# Patient Record
Sex: FEMALE | Race: WHITE | NOT HISPANIC OR LATINO | Employment: OTHER | URBAN - NONMETROPOLITAN AREA
[De-identification: names, ages, dates, MRNs, and addresses within clinical notes are randomized per-mention and may not be internally consistent; named-entity substitution may affect disease eponyms.]

---

## 2019-12-21 ENCOUNTER — APPOINTMENT (EMERGENCY)
Dept: CT IMAGING | Facility: HOSPITAL | Age: 84
End: 2019-12-21
Payer: MEDICARE

## 2019-12-21 ENCOUNTER — HOSPITAL ENCOUNTER (EMERGENCY)
Facility: HOSPITAL | Age: 84
Discharge: HOME | End: 2019-12-21
Attending: EMERGENCY MEDICINE | Admitting: EMERGENCY MEDICINE
Payer: MEDICARE

## 2019-12-21 VITALS
HEART RATE: 58 BPM | DIASTOLIC BLOOD PRESSURE: 88 MMHG | SYSTOLIC BLOOD PRESSURE: 199 MMHG | RESPIRATION RATE: 17 BRPM | OXYGEN SATURATION: 98 % | WEIGHT: 152.78 LBS | TEMPERATURE: 97.6 F

## 2019-12-21 DIAGNOSIS — R10.9 ABDOMINAL PAIN: Primary | ICD-10-CM

## 2019-12-21 LAB
ALBUMIN SERPL BCP-MCNC: 3.5 G/DL (ref 3.5–5)
ALP SERPL-CCNC: 58 U/L (ref 46–116)
ALT SERPL W P-5'-P-CCNC: 19 U/L (ref 12–78)
ANION GAP SERPL CALCULATED.3IONS-SCNC: 7 MMOL/L (ref 4–13)
AST SERPL W P-5'-P-CCNC: 17 U/L (ref 5–45)
BACTERIA UR QL AUTO: ABNORMAL /HPF
BASOPHILS # BLD AUTO: 0.04 THOUSANDS/ΜL (ref 0–0.1)
BASOPHILS NFR BLD AUTO: 1 % (ref 0–1)
BILIRUB SERPL-MCNC: 0.35 MG/DL (ref 0.2–1)
BILIRUB UR QL STRIP: NEGATIVE
BUN SERPL-MCNC: 17 MG/DL (ref 5–25)
CALCIUM SERPL-MCNC: 9.2 MG/DL (ref 8.3–10.1)
CHLORIDE SERPL-SCNC: 102 MMOL/L (ref 100–108)
CLARITY UR: CLEAR
CO2 SERPL-SCNC: 32 MMOL/L (ref 21–32)
COLOR UR: YELLOW
CREAT SERPL-MCNC: 0.76 MG/DL (ref 0.6–1.3)
EOSINOPHIL # BLD AUTO: 0.11 THOUSAND/ΜL (ref 0–0.61)
EOSINOPHIL NFR BLD AUTO: 1 % (ref 0–6)
ERYTHROCYTE [DISTWIDTH] IN BLOOD BY AUTOMATED COUNT: 13.6 % (ref 11.6–15.1)
GFR SERPL CREATININE-BSD FRML MDRD: 72 ML/MIN/1.73SQ M
GLUCOSE SERPL-MCNC: 96 MG/DL (ref 65–140)
GLUCOSE UR STRIP-MCNC: NEGATIVE MG/DL
HCT VFR BLD AUTO: 47 % (ref 34.8–46.1)
HGB BLD-MCNC: 15.1 G/DL (ref 11.5–15.4)
HGB UR QL STRIP.AUTO: ABNORMAL
IMM GRANULOCYTES # BLD AUTO: 0.06 THOUSAND/UL (ref 0–0.2)
IMM GRANULOCYTES NFR BLD AUTO: 1 % (ref 0–2)
KETONES UR STRIP-MCNC: NEGATIVE MG/DL
LACTATE SERPL-SCNC: 1.1 MMOL/L (ref 0.5–2)
LEUKOCYTE ESTERASE UR QL STRIP: ABNORMAL
LYMPHOCYTES # BLD AUTO: 1.32 THOUSANDS/ΜL (ref 0.6–4.47)
LYMPHOCYTES NFR BLD AUTO: 17 % (ref 14–44)
MCH RBC QN AUTO: 28.4 PG (ref 26.8–34.3)
MCHC RBC AUTO-ENTMCNC: 32.1 G/DL (ref 31.4–37.4)
MCV RBC AUTO: 88 FL (ref 82–98)
MONOCYTES # BLD AUTO: 0.64 THOUSAND/ΜL (ref 0.17–1.22)
MONOCYTES NFR BLD AUTO: 8 % (ref 4–12)
NEUTROPHILS # BLD AUTO: 5.54 THOUSANDS/ΜL (ref 1.85–7.62)
NEUTS SEG NFR BLD AUTO: 72 % (ref 43–75)
NITRITE UR QL STRIP: NEGATIVE
NON-SQ EPI CELLS URNS QL MICRO: ABNORMAL /HPF
NRBC BLD AUTO-RTO: 0 /100 WBCS
PH UR STRIP.AUTO: 7.5 [PH]
PLATELET # BLD AUTO: 295 THOUSANDS/UL (ref 149–390)
PMV BLD AUTO: 10 FL (ref 8.9–12.7)
POTASSIUM SERPL-SCNC: 3.5 MMOL/L (ref 3.5–5.3)
PROT SERPL-MCNC: 6.8 G/DL (ref 6.4–8.2)
PROT UR STRIP-MCNC: NEGATIVE MG/DL
RBC # BLD AUTO: 5.32 MILLION/UL (ref 3.81–5.12)
RBC #/AREA URNS AUTO: ABNORMAL /HPF
SODIUM SERPL-SCNC: 141 MMOL/L (ref 136–145)
SP GR UR STRIP.AUTO: 1.01 (ref 1–1.03)
UROBILINOGEN UR QL STRIP.AUTO: 0.2 E.U./DL
WBC # BLD AUTO: 7.71 THOUSAND/UL (ref 4.31–10.16)
WBC #/AREA URNS AUTO: ABNORMAL /HPF

## 2019-12-21 PROCEDURE — 85025 COMPLETE CBC W/AUTO DIFF WBC: CPT | Performed by: EMERGENCY MEDICINE

## 2019-12-21 PROCEDURE — 99284 EMERGENCY DEPT VISIT MOD MDM: CPT

## 2019-12-21 PROCEDURE — 83605 ASSAY OF LACTIC ACID: CPT | Performed by: EMERGENCY MEDICINE

## 2019-12-21 PROCEDURE — 99284 EMERGENCY DEPT VISIT MOD MDM: CPT | Performed by: EMERGENCY MEDICINE

## 2019-12-21 PROCEDURE — 74177 CT ABD & PELVIS W/CONTRAST: CPT

## 2019-12-21 PROCEDURE — 81001 URINALYSIS AUTO W/SCOPE: CPT | Performed by: EMERGENCY MEDICINE

## 2019-12-21 PROCEDURE — 80053 COMPREHEN METABOLIC PANEL: CPT | Performed by: EMERGENCY MEDICINE

## 2019-12-21 PROCEDURE — 36415 COLL VENOUS BLD VENIPUNCTURE: CPT | Performed by: EMERGENCY MEDICINE

## 2019-12-21 RX ORDER — METOCLOPRAMIDE 5 MG/1
TABLET ORAL
COMMUNITY
End: 2020-02-17

## 2019-12-21 RX ORDER — FLUTICASONE PROPIONATE 50 MCG
SPRAY, SUSPENSION (ML) NASAL
COMMUNITY
Start: 2016-08-12 | End: 2020-02-17

## 2019-12-21 RX ORDER — AMLODIPINE BESYLATE 5 MG/1
10 TABLET ORAL
COMMUNITY
End: 2021-06-10

## 2019-12-21 RX ORDER — LANOLIN ALCOHOL/MO/W.PET/CERES
CREAM (GRAM) TOPICAL
COMMUNITY

## 2019-12-21 RX ORDER — METOPROLOL SUCCINATE 25 MG/1
TABLET, EXTENDED RELEASE ORAL DAILY
COMMUNITY
Start: 2019-05-08 | End: 2020-02-17

## 2019-12-21 RX ORDER — MAGNESIUM CHLORIDE 200 MG/ML
1 INJECTION INTRAVENOUS
COMMUNITY
End: 2021-06-10

## 2019-12-21 RX ORDER — METOPROLOL SUCCINATE 50 MG/1
TABLET, EXTENDED RELEASE ORAL
COMMUNITY
End: 2021-06-10

## 2019-12-21 RX ORDER — DOFETILIDE 0.25 MG/1
CAPSULE ORAL 2 TIMES DAILY
COMMUNITY
Start: 2019-02-27 | End: 2021-07-22 | Stop reason: SDUPTHER

## 2019-12-21 RX ORDER — ALPRAZOLAM 0.5 MG/1
TABLET ORAL
COMMUNITY
End: 2020-02-17

## 2019-12-21 RX ORDER — FUROSEMIDE 20 MG/1
TABLET ORAL
COMMUNITY
End: 2020-02-17

## 2019-12-21 RX ORDER — SODIUM CHLORIDE 9 MG/ML
250 INJECTION, SOLUTION INTRAVENOUS CONTINUOUS
Status: DISCONTINUED | OUTPATIENT
Start: 2019-12-21 | End: 2019-12-21 | Stop reason: HOSPADM

## 2019-12-21 RX ORDER — FAMOTIDINE 20 MG/1
TABLET, FILM COATED ORAL
COMMUNITY
End: 2021-06-10

## 2019-12-21 RX ADMIN — IOHEXOL 100 ML: 350 INJECTION, SOLUTION INTRAVENOUS at 12:06

## 2019-12-21 RX ADMIN — SODIUM CHLORIDE 250 ML/HR: 0.9 INJECTION, SOLUTION INTRAVENOUS at 11:23

## 2019-12-21 NOTE — ED NOTES
Patient ambulated to the bathroom with RN to provide urine specimen        Poppy Godoy RN  12/21/19 9061

## 2019-12-21 NOTE — DISCHARGE INSTRUCTIONS
Return immediately if worse or any new symptoms  Please call your physician in 2 days to arrange evaluation within 1-2 days  Tylenol 1000 mg every 6 hours as needed for discomfort

## 2019-12-21 NOTE — ED PROVIDER NOTES
History  Chief Complaint   Patient presents with    Abdominal Pain     pain to the left side and radiating to the back  pt states this is been for a few years  pt states she is burping more today and states her belly feels swollen to her      Worsening left lower quadrant abdominal discomfort with some distention noticed today with associated frequent belching  Occasionally has blood with bowel movements uses pad and on rivaroxaban for Afib  Notes this discomfort has been ongoing for years and was directed colectomy but avoided on advise family physician      Abdominal Pain   Pain location:  LLQ  Pain radiates to:  Back  Pain severity:  Moderate  Onset quality:  Gradual  Duration: Chronically but worse this morning  Progression:  Worsening  Chronicity:  Chronic  Context: not awakening from sleep and not trauma    Associated symptoms: belching    Associated symptoms: no chest pain, no constipation, no diarrhea, no dysuria, no fever, no hematuria and no shortness of breath    Risk factors: being elderly        Prior to Admission Medications   Prescriptions Last Dose Informant Patient Reported? Taking?    ALPRAZolam (XANAX) 0 5 mg tablet 12/20/2019 at Unknown time  Yes Yes   Sig: alprazolam 0 5 mg tablet   Magnesium Chloride 200 MG/ML SOLN 12/21/2019 at Unknown time  Yes Yes   Sig: Take 1 tablet by mouth   amLODIPine (NORVASC) 5 mg tablet 12/21/2019 at Unknown time  Yes Yes   Sig: Take by mouth   calcium citrate-vitamin D (CALCIUM + D) 315-200 MG-UNIT per tablet 12/21/2019 at Unknown time  Yes Yes   Sig: Take by mouth   denosumab (PROLIA) 60 mg/mL 12/21/2019 at Unknown time  Yes Yes   dofetilide (TIKOSYN) 250 mcg capsule 12/21/2019 at Unknown time  Yes Yes   Sig: dofetilide 250 mcg capsule   famotidine (PEPCID) 20 mg tablet 12/21/2019 at Unknown time  Yes Yes   Sig: famotidine 20 mg tablet   fluticasone (FLONASE) 50 mcg/act nasal spray 12/21/2019 at Unknown time  Yes Yes   furosemide (LASIX) 20 mg tablet 12/20/2019 at Unknown time  Yes Yes   Sig: Take by mouth   metoclopramide (REGLAN) 5 mg tablet 12/20/2019 at Unknown time  Yes Yes   metoprolol succinate (TOPROL XL) 50 mg 24 hr tablet 12/21/2019 at Unknown time  Yes Yes   Sig: Take by mouth   metoprolol succinate (TOPROL-XL) 25 mg 24 hr tablet 12/21/2019 at Unknown time  Yes Yes   Sig: Daily   rivaroxaban (XARELTO) 15 mg tablet 12/21/2019 at Unknown time  Yes Yes   Sig: Take by mouth      Facility-Administered Medications: None       Past Medical History:   Diagnosis Date    A-fib [facility])     Cardiac disease     GERD (gastroesophageal reflux disease)     Hyperlipidemia     Hypertension     Osteoporosis     Psychiatric disorder     anxiety       Past Surgical History:   Procedure Laterality Date    EYE SURGERY      cataracts       History reviewed  No pertinent family history  I have reviewed and agree with the history as documented  Social History     Tobacco Use    Smoking status: Never Smoker    Smokeless tobacco: Never Used   Substance Use Topics    Alcohol use: Not Currently    Drug use: Never        Review of Systems   Constitutional: Negative for fever  Respiratory: Negative for shortness of breath  Cardiovascular: Negative for chest pain  Gastrointestinal: Positive for abdominal pain  Negative for constipation and diarrhea  Genitourinary: Negative for dysuria and hematuria  All other systems reviewed and are negative  Physical Exam  Physical Exam   Constitutional: She is oriented to person, place, and time  She appears well-developed and well-nourished  She does not appear ill  HENT:   Head: Normocephalic and atraumatic  Mouth/Throat: Oropharynx is clear and moist    Eyes: Pupils are equal, round, and reactive to light  Conjunctivae and EOM are normal    Neck: Neck supple  Cardiovascular: Normal rate, regular rhythm and normal heart sounds  Pulmonary/Chest: Effort normal and breath sounds normal    Abdominal: Soft   Normal appearance and bowel sounds are normal  She exhibits no distension  There is tenderness in the left lower quadrant  There is no rigidity, no rebound, no guarding and no CVA tenderness  Circumferential anal hemorrhoids in various stages from tags to purplish are generally nontender without bleeding, rectal exam brown stool is guaiac negative   Musculoskeletal: Normal range of motion  Neurological: She is alert and oriented to person, place, and time  No cranial nerve deficit  Coordination normal    Skin: Skin is warm and dry  Psychiatric: She has a normal mood and affect  Her behavior is normal  Judgment and thought content normal    Nursing note and vitals reviewed  Vital Signs  ED Triage Vitals   Temperature Pulse Respirations Blood Pressure SpO2   12/21/19 1037 12/21/19 1037 12/21/19 1037 12/21/19 1038 12/21/19 1037   97 6 °F (36 4 °C) 69 16 (!) 220/93 100 %      Temp src Heart Rate Source Patient Position - Orthostatic VS BP Location FiO2 (%)   -- 12/21/19 1130 12/21/19 1130 12/21/19 1130 --    Monitor Sitting Right arm       Pain Score       --                  Vitals:    12/21/19 1037 12/21/19 1038 12/21/19 1130 12/21/19 1248   BP:  (!) 220/93 (!) 200/84 (!) 199/88   Pulse: 69  56 58   Patient Position - Orthostatic VS:   Sitting Sitting         Visual Acuity      ED Medications  Medications   sodium chloride 0 9 % infusion (0 mL/hr Intravenous Stopped 12/21/19 1247)   iohexol (OMNIPAQUE) 350 MG/ML injection (MULTI-DOSE) 100 mL (100 mL Intravenous Given 12/21/19 1206)       Diagnostic Studies  Results Reviewed     Procedure Component Value Units Date/Time    Lactic acid, plasma [[id number]]  (Normal) Collected:  12/21/19 1117    Lab Status:  Final result Specimen:  Blood from Arm, Right Updated:  12/21/19 1147     LACTIC ACID 1 1 mmol/L     Narrative:       Result may be elevated if tourniquet was used during collection      Comprehensive metabolic panel [[id number]] Collected:  12/21/19 1117    Lab Status:  Final result Specimen:  Blood from Arm, Right Updated:  12/21/19 1144     Sodium 141 mmol/L      Potassium 3 5 mmol/L      Chloride 102 mmol/L      CO2 32 mmol/L      ANION GAP 7 mmol/L      BUN 17 mg/dL      Creatinine 0 76 mg/dL      Glucose 96 mg/dL      Calcium 9 2 mg/dL      AST 17 U/L      ALT 19 U/L      Alkaline Phosphatase 58 U/L      Total Protein 6 8 g/dL      Albumin 3 5 g/dL      Total Bilirubin 0 35 mg/dL      eGFR 72 ml/min/1 73sq m     Narrative:       [place] guidelines for Chronic Kidney Disease (CKD):     Stage 1 with normal or high GFR (GFR > 90 mL/min/1 73 square meters)    Stage 2 Mild CKD (GFR = 60-89 mL/min/1 73 square meters)    Stage 3A Moderate CKD (GFR = 45-59 mL/min/1 73 square meters)    Stage 3B Moderate CKD (GFR = 30-44 mL/min/1 73 square meters)    Stage 4 Severe CKD (GFR = 15-29 mL/min/1 73 square meters)    Stage 5 End Stage CKD (GFR <15 mL/min/1 73 square meters)  Note: GFR calculation is accurate only with a steady state creatinine    Urine Microscopic [[id number]]  (Abnormal) Collected:  12/21/19 1119    Lab Status:  Final result Specimen:  Urine, Clean Catch Updated:  12/21/19 1137     RBC, UA 0-5 /hpf      WBC, UA 0-5 /hpf      Epithelial Cells Moderate /hpf      Bacteria, UA None Seen /hpf     UA w Reflex to Microscopic w Reflex to Culture [[id number]]  (Abnormal) Collected:  12/21/19 1119    Lab Status:  Final result Specimen:  Urine, Clean Catch Updated:  12/21/19 1129     Color, UA Yellow     Clarity, UA Clear     Specific Gravity, UA 1 010     pH, UA 7 5     Leukocytes, UA Trace     Nitrite, UA Negative     Protein, UA Negative mg/dl      Glucose, UA Negative mg/dl      Ketones, UA Negative mg/dl      Urobilinogen, UA 0 2 E U /dl      Bilirubin, UA Negative     Blood, UA Trace-Intact    CBC and differential [[id number]]  (Abnormal) Collected:  12/21/19 1117    Lab Status:  Final result Specimen:  Blood from Arm, Right Updated:  12/21/19 1128 WBC 7 71 Thousand/uL      RBC 5 32 Million/uL      Hemoglobin 15 1 g/dL      Hematocrit 47 0 %      MCV 88 fL      MCH 28 4 pg      MCHC 32 1 g/dL      RDW 13 6 %      MPV 10 0 fL      Platelets 660 Thousands/uL      nRBC 0 /100 WBCs      Neutrophils Relative 72 %      Immat GRANS % 1 %      Lymphocytes Relative 17 %      Monocytes Relative 8 %      Eosinophils Relative 1 %      Basophils Relative 1 %      Neutrophils Absolute 5 54 Thousands/µL      Immature Grans Absolute 0 06 Thousand/uL      Lymphocytes Absolute 1 32 Thousands/µL      Monocytes Absolute 0 64 Thousand/µL      Eosinophils Absolute 0 11 Thousand/µL      Basophils Absolute 0 04 Thousands/µL                  CT abdomen pelvis with contrast   Final Result by Yung Bernal MD (12/21 2605)         1  No acute abnormality identified in the abdomen or pelvis  2   Colonic diverticulosis without evidence for diverticulitis  3   Additional nonemergent findings as noted  Workstation performed: [id number]                    Procedures  Procedures         ED Course  ED Course as of Dec 21 1250   Sat Dec 21, 2019   1243 Feeling better  Appears comfortable  Discussed results and agreeable with close outpatient followup, no antibiotics, voices good understanding to return immediately if worse or any new symptoms, continue current medications for blood pressure control                                  MDM      Disposition  Final diagnoses:   Abdominal pain     Time reflects when diagnosis was documented in both MDM as applicable and the Disposition within this note     Time User Action Codes Description Comment    12/21/2019 12:44 PM Keisha Ferrer Add [R10 9] Abdominal pain       ED Disposition     ED Disposition Condition Date/Time Comment    Discharge Stable Sat Dec 21, 2019 12:44 PM Jillene Postal discharge to home/self care              Follow-up Information    None         Patient's Medications   Discharge Prescriptions    No medications on file     No discharge procedures on file      ED Provider  Electronically Signed by           Raad Millan,   12/21/19 1246       Raad Millan,   12/21/19 1258

## 2019-12-21 NOTE — ED NOTES
Patient returned to room and then ambulated to the bathroom        Malgorzata Sifuentes RN  12/21/19 5564

## 2019-12-27 ENCOUNTER — APPOINTMENT (EMERGENCY)
Dept: CT IMAGING | Facility: HOSPITAL | Age: 84
End: 2019-12-27
Payer: MEDICARE

## 2019-12-27 ENCOUNTER — TELEPHONE (OUTPATIENT)
Dept: CASE MANAGEMENT | Facility: HOSPITAL | Age: 84
End: 2019-12-27

## 2019-12-27 ENCOUNTER — HOSPITAL ENCOUNTER (EMERGENCY)
Facility: HOSPITAL | Age: 84
Discharge: HOME | End: 2019-12-27
Attending: EMERGENCY MEDICINE | Admitting: EMERGENCY MEDICINE
Payer: MEDICARE

## 2019-12-27 VITALS
WEIGHT: 120 LBS | HEIGHT: 63 IN | DIASTOLIC BLOOD PRESSURE: 90 MMHG | OXYGEN SATURATION: 96 % | BODY MASS INDEX: 21.26 KG/M2 | HEART RATE: 68 BPM | SYSTOLIC BLOOD PRESSURE: 179 MMHG | RESPIRATION RATE: 18 BRPM

## 2019-12-27 DIAGNOSIS — M54.9 BACK PAIN: ICD-10-CM

## 2019-12-27 DIAGNOSIS — S81.819A LEG LACERATION: Primary | ICD-10-CM

## 2019-12-27 LAB
ANION GAP SERPL CALCULATED.3IONS-SCNC: 6 MMOL/L (ref 4–13)
BASOPHILS # BLD AUTO: 0.06 THOUSANDS/ΜL (ref 0–0.1)
BASOPHILS NFR BLD AUTO: 1 % (ref 0–1)
BUN SERPL-MCNC: 22 MG/DL (ref 5–25)
CALCIUM SERPL-MCNC: 8.5 MG/DL (ref 8.3–10.1)
CHLORIDE SERPL-SCNC: 104 MMOL/L (ref 100–108)
CO2 SERPL-SCNC: 31 MMOL/L (ref 21–32)
CREAT SERPL-MCNC: 0.86 MG/DL (ref 0.6–1.3)
EOSINOPHIL # BLD AUTO: 0.19 THOUSAND/ΜL (ref 0–0.61)
EOSINOPHIL NFR BLD AUTO: 2 % (ref 0–6)
ERYTHROCYTE [DISTWIDTH] IN BLOOD BY AUTOMATED COUNT: 13.9 % (ref 11.6–15.1)
GFR SERPL CREATININE-BSD FRML MDRD: 62 ML/MIN/1.73SQ M
GLUCOSE SERPL-MCNC: 111 MG/DL (ref 65–140)
HCT VFR BLD AUTO: 44.6 % (ref 34.8–46.1)
HGB BLD-MCNC: 14.6 G/DL (ref 11.5–15.4)
IMM GRANULOCYTES # BLD AUTO: 0.05 THOUSAND/UL (ref 0–0.2)
IMM GRANULOCYTES NFR BLD AUTO: 1 % (ref 0–2)
LYMPHOCYTES # BLD AUTO: 1.89 THOUSANDS/ΜL (ref 0.6–4.47)
LYMPHOCYTES NFR BLD AUTO: 19 % (ref 14–44)
MCH RBC QN AUTO: 29.2 PG (ref 26.8–34.3)
MCHC RBC AUTO-ENTMCNC: 32.7 G/DL (ref 31.4–37.4)
MCV RBC AUTO: 89 FL (ref 82–98)
MONOCYTES # BLD AUTO: 0.68 THOUSAND/ΜL (ref 0.17–1.22)
MONOCYTES NFR BLD AUTO: 7 % (ref 4–12)
NEUTROPHILS # BLD AUTO: 7.08 THOUSANDS/ΜL (ref 1.85–7.62)
NEUTS SEG NFR BLD AUTO: 70 % (ref 43–75)
NRBC BLD AUTO-RTO: 0 /100 WBCS
PLATELET # BLD AUTO: 294 THOUSANDS/UL (ref 149–390)
PMV BLD AUTO: 10.2 FL (ref 8.9–12.7)
POTASSIUM SERPL-SCNC: 3.8 MMOL/L (ref 3.5–5.3)
RBC # BLD AUTO: 5 MILLION/UL (ref 3.81–5.12)
SODIUM SERPL-SCNC: 141 MMOL/L (ref 136–145)
WBC # BLD AUTO: 9.95 THOUSAND/UL (ref 4.31–10.16)

## 2019-12-27 PROCEDURE — 85025 COMPLETE CBC W/AUTO DIFF WBC: CPT | Performed by: EMERGENCY MEDICINE

## 2019-12-27 PROCEDURE — 80048 BASIC METABOLIC PNL TOTAL CA: CPT | Performed by: EMERGENCY MEDICINE

## 2019-12-27 PROCEDURE — 96374 THER/PROPH/DIAG INJ IV PUSH: CPT

## 2019-12-27 PROCEDURE — 36415 COLL VENOUS BLD VENIPUNCTURE: CPT | Performed by: EMERGENCY MEDICINE

## 2019-12-27 PROCEDURE — 72131 CT LUMBAR SPINE W/O DYE: CPT

## 2019-12-27 PROCEDURE — 96361 HYDRATE IV INFUSION ADD-ON: CPT

## 2019-12-27 PROCEDURE — 99284 EMERGENCY DEPT VISIT MOD MDM: CPT

## 2019-12-27 PROCEDURE — 99285 EMERGENCY DEPT VISIT HI MDM: CPT | Performed by: EMERGENCY MEDICINE

## 2019-12-27 RX ORDER — SODIUM CHLORIDE 9 MG/ML
125 INJECTION, SOLUTION INTRAVENOUS CONTINUOUS
Status: DISCONTINUED | OUTPATIENT
Start: 2019-12-27 | End: 2019-12-27 | Stop reason: HOSPADM

## 2019-12-27 RX ORDER — MORPHINE SULFATE 10 MG/ML
5 INJECTION, SOLUTION INTRAMUSCULAR; INTRAVENOUS ONCE
Status: COMPLETED | OUTPATIENT
Start: 2019-12-27 | End: 2019-12-27

## 2019-12-27 RX ADMIN — SODIUM CHLORIDE 125 ML/HR: 0.9 INJECTION, SOLUTION INTRAVENOUS at 09:02

## 2019-12-27 RX ADMIN — MORPHINE SULFATE 5 MG: 10 INJECTION INTRAVENOUS at 09:05

## 2019-12-27 NOTE — DISCHARGE INSTRUCTIONS
Return immediately if worsening symptoms  Dressing change daily with Xeroform or bacitracin/gauze wrap

## 2019-12-27 NOTE — CASE MANAGEMENT
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 who has early demential with mild impairment  We discussed that the last 2 days she has noted increase back pain and been using a cane, normally she does not use a cane  She was able to manage her self care prior to admission  She does cook, drive and does have a cleaning lady  When asked if her family leaved near by to assist - she said no  Identified PCP is Dr Kelin Tejada, per patient she last seen him in December  Pt shared she does have an appointment 12/30 with Dr Mariana Scanlon as a follow up as she was in the ED last week  CM called  and go the call center for [facility], [facility]  and provided information to practice of pt being in the ED and needing home care with wound care  Call center will inform Located within [facility] PCP  Reviewed Home Care options:   A post acute care recommendation was made by your care team for [facility]  Discussed [place] of Choice with patient  List of agencies given to patient via in person  patient aware the list is custom filtered for them by zip code location and that [facility] post acute providers are designated  1st choice was [facility]: CM called [facility] and they had no available staffing  Discussed this with patient and suggested we make multiple referrals to see who can accomodate her needs  She was agreeable  We reviewed the list and additional referrals were made to : [facility], [facility], [address] [facility], [address] [place] [address], [facility]  Await accepting home care  [address] can accept for [facility] of 12/29  [facility] can not start services until Monday  Spoke to ED provider and [facility] for 12/29 has been approved  CM spoke to Suly Haile at [facility] and all required information was received    Pt is aware and phone number was provided to patient for [address]  Updated her bedside nurse

## 2019-12-27 NOTE — DISCHARGE INSTR - APPOINTMENTS
Please Note [facility] has accepted for [facility] 12/29/ 2019 ---[phone]

## 2019-12-27 NOTE — ED PROVIDER NOTES
History  Chief Complaint   Patient presents with    Leg Injury     RLE skin tear/avulsion  Pt reports ironing board fell onto RLE just pta     Right mid lower back pain worsened yesterday  Notes it is chronic and intermittent and related to compression fractures/kyphoplasty  No numbness or weakness  No bowel or bladder incontinence/retention  No fever chills  Also dropped iron right lower extremity incurring wound prior to arrival  Tetanus vaccine up today      Back Pain   Location:  Lumbar spine  Quality:  Aching  Radiates to:  Does not radiate  Pain severity:  Severe  Timing:  Intermittent  Chronicity:  Chronic  Relieved by:  Being still      Prior to Admission Medications   Prescriptions Last Dose Informant Patient Reported? Taking?    ALPRAZolam (XANAX) 0 5 mg tablet 12/26/2019 at Unknown time  Yes Yes   Sig: alprazolam 0 5 mg tablet   Magnesium Chloride 200 MG/ML SOLN 12/26/2019 at Unknown time  Yes Yes   Sig: Take 1 tablet by mouth   amLODIPine (NORVASC) 5 mg tablet 12/26/2019 at Unknown time  Yes Yes   Sig: Take by mouth   calcium citrate-vitamin D (CALCIUM + D) 315-200 MG-UNIT per tablet 12/26/2019 at Unknown time  Yes Yes   Sig: Take by mouth   denosumab (PROLIA) 60 mg/mL 12/26/2019 at Unknown time  Yes Yes   dofetilide (TIKOSYN) 250 mcg capsule 12/26/2019 at Unknown time  Yes Yes   Sig: dofetilide 250 mcg capsule   famotidine (PEPCID) 20 mg tablet 12/26/2019 at Unknown time  Yes Yes   Sig: famotidine 20 mg tablet   fluticasone (FLONASE) 50 mcg/act nasal spray 12/26/2019 at Unknown time  Yes Yes   furosemide (LASIX) 20 mg tablet 12/26/2019 at Unknown time  Yes Yes   Sig: Take by mouth   metoclopramide (REGLAN) 5 mg tablet 12/26/2019 at Unknown time  Yes Yes   metoprolol succinate (TOPROL XL) 50 mg 24 hr tablet 12/26/2019 at Unknown time  Yes Yes   Sig: Take by mouth   metoprolol succinate (TOPROL-XL) 25 mg 24 hr tablet 12/26/2019 at Unknown time  Yes Yes   Sig: Daily   rivaroxaban (XARELTO) 15 mg tablet 12/26/2019 at Unknown time  Yes Yes   Sig: Take by mouth      Facility-Administered Medications: None       Past Medical History:   Diagnosis Date    A-fib [facility])     Cardiac disease     GERD (gastroesophageal reflux disease)     Hyperlipidemia     Hypertension     Osteoporosis     Psychiatric disorder     anxiety       Past Surgical History:   Procedure Laterality Date    BACK SURGERY      EYE SURGERY      cataracts       History reviewed  No pertinent family history  I have reviewed and agree with the history as documented  Social History     Tobacco Use    Smoking status: Never Smoker    Smokeless tobacco: Never Used   Substance Use Topics    Alcohol use: Not Currently    Drug use: Never        Review of Systems   Musculoskeletal: Positive for back pain  All other systems reviewed and are negative  Physical Exam  Physical Exam   Constitutional: She is oriented to person, place, and time  She appears well-developed and well-nourished  HENT:   Head: Normocephalic and atraumatic  Mouth/Throat: Oropharynx is clear and moist    Eyes: Pupils are equal, round, and reactive to light  Conjunctivae and EOM are normal    Neck: Neck supple  Cardiovascular: Normal rate, regular rhythm and normal heart sounds  Pulmonary/Chest: Effort normal and breath sounds normal    Abdominal: Soft  Bowel sounds are normal  She exhibits no distension  There is no tenderness  Musculoskeletal: Normal range of motion  She exhibits tenderness  She exhibits no deformity  Right mid paralumbar musculature area tender, no spinal deformitiesbut generally tender   Neurological: She is alert and oriented to person, place, and time  No cranial nerve deficit  Coordination normal    Skin: Skin is warm and dry  Right mid distal anterior legs with v-shaped skin tear approximately 3 x 3 cm each leg, very superficial, revealing underlying fatty changes, no foreign body   Psychiatric: She has a normal mood and affect   Her behavior is normal  Judgment and thought content normal    Nursing note and vitals reviewed        Vital Signs  ED Triage Vitals [12/27/19 0824]   Temp Pulse Respirations Blood Pressure SpO2   -- 57 20 (!) 215/81 99 %      Temp src Heart Rate Source Patient Position - Orthostatic VS BP Location FiO2 (%)   -- Monitor Lying Left arm --      Pain Score       9           Vitals:    12/27/19 0824 12/27/19 1036   BP: (!) 215/81 (!) 195/86   Pulse: 57 70   Patient Position - Orthostatic VS: Lying          Visual Acuity      ED Medications  Medications   sodium chloride 0 9 % infusion (125 mL/hr Intravenous New Bag 12/27/19 0902)   morphine (PF) 10 mg/mL injection 5 mg (5 mg Intravenous Given 12/27/19 0905)       Diagnostic Studies  Results Reviewed     Procedure Component Value Units Date/Time    Basic metabolic panel [[id number]] Collected:  12/27/19 0859    Lab Status:  Final result Specimen:  Blood from Arm, Left Updated:  12/27/19 2089     Sodium 141 mmol/L      Potassium 3 8 mmol/L      Chloride 104 mmol/L      CO2 31 mmol/L      ANION GAP 6 mmol/L      BUN 22 mg/dL      Creatinine 0 86 mg/dL      Glucose 111 mg/dL      Calcium 8 5 mg/dL      eGFR 62 ml/min/1 73sq m     Narrative:       [place] guidelines for Chronic Kidney Disease (CKD):     Stage 1 with normal or high GFR (GFR > 90 mL/min/1 73 square meters)    Stage 2 Mild CKD (GFR = 60-89 mL/min/1 73 square meters)    Stage 3A Moderate CKD (GFR = 45-59 mL/min/1 73 square meters)    Stage 3B Moderate CKD (GFR = 30-44 mL/min/1 73 square meters)    Stage 4 Severe CKD (GFR = 15-29 mL/min/1 73 square meters)    Stage 5 End Stage CKD (GFR <15 mL/min/1 73 square meters)  Note: GFR calculation is accurate only with a steady state creatinine    CBC and differential [[id number]] Collected:  12/27/19 0859    Lab Status:  Final result Specimen:  Blood from Arm, Left Updated:  12/27/19 0917     WBC 9 95 Thousand/uL      RBC 5 00 Million/uL Hemoglobin 14 6 g/dL      Hematocrit 44 6 %      MCV 89 fL      MCH 29 2 pg      MCHC 32 7 g/dL      RDW 13 9 %      MPV 10 2 fL      Platelets 100 Thousands/uL      nRBC 0 /100 WBCs      Neutrophils Relative 70 %      Immat GRANS % 1 %      Lymphocytes Relative 19 %      Monocytes Relative 7 %      Eosinophils Relative 2 %      Basophils Relative 1 %      Neutrophils Absolute 7 08 Thousands/µL      Immature Grans Absolute 0 05 Thousand/uL      Lymphocytes Absolute 1 89 Thousands/µL      Monocytes Absolute 0 68 Thousand/µL      Eosinophils Absolute 0 19 Thousand/µL      Basophils Absolute 0 06 Thousands/µL                  CT lumbar spine without contrast   Final Result by Martina Cook MD (12/27 1014)      No acute pathology and no significant change since 12/21/2019  Multilevel vertebroplasty noted  There are areas of central canal narrowing at L1 due to retropulsion of bone from the pre-existing fracture and also at L4-L5 due to degenerative changes as    discussed above  There is no critical central canal stenosis  There is moderate stenosis at L4-L5  L3-L4 right-sided moderate to marked foraminal stenosis              Workstation performed: [id number]                    Procedures  Procedures         ED Course  ED Course as of Dec 27 1130   Fri Dec 27, 2019   1012 Right lower extremity wound cleaned, Steri-Strips to approximate the flap, Xeroform gauze trimmed then placed over exposed wound, gauze pad and gauze wrap, well tolerated  Notes back pain and is feeling better                                  MDM      Disposition  Final diagnoses:   Leg laceration   Back pain     Time reflects when diagnosis was documented in both MDM as applicable and the Disposition within this note     Time User Action Codes Description Comment    12/27/2019 11:01 AM Yohana Wheeler Add [O39 729P] Leg laceration     12/27/2019 11:02 AM Yohana Wheeler Add [M54 9] Back pain       ED Disposition     ED Disposition Condition Date/Time Comment    Discharge Stable Fri Dec 27, 2019 11:01 AM Jayce Dubose discharge to home/self care              Follow-up Information     Follow up With Specialties Details Why Contact Info    Marshall Corrales MD Family Medicine Schedule an appointment as soon as possible for a visit in 1 week  Marc Montoya [phone]  [phone]            Patient's Medications   Discharge Prescriptions    No medications on file         ED Provider  Electronically Signed by           Minda Strong,   12/27/19 102 [facility] [address], DO  12/27/19 1130

## 2020-02-17 ENCOUNTER — HOSPITAL ENCOUNTER (OUTPATIENT)
Facility: HOSPITAL | Age: 85
Setting detail: OBSERVATION
Discharge: HOME | End: 2020-02-18
Attending: EMERGENCY MEDICINE | Admitting: FAMILY MEDICINE
Payer: MEDICARE

## 2020-02-17 ENCOUNTER — APPOINTMENT (EMERGENCY)
Dept: RADIOLOGY | Facility: HOSPITAL | Age: 85
End: 2020-02-17
Payer: MEDICARE

## 2020-02-17 DIAGNOSIS — R55 SYNCOPE: ICD-10-CM

## 2020-02-17 DIAGNOSIS — K59.00 CONSTIPATION: Primary | ICD-10-CM

## 2020-02-17 PROBLEM — D72.829 LEUKOCYTOSIS: Status: ACTIVE | Noted: 2020-02-17

## 2020-02-17 PROBLEM — E87.6 HYPOKALEMIA: Status: ACTIVE | Noted: 2020-02-17

## 2020-02-17 LAB
ALBUMIN SERPL BCP-MCNC: 3.2 G/DL (ref 3.5–5)
ALP SERPL-CCNC: 60 U/L (ref 46–116)
ALT SERPL W P-5'-P-CCNC: 15 U/L (ref 12–78)
ANION GAP SERPL CALCULATED.3IONS-SCNC: 11 MMOL/L (ref 4–13)
AST SERPL W P-5'-P-CCNC: 20 U/L (ref 5–45)
BASOPHILS # BLD AUTO: 0.1 THOUSANDS/ΜL (ref 0–0.1)
BASOPHILS NFR BLD AUTO: 1 % (ref 0–1)
BILIRUB SERPL-MCNC: 0.34 MG/DL (ref 0.2–1)
BILIRUB UR QL STRIP: NEGATIVE
BUN SERPL-MCNC: 13 MG/DL (ref 5–25)
CALCIUM SERPL-MCNC: 8.8 MG/DL (ref 8.3–10.1)
CHLORIDE SERPL-SCNC: 103 MMOL/L (ref 100–108)
CLARITY UR: CLEAR
CO2 SERPL-SCNC: 26 MMOL/L (ref 21–32)
COLOR UR: YELLOW
CREAT SERPL-MCNC: 0.86 MG/DL (ref 0.6–1.3)
EOSINOPHIL # BLD AUTO: 0.19 THOUSAND/ΜL (ref 0–0.61)
EOSINOPHIL NFR BLD AUTO: 2 % (ref 0–6)
ERYTHROCYTE [DISTWIDTH] IN BLOOD BY AUTOMATED COUNT: 13.6 % (ref 11.6–15.1)
GFR SERPL CREATININE-BSD FRML MDRD: 62 ML/MIN/1.73SQ M
GLUCOSE SERPL-MCNC: 149 MG/DL (ref 65–140)
GLUCOSE UR STRIP-MCNC: NEGATIVE MG/DL
HCT VFR BLD AUTO: 44.6 % (ref 34.8–46.1)
HGB BLD-MCNC: 14.3 G/DL (ref 11.5–15.4)
HGB UR QL STRIP.AUTO: NEGATIVE
IMM GRANULOCYTES # BLD AUTO: 0.09 THOUSAND/UL (ref 0–0.2)
IMM GRANULOCYTES NFR BLD AUTO: 1 % (ref 0–2)
KETONES UR STRIP-MCNC: NEGATIVE MG/DL
LEUKOCYTE ESTERASE UR QL STRIP: NEGATIVE
LYMPHOCYTES # BLD AUTO: 2.26 THOUSANDS/ΜL (ref 0.6–4.47)
LYMPHOCYTES NFR BLD AUTO: 21 % (ref 14–44)
MCH RBC QN AUTO: 29.1 PG (ref 26.8–34.3)
MCHC RBC AUTO-ENTMCNC: 32.1 G/DL (ref 31.4–37.4)
MCV RBC AUTO: 91 FL (ref 82–98)
MONOCYTES # BLD AUTO: 0.84 THOUSAND/ΜL (ref 0.17–1.22)
MONOCYTES NFR BLD AUTO: 8 % (ref 4–12)
NEUTROPHILS # BLD AUTO: 7.29 THOUSANDS/ΜL (ref 1.85–7.62)
NEUTS SEG NFR BLD AUTO: 67 % (ref 43–75)
NITRITE UR QL STRIP: NEGATIVE
NRBC BLD AUTO-RTO: 0 /100 WBCS
PH UR STRIP.AUTO: 8.5 [PH]
PLATELET # BLD AUTO: 342 THOUSANDS/UL (ref 149–390)
PMV BLD AUTO: 10.1 FL (ref 8.9–12.7)
POTASSIUM SERPL-SCNC: 3.4 MMOL/L (ref 3.5–5.3)
PROT SERPL-MCNC: 6.5 G/DL (ref 6.4–8.2)
PROT UR STRIP-MCNC: NEGATIVE MG/DL
RBC # BLD AUTO: 4.91 MILLION/UL (ref 3.81–5.12)
SODIUM SERPL-SCNC: 140 MMOL/L (ref 136–145)
SP GR UR STRIP.AUTO: 1.01 (ref 1–1.03)
TROPONIN I SERPL-MCNC: 0.02 NG/ML
TROPONIN I SERPL-MCNC: <0.02 NG/ML
TROPONIN I SERPL-MCNC: <0.02 NG/ML
UROBILINOGEN UR QL STRIP.AUTO: 0.2 E.U./DL
WBC # BLD AUTO: 10.77 THOUSAND/UL (ref 4.31–10.16)

## 2020-02-17 PROCEDURE — 99285 EMERGENCY DEPT VISIT HI MDM: CPT

## 2020-02-17 PROCEDURE — 99220 PR INITIAL OBSERVATION CARE/DAY 70 MINUTES: CPT | Performed by: FAMILY MEDICINE

## 2020-02-17 PROCEDURE — 84484 ASSAY OF TROPONIN QUANT: CPT | Performed by: EMERGENCY MEDICINE

## 2020-02-17 PROCEDURE — 85025 COMPLETE CBC W/AUTO DIFF WBC: CPT | Performed by: EMERGENCY MEDICINE

## 2020-02-17 PROCEDURE — 99283 EMERGENCY DEPT VISIT LOW MDM: CPT | Performed by: EMERGENCY MEDICINE

## 2020-02-17 PROCEDURE — 80053 COMPREHEN METABOLIC PANEL: CPT | Performed by: EMERGENCY MEDICINE

## 2020-02-17 PROCEDURE — 36415 COLL VENOUS BLD VENIPUNCTURE: CPT | Performed by: EMERGENCY MEDICINE

## 2020-02-17 PROCEDURE — 81003 URINALYSIS AUTO W/O SCOPE: CPT | Performed by: NURSE PRACTITIONER

## 2020-02-17 PROCEDURE — 84484 ASSAY OF TROPONIN QUANT: CPT | Performed by: FAMILY MEDICINE

## 2020-02-17 PROCEDURE — 74022 RADEX COMPL AQT ABD SERIES: CPT

## 2020-02-17 RX ORDER — MAGNESIUM HYDROXIDE/ALUMINUM HYDROXICE/SIMETHICONE 120; 1200; 1200 MG/30ML; MG/30ML; MG/30ML
30 SUSPENSION ORAL EVERY 6 HOURS PRN
Status: DISCONTINUED | OUTPATIENT
Start: 2020-02-17 | End: 2020-02-18 | Stop reason: HOSPADM

## 2020-02-17 RX ORDER — POLYETHYLENE GLYCOL 3350 17 G/17G
17 POWDER, FOR SOLUTION ORAL DAILY
COMMUNITY

## 2020-02-17 RX ORDER — POLYETHYLENE GLYCOL 3350 17 G/17G
17 POWDER, FOR SOLUTION ORAL DAILY
Status: DISCONTINUED | OUTPATIENT
Start: 2020-02-18 | End: 2020-02-18 | Stop reason: HOSPADM

## 2020-02-17 RX ORDER — FAMOTIDINE 20 MG/1
20 TABLET, FILM COATED ORAL 2 TIMES DAILY
Status: DISCONTINUED | OUTPATIENT
Start: 2020-02-17 | End: 2020-02-18 | Stop reason: HOSPADM

## 2020-02-17 RX ORDER — ONDANSETRON 2 MG/ML
4 INJECTION INTRAMUSCULAR; INTRAVENOUS EVERY 6 HOURS PRN
Status: DISCONTINUED | OUTPATIENT
Start: 2020-02-17 | End: 2020-02-18 | Stop reason: HOSPADM

## 2020-02-17 RX ORDER — DOFETILIDE 0.25 MG/1
250 CAPSULE ORAL EVERY 12 HOURS SCHEDULED
Status: DISCONTINUED | OUTPATIENT
Start: 2020-02-17 | End: 2020-02-18 | Stop reason: HOSPADM

## 2020-02-17 RX ORDER — AMLODIPINE BESYLATE 5 MG/1
5 TABLET ORAL DAILY
Status: DISCONTINUED | OUTPATIENT
Start: 2020-02-17 | End: 2020-02-18 | Stop reason: HOSPADM

## 2020-02-17 RX ORDER — METOPROLOL SUCCINATE 50 MG/1
50 TABLET, EXTENDED RELEASE ORAL DAILY
Status: DISCONTINUED | OUTPATIENT
Start: 2020-02-17 | End: 2020-02-18 | Stop reason: HOSPADM

## 2020-02-17 RX ORDER — POTASSIUM CHLORIDE 20 MEQ/1
40 TABLET, EXTENDED RELEASE ORAL ONCE
Status: COMPLETED | OUTPATIENT
Start: 2020-02-17 | End: 2020-02-17

## 2020-02-17 RX ORDER — CALCIUM CARBONATE 500(1250)
1 TABLET ORAL
Status: DISCONTINUED | OUTPATIENT
Start: 2020-02-18 | End: 2020-02-18 | Stop reason: HOSPADM

## 2020-02-17 RX ORDER — DOFETILIDE 0.25 MG/1
250 CAPSULE ORAL EVERY 12 HOURS SCHEDULED
Status: DISCONTINUED | OUTPATIENT
Start: 2020-02-17 | End: 2020-02-17 | Stop reason: SDUPTHER

## 2020-02-17 RX ORDER — DOCUSATE SODIUM 100 MG/1
100 CAPSULE, LIQUID FILLED ORAL 2 TIMES DAILY
Status: DISCONTINUED | OUTPATIENT
Start: 2020-02-17 | End: 2020-02-18 | Stop reason: HOSPADM

## 2020-02-17 RX ORDER — SENNOSIDES 8.6 MG
1 TABLET ORAL
Status: DISCONTINUED | OUTPATIENT
Start: 2020-02-17 | End: 2020-02-18 | Stop reason: HOSPADM

## 2020-02-17 RX ORDER — LORAZEPAM 1 MG/1
2 TABLET ORAL 3 TIMES DAILY
Status: DISCONTINUED | OUTPATIENT
Start: 2020-02-17 | End: 2020-02-18 | Stop reason: HOSPADM

## 2020-02-17 RX ORDER — ACETAMINOPHEN 325 MG/1
650 TABLET ORAL EVERY 6 HOURS PRN
Status: DISCONTINUED | OUTPATIENT
Start: 2020-02-17 | End: 2020-02-18 | Stop reason: HOSPADM

## 2020-02-17 RX ADMIN — POTASSIUM CHLORIDE 40 MEQ: 1500 TABLET, EXTENDED RELEASE ORAL at 17:12

## 2020-02-17 RX ADMIN — RIVAROXABAN 15 MG: 15 TABLET, FILM COATED ORAL at 18:17

## 2020-02-17 RX ADMIN — STANDARDIZED SENNA CONCENTRATE 8.6 MG: 8.6 TABLET ORAL at 22:31

## 2020-02-17 RX ADMIN — DOFETILIDE 250 MCG: 0.25 CAPSULE ORAL at 20:28

## 2020-02-17 RX ADMIN — SODIUM CHLORIDE 500 ML: 0.9 INJECTION, SOLUTION INTRAVENOUS at 11:40

## 2020-02-17 RX ADMIN — LORAZEPAM 2 MG: 1 TABLET ORAL at 22:31

## 2020-02-17 RX ADMIN — DOCUSATE SODIUM 100 MG: 100 CAPSULE, LIQUID FILLED ORAL at 22:32

## 2020-02-17 RX ADMIN — LORAZEPAM 2 MG: 1 TABLET ORAL at 17:12

## 2020-02-17 NOTE — ED NOTES
Pt transferred to bedside commode     Sanjay Newton RN  02/17/20 825 Kaity Osborne RN  02/17/20 8672

## 2020-02-17 NOTE — ED PROVIDER NOTES
History  Chief Complaint   Patient presents with    Constipation     Pt reports not having a BM for the past 2 days and feels like she has to pass a BM however cannot, she usual goes everyday  Pt took a colace and senna however did not help  History provided by:  Patient  Constipation   Severity:  Mild  Time since last bowel movement:  2 days  Timing:  Constant  Progression:  Waxing and waning  Chronicity:  New  Context: not dehydration, not dietary changes, not medication, not narcotics and not stress    Stool description:  Hard  Unusual stool frequency:  Daily  Relieved by:  Nothing  Ineffective treatments:  Laxatives  Associated symptoms: abdominal pain    Associated symptoms: no anorexia, no back pain, no diarrhea, no dysuria, no fever, no nausea and no vomiting    Abdominal pain:     Location:  Generalized    Quality: bloating      Severity:  Mild    Onset quality:  Gradual    Timing:  Intermittent  Risk factors: no hx of abdominal surgery, no recent antibiotic use, no recent illness, no recent surgery and no recent travel        Prior to Admission Medications   Prescriptions Last Dose Informant Patient Reported? Taking?    ALPRAZolam (XANAX) 0 5 mg tablet   Yes Yes   Sig: alprazolam 0 5 mg tablet   Magnesium Chloride 200 MG/ML SOLN 2/17/2020 at Unknown time  Yes Yes   Sig: Take 1 tablet by mouth   amLODIPine (NORVASC) 5 mg tablet 2/17/2020 at Unknown time  Yes Yes   Sig: Take by mouth   calcium citrate-vitamin D (CALCIUM + D) 315-200 MG-UNIT per tablet 2/16/2020 at Unknown time  Yes Yes   Sig: Take by mouth   denosumab (PROLIA) 60 mg/mL   Yes Yes   dofetilide (TIKOSYN) 250 mcg capsule 2/17/2020 at Unknown time  Yes Yes   Sig: dofetilide 250 mcg capsule   famotidine (PEPCID) 20 mg tablet Not Taking at Unknown time  Yes No   Sig: famotidine 20 mg tablet   fluticasone (FLONASE) 50 mcg/act nasal spray Not Taking at Unknown time  Yes No   furosemide (LASIX) 20 mg tablet Not Taking at Unknown time  Yes No Sig: Take by mouth   metoclopramide (REGLAN) 5 mg tablet Not Taking at Unknown time  Yes No   metoprolol succinate (TOPROL XL) 50 mg 24 hr tablet 2/17/2020 at Unknown time  Yes Yes   Sig: Take by mouth   metoprolol succinate (TOPROL-XL) 25 mg 24 hr tablet Not Taking at Unknown time  Yes No   Sig: Daily   rivaroxaban (XARELTO) 15 mg tablet 2/17/2020 at Unknown time  Yes Yes   Sig: Take by mouth      Facility-Administered Medications: None       Past Medical History:   Diagnosis Date    A-fib [facility])     Cardiac disease     GERD (gastroesophageal reflux disease)     Hyperlipidemia     Hypertension     Osteoporosis     Psychiatric disorder     anxiety       Past Surgical History:   Procedure Laterality Date    BACK SURGERY      EYE SURGERY      cataracts       History reviewed  No pertinent family history  I have reviewed and agree with the history as documented  Social History     Tobacco Use    Smoking status: Never Smoker    Smokeless tobacco: Never Used   Substance Use Topics    Alcohol use: Not Currently    Drug use: Never       Review of Systems   Constitutional: Negative for diaphoresis and fever  HENT: Negative for congestion, ear pain, rhinorrhea, sinus pressure, sinus pain, sore throat and tinnitus  Eyes: Negative for discharge, itching and visual disturbance  Respiratory: Negative for cough, chest tightness, shortness of breath and wheezing  Cardiovascular: Negative for chest pain, palpitations and leg swelling  Gastrointestinal: Positive for abdominal pain and constipation  Negative for anorexia, blood in stool, diarrhea, nausea and vomiting  Endocrine: Negative for polydipsia and polyuria  Genitourinary: Negative for decreased urine volume, dysuria and flank pain  Musculoskeletal: Negative for arthralgias and back pain  Skin: Negative for pallor and rash  Neurological: Negative for dizziness, weakness and headaches  Hematological: Does not bruise/bleed easily  Psychiatric/Behavioral: Negative for sleep disturbance  The patient is not nervous/anxious  All other systems reviewed and are negative  Physical Exam  Physical Exam   Constitutional: She is oriented to person, place, and time  She appears well-developed and well-nourished  HENT:   Head: Normocephalic and atraumatic  Eyes: Pupils are equal, round, and reactive to light  EOM are normal    Neck: Normal range of motion  Neck supple  Cardiovascular: Normal rate, regular rhythm and normal heart sounds  No murmur heard  Pulmonary/Chest: Effort normal and breath sounds normal  No stridor  No respiratory distress  She has no wheezes  She has no rales  She exhibits no tenderness  Abdominal: Soft  Normal appearance and bowel sounds are normal  She exhibits no distension and no ascites  There is tenderness in the left upper quadrant  There is no rebound and no guarding  Musculoskeletal: Normal range of motion  She exhibits no edema or deformity  Neurological: She is alert and oriented to person, place, and time  No cranial nerve deficit  Skin: Skin is warm and dry  No rash noted  No pallor  Psychiatric: She has a normal mood and affect  Her behavior is normal    Nursing note and vitals reviewed        Vital Signs  ED Triage Vitals [02/17/20 0852]   Temperature Pulse Respirations Blood Pressure SpO2   97 8 °F (36 6 °C) 89 18 (!) 173/77 97 %      Temp Source Heart Rate Source Patient Position - Orthostatic VS BP Location FiO2 (%)   Temporal Monitor Lying Right arm --      Pain Score       3           Vitals:    02/17/20 0852 02/17/20 1134   BP: (!) 173/77 109/59   Pulse: 89 91   Patient Position - Orthostatic VS: Lying          Visual Acuity      ED Medications  Medications   sodium chloride 0 9 % bolus 500 mL (has no administration in time range)       Diagnostic Studies  Results Reviewed     Procedure Component Value Units Date/Time    CBC and differential [[id number]]  (Abnormal) Collected: 02/17/20 1135    Lab Status:  Final result Specimen:  Blood from Line, Venous Updated:  02/17/20 1141     WBC 10 77 Thousand/uL      RBC 4 91 Million/uL      Hemoglobin 14 3 g/dL      Hematocrit 44 6 %      MCV 91 fL      MCH 29 1 pg      MCHC 32 1 g/dL      RDW 13 6 %      MPV 10 1 fL      Platelets 812 Thousands/uL      nRBC 0 /100 WBCs      Neutrophils Relative 67 %      Immat GRANS % 1 %      Lymphocytes Relative 21 %      Monocytes Relative 8 %      Eosinophils Relative 2 %      Basophils Relative 1 %      Neutrophils Absolute 7 29 Thousands/µL      Immature Grans Absolute 0 09 Thousand/uL      Lymphocytes Absolute 2 26 Thousands/µL      Monocytes Absolute 0 84 Thousand/µL      Eosinophils Absolute 0 19 Thousand/µL      Basophils Absolute 0 10 Thousands/µL     Troponin I [[id number]] Collected:  02/17/20 1135    Lab Status: In process Specimen:  Blood from Line, Venous Updated:  02/17/20 1138    Comprehensive metabolic panel [[id number]] Collected:  02/17/20 1135    Lab Status: In process Specimen:  Blood from Line, Venous Updated:  02/17/20 1138                 XR abdomen obstruction series   ED Interpretation by Geneva Duran DO (02/17 1008)   No free air  No obstruction  Increased mineralization of L1, L3 and L4      Final Result by Sid Allison MD (02/17 1037)      Fecal stasis with more fecal burden in the rectal region        Workstation performed: [id number]                    Procedures  ECG 12 Lead Documentation Only  Date/Time: 2/17/2020 11:43 AM  Performed by: Geneva Duran DO  Authorized by: Geneva Duran DO     Patient location:  ED  Previous ECG:     Previous ECG:  Unavailable  Rate:     ECG rate assessment: normal    Rhythm:     Rhythm: atrial fibrillation    Ectopy:     Ectopy: none    QRS:     QRS axis:  Normal  Conduction:     Conduction: normal    ST segments:     ST segments:  Non-specific  Comments:      Patient reports that she has a history of atrial fibrillation ED Course  ED Course as of Feb 17 1145   Mon Feb 17, 2020   1018 Patient with no evidence of free air or obstruction  Patient reports that she was not started on any new medications as a cause this constipation  She does report that she does follow-up and colonoscopies and was told at 1 point that she has diverticulosis  The increased mineralization seen on the patient's lumbar spine is related to previous interventional radiology intervention  Have elected to provide the patient with an enema to see if we can get relief her constipation  1128 While attempting to move her bowels following the administration of an enema patient became unresponsive while sitting on the bedside commode with to healthcare providers at bedside  Patient needed to be moved back to her bed and we were able to arouse her with noxious stimuli in the use of some supplemental oxygen via bag-valve mask ventilation  Patient's systolic blood pressure at this time is 109  Suspect that she may have suffered a vasovagal syncopal episode and we will provide some IV fluids and likely place her in observation period                                  MDM  Number of Diagnoses or Management Options  Constipation: new and does not require workup  Syncope: new and requires workup  Diagnosis management comments: Patient presented to the emergency department and a MSE was performed  The patient was evaluated and diagnosed with acute syncope, possibly vasovagal  This is a new issue that will require additional planned work-up and treatment in a hospitalized setting  As may have been required as part of this evaluation, clinical laboratory test, radiology imaging and medical testing (I e  EKG) were ordered as necessitated by the patient's presentation  I independently reviewed these studies, imaging and testing   This patient's case is considered to be a considerable risk secondary to the above listed disease process and poses a threat to the patient's well-being and baseline function  Further in-patient diagnostic testing and management, which may include the administration of parenteral medications, is required  Amount and/or Complexity of Data Reviewed  Clinical lab tests: ordered and reviewed  Tests in the radiology section of CPT®: ordered and reviewed  Independent visualization of images, tracings, or specimens: yes    Risk of Complications, Morbidity, and/or Mortality  Presenting problems: low  Diagnostic procedures: moderate  Management options: moderate          Disposition  Final diagnoses:   Constipation   Syncope     Time reflects when diagnosis was documented in both MDM as applicable and the Disposition within this note     Time User Action Codes Description Comment    2/17/2020 11:29 AM Juvenal Nose Add [K59 00] Constipation     2/17/2020 11:29 AM Juvenal Nose Add [R55] Syncope       ED Disposition     ED Disposition Condition Date/Time Comment    Admit Stable Mon Feb 17, 2020 11:41 AM Case was discussed with Dr Eleni Honeycutt and the patient's admission status was agreed to be Admission Status: observation status to the service of Dr Eleni Honeycutt   Follow-up Information    None         Patient's Medications   Discharge Prescriptions    No medications on file     No discharge procedures on file      PDMP Review     None          ED Provider  Electronically Signed by           Jeanie Laurent DO  02/17/20 5988

## 2020-02-17 NOTE — ED NOTES
Pt rang call bell, when tech went into the room pt was unresponsive on the bedside commode  Tech called this RN into the room and pt was unresponsive to speech but responsive to pain  Dr Eugene Terrell was standing outside the room and responded, called for crash cart and respiratory  Transferred pt into the bed  Carotid and radial present and strong, agonal respirations  Pt placed on 6L and bag mask  Pt became responsive within one to two min  Pt A&O x4        Dieudonnesuha Tineo, JEFFERY  02/17/20 8181

## 2020-02-17 NOTE — ED NOTES
Per pt request, this RN called pt  Odin Sow [phone]) to notify him that the pt will be admitted for observation  Medical Release on file        Randa Baldwin RN  02/17/20 6575

## 2020-02-17 NOTE — ED NOTES
Pt positioned in Left lat Gar  Soap suds enema administered 1000cc solution  Pt instructed to hold solution for 30 min or otherwise tolerated  Pt tolerated procedure well        Derek García, JEFFERY  02/17/20 6632

## 2020-02-18 VITALS
RESPIRATION RATE: 19 BRPM | OXYGEN SATURATION: 97 % | DIASTOLIC BLOOD PRESSURE: 70 MMHG | SYSTOLIC BLOOD PRESSURE: 150 MMHG | HEART RATE: 67 BPM | WEIGHT: 126.1 LBS | BODY MASS INDEX: 22.34 KG/M2 | TEMPERATURE: 97.8 F | HEIGHT: 63 IN

## 2020-02-18 LAB
ALBUMIN SERPL BCP-MCNC: 2.7 G/DL (ref 3.5–5)
ALP SERPL-CCNC: 52 U/L (ref 46–116)
ALT SERPL W P-5'-P-CCNC: 14 U/L (ref 12–78)
ANION GAP SERPL CALCULATED.3IONS-SCNC: 5 MMOL/L (ref 4–13)
AST SERPL W P-5'-P-CCNC: 19 U/L (ref 5–45)
BILIRUB SERPL-MCNC: 0.45 MG/DL (ref 0.2–1)
BUN SERPL-MCNC: 16 MG/DL (ref 5–25)
CALCIUM SERPL-MCNC: 8.4 MG/DL (ref 8.3–10.1)
CHLORIDE SERPL-SCNC: 105 MMOL/L (ref 100–108)
CO2 SERPL-SCNC: 28 MMOL/L (ref 21–32)
CREAT SERPL-MCNC: 0.98 MG/DL (ref 0.6–1.3)
GFR SERPL CREATININE-BSD FRML MDRD: 53 ML/MIN/1.73SQ M
GLUCOSE P FAST SERPL-MCNC: 103 MG/DL (ref 65–99)
GLUCOSE SERPL-MCNC: 103 MG/DL (ref 65–140)
MAGNESIUM SERPL-MCNC: 2 MG/DL (ref 1.6–2.6)
POTASSIUM SERPL-SCNC: 4.3 MMOL/L (ref 3.5–5.3)
PROT SERPL-MCNC: 5.8 G/DL (ref 6.4–8.2)
SODIUM SERPL-SCNC: 138 MMOL/L (ref 136–145)

## 2020-02-18 PROCEDURE — 99217 PR OBSERVATION CARE DISCHARGE MANAGEMENT: CPT | Performed by: FAMILY MEDICINE

## 2020-02-18 PROCEDURE — 80053 COMPREHEN METABOLIC PANEL: CPT | Performed by: FAMILY MEDICINE

## 2020-02-18 PROCEDURE — 83735 ASSAY OF MAGNESIUM: CPT | Performed by: FAMILY MEDICINE

## 2020-02-18 RX ADMIN — Medication 400 MG: at 09:09

## 2020-02-18 RX ADMIN — METOPROLOL SUCCINATE 50 MG: 50 TABLET, EXTENDED RELEASE ORAL at 09:09

## 2020-02-18 RX ADMIN — DOFETILIDE 250 MCG: 0.25 CAPSULE ORAL at 09:10

## 2020-02-18 RX ADMIN — LORAZEPAM 2 MG: 1 TABLET ORAL at 09:09

## 2020-02-18 RX ADMIN — DOCUSATE SODIUM 100 MG: 100 CAPSULE, LIQUID FILLED ORAL at 09:09

## 2020-02-18 RX ADMIN — AMLODIPINE BESYLATE 5 MG: 5 TABLET ORAL at 09:09

## 2020-02-18 RX ADMIN — POLYETHYLENE GLYCOL 3350 17 G: 17 POWDER, FOR SOLUTION ORAL at 09:09

## 2020-02-18 RX ADMIN — CALCIUM 1 TABLET: 500 TABLET ORAL at 09:09

## 2020-02-18 NOTE — ASSESSMENT & PLAN NOTE
· Most likely vasovagal  · As per patient, she had the similar episode multiple times at home-especially when she tried to strain  · As per patient, she had a stress test done 2 years ago which was normal  · Plan is to discharge patient on home medication with follow-up with PCP and Cardiology  ·   ·   · Patient came to the hospital with constipation and had a disimpaction done in the emergency room with subsequent syncope  · Likely vasovagal  · Monitor on the tele  · Troponin  · If remained stable patient can be discharged home tomorrow

## 2020-02-18 NOTE — NURSING NOTE
Pt meets discharge criteria, IV removed  Reviewed exit care and discharge instructions; pt verbalized understanding  All questions answered  Pt has belongings

## 2020-02-18 NOTE — ASSESSMENT & PLAN NOTE
· Patient came to the hospital with constipation and had a disimpaction done in the emergency room with subsequent syncope  · Likely vasovagal  · Monitor on the tele  · Troponin  · If remained stable patient can be discharged home tomorrow

## 2020-02-18 NOTE — DISCHARGE SUMMARY
Discharge- Andrew Russell 1935, 80 y o  female MRN: [id number]    Unit/Bed#: -01 Encounter: [id number]    Primary Care Provider: Brian Jacob MD   Date and time admitted to hospital: 2/17/2020  8:49 AM        * Syncope  Assessment & Plan  · Most likely vasovagal  · As per patient, she had the similar episode multiple times at home-especially when she tried to strain  · As per patient, she had a stress test done 2 years ago which was normal  · Plan is to discharge patient on home medication with follow-up with PCP and Cardiology  ·   ·   · Patient came to the hospital with constipation and had a disimpaction done in the emergency room with subsequent syncope  · Likely vasovagal  · Monitor on the tele  · Troponin  · If remained stable patient can be discharged home tomorrow    Hypokalemia  Assessment & Plan  · Resolved  · Replete and monitor    Leukocytosis  Assessment & Plan  · Unknown etiology  · No signs of infection  · Monitor no signs of infection    Constipation  Assessment & Plan  · Chronic in nature  · Increase p o  Hydration  · Continue laxatives as needed  ·   · Patient had fecal stasis noted on the abdominal x-ray    Status post disimpaction in the emergency room  · Patient with chronic constipation  · During the disimpaction patient had a syncope    Atrial fibrillation with RVR ([address] )  Assessment & Plan  · Rate control  · Actually patient heart rate is running around 50s  · Most likely secondary to medication side effect  · Continue current treatment and management-follow-up with cardiologist and PCP on outpatient basis  ·   · Patient with history of atrial fibrillation and is on metoprolol for rate control and Xarelto for anticoagulation          Discharging Physician / Practitioner: Candy Montes MD  PCP: Brian Jacob MD  Admission Date:   Admission Orders (From admission, onward)     Ordered        02/17/20 1142  Place in Observation  Once                   Discharge Date: 02/18/20    Resolved Problems  Date Reviewed: 2/17/2020    None          Consultations During Hospital Stay:  · None    Procedures Performed:   · None    Significant Findings / Test Results:   XR abdomen obstruction series [[id number]] Collected: 02/17/20 1035   Order Status: Completed Updated: 02/17/20 1039   Narrative:     OBSTRUCTION SERIES    INDICATION:   consipation  COMPARISON:  CT from 12/21/2019; thoracic spine image from 4/12/2019    EXAM PERFORMED/VIEWS: [facility] ABDOMEN OBSTRUCTION SERIES      FINDINGS:    There is a nonobstructive bowel gas pattern   There is fecal stasis present which overall is moderate   There is mild distention in the rectal region   The degree of stasis is similar to the prior  image with increased fecal burden in the rectal   region  No free air beneath the hemidiaphragms  No pathologic calcifications or soft tissue masses evident  Methacrylate cement is seen at multiple levels within the spine  Examination of the chest reveals a normal cardiomediastinal silhouette  Lungs are clear  Impression:       Fecal stasis with more fecal burden in the rectal region  Lab Results   Component Value Date    WBC 10 77 (H) 02/17/2020    HGB 14 3 02/17/2020    HCT 44 6 02/17/2020    MCV 91 02/17/2020     02/17/2020   ·   Lab Results   Component Value Date    SODIUM 138 02/18/2020    K 4 3 02/18/2020     02/18/2020    CO2 28 02/18/2020    AGAP 5 02/18/2020    BUN 16 02/18/2020    CREATININE 0 98 02/18/2020    GLUC 103 02/18/2020    GLUF 103 (H) 02/18/2020    CALCIUM 8 4 02/18/2020    AST 19 02/18/2020    ALT 14 02/18/2020    ALKPHOS 52 02/18/2020    TP 5 8 (L) 02/18/2020    TBILI 0 45 02/18/2020    EGFR 53 02/18/2020   ·   ·     Incidental Findings:   · none     Test Results Pending at Discharge (will require follow up):   · none     Outpatient Tests Requested:  · none    Complications:  none    Reason for Admission:  Constipation    Hospital Course:      Karlie Arcos Yu Paulson is a 80 y o  female patient who originally presented to the hospital on 2/17/2020 due to constipation  Patient had an episode of disease infection done in the emergency, at that time she was having syncopal attack  Patient admitted to hospital for observation  Telemetry monitoring, patient was having bradycardic most likely secondary to her medication from AFib  But other than no other significant issues noted  Plan is to discharge patient home with home medication and patient needs to follow-up with PCP and cardiologist as soon as possible  Please see above list of diagnoses and related plan for additional information  Condition at Discharge: good     Discharge Day Visit / Exam:     Subjective:  Seen and evaluated during the round  No significant complaint  Vitals: Blood Pressure: 150/70 (02/18/20 1145)  Pulse: 67 (02/18/20 1145)  Temperature: 97 8 °F (36 6 °C) (02/18/20 0907)  Temp Source: Temporal (02/17/20 6496)  Respirations: 19 (02/18/20 1145)  Height: 5' 3" (160 cm) (02/17/20 2826)  Weight - Scale: 57 2 kg (126 lb 1 6 oz) (02/18/20 0600)  SpO2: 97 % (02/18/20 1145)  Exam:   Physical Exam   Constitutional: She is oriented to person, place, and time  She appears well-nourished  No distress  HENT:   Mouth/Throat: Oropharynx is clear and moist  No oropharyngeal exudate  Eyes: Pupils are equal, round, and reactive to light  Conjunctivae and EOM are normal  No scleral icterus  Neck: Normal range of motion  Neck supple  No JVD present  Cardiovascular: S1 normal and S2 normal  Bradycardia present  Exam reveals no gallop, no distant heart sounds and no friction rub  Pulmonary/Chest: Effort normal and breath sounds normal  She has no wheezes  She has no rales  Abdominal: Soft  Bowel sounds are normal  She exhibits no distension and no mass  There is no guarding  Musculoskeletal: Normal range of motion  She exhibits no edema or deformity     Neurological: She is alert and oriented to person, place, and time  She displays normal reflexes  No cranial nerve deficit or sensory deficit  She exhibits normal muscle tone  Coordination normal    Skin: Skin is warm  Capillary refill takes less than 2 seconds  Psychiatric: She has a normal mood and affect  Nursing note and vitals reviewed  Discussion with Family: none    Discharge instructions/Information to patient and family:   See after visit summary for information provided to patient and family  Provisions for Follow-Up Care:  See after visit summary for information related to follow-up care and any pertinent home health orders  Disposition:     Home    For Discharges to   [address] SN[facility]:   · Not Applicable to this Patient - Not Applicable to this Patient    Planned Readmission: none     Discharge Statement:  I spent 30 minutes discharging the patient  This time was spent on the day of discharge  I had direct contact with the patient on the day of discharge  Greater than 50% of the total time was spent examining patient, answering all patient questions, arranging and discussing plan of care with patient as well as directly providing post-discharge instructions  Additional time then spent on discharge activities  Discharge Medications:  See after visit summary for reconciled discharge medications provided to patient and family        ** Please Note: This note has been constructed using a voice recognition system **

## 2020-02-18 NOTE — ASSESSMENT & PLAN NOTE
· Chronic in nature  · Increase p o  Hydration  · Continue laxatives as needed  ·   · Patient had fecal stasis noted on the abdominal x-ray    Status post disimpaction in the emergency room  · Patient with chronic constipation  · During the disimpaction patient had a syncope

## 2020-02-18 NOTE — DISCHARGE INSTRUCTIONS
Hospital Course: Gentry Acosta is a 80 y o  female patient who originally presented to the hospital on 2/17/2020 due to constipation  Patient had an episode of disease infection done in the emergency, at that time she was having syncopal attack  Patient admitted to hospital for observation  Telemetry monitoring, patient was having bradycardic most likely secondary to her medication from AFib  But other than no other significant issues noted  Plan is to discharge patient home with home medication and patient needs to follow-up with PCP and cardiologist as soon as possible  Syncope   WHAT YOU NEED TO KNOW:   Syncope is also called fainting or passing out  Syncope is a sudden, temporary loss of consciousness, followed by a fall from a standing or sitting position  Syncope ranges from not serious to a sign of a more serious condition that needs to be treated  You can control some health conditions that cause syncope  Your healthcare providers can help you create a plan to manage syncope and prevent episodes  DISCHARGE INSTRUCTIONS:   Seek care immediately if:   · You are bleeding because you hit your head when you fainted  · You suddenly have double vision, difficulty speaking, numbness, and cannot move your arms or legs  · You have chest pain and trouble breathing  · You vomit blood or material that looks like coffee grounds  · You see blood in your bowel movement  Contact your healthcare provider if:   · You have new or worsening symptoms  · You have another syncope episode  · You have a headache, fast heartbeat, or feel too dizzy to stand up  · You have questions or concerns about your condition or care  Follow up with your healthcare provider as directed:  Write down your questions so you remember to ask them during your visits  Manage syncope:   · Keep a record of your syncope episodes  Include your symptoms and your activity before and after the episode   The record can help your healthcare provider find the cause of your syncope and help you manage episodes  · Sit or lie down when needed  This includes when you feel dizzy, your throat is getting tight, and your vision changes  Raise your legs above the level of your heart  · Take slow, deep breaths if you start to breathe faster with anxiety or fear  This can help decrease dizziness and the feeling that you might faint  · Check your blood pressure often  This is important if you take medicine to lower your blood pressure  Check your blood pressure when you are lying down and when you are standing  Ask how often to check during the day  Keep a record of your blood pressure numbers  Your healthcare provider may use the record to help plan your treatment  Prevent a syncope episode:   · Move slowly and let yourself get used to one position before you move to another position  This is very important when you change from a lying or sitting position to a standing position  Take some deep breaths before you stand up from a lying position  Stand up slowly  Sudden movements may cause a fainting spell  Sit on the side of the bed or couch for a few minutes before you stand up  If you are on bedrest, try to be upright for about 2 hours each day, or as directed  Do not lock your legs if you are standing for a long period of time  Move your legs and bend your knees to keep blood flowing  · Follow your healthcare provider's recommendations  Your provider may  recommend that you drink more liquids to prevent dehydration  You may also need to have more salt to keep your blood pressure from dropping too low and causing syncope  Your provider will tell you how much liquid and sodium to have each day  · Watch for signs of low blood sugar  These include hunger, nervousness, sweating, and fast or fluttery heartbeats  Talk with your healthcare provider about ways to keep your blood sugar level steady      · Do not strain if you are constipated  You may faint if you strain to have a bowel movement  Walking is the best way to get your bowels moving  Eat foods high in fiber to make it easier to have a bowel movement  Good examples are high-fiber cereals, beans, vegetables, and whole-grain breads  Prune juice may help make bowel movements softer  · Be careful in hot weather  Heat can cause a syncope episode  Limit activity done outside on hot days  Physical activity in hot weather can lead to dehydration  This can cause an episode  © 2017 [facility] Information is for End User's use only and may not be sold, redistributed or otherwise used for commercial purposes  All illustrations and images included in CareNotes® are the copyrighted property of A D A M , Inc  or Theodore Chowdhury  The above information is an  only  It is not intended as medical advice for individual conditions or treatments  Talk to your doctor, nurse or pharmacist before following any medical regimen to see if it is safe and effective for you

## 2020-02-18 NOTE — ASSESSMENT & PLAN NOTE
· Rate control  · Actually patient heart rate is running around 50s  · Most likely secondary to medication side effect  · Continue current treatment and management-follow-up with cardiologist and PCP on outpatient basis  ·   · Patient with history of atrial fibrillation and is on metoprolol for rate control and Xarelto for anticoagulation

## 2020-02-18 NOTE — H&P
H&P- Concepción Ponce 1935, 80 y o  female MRN: [id number]    Unit/Bed#: -01 Encounter: [id number]    Primary Care Provider: Edwar Lee MD   Date and time admitted to hospital: 2/17/2020  8:49 AM        * Syncope  Assessment & Plan  · Patient came to the hospital with constipation and had a disimpaction done in the emergency room with subsequent syncope  · Likely vasovagal  · Monitor on the tele  · Troponin  · If remained stable patient can be discharged home tomorrow    Hypokalemia  Assessment & Plan  · Replete and monitor    Leukocytosis  Assessment & Plan  · Monitor no signs of infection    Constipation  Assessment & Plan  · Patient had fecal stasis noted on the abdominal x-ray  Status post disimpaction in the emergency room  · Patient with chronic constipation  · During the disimpaction patient had a syncope    Atrial fibrillation with RVR ([address] )  Assessment & Plan  · Patient with history of atrial fibrillation and is on metoprolol for rate control and Xarelto for anticoagulation      VTE Prophylaxis: Rivaroxaban (Xarelto)  / sequential compression device   Code Status: dnr/ dni  POLST: POLST form is not discussed and not completed at this time  Discussion with family:     Anticipated Length of Stay:  Patient will be admitted on an Observation basis with an anticipated length of stay of less 2 midnights  Justification for Hospital Stay:  Syncope    Total Time for Visit, including Counseling / Coordination of Care: 1 hour  Greater than 50% of this total time spent on direct patient counseling and coordination of care  Chief Complaint:   Syncope    History of Present Illness:    Concepción Ponce is a 80 y o  female who presents with with constipation to the emergency room  Patient reported that her last bowel movement was on Friday patient with chronic constipation and is on stool softener as an outpatient    Abdominal obstruction series in the emergency room noted fecal stasis and patient had a disimpaction done in the emergency room at that time patient had a syncopal event  Likely vasovagal since it is associated with the disimpaction  Patient is totally asymptomatic at the time of evaluation  Denies any chest pain or any shortness of breath or any premonitory symptoms before the syncope  It appears that patient had another event of syncope in the past associated with constipation    Review of Systems:    Review of Systems   Constitutional: Negative  HENT: Negative  Eyes: Negative  Respiratory: Negative  Cardiovascular: Negative  Gastrointestinal: Positive for constipation  Genitourinary: Negative  Musculoskeletal: Negative  Neurological: Positive for syncope  Hematological: Negative  Psychiatric/Behavioral: Negative  Past Medical and Surgical History:     Past Medical History:   Diagnosis Date    A-fib [facility])     Cardiac disease     GERD (gastroesophageal reflux disease)     Hyperlipidemia     Hypertension     Osteoporosis     Psychiatric disorder     anxiety       Past Surgical History:   Procedure Laterality Date    BACK SURGERY      EYE SURGERY      cataracts       Meds/Allergies:    Prior to Admission medications    Medication Sig Start Date End Date Taking?  Authorizing Provider   amLODIPine (NORVASC) 5 mg tablet Take by mouth   Yes Historical Provider, MD   calcium citrate-vitamin D (CALCIUM + D) 315-200 MG-UNIT per tablet Take by mouth   Yes Historical Provider, MD   dofetilide (TIKOSYN) 250 mcg capsule 2 (two) times a day  2/27/19  Yes Historical Provider, MD   Magnesium Chloride 200 MG/ML SOLN Take 1 tablet by mouth   Yes Historical Provider, MD   metoprolol succinate (TOPROL XL) 50 mg 24 hr tablet Take by mouth   Yes Historical Provider, MD   polyethylene glycol (MIRALAX) 17 g packet Take 17 g by mouth daily   Yes Historical Provider, MD   rivaroxaban (XARELTO) 15 mg tablet Take by mouth daily with dinner  10/9/18  Yes Historical Provider, MD ALPRAZolam (XANAX) 0 5 mg tablet alprazolam 0 5 mg tablet  2/17/20 Yes Historical Provider, MD   denosumab (PROLIA) 60 mg/mL     Historical Provider, MD   famotidine (PEPCID) 20 mg tablet famotidine 20 mg tablet    Historical Provider, MD   fluticasone (FLONASE) 50 mcg/act nasal spray  8/12/16 2/17/20  Historical Provider, MD   furosemide (LASIX) 20 mg tablet Take by mouth  2/17/20  Historical Provider, MD   metoclopramide (REGLAN) 5 mg tablet   2/17/20  Historical Provider, MD   metoprolol succinate (TOPROL-XL) 25 mg 24 hr tablet Daily 5/8/19 2/17/20  Historical Provider, MD     I have reviewed home medications with patient personally  Allergies: Allergies   Allergen Reactions    Ciprofloxacin     Bactrim [Sulfamethoxazole-Trimethoprim] Rash    Medical Tape Rash       Social History:     Marital Status: /Civil Union   Occupation: retired  Patient Pre-hospital Living Situation:  Lives at home with family  Patient Pre-hospital Level of Mobility:   Patient Pre-hospital Diet Restrictions:   Substance Use History:   Social History     Substance and Sexual Activity   Alcohol Use Not Currently     Social History     Tobacco Use   Smoking Status Never Smoker   Smokeless Tobacco Never Used     Social History     Substance and Sexual Activity   Drug Use Never       Family History:    Family History   Problem Relation Age of Onset    Cancer Father     Cancer Brother        Physical Exam:     Vitals:   Blood Pressure: 134/98 (02/17/20 1927)  Pulse: 98 (02/17/20 1927)  Temperature: 99 4 °F (37 4 °C) (02/17/20 1927)  Temp Source: Temporal (02/17/20 0852)  Respirations: 16 (02/17/20 1927)  Height: 5' 3" (160 cm) (02/17/20 0852)  Weight - Scale: 54 kg (119 lb) (02/17/20 0852)  SpO2: 96 % (02/17/20 1927)    Physical Exam   Constitutional: She appears well-developed  No distress  HENT:   Head: Normocephalic and atraumatic  Eyes: Right eye exhibits no discharge  Left eye exhibits no discharge     Neck: No JVD present  Cardiovascular:   Irregular   Pulmonary/Chest: Effort normal and breath sounds normal    Abdominal: Soft  Musculoskeletal: Normal range of motion  Neurological: She is alert  No cranial nerve deficit  Skin: Skin is warm  Additional Data:     Lab Results: I have personally reviewed pertinent reports  Results from last 7 days   Lab Units 02/17/20  1135   WBC Thousand/uL 10 77*   HEMOGLOBIN g/dL 14 3   HEMATOCRIT % 44 6   PLATELETS Thousands/uL 342   NEUTROS PCT % 67   LYMPHS PCT % 21   MONOS PCT % 8   EOS PCT % 2     Results from last 7 days   Lab Units 02/17/20  1135   SODIUM mmol/L 140   POTASSIUM mmol/L 3 4*   CHLORIDE mmol/L 103   CO2 mmol/L 26   BUN mg/dL 13   CREATININE mg/dL 0 86   ANION GAP mmol/L 11   CALCIUM mg/dL 8 8   ALBUMIN g/dL 3 2*   TOTAL BILIRUBIN mg/dL 0 34   ALK PHOS U/L 60   ALT U/L 15   AST U/L 20   GLUCOSE RANDOM mg/dL 149*                       Imaging: I have personally reviewed pertinent reports  XR abdomen obstruction series   ED Interpretation by Art Shahid DO (02/17 1008)   No free air  No obstruction  Increased mineralization of L1, L3 and L4      Final Result by Ann Garcia MD (02/17 1037)      Fecal stasis with more fecal burden in the rectal region  Workstation performed: [id number]             EKG, Pathology, and Other Studies Reviewed on Admission:   · EKG:  Atrial fibrillation    Allscripts / Epic Records Reviewed: Yes     ** Please Note: This note has been constructed using a voice recognition system   **

## 2020-02-18 NOTE — ASSESSMENT & PLAN NOTE
· Patient with history of atrial fibrillation and is on metoprolol for rate control and Xarelto for anticoagulation

## 2020-02-18 NOTE — PLAN OF CARE
Problem: Potential for Falls  Goal: Patient will remain free of falls  Description  INTERVENTIONS:  - Assess patient frequently for physical needs  -  [place] fall precautions as indicated by assessment   - Educate patient/family on patient safety including physical limitations  - Instruct patient to call for assistance with activity based on assessment  - Modify environment to reduce risk of injury   Outcome: Progressing

## 2020-02-18 NOTE — UTILIZATION REVIEW
Initial Clinical Review    Admission: Date/Time/Statement: Admission Orders (From admission, onward)     Ordered        02/17/20 1142  Place in Observation  Once                   Orders Placed This Encounter   Procedures    Place in Observation     Standing Status:   Standing     Number of Occurrences:   1     Order Specific Question:   Admitting Physician     Answer:   Boom Robb [[id number]]     Order Specific Question:   Level of Care     Answer:   Med Surg [[id number]]     ED Arrival Information     Expected Arrival Acuity Means of Arrival Escorted By Service Admission Type    - 2/17/2020 08:45 Urgent Walk-In Self Hospitalist Urgent    Arrival Complaint    Constipation        Chief Complaint   Patient presents with    Constipation     Pt reports not having a BM for the past 2 days and feels like she has to pass a BM however cannot, she usual goes everyday  Pt took a colace and senna however did not help  Assessment/Plan: 80year old female to the ED from home with complaints of constipation  Admitted under observation for syncope  Patient reported that her last bowel movement was on Friday patient with chronic constipation and is on stool softener as an outpatient  Abdominal obstruction series in the emergency room noted fecal stasis and patient had a disimpaction done in the emergency room at that time patient had a syncopal event  Likely vasovagal since it is associated with the disimpaction  Patient is totally asymptomatic at the time of evaluation     Assessment & Plan  · Patient came to the hospital with constipation and had a disimpaction done in the emergency room with subsequent syncope  · Likely vasovagal  · Monitor on the tele  · Troponin  · If remained stable patient can be discharged home tomorrow     Hypokalemia  Assessment & Plan  · Replete and monitor     Leukocytosis  Assessment & Plan  · Monitor no signs of infection     Constipation  Assessment & Plan  · Patient had fecal stasis noted on the abdominal x-ray  Status post disimpaction in the emergency room  · Patient with chronic constipation  · During the disimpaction patient had a syncope     Atrial fibrillation with RVR ([address] )  Assessment & Plan  · Patient with history of atrial fibrillation and is on metoprolol for rate control and Xarelto for anticoagulation        ED Triage Vitals [02/17/20 0852]   Temperature Pulse Respirations Blood Pressure SpO2   97 8 °F (36 6 °C) 89 18 (!) 173/77 97 %      Temp Source Heart Rate Source Patient Position - Orthostatic VS BP Location FiO2 (%)   Temporal Monitor Lying Right arm --      Pain Score       3        Wt Readings from Last 1 Encounters:   02/18/20 57 2 kg (126 lb 1 6 oz)     Additional Vital Signs:  Time  Temp  Pulse  Resp  BP  MAP (mmHg)  SpO2  O2 Device  Patient Position - Orthostatic VS   02/18/20 09:07:43  97 8 °F (36 6 °C)  68    147/68  94  98 %       02/18/20 09:06:52    73        98 %       02/18/20 03:10:59  98 7 °F (37 1 °C)  69  18  145/70  95  98 %       02/18/20 00:23:10  99 3 °F (37 4 °C)  101  18  128/75  93  98 %       02/17/20 2300              None (Room air)     02/17/20 19:27:04  99 4 °F (37 4 °C)  98  16  134/98  110  96 %       02/17/20 1726              None (Room air)     02/17/20 15:54:58  99 2 °F (37 3 °C)  105  16  130/70  90  98 %       02/17/20 1134    91  20  109/59             Pertinent Labs/Diagnostic Test Results:   2/17 Xary obstruction series: Fecal stasis with more fecal burden in the rectal region    2/17:  Rhythm:     Rhythm: atrial fibrillation    Ectopy:     Ectopy: none    QRS:     QRS axis:  Normal  Conduction:     Conduction: normal    ST segments:     ST segments:  Non-specific  Comments:      Patient reports that she has a history of atrial fibrillation     Results from last 7 days   Lab Units 02/17/20  1135   WBC Thousand/uL 10 77*   HEMOGLOBIN g/dL 14 3   HEMATOCRIT % 44 6   PLATELETS Thousands/uL 342   NEUTROS ABS Thousands/µL 7 29         Results from last 7 days   Lab Units 02/18/20  0500 02/17/20  1135   SODIUM mmol/L 138 140   POTASSIUM mmol/L 4 3 3 4*   CHLORIDE mmol/L 105 103   CO2 mmol/L 28 26   ANION GAP mmol/L 5 11   BUN mg/dL 16 13   CREATININE mg/dL 0 98 0 86   EGFR ml/min/1 73sq m 53 62   CALCIUM mg/dL 8 4 8 8   MAGNESIUM mg/dL 2 0  --      Results from last 7 days   Lab Units 02/18/20  0500 02/17/20  1135   AST U/L 19 20   ALT U/L 14 15   ALK PHOS U/L 52 60   TOTAL PROTEIN g/dL 5 8* 6 5   ALBUMIN g/dL 2 7* 3 2*   TOTAL BILIRUBIN mg/dL 0 45 0 34         Results from last 7 days   Lab Units 02/18/20  0500 02/17/20  1135   GLUCOSE RANDOM mg/dL 103 149*         Results from last 7 days   Lab Units 02/17/20  2243 02/17/20  1704 02/17/20  1135   TROPONIN I ng/mL 0 02 <0 02 <0 02     Results from last 7 days   Lab Units 02/17/20  2240   CLARITY UA  Clear   COLOR UA  Yellow   SPEC GRAV UA  1 010   PH UA  8 5*   GLUCOSE UA mg/dl Negative   KETONES UA mg/dl Negative   BLOOD UA  Negative   PROTEIN UA mg/dl Negative   NITRITE UA  Negative   BILIRUBIN UA  Negative   UROBILINOGEN UA E U /dl 0 2   LEUKOCYTES UA  Negative     ED Treatment:   Medication Administration from 02/17/2020 0843 to 02/17/2020 1532       Date/Time Order Dose Route Action     02/17/2020 1140 sodium chloride 0 9 % bolus 500 mL 500 mL Intravenous New Bag        Past Medical History:   Diagnosis Date    [facility]-fib [facility])     Cardiac disease     GERD (gastroesophageal reflux disease)     Hyperlipidemia     Hypertension     Osteoporosis     Psychiatric disorder     anxiety     Admitting Diagnosis: Syncope [R55]  Constipation [K59 00]  Age/Sex: 80 y o  female  Admission Orders:  Troponin every 3 hours  Tele  SCDs  Scheduled Medications:    Medications:  amLODIPine 5 mg Oral Daily   calcium carbonate 1 tablet Oral Daily With Breakfast   docusate sodium 100 mg Oral BID   dofetilide 250 mcg Oral Q12H FRANCISCO   famotidine 20 mg Oral BID   LORazepam 2 mg Oral TID   magnesium oxide 400 mg Oral Daily   metoprolol succinate 50 mg Oral Daily   polyethylene glycol 17 g Oral Daily   rivaroxaban 15 mg Oral Daily With Breakfast   senna 1 tablet Oral HS     Continuous IV Infusions:     PRN Meds:    acetaminophen 650 mg Oral Q6H PRN   aluminum-magnesium hydroxide-simethicone 30 mL Oral Q6H PRN   ondansetron 4 mg Intravenous Q6H PRN       Network Utilization Review Department  [email or web address]  ATTENTION: Please call with any questions or concerns to [phone] and carefully listen to the prompts so that you are directed to the right person  All voicemails are confidential   Swati Deluna all requests for admission clinical reviews, approved or denied determinations and any other requests to dedicated fax number below belonging to the campus where the patient is receiving treatment   List of dedicated fax numbers for the Facilities:  [address] DENIALS (Administrative/Medical Necessity) [phone]   [address] (Maternity/NICU/Pediatrics) [phone]   [place] [phone]   L[facility] [phone]   [facility] [phone]   [facility] [phone]   [address] [facility] [address] [facility] [phone]   [facility]  [phone]   [address] [facility]  [address] [facility] [address] [facility] [phone]

## 2020-02-18 NOTE — ASSESSMENT & PLAN NOTE
· Patient had fecal stasis noted on the abdominal x-ray    Status post disimpaction in the emergency room  · Patient with chronic constipation  · During the disimpaction patient had a syncope

## 2020-02-18 NOTE — PLAN OF CARE
Problem: Potential for Falls  Goal: Patient will remain free of falls  Description  INTERVENTIONS:  - Assess patient frequently for physical needs  -  [place] fall precautions as indicated by assessment   - Educate patient/family on patient safety including physical limitations  - Instruct patient to call for assistance with activity based on assessment  - Modify environment to reduce risk of injury   Outcome: Progressing     Problem: Nutrition/Hydration-ADULT  Goal: Nutrient/Hydration intake appropriate for improving, restoring or maintaining nutritional needs  Description  Monitor and assess patient's nutrition/hydration status for malnutrition  Collaborate with interdisciplinary team and initiate plan and interventions as ordered  Monitor patient's weight and dietary intake as ordered or per policy  Utilize nutrition screening tool and intervene as necessary  Determine patient's food preferences and provide high-protein, high-caloric foods as appropriate       INTERVENTIONS:  - Monitor oral intake, urinary output, labs, and treatment plans  - Assess nutrition and hydration status and recommend course of action  - Evaluate amount of meals eaten  - Recommend/ encourage appropriate diets, oral nutritional supplements, and vitamin/mineral supplements  - Assess need for intravenous fluids  - Provide specific nutrition/hydration education as appropriate  - Include patient/family/caregiver in decisions related to nutrition   Outcome: Progressing

## 2020-02-18 NOTE — PLAN OF CARE
Problem: Potential for Falls  Goal: Patient will remain free of falls  Description  INTERVENTIONS:  - Assess patient frequently for physical needs  -  [place] fall precautions as indicated by assessment   - Educate patient/family on patient safety including physical limitations  - Instruct patient to call for assistance with activity based on assessment  - Modify environment to reduce risk of injury   Outcome: Adequate for Discharge

## 2020-06-28 ENCOUNTER — HOSPITAL ENCOUNTER (EMERGENCY)
Facility: HOSPITAL | Age: 85
Discharge: HOME | End: 2020-06-28
Attending: EMERGENCY MEDICINE | Admitting: EMERGENCY MEDICINE
Payer: MEDICARE

## 2020-06-28 ENCOUNTER — APPOINTMENT (EMERGENCY)
Dept: CT IMAGING | Facility: HOSPITAL | Age: 85
End: 2020-06-28
Payer: MEDICARE

## 2020-06-28 VITALS
DIASTOLIC BLOOD PRESSURE: 60 MMHG | HEART RATE: 53 BPM | TEMPERATURE: 98.1 F | OXYGEN SATURATION: 99 % | HEIGHT: 63 IN | BODY MASS INDEX: 22.32 KG/M2 | SYSTOLIC BLOOD PRESSURE: 170 MMHG | RESPIRATION RATE: 18 BRPM | WEIGHT: 126 LBS

## 2020-06-28 DIAGNOSIS — N30.01 ACUTE CYSTITIS WITH HEMATURIA: Primary | ICD-10-CM

## 2020-06-28 LAB
ALBUMIN SERPL BCP-MCNC: 3.8 G/DL (ref 3.5–5)
ALP SERPL-CCNC: 65 U/L (ref 46–116)
ALT SERPL W P-5'-P-CCNC: 22 U/L (ref 12–78)
ANION GAP SERPL CALCULATED.3IONS-SCNC: 7 MMOL/L (ref 4–13)
AST SERPL W P-5'-P-CCNC: 19 U/L (ref 5–45)
BACTERIA UR QL AUTO: ABNORMAL /HPF
BASOPHILS # BLD AUTO: 0.03 THOUSANDS/ΜL (ref 0–0.1)
BASOPHILS NFR BLD AUTO: 0 % (ref 0–1)
BILIRUB SERPL-MCNC: 0.27 MG/DL (ref 0.2–1)
BILIRUB UR QL STRIP: NEGATIVE
BUN SERPL-MCNC: 22 MG/DL (ref 5–25)
CALCIUM SERPL-MCNC: 9.3 MG/DL (ref 8.3–10.1)
CHLORIDE SERPL-SCNC: 102 MMOL/L (ref 100–108)
CLARITY UR: CLEAR
CO2 SERPL-SCNC: 33 MMOL/L (ref 21–32)
COLOR UR: YELLOW
CREAT SERPL-MCNC: 1.14 MG/DL (ref 0.6–1.3)
EOSINOPHIL # BLD AUTO: 0.01 THOUSAND/ΜL (ref 0–0.61)
EOSINOPHIL NFR BLD AUTO: 0 % (ref 0–6)
ERYTHROCYTE [DISTWIDTH] IN BLOOD BY AUTOMATED COUNT: 13.5 % (ref 11.6–15.1)
GFR SERPL CREATININE-BSD FRML MDRD: 44 ML/MIN/1.73SQ M
GLUCOSE SERPL-MCNC: 132 MG/DL (ref 65–140)
GLUCOSE UR STRIP-MCNC: NEGATIVE MG/DL
HCT VFR BLD AUTO: 48.8 % (ref 34.8–46.1)
HGB BLD-MCNC: 15.4 G/DL (ref 11.5–15.4)
HGB UR QL STRIP.AUTO: ABNORMAL
IMM GRANULOCYTES # BLD AUTO: 0.15 THOUSAND/UL (ref 0–0.2)
IMM GRANULOCYTES NFR BLD AUTO: 1 % (ref 0–2)
KETONES UR STRIP-MCNC: ABNORMAL MG/DL
LEUKOCYTE ESTERASE UR QL STRIP: ABNORMAL
LYMPHOCYTES # BLD AUTO: 1.87 THOUSANDS/ΜL (ref 0.6–4.47)
LYMPHOCYTES NFR BLD AUTO: 18 % (ref 14–44)
MCH RBC QN AUTO: 28.7 PG (ref 26.8–34.3)
MCHC RBC AUTO-ENTMCNC: 31.6 G/DL (ref 31.4–37.4)
MCV RBC AUTO: 91 FL (ref 82–98)
MONOCYTES # BLD AUTO: 1.05 THOUSAND/ΜL (ref 0.17–1.22)
MONOCYTES NFR BLD AUTO: 10 % (ref 4–12)
NEUTROPHILS # BLD AUTO: 7.28 THOUSANDS/ΜL (ref 1.85–7.62)
NEUTS SEG NFR BLD AUTO: 71 % (ref 43–75)
NITRITE UR QL STRIP: NEGATIVE
NON-SQ EPI CELLS URNS QL MICRO: ABNORMAL /HPF
NRBC BLD AUTO-RTO: 0 /100 WBCS
PH UR STRIP.AUTO: 5.5 [PH]
PLATELET # BLD AUTO: 393 THOUSANDS/UL (ref 149–390)
PMV BLD AUTO: 10.2 FL (ref 8.9–12.7)
POTASSIUM SERPL-SCNC: 3.9 MMOL/L (ref 3.5–5.3)
PROT SERPL-MCNC: 7.8 G/DL (ref 6.4–8.2)
PROT UR STRIP-MCNC: ABNORMAL MG/DL
RBC # BLD AUTO: 5.37 MILLION/UL (ref 3.81–5.12)
RBC #/AREA URNS AUTO: ABNORMAL /HPF
SODIUM SERPL-SCNC: 142 MMOL/L (ref 136–145)
SP GR UR STRIP.AUTO: 1.02 (ref 1–1.03)
UROBILINOGEN UR QL STRIP.AUTO: 0.2 E.U./DL
WBC # BLD AUTO: 10.39 THOUSAND/UL (ref 4.31–10.16)
WBC #/AREA URNS AUTO: ABNORMAL /HPF

## 2020-06-28 PROCEDURE — 80053 COMPREHEN METABOLIC PANEL: CPT | Performed by: PHYSICIAN ASSISTANT

## 2020-06-28 PROCEDURE — 74177 CT ABD & PELVIS W/CONTRAST: CPT

## 2020-06-28 PROCEDURE — 99285 EMERGENCY DEPT VISIT HI MDM: CPT | Performed by: EMERGENCY MEDICINE

## 2020-06-28 PROCEDURE — 85025 COMPLETE CBC W/AUTO DIFF WBC: CPT | Performed by: PHYSICIAN ASSISTANT

## 2020-06-28 PROCEDURE — 96360 HYDRATION IV INFUSION INIT: CPT

## 2020-06-28 PROCEDURE — 99284 EMERGENCY DEPT VISIT MOD MDM: CPT

## 2020-06-28 PROCEDURE — 81001 URINALYSIS AUTO W/SCOPE: CPT | Performed by: PHYSICIAN ASSISTANT

## 2020-06-28 PROCEDURE — 36415 COLL VENOUS BLD VENIPUNCTURE: CPT | Performed by: PHYSICIAN ASSISTANT

## 2020-06-28 RX ORDER — CEFTRIAXONE 2 G/50ML
2000 INJECTION, SOLUTION INTRAVENOUS ONCE
Status: DISCONTINUED | OUTPATIENT
Start: 2020-06-28 | End: 2020-06-28

## 2020-06-28 RX ORDER — CEPHALEXIN 250 MG/1
500 CAPSULE ORAL ONCE
Status: COMPLETED | OUTPATIENT
Start: 2020-06-28 | End: 2020-06-28

## 2020-06-28 RX ORDER — CEPHALEXIN 500 MG/1
500 CAPSULE ORAL EVERY 6 HOURS SCHEDULED
Qty: 28 CAPSULE | Refills: 0 | Status: SHIPPED | OUTPATIENT
Start: 2020-06-28 | End: 2020-07-05

## 2020-06-28 RX ADMIN — SODIUM CHLORIDE 500 ML: 0.9 INJECTION, SOLUTION INTRAVENOUS at 14:26

## 2020-06-28 RX ADMIN — IOHEXOL 100 ML: 350 INJECTION, SOLUTION INTRAVENOUS at 16:50

## 2020-06-28 RX ADMIN — CEPHALEXIN 500 MG: 250 CAPSULE ORAL at 17:34

## 2020-11-01 ENCOUNTER — HOSPITAL ENCOUNTER (EMERGENCY)
Facility: HOSPITAL | Age: 85
Discharge: HOME | End: 2020-11-01
Attending: EMERGENCY MEDICINE | Admitting: EMERGENCY MEDICINE
Payer: MEDICARE

## 2020-11-01 ENCOUNTER — APPOINTMENT (EMERGENCY)
Dept: CT IMAGING | Facility: HOSPITAL | Age: 85
End: 2020-11-01
Payer: MEDICARE

## 2020-11-01 VITALS
SYSTOLIC BLOOD PRESSURE: 169 MMHG | RESPIRATION RATE: 18 BRPM | HEART RATE: 65 BPM | WEIGHT: 121.03 LBS | BODY MASS INDEX: 21.45 KG/M2 | DIASTOLIC BLOOD PRESSURE: 76 MMHG | HEIGHT: 63 IN | OXYGEN SATURATION: 97 % | TEMPERATURE: 98.4 F

## 2020-11-01 DIAGNOSIS — R10.32 LEFT LOWER QUADRANT ABDOMINAL PAIN: Primary | ICD-10-CM

## 2020-11-01 LAB
ALBUMIN SERPL BCP-MCNC: 3.3 G/DL (ref 3.5–5)
ALP SERPL-CCNC: 48 U/L (ref 46–116)
ALT SERPL W P-5'-P-CCNC: 17 U/L (ref 12–78)
ANION GAP SERPL CALCULATED.3IONS-SCNC: 6 MMOL/L (ref 4–13)
AST SERPL W P-5'-P-CCNC: 18 U/L (ref 5–45)
BASOPHILS # BLD AUTO: 0.05 THOUSANDS/ΜL (ref 0–0.1)
BASOPHILS NFR BLD AUTO: 1 % (ref 0–1)
BILIRUB SERPL-MCNC: 0.4 MG/DL (ref 0.2–1)
BILIRUB UR QL STRIP: NEGATIVE
BUN SERPL-MCNC: 17 MG/DL (ref 5–25)
CALCIUM ALBUM COR SERPL-MCNC: 9.5 MG/DL (ref 8.3–10.1)
CALCIUM SERPL-MCNC: 8.9 MG/DL (ref 8.3–10.1)
CHLORIDE SERPL-SCNC: 106 MMOL/L (ref 100–108)
CLARITY UR: CLEAR
CO2 SERPL-SCNC: 31 MMOL/L (ref 21–32)
COLOR UR: NORMAL
CREAT SERPL-MCNC: 1 MG/DL (ref 0.6–1.3)
EOSINOPHIL # BLD AUTO: 0.11 THOUSAND/ΜL (ref 0–0.61)
EOSINOPHIL NFR BLD AUTO: 2 % (ref 0–6)
ERYTHROCYTE [DISTWIDTH] IN BLOOD BY AUTOMATED COUNT: 13.3 % (ref 11.6–15.1)
GFR SERPL CREATININE-BSD FRML MDRD: 51 ML/MIN/1.73SQ M
GLUCOSE SERPL-MCNC: 95 MG/DL (ref 65–140)
GLUCOSE UR STRIP-MCNC: NEGATIVE MG/DL
HCT VFR BLD AUTO: 42.6 % (ref 34.8–46.1)
HGB BLD-MCNC: 13.6 G/DL (ref 11.5–15.4)
HGB UR QL STRIP.AUTO: NEGATIVE
IMM GRANULOCYTES # BLD AUTO: 0.04 THOUSAND/UL (ref 0–0.2)
IMM GRANULOCYTES NFR BLD AUTO: 1 % (ref 0–2)
KETONES UR STRIP-MCNC: NEGATIVE MG/DL
LEUKOCYTE ESTERASE UR QL STRIP: NEGATIVE
LIPASE SERPL-CCNC: 154 U/L (ref 73–393)
LYMPHOCYTES # BLD AUTO: 1.47 THOUSANDS/ΜL (ref 0.6–4.47)
LYMPHOCYTES NFR BLD AUTO: 20 % (ref 14–44)
MCH RBC QN AUTO: 29.4 PG (ref 26.8–34.3)
MCHC RBC AUTO-ENTMCNC: 31.9 G/DL (ref 31.4–37.4)
MCV RBC AUTO: 92 FL (ref 82–98)
MONOCYTES # BLD AUTO: 0.6 THOUSAND/ΜL (ref 0.17–1.22)
MONOCYTES NFR BLD AUTO: 8 % (ref 4–12)
NEUTROPHILS # BLD AUTO: 5.04 THOUSANDS/ΜL (ref 1.85–7.62)
NEUTS SEG NFR BLD AUTO: 68 % (ref 43–75)
NITRITE UR QL STRIP: NEGATIVE
NRBC BLD AUTO-RTO: 0 /100 WBCS
PH UR STRIP.AUTO: 7.5 [PH]
PLATELET # BLD AUTO: 271 THOUSANDS/UL (ref 149–390)
PMV BLD AUTO: 10.1 FL (ref 8.9–12.7)
POTASSIUM SERPL-SCNC: 3.9 MMOL/L (ref 3.5–5.3)
PROT SERPL-MCNC: 6.3 G/DL (ref 6.4–8.2)
PROT UR STRIP-MCNC: NEGATIVE MG/DL
RBC # BLD AUTO: 4.63 MILLION/UL (ref 3.81–5.12)
SODIUM SERPL-SCNC: 143 MMOL/L (ref 136–145)
SP GR UR STRIP.AUTO: 1.01 (ref 1–1.03)
UROBILINOGEN UR QL STRIP.AUTO: 0.2 E.U./DL
WBC # BLD AUTO: 7.31 THOUSAND/UL (ref 4.31–10.16)

## 2020-11-01 PROCEDURE — 96374 THER/PROPH/DIAG INJ IV PUSH: CPT

## 2020-11-01 PROCEDURE — C9113 INJ PANTOPRAZOLE SODIUM, VIA: HCPCS | Performed by: EMERGENCY MEDICINE

## 2020-11-01 PROCEDURE — 96375 TX/PRO/DX INJ NEW DRUG ADDON: CPT

## 2020-11-01 PROCEDURE — G1004 CDSM NDSC: HCPCS

## 2020-11-01 PROCEDURE — 74177 CT ABD & PELVIS W/CONTRAST: CPT

## 2020-11-01 PROCEDURE — 80053 COMPREHEN METABOLIC PANEL: CPT | Performed by: EMERGENCY MEDICINE

## 2020-11-01 PROCEDURE — 85025 COMPLETE CBC W/AUTO DIFF WBC: CPT | Performed by: EMERGENCY MEDICINE

## 2020-11-01 PROCEDURE — 36415 COLL VENOUS BLD VENIPUNCTURE: CPT | Performed by: EMERGENCY MEDICINE

## 2020-11-01 PROCEDURE — 99285 EMERGENCY DEPT VISIT HI MDM: CPT | Performed by: EMERGENCY MEDICINE

## 2020-11-01 PROCEDURE — 83690 ASSAY OF LIPASE: CPT | Performed by: EMERGENCY MEDICINE

## 2020-11-01 PROCEDURE — 81003 URINALYSIS AUTO W/O SCOPE: CPT | Performed by: EMERGENCY MEDICINE

## 2020-11-01 PROCEDURE — 99284 EMERGENCY DEPT VISIT MOD MDM: CPT

## 2020-11-01 RX ORDER — KETOROLAC TROMETHAMINE 30 MG/ML
15 INJECTION, SOLUTION INTRAMUSCULAR; INTRAVENOUS ONCE
Status: COMPLETED | OUTPATIENT
Start: 2020-11-01 | End: 2020-11-01

## 2020-11-01 RX ORDER — ONDANSETRON 2 MG/ML
4 INJECTION INTRAMUSCULAR; INTRAVENOUS ONCE
Status: COMPLETED | OUTPATIENT
Start: 2020-11-01 | End: 2020-11-01

## 2020-11-01 RX ORDER — PANTOPRAZOLE SODIUM 40 MG/1
40 INJECTION, POWDER, FOR SOLUTION INTRAVENOUS ONCE
Status: COMPLETED | OUTPATIENT
Start: 2020-11-01 | End: 2020-11-01

## 2020-11-01 RX ORDER — MELOXICAM 7.5 MG/1
7.5 TABLET ORAL DAILY
Qty: 30 TABLET | Refills: 0 | Status: SHIPPED | OUTPATIENT
Start: 2020-11-01 | End: 2020-12-31

## 2020-11-01 RX ADMIN — ONDANSETRON 4 MG: 2 INJECTION INTRAMUSCULAR; INTRAVENOUS at 10:48

## 2020-11-01 RX ADMIN — KETOROLAC TROMETHAMINE 15 MG: 30 INJECTION, SOLUTION INTRAMUSCULAR at 10:49

## 2020-11-01 RX ADMIN — IOHEXOL 100 ML: 350 INJECTION, SOLUTION INTRAVENOUS at 11:34

## 2020-11-01 RX ADMIN — PANTOPRAZOLE SODIUM 40 MG: 40 INJECTION, POWDER, FOR SOLUTION INTRAVENOUS at 10:53

## 2020-11-02 ENCOUNTER — APPOINTMENT (EMERGENCY)
Dept: CT IMAGING | Facility: HOSPITAL | Age: 85
End: 2020-11-02
Payer: MEDICARE

## 2020-11-02 ENCOUNTER — HOSPITAL ENCOUNTER (EMERGENCY)
Facility: HOSPITAL | Age: 85
Discharge: HOME | End: 2020-11-02
Attending: EMERGENCY MEDICINE | Admitting: EMERGENCY MEDICINE
Payer: MEDICARE

## 2020-11-02 VITALS
DIASTOLIC BLOOD PRESSURE: 77 MMHG | RESPIRATION RATE: 16 BRPM | OXYGEN SATURATION: 99 % | SYSTOLIC BLOOD PRESSURE: 184 MMHG | HEIGHT: 63 IN | HEART RATE: 55 BPM | BODY MASS INDEX: 21.8 KG/M2 | WEIGHT: 123.02 LBS | TEMPERATURE: 98.3 F

## 2020-11-02 DIAGNOSIS — R10.32 LEFT GROIN PAIN: Primary | ICD-10-CM

## 2020-11-02 LAB
ANION GAP SERPL CALCULATED.3IONS-SCNC: 6 MMOL/L (ref 4–13)
BASOPHILS # BLD AUTO: 0.03 THOUSANDS/ΜL (ref 0–0.1)
BASOPHILS NFR BLD AUTO: 1 % (ref 0–1)
BUN SERPL-MCNC: 16 MG/DL (ref 5–25)
CALCIUM SERPL-MCNC: 9.2 MG/DL (ref 8.3–10.1)
CHLORIDE SERPL-SCNC: 104 MMOL/L (ref 100–108)
CO2 SERPL-SCNC: 33 MMOL/L (ref 21–32)
CREAT SERPL-MCNC: 1.04 MG/DL (ref 0.6–1.3)
EOSINOPHIL # BLD AUTO: 0.1 THOUSAND/ΜL (ref 0–0.61)
EOSINOPHIL NFR BLD AUTO: 2 % (ref 0–6)
ERYTHROCYTE [DISTWIDTH] IN BLOOD BY AUTOMATED COUNT: 13.5 % (ref 11.6–15.1)
GFR SERPL CREATININE-BSD FRML MDRD: 49 ML/MIN/1.73SQ M
GLUCOSE SERPL-MCNC: 106 MG/DL (ref 65–140)
HCT VFR BLD AUTO: 45.6 % (ref 34.8–46.1)
HGB BLD-MCNC: 14.6 G/DL (ref 11.5–15.4)
IMM GRANULOCYTES # BLD AUTO: 0.04 THOUSAND/UL (ref 0–0.2)
IMM GRANULOCYTES NFR BLD AUTO: 1 % (ref 0–2)
LYMPHOCYTES # BLD AUTO: 1.52 THOUSANDS/ΜL (ref 0.6–4.47)
LYMPHOCYTES NFR BLD AUTO: 24 % (ref 14–44)
MCH RBC QN AUTO: 29.4 PG (ref 26.8–34.3)
MCHC RBC AUTO-ENTMCNC: 32 G/DL (ref 31.4–37.4)
MCV RBC AUTO: 92 FL (ref 82–98)
MONOCYTES # BLD AUTO: 0.43 THOUSAND/ΜL (ref 0.17–1.22)
MONOCYTES NFR BLD AUTO: 7 % (ref 4–12)
NEUTROPHILS # BLD AUTO: 4.19 THOUSANDS/ΜL (ref 1.85–7.62)
NEUTS SEG NFR BLD AUTO: 65 % (ref 43–75)
NRBC BLD AUTO-RTO: 0 /100 WBCS
PLATELET # BLD AUTO: 292 THOUSANDS/UL (ref 149–390)
PMV BLD AUTO: 9.9 FL (ref 8.9–12.7)
POTASSIUM SERPL-SCNC: 3.9 MMOL/L (ref 3.5–5.3)
RBC # BLD AUTO: 4.96 MILLION/UL (ref 3.81–5.12)
SODIUM SERPL-SCNC: 143 MMOL/L (ref 136–145)
WBC # BLD AUTO: 6.31 THOUSAND/UL (ref 4.31–10.16)

## 2020-11-02 PROCEDURE — 99284 EMERGENCY DEPT VISIT MOD MDM: CPT

## 2020-11-02 PROCEDURE — 99285 EMERGENCY DEPT VISIT HI MDM: CPT | Performed by: EMERGENCY MEDICINE

## 2020-11-02 PROCEDURE — 74176 CT ABD & PELVIS W/O CONTRAST: CPT

## 2020-11-02 PROCEDURE — 85025 COMPLETE CBC W/AUTO DIFF WBC: CPT | Performed by: EMERGENCY MEDICINE

## 2020-11-02 PROCEDURE — 36415 COLL VENOUS BLD VENIPUNCTURE: CPT | Performed by: EMERGENCY MEDICINE

## 2020-11-02 PROCEDURE — G1004 CDSM NDSC: HCPCS

## 2020-11-02 PROCEDURE — 96374 THER/PROPH/DIAG INJ IV PUSH: CPT

## 2020-11-02 PROCEDURE — 80048 BASIC METABOLIC PNL TOTAL CA: CPT | Performed by: EMERGENCY MEDICINE

## 2020-11-02 RX ORDER — LIDOCAINE 50 MG/G
1 PATCH TOPICAL ONCE
Status: DISCONTINUED | OUTPATIENT
Start: 2020-11-02 | End: 2020-11-02 | Stop reason: HOSPADM

## 2020-11-02 RX ADMIN — LIDOCAINE 1 PATCH: 50 PATCH TOPICAL at 08:57

## 2020-11-02 RX ADMIN — MORPHINE SULFATE 2 MG: 2 INJECTION, SOLUTION INTRAMUSCULAR; INTRAVENOUS at 08:31

## 2020-11-25 ENCOUNTER — OFFICE VISIT (OUTPATIENT)
Dept: OBGYN CLINIC | Facility: CLINIC | Age: 85
End: 2020-11-25
Payer: MEDICARE

## 2020-11-25 VITALS — WEIGHT: 120 LBS | HEIGHT: 63 IN | RESPIRATION RATE: 18 BRPM | TEMPERATURE: 97.9 F | BODY MASS INDEX: 21.26 KG/M2

## 2020-11-25 DIAGNOSIS — M16.0 PRIMARY OSTEOARTHRITIS OF BOTH HIPS: ICD-10-CM

## 2020-11-25 DIAGNOSIS — M81.0 AGE-RELATED OSTEOPOROSIS WITHOUT CURRENT PATHOLOGICAL FRACTURE: ICD-10-CM

## 2020-11-25 DIAGNOSIS — M48.062 SPINAL STENOSIS OF LUMBAR REGION WITH NEUROGENIC CLAUDICATION: Primary | ICD-10-CM

## 2020-11-25 PROCEDURE — 99204 OFFICE O/P NEW MOD 45 MIN: CPT | Performed by: ORTHOPAEDIC SURGERY

## 2020-11-25 RX ORDER — LORAZEPAM 0.5 MG/1
TABLET ORAL EVERY 8 HOURS PRN
COMMUNITY
End: 2021-08-26 | Stop reason: ALTCHOICE

## 2020-12-02 ENCOUNTER — EVALUATION (OUTPATIENT)
Dept: PHYSICAL THERAPY | Facility: CLINIC | Age: 85
End: 2020-12-02
Payer: MEDICARE

## 2020-12-02 DIAGNOSIS — M81.0 AGE-RELATED OSTEOPOROSIS WITHOUT CURRENT PATHOLOGICAL FRACTURE: ICD-10-CM

## 2020-12-02 DIAGNOSIS — M16.0 PRIMARY OSTEOARTHRITIS OF BOTH HIPS: ICD-10-CM

## 2020-12-02 DIAGNOSIS — M48.062 SPINAL STENOSIS OF LUMBAR REGION WITH NEUROGENIC CLAUDICATION: ICD-10-CM

## 2020-12-02 PROCEDURE — 97162 PT EVAL MOD COMPLEX 30 MIN: CPT | Performed by: PHYSICAL THERAPIST

## 2020-12-02 PROCEDURE — 97535 SELF CARE MNGMENT TRAINING: CPT | Performed by: PHYSICAL THERAPIST

## 2020-12-07 ENCOUNTER — OFFICE VISIT (OUTPATIENT)
Dept: PHYSICAL THERAPY | Facility: CLINIC | Age: 85
End: 2020-12-07
Payer: MEDICARE

## 2020-12-07 DIAGNOSIS — M48.062 SPINAL STENOSIS OF LUMBAR REGION WITH NEUROGENIC CLAUDICATION: Primary | ICD-10-CM

## 2020-12-07 DIAGNOSIS — M81.0 AGE-RELATED OSTEOPOROSIS WITHOUT CURRENT PATHOLOGICAL FRACTURE: ICD-10-CM

## 2020-12-07 DIAGNOSIS — M16.0 PRIMARY OSTEOARTHRITIS OF BOTH HIPS: ICD-10-CM

## 2020-12-07 PROCEDURE — 97110 THERAPEUTIC EXERCISES: CPT | Performed by: PHYSICAL THERAPIST

## 2020-12-07 PROCEDURE — 97140 MANUAL THERAPY 1/> REGIONS: CPT | Performed by: PHYSICAL THERAPIST

## 2020-12-07 PROCEDURE — 97014 ELECTRIC STIMULATION THERAPY: CPT | Performed by: PHYSICAL THERAPIST

## 2020-12-09 ENCOUNTER — OFFICE VISIT (OUTPATIENT)
Dept: PHYSICAL THERAPY | Facility: CLINIC | Age: 85
End: 2020-12-09
Payer: MEDICARE

## 2020-12-09 DIAGNOSIS — M16.0 PRIMARY OSTEOARTHRITIS OF BOTH HIPS: ICD-10-CM

## 2020-12-09 DIAGNOSIS — M81.0 AGE-RELATED OSTEOPOROSIS WITHOUT CURRENT PATHOLOGICAL FRACTURE: ICD-10-CM

## 2020-12-09 DIAGNOSIS — M48.062 SPINAL STENOSIS OF LUMBAR REGION WITH NEUROGENIC CLAUDICATION: Primary | ICD-10-CM

## 2020-12-09 PROCEDURE — 97112 NEUROMUSCULAR REEDUCATION: CPT | Performed by: PHYSICAL THERAPIST

## 2020-12-09 PROCEDURE — 97110 THERAPEUTIC EXERCISES: CPT | Performed by: PHYSICAL THERAPIST

## 2020-12-09 PROCEDURE — 97140 MANUAL THERAPY 1/> REGIONS: CPT | Performed by: PHYSICAL THERAPIST

## 2020-12-14 ENCOUNTER — OFFICE VISIT (OUTPATIENT)
Dept: PHYSICAL THERAPY | Facility: CLINIC | Age: 85
End: 2020-12-14
Payer: MEDICARE

## 2020-12-14 DIAGNOSIS — M81.0 AGE-RELATED OSTEOPOROSIS WITHOUT CURRENT PATHOLOGICAL FRACTURE: ICD-10-CM

## 2020-12-14 DIAGNOSIS — M48.062 SPINAL STENOSIS OF LUMBAR REGION WITH NEUROGENIC CLAUDICATION: Primary | ICD-10-CM

## 2020-12-14 DIAGNOSIS — M16.0 PRIMARY OSTEOARTHRITIS OF BOTH HIPS: ICD-10-CM

## 2020-12-14 PROCEDURE — 97140 MANUAL THERAPY 1/> REGIONS: CPT | Performed by: PHYSICAL THERAPIST

## 2020-12-14 PROCEDURE — 97110 THERAPEUTIC EXERCISES: CPT | Performed by: PHYSICAL THERAPIST

## 2020-12-14 PROCEDURE — 97112 NEUROMUSCULAR REEDUCATION: CPT | Performed by: PHYSICAL THERAPIST

## 2020-12-16 ENCOUNTER — APPOINTMENT (OUTPATIENT)
Dept: PHYSICAL THERAPY | Facility: CLINIC | Age: 85
End: 2020-12-16
Payer: MEDICARE

## 2020-12-17 ENCOUNTER — APPOINTMENT (OUTPATIENT)
Dept: PHYSICAL THERAPY | Facility: CLINIC | Age: 85
End: 2020-12-17
Payer: MEDICARE

## 2020-12-21 ENCOUNTER — OFFICE VISIT (OUTPATIENT)
Dept: PHYSICAL THERAPY | Facility: CLINIC | Age: 85
End: 2020-12-21
Payer: MEDICARE

## 2020-12-21 DIAGNOSIS — M48.062 SPINAL STENOSIS OF LUMBAR REGION WITH NEUROGENIC CLAUDICATION: Primary | ICD-10-CM

## 2020-12-21 DIAGNOSIS — M81.0 AGE-RELATED OSTEOPOROSIS WITHOUT CURRENT PATHOLOGICAL FRACTURE: ICD-10-CM

## 2020-12-21 DIAGNOSIS — M16.0 PRIMARY OSTEOARTHRITIS OF BOTH HIPS: ICD-10-CM

## 2020-12-21 PROCEDURE — 97112 NEUROMUSCULAR REEDUCATION: CPT | Performed by: PHYSICAL THERAPIST

## 2020-12-21 PROCEDURE — 97110 THERAPEUTIC EXERCISES: CPT | Performed by: PHYSICAL THERAPIST

## 2020-12-21 PROCEDURE — 97140 MANUAL THERAPY 1/> REGIONS: CPT | Performed by: PHYSICAL THERAPIST

## 2020-12-23 ENCOUNTER — OFFICE VISIT (OUTPATIENT)
Dept: PHYSICAL THERAPY | Facility: CLINIC | Age: 85
End: 2020-12-23
Payer: MEDICARE

## 2020-12-23 DIAGNOSIS — M81.0 AGE-RELATED OSTEOPOROSIS WITHOUT CURRENT PATHOLOGICAL FRACTURE: ICD-10-CM

## 2020-12-23 DIAGNOSIS — M48.062 SPINAL STENOSIS OF LUMBAR REGION WITH NEUROGENIC CLAUDICATION: Primary | ICD-10-CM

## 2020-12-23 DIAGNOSIS — M16.0 PRIMARY OSTEOARTHRITIS OF BOTH HIPS: ICD-10-CM

## 2020-12-23 PROCEDURE — 97140 MANUAL THERAPY 1/> REGIONS: CPT

## 2020-12-28 ENCOUNTER — APPOINTMENT (OUTPATIENT)
Dept: PHYSICAL THERAPY | Facility: CLINIC | Age: 85
End: 2020-12-28
Payer: MEDICARE

## 2020-12-30 ENCOUNTER — APPOINTMENT (OUTPATIENT)
Dept: PHYSICAL THERAPY | Facility: CLINIC | Age: 85
End: 2020-12-30
Payer: MEDICARE

## 2020-12-31 ENCOUNTER — OFFICE VISIT (OUTPATIENT)
Dept: OBGYN CLINIC | Facility: CLINIC | Age: 85
End: 2020-12-31
Payer: MEDICARE

## 2020-12-31 VITALS
WEIGHT: 120 LBS | DIASTOLIC BLOOD PRESSURE: 70 MMHG | SYSTOLIC BLOOD PRESSURE: 124 MMHG | HEIGHT: 63 IN | HEART RATE: 55 BPM | TEMPERATURE: 97.2 F | BODY MASS INDEX: 21.26 KG/M2

## 2020-12-31 DIAGNOSIS — M25.552 CHRONIC LEFT HIP PAIN: ICD-10-CM

## 2020-12-31 DIAGNOSIS — M25.552 GREATER TROCHANTERIC PAIN SYNDROME OF LEFT LOWER EXTREMITY: Primary | ICD-10-CM

## 2020-12-31 DIAGNOSIS — G89.29 CHRONIC MIDLINE LOW BACK PAIN WITHOUT SCIATICA: ICD-10-CM

## 2020-12-31 DIAGNOSIS — G89.29 CHRONIC LEFT HIP PAIN: ICD-10-CM

## 2020-12-31 DIAGNOSIS — M54.50 CHRONIC MIDLINE LOW BACK PAIN WITHOUT SCIATICA: ICD-10-CM

## 2020-12-31 PROCEDURE — 20610 DRAIN/INJ JOINT/BURSA W/O US: CPT | Performed by: STUDENT IN AN ORGANIZED HEALTH CARE EDUCATION/TRAINING PROGRAM

## 2020-12-31 PROCEDURE — 99213 OFFICE O/P EST LOW 20 MIN: CPT | Performed by: STUDENT IN AN ORGANIZED HEALTH CARE EDUCATION/TRAINING PROGRAM

## 2020-12-31 NOTE — PROGRESS NOTES
1  Greater trochanteric pain syndrome of left lower extremity  Large joint arthrocentesis: L greater trochanteric bursa   2  Chronic left hip pain  Large joint arthrocentesis: L greater trochanteric bursa   3  Chronic midline low back pain without sciatica       Orders Placed This Encounter   Procedures    Large joint arthrocentesis: L greater trochanteric bursa        Imaging Studies (I personally reviewed images in PACS and report):    Prior imagin  CT lumbar spine without contrast 2019:  No acute pathology and no significant change since 2019  Multilevel vertebroplasty noted  There are areas of central canal narrowing at L1 due to retropulsion of bone from the pre-existing fracture and also at L4-L5 due to degenerative changes as   discussed above      There is no critical central canal stenosis  There is moderate stenosis at L4-L5      L3-L4 right-sided moderate to marked foraminal stenosis  IMPRESSION:  Chronic low back pain without radiculopathy  Chronic left hip pain secondary to greater trochanteric pain syndrome versus left hip osteoarthritis  DDx: lumbar spinal stenosis    C/i oral NSAIDs  Treatments include formal PT since 2020 without relief    PLAN:  Clinical exam prior radiographic imaging reviewed with patient today, with impression as above  Patient demonstrating symptoms of greater trochanteric pain syndrome of her left hip since starting formal physical therapy  I counseled patient her pain is likely due to PT trying to improve her core muscles to reduce strain through her back  I offered cortisone injection of her left greater trochanter bursa to alleviate pain, which she was agreeable today as per procedure note below  I counseled to allow a week to see if pain improves before returning to formal PT as the injection is only for pain control and the true long-term management of her issues is through mobility/therapy  Return in about 6 weeks (around 2021)   If no improvement or continued worsening symptoms, I will order advanced imaging in the form of lumbar MRI w/o contrast as her last formal lumbar imaging was from 12/27/2019  In addition, I will consider referral to pain management    CHIEF COMPLAINT:  Follow-up low back/left hip pain    HPI:  Bettyjane Bence is a 80 y o  female  who presents for       Visit 12/31/2020 :  Initial evaluation low back/left hip pain: Patient reports that she feels formal PT has worsened her pain since last evaluated by Dr Saima Quintanilla on 11/25/2020  She reports pain located over left lower back and left lateral hip pain  Pain described as a dull ache/sharp, intermittent, moderate to severe intensity, sometimes radiating to anterior left hip  Pain is aggravated with prolonged standing and pivoting her left leg to get out of a car  Denies issues ambulating as she uses a cane/4 point walker  She denies clicking/popping  She denies numbness/tingling and feels that her left leg drags more especially after physical therapy  Patient has difficulty sleeping at night as she cannot lie on her left hip  Denies bowel/bladder incontinence, numbness/tingling, and other ROS as per below  Of note, she has a history of kyphoplasty 2-3 ago from fracture with lingering back pain    She has she is contraindicated from taking oral NSAIDs due to Xarelto (for PAF)    She has been attending formal physical therapy but admits that she stopped going last week as she felt that it was worsening her symptoms  She has home exercises but has not been consistently doing them due to the pain  Review of Systems   Constitutional: Negative for chills, diaphoresis, fatigue, fever and unexpected weight change  Respiratory: Negative for cough and shortness of breath  Gastrointestinal: Negative for abdominal pain, nausea and vomiting  Denies bowel incontinence   Genitourinary:        Denies urinary incontinence   Skin: Negative for rash           Medical, Surgical, Family, and Social History    Past Medical History:   Diagnosis Date    A-fib [facility])     Cardiac disease     GERD (gastroesophageal reflux disease)     Hyperlipidemia     Hypertension     Osteoporosis     Psychiatric disorder     anxiety     Past Surgical History:   Procedure Laterality Date    BACK SURGERY      EYE SURGERY      cataracts     Social History   Social History     Substance and Sexual Activity   Alcohol Use Not Currently     Social History     Substance and Sexual Activity   Drug Use Never     Social History     Tobacco Use   Smoking Status Never Smoker   Smokeless Tobacco Never Used     Family History   Problem Relation Age of Onset    Cancer Father     Cancer Brother      Allergies   Allergen Reactions    Ciprofloxacin     Epinephrine      Irregular heart ryhthm (afib) per pt   Bactrim [Sulfamethoxazole-Trimethoprim] Rash    Medical Tape Rash          Physical Exam  /70 (BP Location: Left arm, Patient Position: Sitting, Cuff Size: Adult)   Pulse 55   Temp (!) 97 2 °F (36 2 °C) (Temporal)   Ht 5' 3" (1 6 m)   Wt 54 4 kg (120 lb)   BMI 21 26 kg/m²     Constitutional:  see vital signs  Gen: well-developed, normocephalic/atraumatic, well-groomed  Eyes: No inflammation or discharge of conjunctiva or lids; sclera clear   Pharynx: no inflammation, lesion, or mass of lips  Neck: supple, no masses, non-distended  MSK: no inflammation, lesion, mass, or clubbing of nails and digits except for other than mentioned below  SKIN: no visible rashes or skin lesions  Pulmonary/Chest: Effort normal  No respiratory distress     NEURO: cranial nerves grossly intact  PSYCH:  Alert and oriented to person, place, and time; recent and remote memory intact; mood normal, no depression, anxiety, or agitation, judgment and insight good and intact     Ortho Exam    BACK EXAM:  Gait: normal, no trendelenberg gait, no antalgic gait    BACK TENDERNESS:  Spinous Processes: +L4, +L5  Paraspinal Muscles: +paralumbar left sided  SI Joint: no  Sacrum: no    ROM:  Flexion:  60, with aggravation of low back pain  Extension:  20 with aggravation of low back pain  Lateral flexion:  20 bilaterally with aggravation of left low back pain during right lateral flexion  Rotation:  15 bilaterally    DERMATOMAL SENSATION:  L1: normal   L2:  Decreased on left compared to right  L3: normal   L4: normal   L5: normal   S1: normal    STRENGTH (bilateral):  Knee Extension: 5/5  Knee Flexion: 5/5  Foot Dorsiflexion: 5/5  Great Toe Extension: 5/5  Foot Plantarflexion: 5/5  Hip Flexion: 5/5  Hip Abduction: 5/5, aggravates left hip pain    REFLEXES:  Patellar: 1+ bilateral  Achilles: 1+ bilateral  Clonus: negative bilateral    BACK:   SUPINE STRAIGHT LEG: negative    HIP:  Palpation:  Nontender over anterior hip joint line but exquisite tenderness over greater trochanter of left hip and along proximal ITB  LOG ROLL: negative  WILVER: negative  FADIR: negative, but aggravates lateral hip pain over greater trochanter    Large joint arthrocentesis: L greater trochanteric bursa  Universal Protocol:  Consent: Verbal consent obtained  Risks and benefits: risks, benefits and alternatives were discussed  Consent given by: patient  Time out: Immediately prior to procedure a "time out" was called to verify the correct patient, procedure, equipment, support staff and site/side marked as required  Timeout called at: 12/31/2020 10:16 AM   Patient understanding: patient states understanding of the procedure being performed  Site marked: the operative site was marked  Radiology Images displayed and confirmed   If images not available, report reviewed: imaging studies available  Patient identity confirmed: verbally with patient    Supporting Documentation  Indications: pain   Procedure Details  Location: hip - L greater trochanteric bursa  Preparation: Patient was prepped and draped in the usual sterile fashion  Needle size: 22 G (3 5 inch spinal needle)  Ultrasound guidance: no  Approach: lateral (perpindicular over greater trochanter at site of maximal tenderness)  Medications administered: 4 mL lidocaine 1 %; 40 mg triamcinolone acetonide 40 mg/mL    Patient tolerance: patient tolerated the procedure well with no immediate complications  Dressing:  Sterile dressing applied

## 2020-12-31 NOTE — PATIENT INSTRUCTIONS

## 2021-01-01 RX ORDER — LIDOCAINE HYDROCHLORIDE 10 MG/ML
4 INJECTION, SOLUTION INFILTRATION; PERINEURAL
Status: COMPLETED | OUTPATIENT
Start: 2021-01-01 | End: 2021-01-01

## 2021-01-01 RX ORDER — TRIAMCINOLONE ACETONIDE 40 MG/ML
40 INJECTION, SUSPENSION INTRA-ARTICULAR; INTRAMUSCULAR
Status: COMPLETED | OUTPATIENT
Start: 2021-01-01 | End: 2021-01-01

## 2021-01-01 RX ADMIN — TRIAMCINOLONE ACETONIDE 40 MG: 40 INJECTION, SUSPENSION INTRA-ARTICULAR; INTRAMUSCULAR at 13:32

## 2021-01-01 RX ADMIN — LIDOCAINE HYDROCHLORIDE 4 ML: 10 INJECTION, SOLUTION INFILTRATION; PERINEURAL at 13:32

## 2021-01-28 ENCOUNTER — IMMUNIZATIONS (OUTPATIENT)
Dept: FAMILY MEDICINE CLINIC | Facility: HOSPITAL | Age: 86
End: 2021-01-28

## 2021-01-28 DIAGNOSIS — Z23 ENCOUNTER FOR IMMUNIZATION: Primary | ICD-10-CM

## 2021-01-28 PROCEDURE — 91301 SARS-COV-2 / COVID-19 MRNA VACCINE (MODERNA) 100 MCG: CPT

## 2021-01-28 PROCEDURE — 0011A SARS-COV-2 / COVID-19 MRNA VACCINE (MODERNA) 100 MCG: CPT

## 2021-02-23 ENCOUNTER — IMMUNIZATIONS (OUTPATIENT)
Dept: FAMILY MEDICINE CLINIC | Facility: HOSPITAL | Age: 86
End: 2021-02-23

## 2021-02-23 DIAGNOSIS — Z23 ENCOUNTER FOR IMMUNIZATION: Primary | ICD-10-CM

## 2021-02-23 PROCEDURE — 0012A SARS-COV-2 / COVID-19 MRNA VACCINE (MODERNA) 100 MCG: CPT

## 2021-02-23 PROCEDURE — 91301 SARS-COV-2 / COVID-19 MRNA VACCINE (MODERNA) 100 MCG: CPT

## 2021-03-05 ENCOUNTER — APPOINTMENT (EMERGENCY)
Dept: CT IMAGING | Facility: HOSPITAL | Age: 86
End: 2021-03-05
Payer: MEDICARE

## 2021-03-05 ENCOUNTER — HOSPITAL ENCOUNTER (EMERGENCY)
Facility: HOSPITAL | Age: 86
Discharge: HOME | End: 2021-03-05
Attending: EMERGENCY MEDICINE | Admitting: EMERGENCY MEDICINE
Payer: MEDICARE

## 2021-03-05 VITALS
RESPIRATION RATE: 16 BRPM | SYSTOLIC BLOOD PRESSURE: 166 MMHG | WEIGHT: 125.66 LBS | BODY MASS INDEX: 22.26 KG/M2 | HEART RATE: 100 BPM | TEMPERATURE: 97.9 F | DIASTOLIC BLOOD PRESSURE: 83 MMHG | OXYGEN SATURATION: 97 %

## 2021-03-05 DIAGNOSIS — R10.32 LEFT LOWER QUADRANT ABDOMINAL PAIN: Primary | ICD-10-CM

## 2021-03-05 LAB
ALBUMIN SERPL BCP-MCNC: 4 G/DL (ref 3.5–5)
ALP SERPL-CCNC: 75 U/L (ref 46–116)
ALT SERPL W P-5'-P-CCNC: 28 U/L (ref 12–78)
AMORPH PHOS CRY URNS QL MICRO: ABNORMAL /HPF
ANION GAP SERPL CALCULATED.3IONS-SCNC: 6 MMOL/L (ref 4–13)
AST SERPL W P-5'-P-CCNC: 27 U/L (ref 5–45)
BACTERIA UR QL AUTO: ABNORMAL /HPF
BASOPHILS # BLD AUTO: 0.07 THOUSANDS/ΜL (ref 0–0.1)
BASOPHILS NFR BLD AUTO: 1 % (ref 0–1)
BILIRUB SERPL-MCNC: 0.56 MG/DL (ref 0.2–1)
BILIRUB UR QL STRIP: NEGATIVE
BUN SERPL-MCNC: 15 MG/DL (ref 5–25)
CALCIUM SERPL-MCNC: 10.1 MG/DL (ref 8.3–10.1)
CHLORIDE SERPL-SCNC: 101 MMOL/L (ref 100–108)
CLARITY UR: ABNORMAL
CO2 SERPL-SCNC: 32 MMOL/L (ref 21–32)
COLOR UR: YELLOW
CREAT SERPL-MCNC: 0.87 MG/DL (ref 0.6–1.3)
EOSINOPHIL # BLD AUTO: 0.11 THOUSAND/ΜL (ref 0–0.61)
EOSINOPHIL NFR BLD AUTO: 2 % (ref 0–6)
ERYTHROCYTE [DISTWIDTH] IN BLOOD BY AUTOMATED COUNT: 13.4 % (ref 11.6–15.1)
GFR SERPL CREATININE-BSD FRML MDRD: 61 ML/MIN/1.73SQ M
GLUCOSE SERPL-MCNC: 96 MG/DL (ref 65–140)
GLUCOSE UR STRIP-MCNC: NEGATIVE MG/DL
HCT VFR BLD AUTO: 48.9 % (ref 34.8–46.1)
HGB BLD-MCNC: 15.7 G/DL (ref 11.5–15.4)
HGB UR QL STRIP.AUTO: ABNORMAL
IMM GRANULOCYTES # BLD AUTO: 0.08 THOUSAND/UL (ref 0–0.2)
IMM GRANULOCYTES NFR BLD AUTO: 1 % (ref 0–2)
KETONES UR STRIP-MCNC: NEGATIVE MG/DL
LEUKOCYTE ESTERASE UR QL STRIP: NEGATIVE
LYMPHOCYTES # BLD AUTO: 1.58 THOUSANDS/ΜL (ref 0.6–4.47)
LYMPHOCYTES NFR BLD AUTO: 22 % (ref 14–44)
MCH RBC QN AUTO: 29.6 PG (ref 26.8–34.3)
MCHC RBC AUTO-ENTMCNC: 32.1 G/DL (ref 31.4–37.4)
MCV RBC AUTO: 92 FL (ref 82–98)
MONOCYTES # BLD AUTO: 0.49 THOUSAND/ΜL (ref 0.17–1.22)
MONOCYTES NFR BLD AUTO: 7 % (ref 4–12)
NEUTROPHILS # BLD AUTO: 5.01 THOUSANDS/ΜL (ref 1.85–7.62)
NEUTS SEG NFR BLD AUTO: 67 % (ref 43–75)
NITRITE UR QL STRIP: NEGATIVE
NON-SQ EPI CELLS URNS QL MICRO: ABNORMAL /HPF
NRBC BLD AUTO-RTO: 0 /100 WBCS
OTHER STN SPEC: ABNORMAL
PH UR STRIP.AUTO: 8 [PH]
PLATELET # BLD AUTO: 318 THOUSANDS/UL (ref 149–390)
PMV BLD AUTO: 10 FL (ref 8.9–12.7)
POTASSIUM SERPL-SCNC: 4 MMOL/L (ref 3.5–5.3)
PROT SERPL-MCNC: 8.3 G/DL (ref 6.4–8.2)
PROT UR STRIP-MCNC: ABNORMAL MG/DL
RBC # BLD AUTO: 5.3 MILLION/UL (ref 3.81–5.12)
RBC #/AREA URNS AUTO: ABNORMAL /HPF
SODIUM SERPL-SCNC: 139 MMOL/L (ref 136–145)
SP GR UR STRIP.AUTO: 1.02 (ref 1–1.03)
UROBILINOGEN UR QL STRIP.AUTO: 0.2 E.U./DL
WBC # BLD AUTO: 7.34 THOUSAND/UL (ref 4.31–10.16)
WBC #/AREA URNS AUTO: ABNORMAL /HPF

## 2021-03-05 PROCEDURE — 96375 TX/PRO/DX INJ NEW DRUG ADDON: CPT

## 2021-03-05 PROCEDURE — 96374 THER/PROPH/DIAG INJ IV PUSH: CPT

## 2021-03-05 PROCEDURE — 99284 EMERGENCY DEPT VISIT MOD MDM: CPT

## 2021-03-05 PROCEDURE — 81001 URINALYSIS AUTO W/SCOPE: CPT | Performed by: EMERGENCY MEDICINE

## 2021-03-05 PROCEDURE — 74177 CT ABD & PELVIS W/CONTRAST: CPT

## 2021-03-05 PROCEDURE — 96361 HYDRATE IV INFUSION ADD-ON: CPT

## 2021-03-05 PROCEDURE — 99285 EMERGENCY DEPT VISIT HI MDM: CPT | Performed by: EMERGENCY MEDICINE

## 2021-03-05 PROCEDURE — G1004 CDSM NDSC: HCPCS

## 2021-03-05 PROCEDURE — 80053 COMPREHEN METABOLIC PANEL: CPT | Performed by: EMERGENCY MEDICINE

## 2021-03-05 PROCEDURE — 85025 COMPLETE CBC W/AUTO DIFF WBC: CPT | Performed by: EMERGENCY MEDICINE

## 2021-03-05 PROCEDURE — 36415 COLL VENOUS BLD VENIPUNCTURE: CPT | Performed by: EMERGENCY MEDICINE

## 2021-03-05 RX ORDER — MORPHINE SULFATE 4 MG/ML
4 INJECTION, SOLUTION INTRAMUSCULAR; INTRAVENOUS ONCE
Status: COMPLETED | OUTPATIENT
Start: 2021-03-05 | End: 2021-03-05

## 2021-03-05 RX ORDER — ONDANSETRON 2 MG/ML
4 INJECTION INTRAMUSCULAR; INTRAVENOUS ONCE
Status: COMPLETED | OUTPATIENT
Start: 2021-03-05 | End: 2021-03-05

## 2021-03-05 RX ORDER — SODIUM CHLORIDE 9 MG/ML
250 INJECTION, SOLUTION INTRAVENOUS CONTINUOUS
Status: DISCONTINUED | OUTPATIENT
Start: 2021-03-05 | End: 2021-03-05 | Stop reason: HOSPADM

## 2021-03-05 RX ADMIN — MORPHINE SULFATE 4 MG: 4 INJECTION INTRAVENOUS at 09:56

## 2021-03-05 RX ADMIN — SODIUM CHLORIDE 250 ML/HR: 0.9 INJECTION, SOLUTION INTRAVENOUS at 09:56

## 2021-03-05 RX ADMIN — IOHEXOL 85 ML: 350 INJECTION, SOLUTION INTRAVENOUS at 11:05

## 2021-03-05 RX ADMIN — ONDANSETRON 4 MG: 2 INJECTION INTRAMUSCULAR; INTRAVENOUS at 09:56

## 2021-03-05 NOTE — ED PROVIDER NOTES
History  Chief Complaint   Patient presents with    Abdominal Pain     patient has Hx of diverticulitis now had left lower abdominal pain radiating into her left hip and flank   40-year-old female complains of sharp fairly severe left lower quadrant, flank pain for 2 days  States she started Augmentin yesterday  She denies fever and chills  She denies diarrhea and constipation  She denies hematuria and dysuria  She denies rectal bleeding, but has hemorrhoids and occasionally bleed  History provided by:  Patient  Abdominal Pain  Pain location:  LLQ and L flank  Pain quality: sharp    Pain radiates to:  L flank  Pain severity:  Moderate  Onset quality:  Gradual  Timing:  Constant  Progression:  Worsening  Chronicity:  New  Ineffective treatments: Single dose augmentin  Associated symptoms: no chest pain, no fever and no shortness of breath        Prior to Admission Medications   Prescriptions Last Dose Informant Patient Reported? Taking?    LORazepam (ATIVAN) 0 5 mg tablet   Yes No   Sig: Take by mouth every 8 (eight) hours as needed for anxiety   Magnesium Chloride 200 MG/ML SOLN   Yes No   Sig: Take 1 tablet by mouth   amLODIPine (NORVASC) 5 mg tablet   Yes No   Sig: Take by mouth   calcium citrate-vitamin D (CALCIUM + D) 315-200 MG-UNIT per tablet   Yes No   Sig: Take by mouth   denosumab (PROLIA) 60 mg/mL   Yes No   dofetilide (TIKOSYN) 250 mcg capsule   Yes No   Si (two) times a day    famotidine (PEPCID) 20 mg tablet   Yes No   Sig: famotidine 20 mg tablet   metoprolol succinate (TOPROL XL) 50 mg 24 hr tablet   Yes No   Sig: Take by mouth   polyethylene glycol (MIRALAX) 17 g packet   Yes No   Sig: Take 17 g by mouth daily   rivaroxaban (XARELTO) 15 mg tablet   Yes No   Sig: Take by mouth daily with dinner       Facility-Administered Medications: None       Past Medical History:   Diagnosis Date    [facility] )     Cardiac disease     GERD (gastroesophageal reflux disease)     Hyperlipidemia     Hypertension     Osteoporosis     Psychiatric disorder     anxiety       Past Surgical History:   Procedure Laterality Date    BACK SURGERY      EYE SURGERY      cataracts       Family History   Problem Relation Age of Onset    Cancer Father     Cancer Brother      I have reviewed and agree with the history as documented  E-Cigarette/Vaping    E-Cigarette Use Never User      E-Cigarette/Vaping Substances     Social History     Tobacco Use    Smoking status: Never Smoker    Smokeless tobacco: Never Used   Substance Use Topics    Alcohol use: Not Currently    Drug use: Never       Review of Systems   Constitutional: Negative for fever  Respiratory: Negative for shortness of breath  Cardiovascular: Negative for chest pain  Gastrointestinal: Positive for abdominal pain  All other systems reviewed and are negative  Physical Exam  Physical Exam  Vitals signs and nursing note reviewed  Constitutional:       Comments: Pleasant, comfortable-appearing   HENT:      Head: Normocephalic and atraumatic  Eyes:      Conjunctiva/sclera: Conjunctivae normal       Pupils: Pupils are equal, round, and reactive to light  Neck:      Musculoskeletal: Neck supple  Cardiovascular:      Rate and Rhythm: Normal rate and regular rhythm  Heart sounds: Normal heart sounds  Pulmonary:      Effort: Pulmonary effort is normal       Breath sounds: Normal breath sounds  Abdominal:      General: Bowel sounds are normal  There is no distension  Palpations: Abdomen is soft  Tenderness: There is abdominal tenderness in the left lower quadrant  There is left CVA tenderness  There is no guarding or rebound  Musculoskeletal: Normal range of motion  Skin:     General: Skin is warm and dry  Neurological:      Mental Status: She is alert and oriented to person, place, and time  Cranial Nerves: No cranial nerve deficit        Coordination: Coordination normal    Psychiatric: Behavior: Behavior normal          Thought Content:  Thought content normal          Judgment: Judgment normal          Vital Signs  ED Triage Vitals   Temperature Pulse Respirations Blood Pressure SpO2   03/05/21 0913 03/05/21 0913 03/05/21 0913 03/05/21 0913 03/05/21 0913   97 9 °F (36 6 °C) 70 16 (!) 214/85 99 %      Temp Source Heart Rate Source Patient Position - Orthostatic VS BP Location FiO2 (%)   03/05/21 0913 03/05/21 0913 03/05/21 0913 03/05/21 0913 --   Skin Monitor Lying Right arm       Pain Score       03/05/21 0956       5           Vitals:    03/05/21 0913 03/05/21 1130 03/05/21 1200   BP: (!) 214/85 (!) 186/79 166/82   Pulse: 70 60 80   Patient Position - Orthostatic VS: Lying           Visual Acuity      ED Medications  Medications   sodium chloride 0 9 % infusion (250 mL/hr Intravenous New Bag 3/5/21 0956)   ondansetron (ZOFRAN) injection 4 mg (4 mg Intravenous Given 3/5/21 0956)   morphine (PF) 4 mg/mL injection 4 mg (4 mg Intravenous Given 3/5/21 0956)   iohexol (OMNIPAQUE) 350 MG/ML injection (SINGLE-DOSE) 85 mL (85 mL Intravenous Given 3/5/21 1105)       Diagnostic Studies  Results Reviewed     Procedure Component Value Units Date/Time    Comprehensive metabolic panel [[id number]]  (Abnormal) Collected: 03/05/21 0955    Lab Status: Final result Specimen: Blood from Arm, Left Updated: 03/05/21 1029     Sodium 139 mmol/L      Potassium 4 0 mmol/L      Chloride 101 mmol/L      CO2 32 mmol/L      ANION GAP 6 mmol/L      BUN 15 mg/dL      Creatinine 0 87 mg/dL      Glucose 96 mg/dL      Calcium 10 1 mg/dL      AST 27 U/L      ALT 28 U/L      Alkaline Phosphatase 75 U/L      Total Protein 8 3 g/dL      Albumin 4 0 g/dL      Total Bilirubin 0 56 mg/dL      eGFR 61 ml/min/1 73sq m     Narrative:      [place] guidelines for Chronic Kidney Disease (CKD):     Stage 1 with normal or high GFR (GFR > 90 mL/min/1 73 square meters)    Stage 2 Mild CKD (GFR = 60-89 mL/min/1 73 square meters)    Stage 3A Moderate CKD (GFR = 45-59 mL/min/1 73 square meters)    Stage 3B Moderate CKD (GFR = 30-44 mL/min/1 73 square meters)    Stage 4 Severe CKD (GFR = 15-29 mL/min/1 73 square meters)    Stage 5 End Stage CKD (GFR <15 mL/min/1 73 square meters)  Note: GFR calculation is accurate only with a steady state creatinine    Urine Microscopic [[id number]]  (Abnormal) Collected: 03/05/21 0932    Lab Status: Final result Specimen: Urine, Clean Catch Updated: 03/05/21 1014     RBC, UA 0-1 /hpf      WBC, UA 2-4 /hpf      Epithelial Cells Moderate /hpf      Bacteria, UA Innumerable /hpf      AMORPH PHOSPATES Moderate /hpf      OTHER OBSERVATIONS Renal Epithelial Cells Present    CBC and differential [[id number]]  (Abnormal) Collected: 03/05/21 0955    Lab Status: Final result Specimen: Blood from Arm, Left Updated: 03/05/21 1013     WBC 7 34 Thousand/uL      RBC 5 30 Million/uL      Hemoglobin 15 7 g/dL      Hematocrit 48 9 %      MCV 92 fL      MCH 29 6 pg      MCHC 32 1 g/dL      RDW 13 4 %      MPV 10 0 fL      Platelets 514 Thousands/uL      nRBC 0 /100 WBCs      Neutrophils Relative 67 %      Immat GRANS % 1 %      Lymphocytes Relative 22 %      Monocytes Relative 7 %      Eosinophils Relative 2 %      Basophils Relative 1 %      Neutrophils Absolute 5 01 Thousands/µL      Immature Grans Absolute 0 08 Thousand/uL      Lymphocytes Absolute 1 58 Thousands/µL      Monocytes Absolute 0 49 Thousand/µL      Eosinophils Absolute 0 11 Thousand/µL      Basophils Absolute 0 07 Thousands/µL     UA (URINE) with reflex to Scope [[id number]]  (Abnormal) Collected: 03/05/21 0932    Lab Status: Final result Specimen: Urine, Clean Catch Updated: 03/05/21 0951     Color, UA Yellow     Clarity, UA Slightly Cloudy     Specific [place], UA 1 020     pH, UA 8 0     Leukocytes, UA Negative     Nitrite, UA Negative     Protein, UA Trace mg/dl      Glucose, UA Negative mg/dl      Ketones, UA Negative mg/dl      Urobilinogen, UA 0 2 E U /dl      Bilirubin, UA Negative     Blood, UA Trace-lysed                 CT abdomen pelvis with contrast   Final Result by Latonya Ventura MD (03/05 1148)      No evidence of acute abdominopelvic process  Moderate stool in the rectosigmoid colon  No bowel obstruction  Sigmoid diverticulosis  Workstation performed: [id number]                    Procedures  Procedures         ED Course  ED Course as of Mar 05 1254   Fri Mar 05, 2021   1250 Feeling better  Abdomen benign  We discussed results fundal close outpatient follow-up                                              MDM    Disposition  Final diagnoses:   Left lower quadrant abdominal pain     Time reflects when diagnosis was documented in both MDM as applicable and the Disposition within this note     Time User Action Codes Description Comment    3/5/2021 12:53 PM Dustin Burk Add [R10 32] Left lower quadrant abdominal pain       ED Disposition     ED Disposition Condition Date/Time Comment    Discharge Stable Fri Mar 5, 2021 12:54 PM Bettyjane Bence discharge to home/self care  Follow-up Information     Follow up With Specialties Details Why Beatrice Najjar, MD Family Medicine Schedule an appointment as soon as possible for a visit in 3 days  [id number]  [phone]            Patient's Medications   Discharge Prescriptions    No medications on file     No discharge procedures on file      PDMP Review     None          ED Provider  Electronically Signed by           Jing Menard DO  03/05/21 0920       Jing Menard DO  03/05/21 1256

## 2021-03-05 NOTE — DISCHARGE INSTRUCTIONS
Increased MiraLax did 2-3 times daily for the next 3 days  Continue antibiotics as currently  Return immediately if worse or new symptoms

## 2021-03-08 ENCOUNTER — TRANSCRIBE ORDERS (OUTPATIENT)
Dept: ADMINISTRATIVE | Facility: HOSPITAL | Age: 86
End: 2021-03-08

## 2021-03-08 ENCOUNTER — APPOINTMENT (OUTPATIENT)
Dept: LAB | Facility: HOSPITAL | Age: 86
End: 2021-03-08
Payer: MEDICARE

## 2021-03-08 DIAGNOSIS — F41.9 ANXIETY: ICD-10-CM

## 2021-03-08 DIAGNOSIS — I48.91 ATRIAL FIBRILLATION WITH RVR (HCC): Primary | ICD-10-CM

## 2021-03-08 LAB
ALBUMIN SERPL BCP-MCNC: 3.4 G/DL (ref 3.5–5)
ALP SERPL-CCNC: 59 U/L (ref 46–116)
ALT SERPL W P-5'-P-CCNC: 21 U/L (ref 12–78)
ANION GAP SERPL CALCULATED.3IONS-SCNC: 7 MMOL/L (ref 4–13)
AST SERPL W P-5'-P-CCNC: 20 U/L (ref 5–45)
BILIRUB SERPL-MCNC: 0.48 MG/DL (ref 0.2–1)
BUN SERPL-MCNC: 22 MG/DL (ref 5–25)
CALCIUM ALBUM COR SERPL-MCNC: 10.1 MG/DL (ref 8.3–10.1)
CALCIUM SERPL-MCNC: 9.6 MG/DL (ref 8.3–10.1)
CHLORIDE SERPL-SCNC: 103 MMOL/L (ref 100–108)
CO2 SERPL-SCNC: 32 MMOL/L (ref 21–32)
CREAT SERPL-MCNC: 1.08 MG/DL (ref 0.6–1.3)
GFR SERPL CREATININE-BSD FRML MDRD: 47 ML/MIN/1.73SQ M
GLUCOSE SERPL-MCNC: 95 MG/DL (ref 65–140)
POTASSIUM SERPL-SCNC: 4.4 MMOL/L (ref 3.5–5.3)
PROT SERPL-MCNC: 6.7 G/DL (ref 6.4–8.2)
SODIUM SERPL-SCNC: 142 MMOL/L (ref 136–145)
TSH SERPL DL<=0.05 MIU/L-ACNC: 2.34 UIU/ML (ref 0.36–3.74)

## 2021-03-08 PROCEDURE — 80053 COMPREHEN METABOLIC PANEL: CPT

## 2021-03-08 PROCEDURE — 36415 COLL VENOUS BLD VENIPUNCTURE: CPT

## 2021-03-08 PROCEDURE — 84443 ASSAY THYROID STIM HORMONE: CPT

## 2021-05-10 ENCOUNTER — TELEPHONE (OUTPATIENT)
Dept: CARDIOLOGY CLINIC | Facility: CLINIC | Age: 86
End: 2021-05-10

## 2021-05-10 NOTE — TELEPHONE ENCOUNTER
Spoke with pt  She rechecked her BP 30 minutes after her last phone call and it came down to 145/62  HR 56  She noticed a stronger thumping in her chest yesterday afternoon and today after eating more chocolate than normal and drinking an iced tea  She did feel her legs were puffy yesterday but she had been eating saltier food  She urinated well today and the swelling is better  She is on Tikosyn  Had deferred Amiodarone per the note with her previous cardiologist in September  Not on a diuretic  She denies chest pain, shortness of breath, lightheadedness, falls  She is not weighing herself  She does not feel the need to go to the ER  She will stop caffeine, make sure she is drinking enough water  She will weigh herself daily and monitor for 3 pound weight gain in 1 day  She states she will go to the ER if any concerning symptoms  She will let us know if her pounding continues and we can consider an EKG in the office  Plan for office visit as scheduled    Jony Gomes verbalized understanding

## 2021-05-10 NOTE — TELEPHONE ENCOUNTER
Pt states that she started yesterday with swelling in her left ankle  States that it is still swollen today  States that she can "feel" her Afib  States that she has had it for many years, and could never feel it before  States that when she lays down, and sits she can feel the irregular beat  177/85 hr 76 as we were on the phone

## 2021-06-10 ENCOUNTER — CONSULT (OUTPATIENT)
Dept: CARDIOLOGY CLINIC | Facility: CLINIC | Age: 86
End: 2021-06-10
Payer: MEDICARE

## 2021-06-10 VITALS
SYSTOLIC BLOOD PRESSURE: 182 MMHG | BODY MASS INDEX: 21.51 KG/M2 | HEART RATE: 61 BPM | OXYGEN SATURATION: 100 % | HEIGHT: 63 IN | DIASTOLIC BLOOD PRESSURE: 80 MMHG | WEIGHT: 121.4 LBS

## 2021-06-10 DIAGNOSIS — R55 SYNCOPE, UNSPECIFIED SYNCOPE TYPE: ICD-10-CM

## 2021-06-10 DIAGNOSIS — E78.2 MIXED HYPERLIPIDEMIA: ICD-10-CM

## 2021-06-10 DIAGNOSIS — I65.23 CAROTID ATHEROSCLEROSIS, BILATERAL: ICD-10-CM

## 2021-06-10 DIAGNOSIS — I48.0 PAROXYSMAL ATRIAL FIBRILLATION (HCC): Primary | ICD-10-CM

## 2021-06-10 DIAGNOSIS — I10 ESSENTIAL HYPERTENSION: ICD-10-CM

## 2021-06-10 PROCEDURE — 99204 OFFICE O/P NEW MOD 45 MIN: CPT | Performed by: INTERNAL MEDICINE

## 2021-06-10 RX ORDER — DOCUSATE SODIUM 100 MG/1
100 CAPSULE, LIQUID FILLED ORAL DAILY
COMMUNITY

## 2021-06-10 RX ORDER — METOPROLOL SUCCINATE 25 MG/1
25 TABLET, EXTENDED RELEASE ORAL DAILY
COMMUNITY
End: 2021-06-10 | Stop reason: SDUPTHER

## 2021-06-10 RX ORDER — MAGNESIUM L-LACTATE 84 MG
84 TABLET, EXTENDED RELEASE ORAL DAILY
COMMUNITY
End: 2021-10-08 | Stop reason: ALTCHOICE

## 2021-06-10 RX ORDER — AMLODIPINE BESYLATE 10 MG/1
10 TABLET ORAL DAILY
COMMUNITY
End: 2021-06-10 | Stop reason: SDUPTHER

## 2021-06-10 RX ORDER — METOPROLOL SUCCINATE 25 MG/1
25 TABLET, EXTENDED RELEASE ORAL DAILY
Qty: 90 TABLET | Refills: 3 | Status: SHIPPED | OUTPATIENT
Start: 2021-06-10 | End: 2021-07-21 | Stop reason: SDUPTHER

## 2021-06-10 RX ORDER — AMLODIPINE BESYLATE 10 MG/1
10 TABLET ORAL DAILY
Qty: 90 TABLET | Refills: 3 | Status: SHIPPED | OUTPATIENT
Start: 2021-06-10 | End: 2022-07-11

## 2021-06-10 NOTE — PROGRESS NOTES
Cardiology Office Visit    Katie Rodriguez  [id number]  1935    [facility]  [address]  [address]      Dear Hortensia Rader MD,    I had the pleasure of seeing your patient at our [address] Card[facility] [place] office today 6/10/2021  As you know she is a pleasant 80y o  year old female with a medical history as described below  Reason for office visit: Establish care for atrial fibrillation, hypertension, hyperlipidemia and syncope  1  Paroxysmal atrial fibrillation [facility])  Assessment & Plan:  Patient has a history of paroxysmal atrial fibrillation with use of Tikosyn for > 10 years  She presents with complaint of increasing "a fib episodes", feeling fluttering/palpitations in her chest   Recommend 7 day ZIO to try to determine atrial fibrillation burden  Echocardiogram 4/2021 with EF 55-60% with mild to moderate MR/AI and mild LAE  She is currently on Tikosyn 250mcg BID despite prior discussion with her previous cardiologist about the dangers of Torsades in the setting of her advanced age  Continue metoprolol succinate 25mg daily  Patient was instructed that she can take an extra  1/2 to whole dose of metoprolol succinate if she is having a lot of palpitations  Continue magnesium  Pending results of ZIO we will re discuss transitioning to amiodarone from Tikosyn  Continue renally dose adjusted Xarelto 15 mg daily for anticoagulation  Orders:  -     rivaroxaban (Xarelto) 15 mg tablet; Take 1 tablet (15 mg total) by mouth daily with dinner Please do not fill until the patient needs refills  Please note this is a new prescribing physician and all refill requests should come to this office   -     AMB extended holter monitor; Future; Expected date: 06/10/2021    2  Essential hypertension  Assessment & Plan:  Blood pressure elevated on exam today  This is her first office visit with me  Blood pressures at home are well controlled per report  Patient was instructed to call me if blood pressure is consistently >150 as we will need to adjust her medications  Orders:  -     amLODIPine (NORVASC) 10 mg tablet; Take 1 tablet (10 mg total) by mouth daily Please do not fill until the patient needs refills  Please note this is a new prescribing physician and all refill requests should come to this office  3  Mixed hyperlipidemia  Assessment & Plan:  No recent lipid panel for review  Should have updated lipid panel  She is currently not on statin therapy  Prior total cholesterol noted 10/2016 was 290  Given age, there likely would be no significant benefit from adding statin therapy  4  Syncope, unspecified syncope type  Assessment & Plan:  Patient sustained a fall in 2/2019 when she was brushing her teeth in the bathroom and fell, hitting her head against the wall  EKG at that time with QTc of 470msec  No reported LOC at that time  She did have syncopal episode 2/2020 while admitted at [address] [place] [address] in the setting of constipation and straining during BM  She tells me she had two total syncopal events that both occurred while having BM  She denies any recent recurrent syncopal events  We have reviewed the risk of Torsades in Tikosyn with advancing age  She wishes to continue medication for now, but we will continue to follow closely and continue to discuss transitioning to amiodarone which would be safer  5  Carotid atherosclerosis, bilateral  Assessment & Plan:  Carotid ultrasound 5/1/2017 showed moderate bilateral carotid atherosclerosis with 20-49% carotid stenosis bilaterally  Normal vertebral artery flow  Patient should have updated carotid ultrasound at some point in the next year  Ideally LDL < 70 given carotid artery disease (see discussion under hyperlipidemia)               SHAE   Jose Alvarado has a past medical history of paroxysmal atrial fibrillation, HTN, HLD, bilateral carotid atherosclerosis, CKD, lumbar spinal stenosis, and osteoporosis      She was previously following with [facility], Dr Atul Hardy has been treated for paroxysmal atrial fibrillation with Tikosyn for > 10 years  Her last symptomatic episode from a fib had been in 2019  She sustained a fall without LOC where her legs gave out and her head hit the wall while she was in the bathroom brushing her teeth  2/2019  Her EKG at the time showed QTc of 470msec  Patient was admitted to [facility] for constipation and was noted to have an episode of syncope while straining for BM and was observed overnight       She had met with [facility] EP clinic in 9/2020 and they had discussed switching to Amiodarone due to increased risk of Tikosyn induced Torsades with advanced age, however, she decided against this  She was last seen by Dr Eloisa Sifuentes (EP at [facility]) 3/22/2021 at which time the Tikosyn conversion to amiodarone was again discussed  There was also a discussion about the possible need for a pacemaker moving forward if she developed symptomatic bradycardia  6/10/2021: Patient notes that her palpitations seem worse and particularly worse at night  She does note significant stress at home as her  has dementia and she is caring for him  Blood pressures at home have been in the 130-140's per her report  She did have an echocardiogram done 4/2/2021 as outlined below  She reports two prior episodes of syncope  Both occurred while in the bathroom  She does note that her heart rate is fast at times and reports associated lightheadedness with rapid rates  She does note occasional chest tightness with stress       Patient Active Problem List   Diagnosis    Paroxysmal atrial fibrillation (HCC)    Syncope    Constipation    Leukocytosis    Hypokalemia    Spinal stenosis of lumbar region with neurogenic claudication    Age-related osteoporosis without current pathological fracture    Primary osteoarthritis of both hips    Hypertension    Hyperlipidemia    Fracture of multiple ribs of right side    Acute pain of right shoulder    Closed displaced fracture of lateral end of right clavicle    Pacemaker    Carotid atherosclerosis, bilateral     Past Medical History:   Diagnosis Date    [facility] )     Anxiety     Arthritis     R knee    Carotid atherosclerosis, bilateral     GERD (gastroesophageal reflux disease)     Hyperlipidemia     Hypertension     Muscle spasms of neck     Osteoporosis     Spinal stenosis      Social History     Socioeconomic History    Marital status: /Civil Union     Spouse name: Not on file    Number of children: Not on file    Years of education: Not on file    Highest education level: Not on file   Occupational History    Not on file   Tobacco Use    Smoking status: Never Smoker    Smokeless tobacco: Never Used   Vaping Use    Vaping Use: Never used   Substance and Sexual Activity    Alcohol use: Not Currently    Drug use: Never    Sexual activity: Not Currently   Other Topics Concern    Not on file   Social History Narrative    Not on file     Social Determinants of Health     Financial Resource Strain: Not on file   Food Insecurity: Not on file   Transportation Needs: Not on file   Physical Activity: Not on file   Stress: Not on file   Social Connections: Not on file   Intimate Partner Violence: Not on file   Housing Stability: Not on file      Family History   Problem Relation Age of Onset    Cancer Father     Cancer Brother     Heart disease Mother      Past Surgical History:   Procedure Laterality Date    BACK SURGERY                EYE SURGERY      cataracts     Medications reviewed in detail with patient (see  AVS)  Allergies   Allergen Reactions    Ciprofloxacin     Epinephrine      Irregular heart ryhthm (afib) per pt      Bactrim [Sulfamethoxazole-Trimethoprim] Rash    Medical Tape Rash         Cardiac Test Results:    Echocardiogram 4/2/2021 [facility]):  · Left Ventricle: Normal LV size, thickness, wall motion, EF 55-60%  Impaired diastolic relaxation, grade 1   · Right Ventricle: Normal RV size and systolic function  · Aortic sclerosis without significant stenosis     · Mild to moderate aortic insufficiency  · Mitral sclerosis without stenosis     · Mild to moderate central MR    · Mild TR   · Mild LAE       ECG 3/22/2021: Sinus rhythm  PAC's  Voltage criteria of [facility]  Non specific ST abnormality  Echocardiogram 4/2018:  EF 60%  Mild aortic regurgitation  MAC  Mild mitral regurgitation  Lexiscan nuclear stress test 10/2017:  ECG positive for ischemia  No evidence of myocardial ischemia on perfusion images  EF 73%  Carotid ultrasound 5/1/2017: Moderate bilateral carotid atherosclerosis with 20-49% carotid stenosis bilaterally  Normal vertebral artery flow  Review of Systems:    Review of Systems   Constitutional: Negative for activity change, appetite change and fatigue  HENT: Negative for congestion, hearing loss, tinnitus and trouble swallowing  Eyes: Negative for visual disturbance  Respiratory: Negative for cough, chest tightness, shortness of breath and wheezing  Cardiovascular: Positive for chest pain (occasional chest tightness with stress) and palpitations  Negative for leg swelling  Gastrointestinal: Negative for abdominal distention, abdominal pain, nausea and vomiting  Genitourinary: Negative for difficulty urinating  Musculoskeletal: Negative for arthralgias  Skin: Negative for rash  Neurological: Positive for syncope (prior) and light-headedness (with rapid heart rates)  Negative for dizziness  Hematological: Does not bruise/bleed easily  Psychiatric/Behavioral: Negative for confusion  The patient is not nervous/anxious  Increased stress   All other systems reviewed and are negative          Vitals:    06/10/21 1426   BP: (!) 182/80   Pulse: 61   SpO2: 100%   Weight: 55 1 kg (121 lb 6 4 oz) Height: 5' 3" (1 6 m)     Vitals:    06/10/21 1426   Weight: 55 1 kg (121 lb 6 4 oz)     Height: 5' 3" (160 cm)     Physical Exam  Vitals reviewed  Constitutional:       Appearance: She is well-developed  HENT:      Head: Normocephalic and atraumatic  Eyes:      Conjunctiva/sclera: Conjunctivae normal       Pupils: Pupils are equal, round, and reactive to light  Neck:      Vascular: No JVD  Cardiovascular:      Rate and Rhythm: Normal rate and regular rhythm  Heart sounds: Murmur heard  No friction rub  No gallop  Pulmonary:      Effort: Pulmonary effort is normal       Breath sounds: Normal breath sounds  Abdominal:      General: Bowel sounds are normal       Palpations: Abdomen is soft  Musculoskeletal:      Cervical back: Normal range of motion  Skin:     General: Skin is warm and dry  Neurological:      Mental Status: She is alert and oriented to person, place, and time     Psychiatric:         Behavior: Behavior normal

## 2021-06-10 NOTE — PATIENT INSTRUCTIONS
I would recommend continuing current medications for now without change  You can take an extra half to 1 whole metoprolol for recurrent/breakthrough palpitations  As discussed there is concern with Tikosyn causing potentially other abnormal heart rhythms which is what your prior cardiologist was concerned about  I am also concerned about this and would certainly not recommend increasing the dose of Tikosyn  Seven day ZIO monitor will help us determine what is actually happening with your heart rhythm and at that point we can make a decision about possibly transitioning to amiodarone  Monitor blood pressure intermittently at home and call me if consistently above 150

## 2021-07-07 ENCOUNTER — CLINICAL SUPPORT (OUTPATIENT)
Dept: CARDIOLOGY CLINIC | Facility: CLINIC | Age: 86
End: 2021-07-07
Payer: MEDICARE

## 2021-07-07 DIAGNOSIS — I48.0 PAROXYSMAL ATRIAL FIBRILLATION (HCC): ICD-10-CM

## 2021-07-07 PROCEDURE — 93244 EXT ECG>48HR<7D REV&INTERPJ: CPT | Performed by: INTERNAL MEDICINE

## 2021-07-21 ENCOUNTER — OFFICE VISIT (OUTPATIENT)
Dept: CARDIOLOGY CLINIC | Facility: CLINIC | Age: 86
End: 2021-07-21
Payer: MEDICARE

## 2021-07-21 VITALS
HEART RATE: 64 BPM | HEIGHT: 63 IN | BODY MASS INDEX: 21.09 KG/M2 | OXYGEN SATURATION: 98 % | SYSTOLIC BLOOD PRESSURE: 120 MMHG | DIASTOLIC BLOOD PRESSURE: 78 MMHG | WEIGHT: 119 LBS | TEMPERATURE: 97.2 F

## 2021-07-21 DIAGNOSIS — E78.2 MIXED HYPERLIPIDEMIA: ICD-10-CM

## 2021-07-21 DIAGNOSIS — I48.0 PAROXYSMAL ATRIAL FIBRILLATION (HCC): Primary | ICD-10-CM

## 2021-07-21 DIAGNOSIS — R55 VASOVAGAL SYNCOPE: ICD-10-CM

## 2021-07-21 DIAGNOSIS — I10 ESSENTIAL HYPERTENSION: ICD-10-CM

## 2021-07-21 PROCEDURE — 99204 OFFICE O/P NEW MOD 45 MIN: CPT | Performed by: NURSE PRACTITIONER

## 2021-07-21 RX ORDER — OMEPRAZOLE 20 MG/1
20 CAPSULE, DELAYED RELEASE ORAL DAILY
COMMUNITY
End: 2021-08-11 | Stop reason: HOSPADM

## 2021-07-21 RX ORDER — METOPROLOL SUCCINATE 25 MG/1
TABLET, EXTENDED RELEASE ORAL
Qty: 90 TABLET | Refills: 3
Start: 2021-07-21 | End: 2021-08-26 | Stop reason: SDUPTHER

## 2021-07-21 RX ORDER — FAMOTIDINE 20 MG/1
20 TABLET, FILM COATED ORAL 2 TIMES DAILY
COMMUNITY
End: 2021-08-11 | Stop reason: HOSPADM

## 2021-07-21 NOTE — PROGRESS NOTES
Cardiology Follow Up    Leela Beauchamp  1935  [id number]  [facility] [address]  [facility] [address]  [phone]    Remigio Weathers presents today for routine follow up of her paroxysmal atrial fibrillation and HTN  1  Paroxysmal atrial fibrillation [facility])  Assessment & Plan:  Remigio Weathers has a history of paroxysmal atrial fibrillation with use of Tikosyn for > 10 years  She presented with complaint of increasing "a fib episodes", feeling fluttering/palpitations in her chest   ZIO monitoring from 6/10-6/17/2021 reveals predominantly sinus rhythm, no evidence of atrial fibrillation, one 6-beat run of WCT/VT, 14 runs of SVT, fastest 4 beats at 156BPM and longest of 20 beats at 115 BPM, occasional SVEs at 3 9%, rare VEs at <1%, ventricular bigeminy and trigeminy were present  Echo 4/2021 with EF 55-60%  We reviewed the results of her ZIO monitoring  She is not interested in changing the Tikosyn at this time  Continues Tikosyn 250mcg BID  We will trial a slight increase in Metoprolol succinate to 25mg in am, 12 5mg in pm   Repeat 48 hour holter monitor to assess response  Continue Magnesium supplementation at 250-500mg daily  Continue Xarelto for anticoagulation  Follow up closely - 2-3 weeks  Consider updated stress test in near future    Orders:  -     metoprolol succinate (TOPROL-XL) 25 mg 24 hr tablet; 1 tab po in am, 1/2 tab po in pm  -     Basic metabolic panel; Future  -     Magnesium  -     Holter monitor - 48 hour; Future; Expected date: 07/22/2021    2  Essential hypertension  Assessment & Plan:  BP improved on my recheck  Will continue Amlodipine 10mg daily  Will trial Metoprolol succinate increase to 25mg in am and 12 5mg in pm, but will monitor closely for bradycardia      3   Vasovagal syncope  Assessment & Plan:  Remigio Weathers sustained a fall in 2/2019 when she was brushing her teeth in the bathroom and fell, hitting her head against the wall  EKG at that time with QTc of 470msec  She denies any recurrent syncopal events  We have reviewed the risk of Torsades in Tikosyn with advancing age  She wishes to continue medication for now, but we will continue to follow closely  Labs have remained stable, will recheck BMP, Mag now      4  Mixed hyperlipidemia  Assessment & Plan:  Not on statin therapy at present  Will recheck lipid panel now    Orders:  -     Lipid panel; Future     HPI    Robin Larkin has a past medical history of paroxysmal atrial fibrillation, HTN, HLD, CKD, lumbar spinal stenosis, and osteoporosis  She was previously following with [facility], Dr Kinsey Gonzales, and presented to Dr Leona Santacruz to establish care on 6/10/2021  Robin Larkin has been treated for paroxysmal atrial fibrillation with Tikosyn for > 10 years  Her last symptomatic episode from a fib had been in 2019  She sustained a fall  from a potential syncopal event where her legs gave out and her head hit the wall while she was in the bathroom brushing her teeth in 2/2019  Her EKG at the time showed QTc of 470msec  She had met with [facility] EP clinic in 9/2020 and they had discussed switching to Amiodarone due to increased risk of Tikosyn induced torsades with advanced age, however, she decided against this  She continues to note fatigue and increasing palpitations over recent months  She denies lightheadedness, chest pain, shortness of breath, recurrent syncope, or leg swelling  She denies Covid 19 infection history  She is under a lot of stress caring for her spouse with Alzheimer's disease  7/21/2021: Today Robin Larkin presents for follow up  She completed a 7 day ZIO monitor and preliminary results are reviewed with her today  She tells me while she was wearing the patch her "a fib was going crazy", however, the past 3 days it has calmed down   I inquired as to why there were no triggered events on the ZIO and she stated that she did not trigger it because she was noticing symptoms "all the time"  She is taking her medications faithfully  She continues to deny syncope/near-syncope, chest pain, shortness of breath, or leg swelling  She tells me her last stress test was several years ago  Medical Problems     Problem List     Paroxysmal atrial fibrillation (HCC)    Syncope    Constipation    Leukocytosis    Hypokalemia    Spinal stenosis of lumbar region with neurogenic claudication    Age-related osteoporosis without current pathological fracture    Primary osteoarthritis of both hips    Hypertension    Hyperlipidemia              Past Medical History:   Diagnosis Date    [facility]fib ([facility] )     Arthritis     R knee    Cardiac disease     GERD (gastroesophageal reflux disease)     Hyperlipidemia     Hypertension     Osteoporosis     Psychiatric disorder     anxiety     Social History     Socioeconomic History    Marital status: /Civil Union     Spouse name: Not on file    Number of children: Not on file    Years of education: Not on file    Highest education level: Not on file   Occupational History    Not on file   Tobacco Use    Smoking status: Never Smoker    Smokeless tobacco: Never Used   Vaping Use    Vaping Use: Never used   Substance and Sexual Activity    Alcohol use: Not Currently    Drug use: Never    Sexual activity: Not Currently   Other Topics Concern    Not on file   Social History Narrative    Not on file     Social Determinants of Health     Financial Resource Strain:     Difficulty of Paying Living Expenses:    Food Insecurity:     Worried About Running Out of Food in the Last Year:     Ran Out of Food in the Last Year:    Transportation Needs:     Lack of Transportation (Medical):      Lack of Transportation (Non-Medical):    Physical Activity:     Days of Exercise per Week:     Minutes of Exercise per Session:    Stress:     Feeling of Stress :    Social Connections:     Frequency of Communication with Friends and Family:     Frequency of Social Gatherings with Friends and Family:     Attends Yarsanism Services:     Active Member of Clubs or Organizations:     Attends Club or Organization Meetings:     Marital Status:    Intimate Partner Violence:     Fear of Current or Ex-Partner:     Emotionally Abused:     Physically Abused:     Sexually Abused:       Family History   Problem Relation Age of Onset    Cancer Father     Cancer Brother      Past Surgical History:   Procedure Laterality Date    BACK SURGERY      EYE SURGERY      cataracts       Current Outpatient Medications:     amLODIPine (NORVASC) 10 mg tablet, Take 1 tablet (10 mg total) by mouth daily Please do not fill until the patient needs refills  Please note this is a new prescribing physician and all refill requests should come to this office  , Disp: 90 tablet, Rfl: 3    calcium citrate-vitamin D (CALCIUM + D) 315-200 MG-UNIT per tablet, Take by mouth, Disp: , Rfl:     docusate sodium (COLACE) 100 mg capsule, Take 100 mg by mouth daily, Disp: , Rfl:     dofetilide (TIKOSYN) 250 mcg capsule, 2 (two) times a day , Disp: , Rfl:     famotidine (PEPCID) 20 mg tablet, Take 20 mg by mouth 2 (two) times a day, Disp: , Rfl:     LORazepam (ATIVAN) 0 5 mg tablet, Take by mouth every 8 (eight) hours as needed for anxiety, Disp: , Rfl:     magnesium (MAGTAB) 84 MG (7MEQ) TBCR, Take 84 mg by mouth daily, Disp: , Rfl:     metoprolol succinate (TOPROL-XL) 25 mg 24 hr tablet, 1 tab po in am, 1/2 tab po in pm, Disp: 90 tablet, Rfl: 3    Multiple Vitamins-Minerals (ICAPS AREDS 2 PO), Take 1 tablet by mouth daily, Disp: , Rfl:     polyethylene glycol (MIRALAX) 17 g packet, Take 17 g by mouth daily, Disp: , Rfl:     rivaroxaban (Xarelto) 15 mg tablet, Take 1 tablet (15 mg total) by mouth daily with dinner Please do not fill until the patient needs refills  Please note this is a new prescribing physician and all refill requests should come to this office  , Disp: 90 tablet, Rfl: 3    denosumab (PROLIA) 60 mg/mL, , Disp: , Rfl:     omeprazole (PriLOSEC) 20 mg delayed release capsule, Take 20 mg by mouth daily (Patient not taking: Reported on 7/21/2021), Disp: , Rfl:   Allergies   Allergen Reactions    Ciprofloxacin     Epinephrine      Irregular heart ryhthm (afib) per pt   Bactrim [Sulfamethoxazole-Trimethoprim] Rash    Medical Tape Rash       Labs:     Chemistry        Component Value Date/Time    K 4 4 03/08/2021 0949     03/08/2021 0949    CO2 32 03/08/2021 0949    BUN 22 03/08/2021 0949    CREATININE 1 08 03/08/2021 0949        Component Value Date/Time    CALCIUM 9 6 03/08/2021 0949    ALKPHOS 59 03/08/2021 0949    AST 20 03/08/2021 0949    ALT 21 03/08/2021 0949        Cardiac Imaging:   ZIO 6/10-6/17/2021: (preliminary results) Min HR of 35 bpm, max HR of 193 bpm, and avg HR of 59 bpm   Predominant underlying rhythm was Sinus Rhythm  1 run of Ventricular Tachycardia occurred lasting 6 beats with a max rate of 193 bpm (avg 177 bpm)  14 Supraventricular Tachycardia runs occurred, the run with the fastest interval lasting 4 beats with a max rate of 156 bpm, the longest lasting 20 beats with an avg rate of 115 bpm  Isolated SVEs were occasional (3 9%, 46545), SVE Couplets were rare (<1 0%, 89), and SVE Triplets were rare (<1 0%, 15)  Isolated VEs were rare (<1 0%), VE Couplets were rare (<1 0%), and no VE Triplets were present  Ventricular Bigeminy and Trigeminy were present  Echocardiogram 4/2/2021 ([facility]): EF 55-60%  Grade 1 diastolic dysfunction  Mild-moderate AI  Mild-moderate MR  Mild TR  ECG 3/22/2021 ([facility]): sinus rhythm with one PAC, rate 68      Review of Systems   Constitutional: Negative  HENT: Negative  Cardiovascular: Positive for palpitations  Negative for chest pain, dyspnea on exertion, irregular heartbeat, leg swelling, near-syncope, orthopnea and syncope  Respiratory: Negative for cough and snoring  Endocrine: Negative  Skin: Negative  Musculoskeletal: Negative  Gastrointestinal: Negative  Genitourinary: Negative  Neurological: Negative  Negative for headaches, light-headedness and weakness  Psychiatric/Behavioral: Negative  Vitals:    07/21/21 1509   BP: 120/78   Pulse:    Temp:    SpO2:      Vitals:    07/21/21 1442   Weight: 54 kg (119 lb)     Height: 5' 3" (160 cm)   Body mass index is 21 08 kg/m²  Physical Exam  Vitals and nursing note reviewed  Constitutional:       General: She is not in acute distress  Appearance: She is well-developed  She is not diaphoretic  HENT:      Head: Normocephalic and atraumatic  Neck:      Thyroid: No thyromegaly  Vascular: No carotid bruit or JVD  Cardiovascular:      Rate and Rhythm: Normal rate and regular rhythm  No extrasystoles are present  Pulses: Intact distal pulses  Radial pulses are 2+ on the right side and 2+ on the left side  Heart sounds: Normal heart sounds, S1 normal and S2 normal  No murmur heard  Comments: Trace edema to bilateral ankles  Pulmonary:      Effort: Pulmonary effort is normal       Breath sounds: Normal breath sounds  Abdominal:      General: There is no distension  Palpations: Abdomen is soft  Tenderness: There is no abdominal tenderness  Musculoskeletal:         General: Normal range of motion  Cervical back: Normal range of motion and neck supple  Lymphadenopathy:      Cervical: No cervical adenopathy  Skin:     General: Skin is warm and dry  Neurological:      Mental Status: She is alert and oriented to person, place, and time  Cranial Nerves: No cranial nerve deficit  Psychiatric:         Mood and Affect: Mood normal          Speech: Speech normal          Behavior: Behavior normal  Behavior is cooperative           Cognition and Memory: Cognition and memory normal

## 2021-07-21 NOTE — PATIENT INSTRUCTIONS
Reviewed preliminary ZIO results - revealing extra beats from the top and bottom of the heart but no atrial fibrillation that I could tell  I would like to increase Metoprolol slightly - take 1 pill in the morning, 1/2 pill at night  I will let you know if Dr Que Gomes recommends anything further based on her review of the ZIO results  Please make sure you are taking Magnesium 250-500mg daily  Please complete labs prior to next visit  Please notify me immediately with any chest pain, shortness of breath, lightheadedness, leg swelling, or any other concerns

## 2021-07-22 ENCOUNTER — TELEPHONE (OUTPATIENT)
Dept: CARDIOLOGY CLINIC | Facility: CLINIC | Age: 86
End: 2021-07-22

## 2021-07-22 DIAGNOSIS — I48.0 PAROXYSMAL ATRIAL FIBRILLATION (HCC): Primary | ICD-10-CM

## 2021-07-22 RX ORDER — DOFETILIDE 0.25 MG/1
250 CAPSULE ORAL 2 TIMES DAILY
Qty: 180 CAPSULE | Refills: 3 | Status: SHIPPED | OUTPATIENT
Start: 2021-07-22 | End: 2021-07-28 | Stop reason: SDUPTHER

## 2021-07-22 NOTE — TELEPHONE ENCOUNTER
Pt is aware  The pt stated that she spoke with her daughter and she told her that she takes mag glycinate 400MG BID  She also stated she wasn't sure if  Dr Zak Simon wants the patient to  continue on Tikosyn  If she wishes her to continue on it she will need a new script called into [facility]  She only has enough for up to next Wednesday

## 2021-07-22 NOTE — ASSESSMENT & PLAN NOTE
Ashley Mclean has a history of paroxysmal atrial fibrillation with use of Tikosyn for > 10 years  She presented with complaint of increasing "a fib episodes", feeling fluttering/palpitations in her chest   ZIO monitoring from 6/10-6/17/2021 reveals predominantly sinus rhythm, no evidence of atrial fibrillation, one 6-beat run of WCT/VT, 14 runs of SVT, fastest 4 beats at 156BPM and longest of 20 beats at 115 BPM, occasional SVEs at 3 9%, rare VEs at <1%, ventricular bigeminy and trigeminy were present  Echo 4/2021 with EF 55-60%  We reviewed the results of her ZIO monitoring  She is not interested in changing the Tikosyn at this time  Continues Tikosyn 250mcg BID  We will trial a slight increase in Metoprolol succinate to 25mg in am, 12 5mg in pm   Repeat 48 hour holter monitor to assess response    Continue Magnesium supplementation at 250-500mg daily  Continue Xarelto for anticoagulation  Follow up closely - 2-3 weeks  Consider updated stress test in near future

## 2021-07-22 NOTE — TELEPHONE ENCOUNTER
Yes, we will continue Tikosyn for now and discuss continuation at follow up  I sent to [facility], please let us know if she needs any to local if [facility] does not get to her in time   I do not want her to run out

## 2021-07-22 NOTE — ASSESSMENT & PLAN NOTE
Dat Byers sustained a fall in 2/2019 when she was brushing her teeth in the bathroom and fell, hitting her head against the wall  EKG at that time with QTc of 470msec  She denies any recurrent syncopal events  We have reviewed the risk of Torsades in Tikosyn with advancing age  She wishes to continue medication for now, but we will continue to follow closely    Labs have remained stable, will recheck BMP, Mag now

## 2021-07-22 NOTE — ASSESSMENT & PLAN NOTE
BP improved on my recheck    Will continue Amlodipine 10mg daily  Will trial Metoprolol succinate increase to 25mg in am and 12 5mg in pm, but will monitor closely for bradycardia

## 2021-07-22 NOTE — TELEPHONE ENCOUNTER
Can you please notify pt that I discussed her ZIO with Dr Amaris Ramirez  She agreed with trying the slight increase in Metoprolol to 25mg in am, 12 5mg in pm  She agreed with repeat holter to assess response  I placed 48 hour holter monitor - please schedule  Thank you!

## 2021-07-28 ENCOUNTER — TELEPHONE (OUTPATIENT)
Dept: CARDIOLOGY CLINIC | Facility: CLINIC | Age: 86
End: 2021-07-28

## 2021-07-28 DIAGNOSIS — I48.0 PAROXYSMAL ATRIAL FIBRILLATION (HCC): ICD-10-CM

## 2021-07-28 RX ORDER — DOFETILIDE 0.25 MG/1
250 CAPSULE ORAL 2 TIMES DAILY
Qty: 28 CAPSULE | Refills: 0 | Status: SHIPPED | OUTPATIENT
Start: 2021-07-28 | End: 2021-09-07

## 2021-07-28 NOTE — TELEPHONE ENCOUNTER
Pt states that she is out of her dofetilide and did not get the script yet that was [facility]  Asking for a small order to be sent to [facility] for the mean time

## 2021-08-03 ENCOUNTER — TELEPHONE (OUTPATIENT)
Dept: CARDIOLOGY CLINIC | Facility: CLINIC | Age: 86
End: 2021-08-03

## 2021-08-03 ENCOUNTER — APPOINTMENT (OUTPATIENT)
Dept: LAB | Facility: HOSPITAL | Age: 86
End: 2021-08-03
Payer: MEDICARE

## 2021-08-03 DIAGNOSIS — I48.0 PAROXYSMAL ATRIAL FIBRILLATION (HCC): ICD-10-CM

## 2021-08-03 DIAGNOSIS — E78.2 MIXED HYPERLIPIDEMIA: ICD-10-CM

## 2021-08-03 LAB
ANION GAP SERPL CALCULATED.3IONS-SCNC: 5 MMOL/L (ref 4–13)
BUN SERPL-MCNC: 19 MG/DL (ref 5–25)
CALCIUM SERPL-MCNC: 9.2 MG/DL (ref 8.3–10.1)
CHLORIDE SERPL-SCNC: 106 MMOL/L (ref 100–108)
CHOLEST SERPL-MCNC: 243 MG/DL (ref 50–200)
CO2 SERPL-SCNC: 32 MMOL/L (ref 21–32)
CREAT SERPL-MCNC: 0.98 MG/DL (ref 0.6–1.3)
GFR SERPL CREATININE-BSD FRML MDRD: 52 ML/MIN/1.73SQ M
GLUCOSE P FAST SERPL-MCNC: 102 MG/DL (ref 65–99)
HDLC SERPL-MCNC: 74 MG/DL
LDLC SERPL CALC-MCNC: 139 MG/DL (ref 0–100)
MAGNESIUM SERPL-MCNC: 2 MG/DL (ref 1.6–2.6)
NONHDLC SERPL-MCNC: 169 MG/DL
POTASSIUM SERPL-SCNC: 3.9 MMOL/L (ref 3.5–5.3)
SODIUM SERPL-SCNC: 143 MMOL/L (ref 136–145)
TRIGL SERPL-MCNC: 149 MG/DL

## 2021-08-03 PROCEDURE — 80048 BASIC METABOLIC PNL TOTAL CA: CPT

## 2021-08-03 PROCEDURE — 80061 LIPID PANEL: CPT

## 2021-08-03 PROCEDURE — 83735 ASSAY OF MAGNESIUM: CPT | Performed by: NURSE PRACTITIONER

## 2021-08-03 PROCEDURE — 36415 COLL VENOUS BLD VENIPUNCTURE: CPT

## 2021-08-03 NOTE — TELEPHONE ENCOUNTER
----- Message from Sharmin Jarrett 82 sent at 8/3/2021  1:21 PM EDT -----  Please let pt know that her electrolytes and kidney function are in good range  Her cholesterol is elevated, but we can review that in detail at her visit with me next week

## 2021-08-05 ENCOUNTER — OFFICE VISIT (OUTPATIENT)
Dept: OBGYN CLINIC | Facility: CLINIC | Age: 86
End: 2021-08-05
Payer: MEDICARE

## 2021-08-05 VITALS
TEMPERATURE: 97.3 F | BODY MASS INDEX: 21.09 KG/M2 | DIASTOLIC BLOOD PRESSURE: 80 MMHG | HEIGHT: 63 IN | HEART RATE: 70 BPM | WEIGHT: 119 LBS | SYSTOLIC BLOOD PRESSURE: 160 MMHG

## 2021-08-05 DIAGNOSIS — M17.11 PRIMARY OSTEOARTHRITIS OF RIGHT KNEE: ICD-10-CM

## 2021-08-05 DIAGNOSIS — M25.561 CHRONIC PAIN OF RIGHT KNEE: Primary | ICD-10-CM

## 2021-08-05 DIAGNOSIS — G89.29 CHRONIC PAIN OF RIGHT KNEE: Primary | ICD-10-CM

## 2021-08-05 PROCEDURE — 99213 OFFICE O/P EST LOW 20 MIN: CPT | Performed by: STUDENT IN AN ORGANIZED HEALTH CARE EDUCATION/TRAINING PROGRAM

## 2021-08-05 PROCEDURE — 20610 DRAIN/INJ JOINT/BURSA W/O US: CPT | Performed by: STUDENT IN AN ORGANIZED HEALTH CARE EDUCATION/TRAINING PROGRAM

## 2021-08-05 RX ORDER — TRIAMCINOLONE ACETONIDE 40 MG/ML
40 INJECTION, SUSPENSION INTRA-ARTICULAR; INTRAMUSCULAR
Status: COMPLETED | OUTPATIENT
Start: 2021-08-05 | End: 2021-08-05

## 2021-08-05 RX ORDER — LIDOCAINE HYDROCHLORIDE 10 MG/ML
4 INJECTION, SOLUTION INFILTRATION; PERINEURAL
Status: COMPLETED | OUTPATIENT
Start: 2021-08-05 | End: 2021-08-05

## 2021-08-05 RX ADMIN — LIDOCAINE HYDROCHLORIDE 4 ML: 10 INJECTION, SOLUTION INFILTRATION; PERINEURAL at 17:12

## 2021-08-05 RX ADMIN — TRIAMCINOLONE ACETONIDE 40 MG: 40 INJECTION, SUSPENSION INTRA-ARTICULAR; INTRAMUSCULAR at 17:12

## 2021-08-05 NOTE — PATIENT INSTRUCTIONS

## 2021-08-05 NOTE — PROGRESS NOTES
1  Chronic pain of right knee  Large joint arthrocentesis: R knee   2  Primary osteoarthritis of right knee  Large joint arthrocentesis: R knee     Orders Placed This Encounter   Procedures    Large joint arthrocentesis: R knee        Imaging Studies (I personally reviewed images in PACS and report):    Prior imagin  Prior imaging from [facility] reviewed with radiology tech today as I do not have access to their imaging system  Interpreted as tricompartmental OA, severe in lateral tibiofemoral compartment  Otherwise, no acute osseous abnormalities  IMPRESSION:  Chronic right knee pain without precipitating injury  Primary OA of the right knee flare    PLAN:  - I have discussed with the patient the pathophysiology of this diagnosis and reviewed how the examination correlates with this diagnosis  Treatment options were discussed at length and after discussing these treatment options, the patient elected  For right knee cortisone injection as per procedure note below  I counseled that she can continue concurrent use of Tylenol as needed while waiting for the cortisone to take effect  I also counseled for her to purchase a compression knee sleeve during activity  I deferred prescribing oral NSAIDs as she had been told she was contraindicated to taking this medication   - I will scan into her charts the official report of her knee x-ray  Return in about 4 weeks (around 2021)  If no improvement or worsening pain on subsequent visit, I will consider ordering a viscosupplementation injection of her right knee  CHIEF COMPLAINT:  Right knee pain     HPI:  Harley Zhang is a 80 y o  female  who presents for     Visit 2021 :  Initial evaluation of right knee pain: Ongoing since the beginning of  without a precipitating injury  Pain is located over the medial aspect of her knee is nonradiating    She describes the pain as aching/ sharp, intermittent, and aggravated when transitioning from sitting to standing and prolonged walking  She denies swelling of her knee  She denies erythema of her knee  Denies clicking of her knee  Denies sensation of giving out or locking in extension  She reports using a cane to ambulate which she does chronically at baseline  She reports having similar pain several years ago in which she had a cortisone injection of her knee which provided relief  She had been seen by her PCP from [facility] and had imaging done as noted above  Patient states she was prescribed diclofenac gel as she cannot take oral NSAIDs and states there has been no relief with the gel  She also has been taking Tylenol with minimal relief  She reports difficulty sleeping at night due to pain  Review of Systems   Constitutional: Negative for chills, fatigue and fever  Respiratory: Negative for cough and shortness of breath  Gastrointestinal: Negative for abdominal pain, nausea and vomiting  Musculoskeletal:        As per HPI   Skin: Negative for rash and wound  Neurological:        As per HPI         Medical, Surgical, Family, and Social History    Past Medical History:   Diagnosis Date    A-fib [facility])     Arthritis     R knee    Cardiac disease     GERD (gastroesophageal reflux disease)     Hyperlipidemia     Hypertension     Osteoporosis     Psychiatric disorder     anxiety     Past Surgical History:   Procedure Laterality Date    BACK SURGERY      EYE SURGERY      cataracts     Social History   Social History     Substance and Sexual Activity   Alcohol Use Not Currently     Social History     Substance and Sexual Activity   Drug Use Never     Social History     Tobacco Use   Smoking Status Never Smoker   Smokeless Tobacco Never Used     Family History   Problem Relation Age of Onset    Cancer Father     Cancer Brother      Allergies   Allergen Reactions    Ciprofloxacin     Epinephrine      Irregular heart ryhthm (afib) per pt      Bactrim [Sulfamethoxazole-Trimethoprim] Rash    Medical Tape Rash          Physical Exam  /80   Pulse 70   Temp (!) 97 3 °F (36 3 °C)   Ht 5' 3" (1 6 m)   Wt 54 kg (119 lb)   BMI 21 08 kg/m²     Constitutional:  see vital signs  Gen: well-developed, normocephalic/atraumatic, well-groomed  Eyes: No inflammation or discharge of conjunctiva or lids; sclera clear   Pharynx: no inflammation, lesion, or mass of lips  Neck: supple, no masses, non-distended  MSK: no inflammation, lesion, mass, or clubbing of nails and digits except for other than mentioned below  SKIN: no visible rashes or skin lesions  Pulmonary/Chest: Effort normal  No respiratory distress  NEURO: cranial nerves grossly intact  PSYCH:  Alert and oriented to person, place, and time; recent and remote memory intact; mood normal, no depression, anxiety, or agitation, judgment and insight good and intact     Ortho Exam  Right Knee Exam:  Erythema: no  Swelling: no  Increased Warmth: no  Tenderness: +MJL  ROM: 0-130  Patellar Apprehension: negative  Patellar Grind: negative  Lachman's: negative  Anterior Drawer: negative  Varus laxity: negative  Valgus laxity: negative  Sav: negative     Large joint arthrocentesis: R knee  Universal Protocol:  Consent: Verbal consent obtained  Risks and benefits: risks, benefits and alternatives were discussed  Consent given by: patient  Time out: Immediately prior to procedure a "time out" was called to verify the correct patient, procedure, equipment, support staff and site/side marked as required  Timeout called at: 8/5/2021 4:26 PM   Patient understanding: patient states understanding of the procedure being performed  Site marked: the operative site was marked  Radiology Images displayed and confirmed   If images not available, report reviewed: imaging studies available  Patient identity confirmed: verbally with patient    Supporting Documentation  Indications: pain   Procedure Details  Location: knee - R knee  Preparation: Patient was prepped and draped in the usual sterile fashion  Needle size: 22 G  Ultrasound guidance: no  Approach: anterolateral  Medications administered: 4 mL lidocaine 1 %; 40 mg triamcinolone acetonide 40 mg/mL    Patient tolerance: patient tolerated the procedure well with no immediate complications  Dressing:  Sterile dressing applied

## 2021-08-10 ENCOUNTER — APPOINTMENT (EMERGENCY)
Dept: CT IMAGING | Facility: HOSPITAL | Age: 86
End: 2021-08-10
Payer: MEDICARE

## 2021-08-10 ENCOUNTER — HOSPITAL ENCOUNTER (OUTPATIENT)
Facility: HOSPITAL | Age: 86
Setting detail: OBSERVATION
Discharge: HOME HEALTH | End: 2021-08-11
Attending: STUDENT IN AN ORGANIZED HEALTH CARE EDUCATION/TRAINING PROGRAM | Admitting: FAMILY MEDICINE
Payer: MEDICARE

## 2021-08-10 DIAGNOSIS — S22.41XA CLOSED FRACTURE OF MULTIPLE RIBS OF RIGHT SIDE, INITIAL ENCOUNTER: Primary | ICD-10-CM

## 2021-08-10 LAB
ALBUMIN SERPL BCP-MCNC: 3.2 G/DL (ref 3.5–5)
ALP SERPL-CCNC: 60 U/L (ref 46–116)
ALT SERPL W P-5'-P-CCNC: 18 U/L (ref 12–78)
ANION GAP SERPL CALCULATED.3IONS-SCNC: 6 MMOL/L (ref 4–13)
AST SERPL W P-5'-P-CCNC: 21 U/L (ref 5–45)
BASOPHILS # BLD AUTO: 0.05 THOUSANDS/ΜL (ref 0–0.1)
BASOPHILS NFR BLD AUTO: 0 % (ref 0–1)
BILIRUB SERPL-MCNC: 0.25 MG/DL (ref 0.2–1)
BUN SERPL-MCNC: 24 MG/DL (ref 5–25)
CALCIUM ALBUM COR SERPL-MCNC: 9.6 MG/DL (ref 8.3–10.1)
CALCIUM SERPL-MCNC: 9 MG/DL (ref 8.3–10.1)
CHLORIDE SERPL-SCNC: 106 MMOL/L (ref 100–108)
CO2 SERPL-SCNC: 29 MMOL/L (ref 21–32)
CREAT SERPL-MCNC: 0.92 MG/DL (ref 0.6–1.3)
EOSINOPHIL # BLD AUTO: 0.05 THOUSAND/ΜL (ref 0–0.61)
EOSINOPHIL NFR BLD AUTO: 0 % (ref 0–6)
ERYTHROCYTE [DISTWIDTH] IN BLOOD BY AUTOMATED COUNT: 13.7 % (ref 11.6–15.1)
GFR SERPL CREATININE-BSD FRML MDRD: 57 ML/MIN/1.73SQ M
GLUCOSE SERPL-MCNC: 136 MG/DL (ref 65–140)
HCT VFR BLD AUTO: 39.6 % (ref 34.8–46.1)
HGB BLD-MCNC: 12.4 G/DL (ref 11.5–15.4)
IMM GRANULOCYTES # BLD AUTO: 0.07 THOUSAND/UL (ref 0–0.2)
IMM GRANULOCYTES NFR BLD AUTO: 1 % (ref 0–2)
INR PPP: 1.21 (ref 0.84–1.19)
LACTATE SERPL-SCNC: 1.3 MMOL/L (ref 0.5–2)
LYMPHOCYTES # BLD AUTO: 1.4 THOUSANDS/ΜL (ref 0.6–4.47)
LYMPHOCYTES NFR BLD AUTO: 12 % (ref 14–44)
MCH RBC QN AUTO: 28.8 PG (ref 26.8–34.3)
MCHC RBC AUTO-ENTMCNC: 31.3 G/DL (ref 31.4–37.4)
MCV RBC AUTO: 92 FL (ref 82–98)
MONOCYTES # BLD AUTO: 1.03 THOUSAND/ΜL (ref 0.17–1.22)
MONOCYTES NFR BLD AUTO: 9 % (ref 4–12)
NEUTROPHILS # BLD AUTO: 9.01 THOUSANDS/ΜL (ref 1.85–7.62)
NEUTS SEG NFR BLD AUTO: 78 % (ref 43–75)
NRBC BLD AUTO-RTO: 0 /100 WBCS
PLATELET # BLD AUTO: 306 THOUSANDS/UL (ref 149–390)
PMV BLD AUTO: 10.2 FL (ref 8.9–12.7)
POTASSIUM SERPL-SCNC: 3.8 MMOL/L (ref 3.5–5.3)
PROT SERPL-MCNC: 6.3 G/DL (ref 6.4–8.2)
PROTHROMBIN TIME: 15.1 SECONDS (ref 11.6–14.5)
RBC # BLD AUTO: 4.3 MILLION/UL (ref 3.81–5.12)
SODIUM SERPL-SCNC: 141 MMOL/L (ref 136–145)
WBC # BLD AUTO: 11.61 THOUSAND/UL (ref 4.31–10.16)

## 2021-08-10 PROCEDURE — 99285 EMERGENCY DEPT VISIT HI MDM: CPT

## 2021-08-10 PROCEDURE — 85025 COMPLETE CBC W/AUTO DIFF WBC: CPT | Performed by: STUDENT IN AN ORGANIZED HEALTH CARE EDUCATION/TRAINING PROGRAM

## 2021-08-10 PROCEDURE — 99219 PR INITIAL OBSERVATION CARE/DAY 50 MINUTES: CPT | Performed by: FAMILY MEDICINE

## 2021-08-10 PROCEDURE — 72125 CT NECK SPINE W/O DYE: CPT

## 2021-08-10 PROCEDURE — 36415 COLL VENOUS BLD VENIPUNCTURE: CPT | Performed by: STUDENT IN AN ORGANIZED HEALTH CARE EDUCATION/TRAINING PROGRAM

## 2021-08-10 PROCEDURE — 1123F ACP DISCUSS/DSCN MKR DOCD: CPT | Performed by: STUDENT IN AN ORGANIZED HEALTH CARE EDUCATION/TRAINING PROGRAM

## 2021-08-10 PROCEDURE — 80053 COMPREHEN METABOLIC PANEL: CPT | Performed by: STUDENT IN AN ORGANIZED HEALTH CARE EDUCATION/TRAINING PROGRAM

## 2021-08-10 PROCEDURE — 70450 CT HEAD/BRAIN W/O DYE: CPT

## 2021-08-10 PROCEDURE — 83605 ASSAY OF LACTIC ACID: CPT | Performed by: STUDENT IN AN ORGANIZED HEALTH CARE EDUCATION/TRAINING PROGRAM

## 2021-08-10 PROCEDURE — 93005 ELECTROCARDIOGRAM TRACING: CPT

## 2021-08-10 PROCEDURE — 71260 CT THORAX DX C+: CPT

## 2021-08-10 PROCEDURE — 99285 EMERGENCY DEPT VISIT HI MDM: CPT | Performed by: STUDENT IN AN ORGANIZED HEALTH CARE EDUCATION/TRAINING PROGRAM

## 2021-08-10 PROCEDURE — 85610 PROTHROMBIN TIME: CPT | Performed by: STUDENT IN AN ORGANIZED HEALTH CARE EDUCATION/TRAINING PROGRAM

## 2021-08-10 PROCEDURE — 74177 CT ABD & PELVIS W/CONTRAST: CPT

## 2021-08-10 RX ORDER — METOPROLOL SUCCINATE 25 MG/1
12.5 TABLET, EXTENDED RELEASE ORAL EVERY EVENING
Status: DISCONTINUED | OUTPATIENT
Start: 2021-08-10 | End: 2021-08-11 | Stop reason: HOSPADM

## 2021-08-10 RX ORDER — MORPHINE SULFATE 4 MG/ML
4 INJECTION, SOLUTION INTRAMUSCULAR; INTRAVENOUS ONCE
Status: COMPLETED | OUTPATIENT
Start: 2021-08-10 | End: 2021-08-10

## 2021-08-10 RX ORDER — ACETAMINOPHEN 325 MG/1
975 TABLET ORAL EVERY 8 HOURS SCHEDULED
Status: DISCONTINUED | OUTPATIENT
Start: 2021-08-10 | End: 2021-08-11 | Stop reason: HOSPADM

## 2021-08-10 RX ORDER — LIDOCAINE 50 MG/G
1 PATCH TOPICAL DAILY
Status: DISCONTINUED | OUTPATIENT
Start: 2021-08-11 | End: 2021-08-11 | Stop reason: HOSPADM

## 2021-08-10 RX ORDER — METOPROLOL SUCCINATE 25 MG/1
25 TABLET, EXTENDED RELEASE ORAL DAILY
Status: DISCONTINUED | OUTPATIENT
Start: 2021-08-11 | End: 2021-08-11 | Stop reason: HOSPADM

## 2021-08-10 RX ORDER — LORAZEPAM 0.5 MG/1
0.5 TABLET ORAL EVERY 8 HOURS PRN
Status: DISCONTINUED | OUTPATIENT
Start: 2021-08-10 | End: 2021-08-11 | Stop reason: HOSPADM

## 2021-08-10 RX ORDER — HYDRALAZINE HYDROCHLORIDE 20 MG/ML
5 INJECTION INTRAMUSCULAR; INTRAVENOUS EVERY 6 HOURS PRN
Status: DISCONTINUED | OUTPATIENT
Start: 2021-08-10 | End: 2021-08-11 | Stop reason: HOSPADM

## 2021-08-10 RX ORDER — AMLODIPINE BESYLATE 10 MG/1
10 TABLET ORAL DAILY
Status: DISCONTINUED | OUTPATIENT
Start: 2021-08-11 | End: 2021-08-11 | Stop reason: HOSPADM

## 2021-08-10 RX ORDER — B-COMPLEX WITH VITAMIN C
1 TABLET ORAL
Status: DISCONTINUED | OUTPATIENT
Start: 2021-08-11 | End: 2021-08-11 | Stop reason: HOSPADM

## 2021-08-10 RX ORDER — DOFETILIDE 0.25 MG/1
250 CAPSULE ORAL 2 TIMES DAILY
Status: DISCONTINUED | OUTPATIENT
Start: 2021-08-10 | End: 2021-08-11 | Stop reason: HOSPADM

## 2021-08-10 RX ADMIN — DOFETILIDE 250 MCG: 0.25 CAPSULE ORAL at 22:29

## 2021-08-10 RX ADMIN — IOHEXOL 100 ML: 350 INJECTION, SOLUTION INTRAVENOUS at 15:29

## 2021-08-10 RX ADMIN — ACETAMINOPHEN 975 MG: 325 TABLET ORAL at 22:28

## 2021-08-10 RX ADMIN — METOPROLOL SUCCINATE 12.5 MG: 25 TABLET, EXTENDED RELEASE ORAL at 22:28

## 2021-08-10 RX ADMIN — MORPHINE SULFATE 2 MG: 4 INJECTION INTRAVENOUS at 17:15

## 2021-08-10 NOTE — ED PROVIDER NOTES
History  Chief Complaint   Patient presents with    Rib Pain     pt c/o right sided upper rib pain after standing up at walker loosing balance and falling to floor landing on hardwood floor on right side at 1100 today  denies hitting head  +blood thinners  History provided by:  Patient  Chest Pain  Pain location:  R chest  Pain quality: sharp    Pain radiates to:  Does not radiate  Pain radiates to the back: no    Pain severity:  Severe  Onset quality:  Sudden  Duration:  3 hours  Timing:  Intermittent  Progression:  Worsening  Chronicity:  New  Context: breathing, movement and trauma    Context: not at rest    Relieved by:  Nothing  Worsened by:  Coughing, deep breathing and movement  Ineffective treatments:  None tried  Associated symptoms: no abdominal pain, no altered mental status, no back pain, no cough, no diaphoresis, no dizziness, no fatigue, no fever, no nausea, no near-syncope, no numbness, no palpitations, no shortness of breath, no syncope, not vomiting and no weakness       80year-old female  History of atrial fibrillation on Xarelto  She states that as she was attempting to stand from a seated position, she lost her balance and fell onto her right chest   No loss of consciousness  Denies head strike  The patient presents to the emergency department with worsening right-sided chest pain  Describes her pain as sharp in nature and 9/10 severity  Taking a deep breath exacerbates her pain  Did not take anything for pain prior to arrival   Denies any other injuries/symptoms  Prior to Admission Medications   Prescriptions Last Dose Informant Patient Reported? Taking?    LORazepam (ATIVAN) 0 5 mg tablet   Yes No   Sig: Take by mouth every 8 (eight) hours as needed for anxiety   Multiple Vitamins-Minerals (ICAPS AREDS 2 PO)   Yes No   Sig: Take 1 tablet by mouth daily   amLODIPine (NORVASC) 10 mg tablet   No No   Sig: Take 1 tablet (10 mg total) by mouth daily Please do not fill until the patient needs refills  Please note this is a new prescribing physician and all refill requests should come to this office  calcium citrate-vitamin D (CALCIUM + D) 315-200 MG-UNIT per tablet   Yes No   Sig: Take by mouth   denosumab (PROLIA) 60 mg/mL   Yes No   Patient not taking: Reported on 2021   docusate sodium (COLACE) 100 mg capsule   Yes No   Sig: Take 100 mg by mouth daily   dofetilide (TIKOSYN) 250 mcg capsule   No No   Sig: Take 1 capsule (250 mcg total) by mouth 2 (two) times a day   famotidine (PEPCID) 20 mg tablet   Yes No   Sig: Take 20 mg by mouth 2 (two) times a day   magnesium (MAGTAB) 84 MG (7MEQ) TBCR   Yes No   Sig: Take 84 mg by mouth daily   metoprolol succinate (TOPROL-XL) 25 mg 24 hr tablet   No No   Si tab po in am, 1/2 tab po in pm   omeprazole (PriLOSEC) 20 mg delayed release capsule   Yes No   Sig: Take 20 mg by mouth daily   Patient not taking: Reported on 2021   polyethylene glycol (MIRALAX) 17 g packet   Yes No   Sig: Take 17 g by mouth daily   rivaroxaban (Xarelto) 15 mg tablet   No No   Sig: Take 1 tablet (15 mg total) by mouth daily with dinner Please do not fill until the patient needs refills  Please note this is a new prescribing physician and all refill requests should come to this office  Facility-Administered Medications: None       Past Medical History:   Diagnosis Date    [facility]     Arthritis     R knee    Cardiac disease     GERD (gastroesophageal reflux disease)     Hyperlipidemia     Hypertension     Osteoporosis     Psychiatric disorder     anxiety       Past Surgical History:   Procedure Laterality Date    BACK SURGERY      EYE SURGERY      cataracts       Family History   Problem Relation Age of Onset    Cancer Father     Cancer Brother      I have reviewed and agree with the history as documented      E-Cigarette/Vaping    E-Cigarette Use Never User      E-Cigarette/Vaping Substances     Social History     Tobacco Use    Smoking status: Never Smoker    Smokeless tobacco: Never Used   Vaping Use    Vaping Use: Never used   Substance Use Topics    Alcohol use: Not Currently    Drug use: Never       Review of Systems   Constitutional: Negative for chills, diaphoresis, fatigue and fever  HENT: Negative for congestion, rhinorrhea, sinus pressure and sinus pain  Eyes: Negative for photophobia, pain and visual disturbance  Respiratory: Negative for cough, chest tightness and shortness of breath  Cardiovascular: Positive for chest pain  Negative for palpitations, syncope and near-syncope  Gastrointestinal: Negative for abdominal pain, nausea and vomiting  Genitourinary: Negative for difficulty urinating, dysuria, flank pain, pelvic pain and urgency  Musculoskeletal: Positive for gait problem  Negative for back pain, myalgias, neck pain and neck stiffness  Skin: Negative for color change, pallor, rash and wound  Neurological: Negative for dizziness, syncope, weakness, light-headedness and numbness  Hematological: Bruises/bleeds easily  Psychiatric/Behavioral: Negative for confusion  The patient is not nervous/anxious  All other systems reviewed and are negative  Physical Exam  Physical Exam  Vitals and nursing note reviewed  Constitutional:       General: She is in acute distress  Appearance: She is not ill-appearing or toxic-appearing  HENT:      Head: Normocephalic and atraumatic  Right Ear: External ear normal       Left Ear: External ear normal       Nose: No congestion or rhinorrhea  Mouth/Throat:      Mouth: Mucous membranes are moist       Pharynx: Oropharynx is clear  No oropharyngeal exudate or posterior oropharyngeal erythema  Eyes:      General:         Right eye: No discharge  Left eye: No discharge  Extraocular Movements: Extraocular movements intact  Conjunctiva/sclera: Conjunctivae normal       Pupils: Pupils are equal, round, and reactive to light  Pulmonary:      Effort: Pulmonary effort is normal       Breath sounds: Normal breath sounds  No wheezing, rhonchi or rales  Chest:      Chest wall: Tenderness present  Abdominal:      General: Abdomen is flat  Palpations: Abdomen is soft  Tenderness: There is abdominal tenderness  There is no right CVA tenderness, left CVA tenderness, guarding or rebound  Comments: Tenderness to palpation along the lower abdomen  No rebound/guarding noted  Normoactive bowel sounds  Musculoskeletal:        Arms:    Neurological:      Mental Status: She is alert  Vital Signs  ED Triage Vitals [08/10/21 1348]   Temperature Pulse Respirations Blood Pressure SpO2   98 2 °F (36 8 °C) 62 17 (!) 199/81 97 %      Temp Source Heart Rate Source Patient Position - Orthostatic VS BP Location FiO2 (%)   Temporal Monitor Lying Left arm --      Pain Score       Worst Possible Pain           Vitals:    08/10/21 1348 08/10/21 1550 08/10/21 1630   BP: (!) 199/81 (!) 201/82 (!) 197/79   Pulse: 62 68 73   Patient Position - Orthostatic VS: Lying Lying      Visual Acuity  Visual Acuity      Most Recent Value   L Pupil Size (mm)  3   R Pupil Size (mm)  3        ED Medications  Medications   morphine (PF) 4 mg/mL injection 4 mg (has no administration in time range)   iohexol (OMNIPAQUE) 350 MG/ML injection (SINGLE-DOSE) 100 mL (100 mL Intravenous Given 8/10/21 1529)     Diagnostic Studies  Results Reviewed     Procedure Component Value Units Date/Time    Lactic acid, plasma [[id number]]  (Normal) Collected: 08/10/21 1441    Lab Status: Final result Specimen: Blood from Line, Venous Updated: 08/10/21 1513     LACTIC ACID 1 3 mmol/L     Narrative:      Result may be elevated if tourniquet was used during collection      Comprehensive metabolic panel [[id number]]  (Abnormal) Collected: 08/10/21 1441    Lab Status: Final result Specimen: Blood from Line, Venous Updated: 08/10/21 1508     Sodium 141 mmol/L      Potassium 3 8 mmol/L      Chloride 106 mmol/L      CO2 29 mmol/L      ANION GAP 6 mmol/L      BUN 24 mg/dL      Creatinine 0 92 mg/dL      Glucose 136 mg/dL      Calcium 9 0 mg/dL      Corrected Calcium 9 6 mg/dL      AST 21 U/L      ALT 18 U/L      Alkaline Phosphatase 60 U/L      Total Protein 6 3 g/dL      Albumin 3 2 g/dL      Total Bilirubin 0 25 mg/dL      eGFR 57 ml/min/1 73sq m     Narrative:      [place] guidelines for Chronic Kidney Disease (CKD):     Stage 1 with normal or high GFR (GFR > 90 mL/min/1 73 square meters)    Stage 2 Mild CKD (GFR = 60-89 mL/min/1 73 square meters)    Stage 3A Moderate CKD (GFR = 45-59 mL/min/1 73 square meters)    Stage 3B Moderate CKD (GFR = 30-44 mL/min/1 73 square meters)    Stage 4 Severe CKD (GFR = 15-29 mL/min/1 73 square meters)    Stage 5 End Stage CKD (GFR <15 mL/min/1 73 square meters)  Note: GFR calculation is accurate only with a steady state creatinine    Protime-INR [[id number]]  (Abnormal) Collected: 08/10/21 1441    Lab Status: Final result Specimen: Blood from Line, Venous Updated: 08/10/21 1506     Protime 15 1 seconds      INR 1 21    CBC and differential [[id number]]  (Abnormal) Collected: 08/10/21 1441    Lab Status: Final result Specimen: Blood from Line, Venous Updated: 08/10/21 1449     WBC 11 61 Thousand/uL      RBC 4 30 Million/uL      Hemoglobin 12 4 g/dL      Hematocrit 39 6 %      MCV 92 fL      MCH 28 8 pg      MCHC 31 3 g/dL      RDW 13 7 %      MPV 10 2 fL      Platelets 184 Thousands/uL      nRBC 0 /100 WBCs      Neutrophils Relative 78 %      Immat GRANS % 1 %      Lymphocytes Relative 12 %      Monocytes Relative 9 %      Eosinophils Relative 0 %      Basophils Relative 0 %      Neutrophils Absolute 9 01 Thousands/µL      Immature Grans Absolute 0 07 Thousand/uL      Lymphocytes Absolute 1 40 Thousands/µL      Monocytes Absolute 1 03 Thousand/µL      Eosinophils Absolute 0 05 Thousand/µL      Basophils Absolute 0 05 Thousands/µL                  CT head without contrast   Final Result by Yonny Henao MD (08/10 4538)      No acute intracranial abnormality  Workstation performed: [id number]         CT spine cervical without contrast   Final Result by Yonny Henao MD (08/10 8213)      1  The left one half of the C1 arch is chronically discontinuous with the remainder of the vertebral body  The right one half of the C1 arch is fused with the adjacent C2 vertebral body  This is likely a chronic posttraumatic deformity  Stability of    the C1 arch should be assessed clinically, with MRI as needed  2   No evidence of acute trauma      The study was marked in EPIC for immediate notification  Workstation performed: [id number]         CT chest abdomen pelvis w contrast   Final Result by Yonny Henao MD (08/10 5270)      1  Acute right 4th and 5th rib fractures   2  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 Yes Filed at: 08/10/2021 1444   Initial Alcohol Screen: US AUDIT-C    1  How often do you have a drink containing alcohol?  0 Filed at: 08/10/2021 1444   2  How many drinks containing alcohol do you have on a typical day you are drinking? 0 Filed at: 08/10/2021 1444   3a  Male UNDER 65: How often do you have five or more drinks on one occasion? 0 Filed at: 08/10/2021 1444   3b  FEMALE Any Age, or MALE 65+: How often do you have 4 or more drinks on one occassion?   0 Filed at: 08/10/2021 1444   Audit-C Score  0 Filed at: 08/10/2021 1444   GAURAV: How many times in the past year have you    Used an illegal drug or used a prescription medication for non-medical reasons? Never Filed at: 08/10/2021 1444          [facility]     51-year-old female  History of AFib on Xarelto  [place]  onto her right chest earlier today  No loss of consciousness  Denies head strike  CT imaging significant for rib fractures  Cervical spine findings appear chronic  The patient denies neck pain  She was administered a dose of morphine  Admitted to the Hospitalist service  The patient was stable under my care  Disposition  Final diagnoses:   Closed fracture of multiple ribs of right side, initial encounter     Time reflects when diagnosis was documented in both MDM as applicable and the Disposition within this note     Time User Action Codes Description Comment    8/10/2021  4:25 PM Brittany Bryant Add [[id number]] Closed fracture of multiple ribs of right side, initial encounter       ED Disposition     ED Disposition Condition Date/Time Comment    Admit Stable Tue Aug 10, 2021  4:25 PM Case was discussed with Dr Beto Richter and the patient's admission status was agreed to be Admission Status: observation status to the service of Dr Beto Richter   Follow-up Information    None         Patient's Medications   Discharge Prescriptions    No medications on file     No discharge procedures on file      PDMP Review     None          ED Provider  Electronically Signed by           Lili Fitzgerald DO  08/10/21 5072

## 2021-08-11 VITALS
SYSTOLIC BLOOD PRESSURE: 173 MMHG | DIASTOLIC BLOOD PRESSURE: 61 MMHG | BODY MASS INDEX: 21.76 KG/M2 | TEMPERATURE: 98.4 F | RESPIRATION RATE: 20 BRPM | WEIGHT: 122.8 LBS | HEIGHT: 63 IN | OXYGEN SATURATION: 96 % | HEART RATE: 57 BPM

## 2021-08-11 LAB
ANION GAP SERPL CALCULATED.3IONS-SCNC: 10 MMOL/L (ref 4–13)
ATRIAL RATE: 62 BPM
BUN SERPL-MCNC: 22 MG/DL (ref 5–25)
CALCIUM SERPL-MCNC: 9.2 MG/DL (ref 8.3–10.1)
CHLORIDE SERPL-SCNC: 105 MMOL/L (ref 100–108)
CO2 SERPL-SCNC: 26 MMOL/L (ref 21–32)
CREAT SERPL-MCNC: 0.91 MG/DL (ref 0.6–1.3)
ERYTHROCYTE [DISTWIDTH] IN BLOOD BY AUTOMATED COUNT: 13.7 % (ref 11.6–15.1)
GFR SERPL CREATININE-BSD FRML MDRD: 57 ML/MIN/1.73SQ M
GLUCOSE P FAST SERPL-MCNC: 121 MG/DL (ref 65–99)
GLUCOSE SERPL-MCNC: 121 MG/DL (ref 65–140)
HCT VFR BLD AUTO: 42.3 % (ref 34.8–46.1)
HGB BLD-MCNC: 13.4 G/DL (ref 11.5–15.4)
MCH RBC QN AUTO: 29.2 PG (ref 26.8–34.3)
MCHC RBC AUTO-ENTMCNC: 31.7 G/DL (ref 31.4–37.4)
MCV RBC AUTO: 92 FL (ref 82–98)
P AXIS: 88 DEGREES
PLATELET # BLD AUTO: 314 THOUSANDS/UL (ref 149–390)
PMV BLD AUTO: 10.4 FL (ref 8.9–12.7)
POTASSIUM SERPL-SCNC: 3.8 MMOL/L (ref 3.5–5.3)
PR INTERVAL: 162 MS
QRS AXIS: -4 DEGREES
QRSD INTERVAL: 96 MS
QT INTERVAL: 428 MS
QTC INTERVAL: 434 MS
RBC # BLD AUTO: 4.59 MILLION/UL (ref 3.81–5.12)
SODIUM SERPL-SCNC: 141 MMOL/L (ref 136–145)
T WAVE AXIS: 76 DEGREES
VENTRICULAR RATE: 62 BPM
WBC # BLD AUTO: 9.04 THOUSAND/UL (ref 4.31–10.16)

## 2021-08-11 PROCEDURE — 80048 BASIC METABOLIC PNL TOTAL CA: CPT | Performed by: PHYSICIAN ASSISTANT

## 2021-08-11 PROCEDURE — 97163 PT EVAL HIGH COMPLEX 45 MIN: CPT

## 2021-08-11 PROCEDURE — 85027 COMPLETE CBC AUTOMATED: CPT | Performed by: PHYSICIAN ASSISTANT

## 2021-08-11 PROCEDURE — 97166 OT EVAL MOD COMPLEX 45 MIN: CPT

## 2021-08-11 PROCEDURE — 97116 GAIT TRAINING THERAPY: CPT

## 2021-08-11 PROCEDURE — 93010 ELECTROCARDIOGRAM REPORT: CPT | Performed by: INTERNAL MEDICINE

## 2021-08-11 PROCEDURE — 99217 PR OBSERVATION CARE DISCHARGE MANAGEMENT: CPT | Performed by: FAMILY MEDICINE

## 2021-08-11 RX ORDER — ACETAMINOPHEN 325 MG/1
975 TABLET ORAL EVERY 8 HOURS SCHEDULED
Qty: 90 TABLET | Refills: 0 | Status: SHIPPED | OUTPATIENT
Start: 2021-08-11 | End: 2021-08-21

## 2021-08-11 RX ADMIN — LIDOCAINE 1 PATCH: 50 PATCH TOPICAL at 08:54

## 2021-08-11 RX ADMIN — METOPROLOL SUCCINATE 25 MG: 25 TABLET, EXTENDED RELEASE ORAL at 08:54

## 2021-08-11 RX ADMIN — DOFETILIDE 250 MCG: 0.25 CAPSULE ORAL at 08:57

## 2021-08-11 RX ADMIN — HYDRALAZINE HYDROCHLORIDE 5 MG: 20 INJECTION INTRAMUSCULAR; INTRAVENOUS at 00:59

## 2021-08-11 RX ADMIN — Medication 1 TABLET: at 08:56

## 2021-08-11 RX ADMIN — AMLODIPINE BESYLATE 10 MG: 10 TABLET ORAL at 08:54

## 2021-08-11 RX ADMIN — ACETAMINOPHEN 975 MG: 325 TABLET ORAL at 04:55

## 2021-08-11 NOTE — PLAN OF CARE
Problem: PHYSICAL THERAPY ADULT  Goal: Performs mobility at highest level of function for planned discharge setting  See evaluation for individualized goals  Description: Treatment/Interventions: Functional transfer training, LE strengthening/ROM, Elevations, Therapeutic exercise, Endurance training, Patient/family training, Equipment eval/education, Bed mobility, Compensatory technique education, Gait training, Spoke to nursing, OT  Equipment Recommended:  (walker- pt has)       See flowsheet documentation for full assessment, interventions and recommendations  9/25/3421 8615 by Elbert Paul, PT  Note: Prognosis: Good  Problem List: Decreased strength, Decreased endurance, Impaired balance, Decreased mobility, Decreased safety awareness, Pain  Pt requires min cues for hand placement for safety with RW  She was able t ambulate increased distance, but reports fatigue limiting further distance and stair trial  She is at an increased fall risk as indicated by score of 7/12 on 4 item DGI  She will continue to benefit from PT services to maximize LOF with LRAD  Barriers to Discharge: None  Barriers to Discharge Comments: pt has 1st floor set up and walker at home     PT Discharge Recommendation: Home with home health rehabilitation     PT - OK to Discharge:  (when medically cleared)    See flowsheet documentation for full assessment

## 2021-08-11 NOTE — DISCHARGE SUMMARY
[address]  Discharge- Tom Anne 1935, 80 y o  female MRN: [id number]  Unit/Bed#: -01 Encounter: [id number]  Primary Care Provider: Evonne Valverde MD   Date and time admitted to hospital: 8/10/2021  1:42 PM    Hypertension  Assessment & Plan  Continue Norvasc and toprol XL   Overnight htn resolved and most likely was situation due to pain as reviewed outpatient visits bp in 120's  Will repat bp prior to discharge post am meds  Pain controled on just tylenol at rest she even has no pain just on exertion    Spinal stenosis of lumbar region with neurogenic claudication  Assessment & Plan  History of compression fractures with chronic ambulatory dysfunction and back pain    Paroxysmal atrial fibrillation ([address] )  Assessment & Plan  Currently in AFib  Has been on Tikosyn for over 10 years - EKG revealed normal sinus rhythm and a normal QTC  Continue Toprol XL and Xarelto  Is due for outpatient cardiology appointment on 08/11 at 2:00 p m  Lynita Ped * Fracture of multiple ribs of right side  Assessment & Plan  Presents with R anterior chest wall pain after a mechanical fall   CT shows Acute right 4th and 5th rib fractures  Chronic deformity of C-spine, no current neck pain   Patient has worked with physical therapy recommendation is to discharge with home health  Patient at rest does not have any pain she just has pain when she ambulates but the pain is bearable which is Tylenol and she does not want to use any narcotic medications  I discussed with her about the diclofenac cream she stated that she used in the past on her knees and did not help  Will discharge her on Tylenol around the clock for 10 days and then p r n   Also incentive spirometer  Likely discharge tomorrow - patient has cardiology appointment at 2:00 p m  and is the primary caregiver for her  with dementia      Medical Problems     Resolved Problems  Date Reviewed: 8/11/2021    None              Discharging Physician / Practitioner: Kirstina Oswald MD  PCP: Bianca Avila MD  Admission Date:   Admission Orders (From admission, onward)     Ordered        08/10/21 1625  Place in Observation  Once                   Discharge Date: 08/11/21    Consultations During Hospital Stay:  · none    Procedures Performed:   · none    Significant Findings / Test Results:   · Ct chest abdomen and pelvis-   Acute right 4th and 5th rib fractures  2  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      Incidental Findings:   · See above     Test Results Pending at Discharge (will require follow up):   · none     Outpatient Tests Requested:  · none    Complications:  none    Reason for Admission:  Fall with rib fracture    Hospital Course: Juana Booth is a 80 y o  female patient who originally presented to the hospital on 8/10/2021 due to right chest wall pain patient fell suspect she turned around very quickly as she stated  She was found to have acute right 4th and 5th rib fractures  Was admitted overnight for observation for evaluation by PT OT and pain control  Patient had PT evaluation and recommendation is home with home health pain has been controlled with Tylenol without any use of p r n  She declined to have any narcotic medication as p r n     Discussed with her the importance of incentive spirometer as to prevent her from atelectasis or pneumonia  Most of her pain is when she ambulates but it is bearable with Tylenol her initial elevation of blood pressure suspect was situational secondary to pain as reviewed the Cardiology notes blood pressure was 120 in the office  Will discharge on same medication and pain control  Labs are normal        Please see above list of diagnoses and related plan for additional information  Condition at Discharge: stable    Discharge Day Visit / Exam:   Subjective:  Patient seen and examined no chest pain or shortness of breath    She did not have any right-sided will pain when she rest when she ambulates that is when the pain occurs but it is bearable with just Tylenol  Vitals: Blood Pressure: (!) 173/61 (08/11/21 1016)  Pulse: 57 (08/11/21 1016)  Temperature: 98 4 °F (36 9 °C) (08/11/21 0611)  Temp Source: Oral (08/10/21 1550)  Respirations: 20 (08/11/21 0611)  Height: 5' 3" (160 cm) (08/10/21 1348)  Weight - Scale: 55 7 kg (122 lb 12 7 oz) (08/10/21 1348)  SpO2: 96 % (08/11/21 1016)  Exam:   Physical Exam  Vitals and nursing note reviewed  Constitutional:       General: She is not in acute distress  Appearance: She is well-developed  HENT:      Head: Normocephalic and atraumatic  Eyes:      Conjunctiva/sclera: Conjunctivae normal    Cardiovascular:      Rate and Rhythm: Normal rate and regular rhythm  Heart sounds: No murmur heard  Pulmonary:      Effort: Pulmonary effort is normal  No respiratory distress  Breath sounds: Normal breath sounds  No wheezing or rales  Abdominal:      General: There is no distension  Palpations: Abdomen is soft  Tenderness: There is no abdominal tenderness  Musculoskeletal:         General: No swelling  Cervical back: Neck supple  Skin:     General: Skin is warm and dry  Neurological:      General: No focal deficit present  Mental Status: She is alert and oriented to person, place, and time  Mental status is at baseline  Psychiatric:         Mood and Affect: Mood normal          Discussion with Family: Patient declined call to   Discharge instructions/Information to patient and family:   See after visit summary for information provided to patient and family  Provisions for Follow-Up Care:  See after visit summary for information related to follow-up care and any pertinent home health orders         Disposition:   Home with VNA Services (Reminder: Complete face to face encounter)    Planned Readmission: no     Discharge Statement:  I spent >35 minutes discharging the patient  This time was spent on the day of discharge  I had direct contact with the patient on the day of discharge  Greater than 50% of the total time was spent examining patient, answering all patient questions, arranging and discussing plan of care with patient as well as directly providing post-discharge instructions  Additional time then spent on discharge activities  Discharge Medications:  See after visit summary for reconciled discharge medications provided to patient and/or family        **Please Note: This note may have been constructed using a voice recognition system**

## 2021-08-11 NOTE — ASSESSMENT & PLAN NOTE
Presents with R anterior chest wall pain after a mechanical fall   CT shows Acute right 4th and 5th rib fractures  Chronic deformity of C-spine, no current neck pain   Patient has worked with physical therapy recommendation is to discharge with home health  Patient at rest does not have any pain she just has pain when she ambulates but the pain is bearable which is Tylenol and she does not want to use any narcotic medications  I discussed with her about the diclofenac cream she stated that she used in the past on her knees and did not help  Will discharge her on Tylenol around the clock for 10 days and then p r n   Also incentive spirometer  Likely discharge tomorrow - patient has cardiology appointment at 2:00 p m  and is the primary caregiver for her  with dementia

## 2021-08-11 NOTE — ASSESSMENT & PLAN NOTE
Currently in AFib  Has been on Tikosyn for over 10 years - EKG revealed normal sinus rhythm and a normal QTC  Continue Toprol XL and Xarelto  Is due for outpatient cardiology appointment on 08/11 at 2:00 p m  Rosalind Payne

## 2021-08-11 NOTE — PLAN OF CARE
Problem: Potential for Falls  Goal: Patient will remain free of falls  Description: INTERVENTIONS:  - Educate patient/family on patient safety including physical limitations  - Instruct patient to call for assistance with activity   - Consult OT/PT to assist with strengthening/mobility   - Keep Call bell within reach  - Keep bed low and locked with side rails adjusted as appropriate  - Keep care items and personal belongings within reach  - Initiate and maintain comfort rounds  - Make Fall Risk Sign visible to staff  - Offer Toileting every 2 Hours, in advance of need  - Initiate/Maintain bedalarm  - Obtain necessary fall risk management equipment: roller walker  - Apply yellow socks and bracelet for high fall risk patients  - Consider moving patient to room near nurses station  Outcome: Progressing     Problem: MOBILITY - ADULT  Goal: Maintain or return to baseline ADL function  Description: INTERVENTIONS:  -  Assess patient's ability to carry out ADLs; assess patient's baseline for ADL function and identify physical deficits which impact ability to perform ADLs (bathing, care of mouth/teeth, toileting, grooming, dressing, etc )  - Assess/evaluate cause of self-care deficits   - Assess range of motion  - Assess patient's mobility; develop plan if impaired  - Assess patient's need for assistive devices and provide as appropriate  - Encourage maximum independence but intervene and supervise when necessary  - Involve family in performance of ADLs  - Assess for home care needs following discharge   - Consider OT consult to assist with ADL evaluation and planning for discharge  - Provide patient education as appropriate  Outcome: Progressing  Goal: Maintains/Returns to pre admission functional level  Description: INTERVENTIONS:  - Perform BMAT or MOVE assessment daily    - Set and communicate daily mobility goal to care team and patient/family/caregiver     - Collaborate with rehabilitation services on mobility goals if consulted  - Perform Range of Motion etimes a day  - Reposition patient every 2hours    - Dangle patient 3 times a day  - Stand patient 3times a day  - Ambulate patient 3 times a day  - Out of bed to chair 3 times a day   - Out of bed for meals 3imes a day  - Out of bed for toileting  - Record patient progress and toleration of activity level   Outcome: Progressing     Problem: PAIN - ADULT  Goal: Verbalizes/displays adequate comfort level or baseline comfort level  Description: Interventions:  - Encourage patient to monitor pain and request assistance  - Assess pain using appropriate pain scale0-10  - Administer analgesics based on type and severity of pain and evaluate response  - Implement non-pharmacological measures as appropriate and evaluate response  - Consider cultural and social influences on pain and pain management  - Notify physician/advanced practitioner if interventions unsuccessful or patient reports new pain  Outcome: Progressing

## 2021-08-11 NOTE — ASSESSMENT & PLAN NOTE
Continue Norvasc and toprol XL   Hypertensive urgency likely situational and due to pain  Add p r n   Hydralazine

## 2021-08-11 NOTE — ASSESSMENT & PLAN NOTE
Presents with R anterior chest wall pain after a mechanical fall   CT shows Acute right 4th and 5th rib fractures  Chronic deformity of C-spine, no current neck pain   Will order around the clock Tylenol lidocaine patch, upon discharge him prescribed topical diclofenac   PT/OT  Likely discharge tomorrow - patient has cardiology appointment at 2:00 p m  and is the primary caregiver for her  with dementia

## 2021-08-11 NOTE — OCCUPATIONAL THERAPY NOTE
Occupational Therapy Evaluation     Patient Name: Uriel Camacho  [id number]'[id number] Date: 8/11/2021  Problem List  Principal Problem:    Fracture of multiple ribs of right side  Active Problems:    Paroxysmal atrial fibrillation ([facility] )    Spinal stenosis of lumbar region with neurogenic claudication    Hypertension    Hyperlipidemia    Past Medical History  Past Medical History:   Diagnosis Date    A-[facility] )     Arthritis     R knee    Cardiac disease     GERD (gastroesophageal reflux disease)     Hyperlipidemia     Hypertension     Osteoporosis     Psychiatric disorder     anxiety     Past Surgical History  Past Surgical History:   Procedure Laterality Date    BACK SURGERY      EYE SURGERY      cataracts           08/11/21 0707   Note Type   Note type Evaluation   Restrictions/Precautions   Other Precautions Chair Alarm; Bed Alarm; Fall Risk;Pain   Pain Assessment   Pain Assessment Tool FLACC   Pain Location/Orientation Orientation: Right;Location: Rib Cage   Pain Rating: FLACC (Rest) - Face 0   Pain Rating: FLACC (Rest) - Legs 0   Pain Rating: FLACC (Rest) - Activity 0   Pain Rating: FLACC (Rest) - Cry 0   Pain Rating: FLACC (Rest) - Consolability 0   Score: FLACC (Rest) 0   Pain Rating: FLACC (Activity) - Face 1   Pain Rating: FLACC (Activity) - Legs 0   Pain Rating: FLACC (Activity) - Activity 0   Pain Rating: FLACC (Activity) - Cry 0   Pain Rating: FLACC (Activity) - Consolability 0   Score: FLACC (Activity) 1   Home Living   Type of Home House  (1 LY L HR)   Home Layout One level;Performs ADLs on one level; Able to live on main level with bedroom/bathroom   Bathroom Shower/Tub Walk-in shower   Bathroom Toilet Standard   Bathroom Equipment Grab bars in shower; Shower chair;Grab bars around toilet   [address] [facility]  (uses rollator)   Additional Comments Pt Pt reprots living in a 1 story home with 1 LY    Pt reports ambulating with rollator at baseline and being her husbands primar caretaker   Prior Function Level of [place] Independent with ADLs and functional mobility   Lives With Spouse   ADL Assistance Independent   IADLs Independent  (+ driving, + cooking, - cleaning (has cleaning lady))   Falls in the last 6 months 1 to 4   Comments Pt reports completing ADLs, IADLs, functional ambulation, and community mobility + driving @ Mod I  Pt reports having a cleaning lady   ADL   Where Assessed Edge of bed   Grooming Assistance 5  Supervision/Setup  (standing at sinkside)   Grooming Deficit Increased time to complete   UB Dressing Assistance 6  Modified independent   UB Dressing Deficit Setup   LB Dressing Assistance 5  Supervision/Setup   LB Dressing Deficit Requires assistive device for steadying;Verbal cueing;Supervision/safety; Increased time to complete   Toileting Assistance  5  Supervision/Setup   Toileting Deficit Verbal cueing;Supervison/safety; Increased time to complete;Grab bar use;Clothing management up;Clothing management down;Perineal hygiene   Additional Comments Pt completing ADL tasks while seated at EOB  UB dressing @ MOd I after set-up  LB Dressing @ S including socks/shoes and CM around waist with UB suported on RW PRN  Pt completing toileting tasks @ S with no LOB noted  Pt standing at sinkside to complete hand hygiene and oral hygiene @ S with increased time PRN  Bed Mobility   Supine to Sit 6  Modified independent   Additional items Increased time required;Verbal cues   Additional Comments HOB flat, no bedrails   Transfers   Sit to Stand 5  Supervision   Additional items Increased time required;Verbal cues   Stand to Sit 5  Supervision   Additional items Increased time required;Verbal cues   Stand pivot 5  Supervision   Additional items Increased time required;Verbal cues   Additional Comments Pt completing functional transfers with use of RW for UB support  vc'ing for initial hand placement and safety   Pt with no LOB noted and Good safety awareness   Balance   Static Sitting Normal Dynamic Sitting Good   Static Standing Fair +   Dynamic Standing Fair   Activity Tolerance   Activity Tolerance Patient limited by fatigue   Medical Staff Made Aware Spoke with PT, Danny Munson   Nurse Made Aware Spoke with RN   RUE Assessment   RUE Assessment WFL   LUE Assessment   LUE Assessment WFL   Hand Function   Gross Motor Coordination Functional   Fine Motor Coordination Functional   Sensation   Light Touch No apparent deficits   Cognition   Overall Cognitive Status WFL   Arousal/Participation Alert; Responsive; Cooperative   Attention Within functional limits   Orientation Level Oriented X4   Memory Within functional limits   Following Commands Follows one step commands without difficulty   Assessment   Limitation Decreased endurance   Prognosis Good   Assessment Pt is an 81 y/o F admitted to [address] [place] [address] 8/10/2021 under observation d/t experiencing a fall at home resulting in R sided rib pain  Dx: fracture of multiple ribs of R side  CT of chest suggest: Acute right 4th and 5th rib fractures and 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 Pt with PMHx impacting performance during functional tasks including: a-fib, arthritis, GERD, hyperlipidemia, HTN, osteoporosis, anxiety  Pt reports living in a 1 story home with 1 LY  Pt ambulates with rollator at baseline and reports completing ADLs, IADLs, functional ambulation and community mobility + driving @ Mod I  On evaluation, Pt A&Ox4  Pt completing supine>sit @ Mod I  UB Dressing @ Mod I  LB Dressing @ S  Toileting and grooming tasks @ S  Pt completing functional transfers with use of RW for UB support @ S with increased time PRN  Pt's BUE ROM and MS WFL  Pt's barriers to d/c include: pain and decreased activity tolerance  Pt at this time appears to be completing ADLs and functional tasks at [facility] and does not require any further acute OT services  Pt has no concerns with returning home with her  at this time  D/c OT effective 8/11/2021   If new concerns arise, please re-consult  Plan   OT Frequency Eval only   Recommendation   OT Discharge Recommendation No rehabilitation needs   AM-PAC Daily Activity Inpatient   Lower Body Dressing 3   Bathing 3   Toileting 3   Upper Body Dressing 4   Grooming 3   Eating 4   Daily Activity Raw Score 20   Daily Activity Standardized Score (Calc for Raw Score >=11) 42 03   AM-PAC Applied Cognition Inpatient   Following a Speech/Presentation 4   Understanding Ordinary Conversation 4   Taking Medications 4   Remembering Where Things Are Placed or Put Away 4   Remembering List of 4-5 Errands 4   Taking Care of Complicated Tasks 4   Applied Cognition Raw Score 24   Applied Cognition Standardized Score 62 21     The patient's raw score on the AM-PAC Daily Activity inpatient short form is 20, standardized score is 42 03, greater than 39 4  Patients at this level are likely to benefit from DC to home  Please refer to the recommendation of the Occupational Therapist for safe DC planning        [facility] OTR/L

## 2021-08-11 NOTE — PLAN OF CARE
Problem: PHYSICAL THERAPY ADULT  Goal: Performs mobility at highest level of function for planned discharge setting  See evaluation for individualized goals  Description: Treatment/Interventions: Functional transfer training, LE strengthening/ROM, Elevations, Therapeutic exercise, Endurance training, Patient/family training, Equipment eval/education, Bed mobility, Compensatory technique education, Gait training, Spoke to nursing, OT  Equipment Recommended:  (walker- pt has)       See flowsheet documentation for full assessment, interventions and recommendations  Note: Prognosis: Good  Problem List: Decreased strength, Decreased endurance, Impaired balance, Decreased mobility, Decreased safety awareness, Pain  Assessment: Pt is a 80 y o  female seen for PT evaluation s/p admission to [address] [place] [address] on 8/10/2021 with Fracture of multiple ribs of right side  Order placed for PT services  Upon evaluation: Pt is presenting with impaired functional mobility due to pain, decreased strength, decreased endurance, impaired balance, gait deviations, decreased safety awareness and fall risk requiring supervision assistance for transfers and supervision assistance for ambulation with RW  Pt's clinical presentation is currently unpredictable given the functional mobility deficits above, especially weakness, decreased endurance, gait deviations, pain, decreased activity tolerance, decreased functional mobility tolerance and decreased safety awareness, coupled with fall risks as indicated by AM-PAC 6-Clicks: 57/26 as well as hx of falls, impaired balance, polypharmacy and decreased safety awareness and combined with medical complications of pain impacting overall mobility status and abnormal CBC  Pt's PMHx and comorbidities that may affect physical performance and progress include: A fib, HTN, CAD and arthritis, osteoporosis, HLD, 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   Personal factors affecting pt at time of IE include: unable to perform caregiver support/tasks, anxiety, step(s) to enter environment, limited home support, advanced age, inability to perform IADLs, inability to navigate community distances and recent fall(s)/fall history  Pt will benefit from continued skilled PT services to address deficits as defined above and to maximize level of functional mobility to facilitate return toward PLOF and improved QOL  From PT/mobility standpoint, recommendation at time of d/c would be Home PT, home with family support and with walker pending progress in order to reduce fall risk and maximize pt's functional independence and consistency with mobility in order to facilitate return to PLOF  Recommend trial with cane next 1-2 sessions and ther ex next 1-2 sessions  Barriers to Discharge: None  Barriers to Discharge Comments: pt has 1st floor set up and walker at home     PT Discharge Recommendation: Home with home health rehabilitation     PT - OK to Discharge:  (when medically cleared)    See flowsheet documentation for full assessment

## 2021-08-11 NOTE — ASSESSMENT & PLAN NOTE
Currently in AFib  Has been on Tikosyn for over 10 years - obtain baseline ekg  Continue Toprol XL and Xarelto  Is due for outpatient cardiology appointment on 08/11 at 2:00 p m  Maddie Ruvalcaba

## 2021-08-11 NOTE — ASSESSMENT & PLAN NOTE
Continue Norvasc and toprol XL   Overnight htn resolved and most likely was situation due to pain as reviewed outpatient visits bp in 120's  Will repat bp prior to discharge post am meds  Pain controled on just tylenol at rest she even has no pain just on exertion

## 2021-08-11 NOTE — PLAN OF CARE
Problem: Potential for Falls  Goal: Patient will remain free of falls  Description: INTERVENTIONS:  - Educate patient/family on patient safety including physical limitations  - Instruct patient to call for assistance with activity   - Consult OT/PT to assist with strengthening/mobility   - Keep Call bell within reach  - Keep bed low and locked with side rails adjusted as appropriate  - Keep care items and personal belongings within reach  - Initiate and maintain comfort rounds  - Make Fall Risk Sign visible to staff  - Offer Toileting every 2 Hours, in advance of need  - Initiate/Maintain bedalarm  - Obtain necessary fall risk management equipment: roller walker  - Apply yellow socks and bracelet for high fall risk patients  - Consider moving patient to room near nurses station  8/11/2021 1037 by Mayra Hernández RN  Outcome: Adequate for Discharge  8/11/2021 1037 by Mayra Hernández RN  Outcome: Progressing     Problem: MOBILITY - ADULT  Goal: Maintain or return to baseline ADL function  Description: INTERVENTIONS:  -  Assess patient's ability to carry out ADLs; assess patient's baseline for ADL function and identify physical deficits which impact ability to perform ADLs (bathing, care of mouth/teeth, toileting, grooming, dressing, etc )  - Assess/evaluate cause of self-care deficits   - Assess range of motion  - Assess patient's mobility; develop plan if impaired  - Assess patient's need for assistive devices and provide as appropriate  - Encourage maximum independence but intervene and supervise when necessary  - Involve family in performance of ADLs  - Assess for home care needs following discharge   - Consider OT consult to assist with ADL evaluation and planning for discharge  - Provide patient education as appropriate  8/11/2021 1037 by Mayra Hernández RN  Outcome: Adequate for Discharge  8/11/2021 1037 by Mayra Hernández RN  Outcome: Progressing  Goal: Maintains/Returns to pre admission functional level  Description: INTERVENTIONS:  - Perform BMAT or MOVE assessment daily    - Set and communicate daily mobility goal to care team and patient/family/caregiver  - Collaborate with rehabilitation services on mobility goals if consulted  - Perform Range of Motion etimes a day  - Reposition patient every 2hours    - Dangle patient 3 times a day  - Stand patient 3times a day  - Ambulate patient 3 times a day  - Out of bed to chair 3 times a day   - Out of bed for meals 3imes a day  - Out of bed for toileting  - Record patient progress and toleration of activity level   8/11/2021 1037 by Gabby Wilde RN  Outcome: Adequate for Discharge  8/11/2021 1037 by Gabby Wilde RN  Outcome: Progressing     Problem: PAIN - ADULT  Goal: Verbalizes/displays adequate comfort level or baseline comfort level  Description: Interventions:  - Encourage patient to monitor pain and request assistance  - Assess pain using appropriate pain scale0-10  - Administer analgesics based on type and severity of pain and evaluate response  - Implement non-pharmacological measures as appropriate and evaluate response  - Consider cultural and social influences on pain and pain management  - Notify physician/advanced practitioner if interventions unsuccessful or patient reports new pain  8/11/2021 1037 by Gabby Wilde RN  Outcome: Adequate for Discharge  8/11/2021 1037 by Gabby Wilde RN  Outcome: Progressing

## 2021-08-11 NOTE — H&P
Braden 67 1935, 80 y o  female MRN: [id number]  Unit/Bed#: -01 Encounter: [id number]  Primary Care Provider: Mckenna Cody MD   Date and time admitted to hospital: 8/10/2021  1:42 PM    * Fracture of multiple ribs of right side  Assessment & Plan  Presents with R anterior chest wall pain after a mechanical fall   CT shows Acute right 4th and 5th rib fractures  Chronic deformity of C-spine, no current neck pain   Will order around the clock Tylenol lidocaine patch, upon discharge him prescribed topical diclofenac   PT/OT  Likely discharge tomorrow - patient has cardiology appointment at 2:00 p m  and is the primary caregiver for her  with dementia    Hypertension  Assessment & Plan  Continue Norvasc and toprol XL   Hypertensive urgency likely situational and due to pain  Add p r n  Hydralazine    Spinal stenosis of lumbar region with neurogenic claudication  Assessment & Plan  History of compression fractures with chronic ambulatory dysfunction and back pain    Paroxysmal atrial fibrillation [facility])  Assessment & Plan  Currently in AFib  Has been on Tikosyn for over 10 years - obtain baseline ekg  Continue Toprol XL and Xarelto  Is due for outpatient cardiology appointment on 08/11 at 2:00 p m  VTE Prophylaxis: Rivaroxaban (Xarelto)  / sequential compression device   Code Status: dnr dni d/w patient   POLST: POLST form is not discussed and not completed at this time  Discussion with family: declined call to      Anticipated Length of Stay:  Patient will be admitted on an Observation basis with an anticipated length of stay of  Less than 2 midnights  Justification for Hospital Stay: chest pain after fall     Total Time for Visit, including Counseling / Coordination of Care: 45 minutes  Greater than 50% of this total time spent on direct patient counseling and coordination of care      Chief Complaint:   Right chest wall pain    History of Present Illness:    Pamela Lock is a 80 y o  female with past medical history significant for arthritis, AFib, GERD, hypertension, hyperlipidemia, ambulatory dysfunction, osteoporosis, anxiety who presents with right-sided rib pain  Patient states she was visiting with her family today, in otherwise normal state of health  States she was walking in her house and turned around and lost her footing falling onto her right side  She did not have her walker with her at that time but states she was walking towards it after closing her front door  States she does not have any carpets in the house but fell onto the hardwood floor onto her right side  No head strike  Notes pain with deep inspiration, laughing or coughing  No fevers or chills  Tells me her BP is high because she is worried about her  at home  Review of Systems:    Review of Systems   Constitutional: Negative for chills and fever  HENT: Negative for congestion  Respiratory: Negative for shortness of breath  Cardiovascular: Positive for chest pain  Gastrointestinal: Negative for abdominal pain  Genitourinary: Negative for dysuria  Musculoskeletal: Positive for arthralgias, back pain and gait problem  Skin: Negative for wound  Neurological: Negative for dizziness, weakness and light-headedness  Psychiatric/Behavioral: Negative for confusion  Past Medical and Surgical History:     Past Medical History:   Diagnosis Date    [facility]     Arthritis     R knee    Cardiac disease     GERD (gastroesophageal reflux disease)     Hyperlipidemia     Hypertension     Osteoporosis     Psychiatric disorder     anxiety       Past Surgical History:   Procedure Laterality Date    BACK SURGERY      EYE SURGERY      cataracts       Meds/Allergies:    Prior to Admission medications    Medication Sig Start Date End Date Taking?  Authorizing Provider   amLODIPine (NORVASC) 10 mg tablet Take 1 tablet (10 mg total) by mouth daily Please do not fill until the patient needs refills  Please note this is a new prescribing physician and all refill requests should come to this office  6/10/21  Yes Amelia Aguila DO   calcium citrate-vitamin D (CALCIUM + D) 315-200 MG-UNIT per tablet Take by mouth   Yes Historical Provider, MD   docusate sodium (COLACE) 100 mg capsule Take 100 mg by mouth daily   Yes Historical Provider, MD   dofetilide (TIKOSYN) 250 mcg capsule Take 1 capsule (250 mcg total) by mouth 2 (two) times a day 7/28/21  Yes PAUL Schultz   LORazepam (ATIVAN) 0 5 mg tablet Take by mouth every 8 (eight) hours as needed for anxiety   Yes Historical Provider, MD   magnesium (MAGTAB) 84 MG (7MEQ) TBCR Take 84 mg by mouth daily Takes 2 tablets 84mg each   Yes Historical Provider, MD   metoprolol succinate (TOPROL-XL) 25 mg 24 hr tablet 1 tab po in am, 1/2 tab po in pm 7/21/21  Yes PAUL Pro   Multiple Vitamins-Minerals (ICAPS AREDS 2 PO) Take 1 tablet by mouth daily   Yes Historical Provider, MD   polyethylene glycol (MIRALAX) 17 g packet Take 17 g by mouth daily   Yes Historical Provider, MD   rivaroxaban (Xarelto) 15 mg tablet Take 1 tablet (15 mg total) by mouth daily with dinner Please do not fill until the patient needs refills  Please note this is a new prescribing physician and all refill requests should come to this office  6/10/21  Yes Amelia Aguila DO   denosumab (PROLIA) 60 mg/mL     Historical Provider, MD   famotidine (PEPCID) 20 mg tablet Take 20 mg by mouth 2 (two) times a day  Patient not taking: Reported on 8/10/2021    Historical Provider, MD   omeprazole (PriLOSEC) 20 mg delayed release capsule Take 20 mg by mouth daily  Patient not taking: Reported on 7/21/2021    Historical Provider, MD     I have reviewed home medications with patient personally  Allergies: Allergies   Allergen Reactions    Ciprofloxacin     Epinephrine      Irregular heart ryhthm (afib) per pt      Bactrim [Sulfamethoxazole-Trimethoprim] Rash    Medical Tape Rash       Social History:     Marital Status: /Civil Union   Occupation:   Patient Pre-hospital Living Situation: with    Patient Pre-hospital Level of Mobility: walker prn   Patient Pre-hospital Diet Restrictions: none   Substance Use History:   Social History     Substance and Sexual Activity   Alcohol Use Not Currently     Social History     Tobacco Use   Smoking Status Never Smoker   Smokeless Tobacco Never Used     Social History     Substance and Sexual Activity   Drug Use Never       Family History:    Family History   Problem Relation Age of Onset    Cancer Father     Cancer Brother        Physical Exam:     Vitals:   Blood Pressure: (!) 174/70 (08/10/21 2156)  Pulse: 63 (08/10/21 2156)  Temperature: 98 3 °F (36 8 °C) (08/10/21 2156)  Temp Source: Oral (08/10/21 1550)  Respirations: 17 (08/10/21 2156)  Height: 5' 3" (160 cm) (08/10/21 1348)  Weight - Scale: 55 7 kg (122 lb 12 7 oz) (08/10/21 1348)  SpO2: 95 % (08/10/21 2156)    Physical Exam  Constitutional:       Appearance: Normal appearance  She is not ill-appearing  HENT:      Head: Normocephalic and atraumatic  Eyes:      Extraocular Movements: Extraocular movements intact  Cardiovascular:      Rate and Rhythm: Normal rate  Rhythm irregular  Pulmonary:      Effort: Pulmonary effort is normal       Breath sounds: Normal breath sounds  Chest:      Chest wall: Tenderness present  Abdominal:      General: Abdomen is flat  Palpations: Abdomen is soft  Musculoskeletal:         General: No swelling  Normal range of motion  Cervical back: Normal range of motion and neck supple  Skin:     General: Skin is warm and dry  Neurological:      General: No focal deficit present  Mental Status: She is alert and oriented to person, place, and time     Psychiatric:         Mood and Affect: Mood normal        Additional Data:     Lab Results: I have personally reviewed pertinent reports  Results from last 7 days   Lab Units 08/10/21  1441   WBC Thousand/uL 11 61*   HEMOGLOBIN g/dL 12 4   HEMATOCRIT % 39 6   PLATELETS Thousands/uL 306   NEUTROS PCT % 78*   LYMPHS PCT % 12*   MONOS PCT % 9   EOS PCT % 0     Results from last 7 days   Lab Units 08/10/21  1441   SODIUM mmol/L 141   POTASSIUM mmol/L 3 8   CHLORIDE mmol/L 106   CO2 mmol/L 29   BUN mg/dL 24   CREATININE mg/dL 0 92   ANION GAP mmol/L 6   CALCIUM mg/dL 9 0   ALBUMIN g/dL 3 2*   TOTAL BILIRUBIN mg/dL 0 25   ALK PHOS U/L 60   ALT U/L 18   AST U/L 21   GLUCOSE RANDOM mg/dL 136     Results from last 7 days   Lab Units 08/10/21  1441   INR  1 21*             Results from last 7 days   Lab Units 08/10/21  1441   LACTIC ACID mmol/L 1 3       Imaging: I have personally reviewed pertinent reports  CT head without contrast   Final Result by Goldie Castillo MD (08/10 4912)      No acute intracranial abnormality  Workstation performed: [id number]         CT spine cervical without contrast   Final Result by Goldie Castillo MD (08/10 4275)      1  The left one half of the C1 arch is chronically discontinuous with the remainder of the vertebral body  The right one half of the C1 arch is fused with the adjacent C2 vertebral body  This is likely a chronic posttraumatic deformity  Stability of    the C1 arch should be assessed clinically, with MRI as needed  2   No evidence of acute trauma      The study was marked in EPIC for immediate notification  Workstation performed: [id number]         CT chest abdomen pelvis w contrast   Final Result by Goldie Castillo MD (08/10 0410)      1  Acute right 4th and 5th rib fractures   2  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

## 2021-08-11 NOTE — CASE MANAGEMENT
Case Management Progress Note    Patient name Mattie Harada  Location /-26 MRN [id number]  : 1935 Date 2021       LOS (days): 0  Geometric Mean LOS (GMLOS) (days):   Days to GMLOS:          PROGRESS NOTE:    HHC has been recommended for pt at time of discharge  Cm discussing HHC options with pt, pt sts she would like referrals placed to [place] [address], as per request, referrals placed in [facility]    A post acute care recommendation was made by your care team for [facility]  Discussed [place] of Choice with patient  List of agencies given to patient via in person  patient aware the list is custom filtered for them by zip code location and that [facility] post acute providers are designated  1009 CM made a follow up appointment fo rpt with PCP on 21 at 1020, AVS updated    [facility] has accepted the pt at time of dc

## 2021-08-11 NOTE — PHYSICAL THERAPY NOTE
PHYSICAL THERAPY EVALUATION  NAME:  Jacob Degroot  DATE: 08/11/21    AGE:   80 y o  Mrn:   [id number]  ADMIT DX:  Leg pain [M79 [id number]]  Closed fracture of multiple ribs of right side, initial encounter [S22 41XA]    Past Medical History:   Diagnosis Date    A-fib ([facility] [address] )     Arthritis     R knee    Cardiac disease     GERD (gastroesophageal reflux disease)     Hyperlipidemia     Hypertension     Osteoporosis     Psychiatric disorder     anxiety     Length Of Stay: 0  Performed at least 2 patient identifiers during session: Name and Birthday  PHYSICAL THERAPY EVALUATION :      08/11/21 0706   PT Last Visit   PT Visit Date 08/11/21   Note Type   Note type Evaluation   Pain Assessment   Pain Assessment Tool 0-10   Pain Score   (no pain at rest)   Pain Location/Orientation Orientation: Right;Location: Rib Cage;Orientation: Upper  (anterior rib cage)   Effect of Pain on Daily Activities increased pain with mobility   Pain Rating: FLACC (Rest) - Face 0   Pain Rating: FLACC (Rest) - Legs 0   Pain Rating: FLACC (Rest) - Activity 0   Pain Rating: FLACC (Rest) - Cry 0   Pain Rating: FLACC (Rest) - Consolability 0   Score: FLACC (Rest) 0   Pain Rating: FLACC (Activity) - Face 1   Pain Rating: FLACC (Activity) - Legs 0   Pain Rating: FLACC (Activity) - Activity 0   Pain Rating: FLACC (Activity) - Cry 1   Pain Rating: FLACC (Activity) - Consolability 0   Score: FLACC (Activity) 2   Home Living   Type of Home House  (1 LY with L HR)   Home Layout One level;Performs ADLs on one level; Able to live on main level with bedroom/bathroom   Bathroom Shower/Tub Walk-in shower   Bathroom Toilet Standard   Bathroom Equipment Grab bars in shower; Shower chair;Grab bars around toilet   [address] [facility]  (rollator walker, uses walker mostly, uses cane outside)   Additional Comments Reports living in a 1Sh with 1 LY with L HR and using rollator in the house and spc short distances outside the house      Prior Function   Level of [place] Independent with ADLs and functional mobility   Lives With Spouse   ADL Assistance Independent   IADLs Independent  (+ driving, + cooking, - cleaning (has cleaning lady))   Falls in the last 6 months 1 to 4   Comments Reports being independent with RW in the house and uses spc when she goes out of the house to walk short distnaces to/from the car to the store until she gets a cart  Reports being independent with ADLs and IADLs  Pt is primary caregiver for her spouse who has dementia  Restrictions/Precautions   Other Precautions Fall Risk; Chair Alarm; Bed Alarm   General   Additional Pertinent History Pt with acure right 4th and 5th rib fxs  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   Cognition   Orientation Level Oriented X4   RLE Assessment   RLE Assessment WFL  (4/5)   LLE Assessment   LLE Assessment WFL  (4/5)   Coordination   Movements are Fluid and Coordinated 1  (alt toe tapping)   Light Touch   RLE Light Touch Grossly intact   LLE Light Touch Grossly intact   Bed Mobility   Supine to Sit 6  Modified independent   Additional items Increased time required   Additional Comments HOB flat without bedrail  completed with increased time  Transfers   Sit to Stand 5  Supervision   Additional items Increased time required;Verbal cues   Stand to Sit 5  Supervision   Additional items Increased time required;Verbal cues   Stand pivot 5  Supervision   Additional items Increased time required;Verbal cues   Additional Comments use of RW  sit<>stand with RW with supervision with min cues for hand placement for safety with RW  spt with RW with supervision with min cues for turning completely prior to sitting  Ambulation/Elevation   Gait pattern Wide MINA; Decreased foot clearance; Excessively slow; Short stride   Gait Assistance 5  Supervision   Additional items Assist x 1;Verbal cues   Assistive Device Rolling walker   Distance 48'x1 with RW with supervision with decreased step, decreased foot clearance right foot > left  cues for increased step length and foot clearance  Balance   Static Sitting Normal   Dynamic Sitting Good   Static Standing Good   Dynamic Standing Fair +   Ambulatory Fair +   Endurance Deficit   Endurance Deficit Description fatigue   Activity Tolerance   Activity Tolerance Patient limited by fatigue   Medical Staff Made Aware OT, Fortino Angelucci   Nurse Made Aware RNShira   Assessment   Prognosis Good   Problem List Decreased strength;Decreased endurance; Impaired balance;Decreased mobility; Decreased safety awareness;Pain   Barriers to Discharge None   Barriers to Discharge Comments pt has 1st floor set up and walker at home   Goals   Patient Goals "Go home"   STG Expiration Date 08/21/21   PT Treatment Day 1   Plan   Treatment/Interventions Functional transfer training;LE strengthening/ROM; Elevations; Therapeutic exercise; Endurance training;Patient/family training;Equipment eval/education; Bed mobility; Compensatory technique education;Gait training;Spoke to nursing;OT   PT Frequency 1-2x/wk   Recommendation   PT Discharge Recommendation Home with home health rehabilitation   Equipment Recommended   (walker- pt has)   PT - OK to Discharge   (when medically cleared)   Additional Comments pt sitting in recliner chair at end of session with all needs within reach and posey alarm on   AM-PAC Basic Mobility Inpatient   Turning in Bed Without Bedrails 4   Lying on Back to Sitting on Edge of Flat Bed 4   Moving Bed to Chair 3   Standing Up From Chair 3   Walk in Room 3   Climb 3-5 Stairs 3   Basic Mobility Inpatient Raw Score 20   Basic Mobility Standardized Score 43 99     (Please find full objective findings from PT assessment regarding body systems outlined above)  Assessment: Pt is a 80 y o  female seen for PT evaluation s/p admission to [address] [place] [address] 18 on 8/10/2021 with Fracture of multiple ribs of right side  Order placed for PT services    Upon evaluation: Pt is presenting with impaired functional mobility due to pain, decreased strength, decreased endurance, impaired balance, gait deviations, decreased safety awareness and fall risk requiring supervision assistance for transfers and supervision assistance for ambulation with RW  Pt's clinical presentation is currently unpredictable given the functional mobility deficits above, especially weakness, decreased endurance, gait deviations, pain, decreased activity tolerance, decreased functional mobility tolerance and decreased safety awareness, coupled with fall risks as indicated by AM-PAC 6-Clicks: 97/34 as well as hx of falls, impaired balance, polypharmacy and decreased safety awareness and combined with medical complications of pain impacting overall mobility status and abnormal CBC  Pt's PMHx and comorbidities that may affect physical performance and progress include: A fib, HTN, CAD and arthritis, osteoporosis, HLD, 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  Personal factors affecting pt at time of IE include: unable to perform caregiver support/tasks, anxiety, step(s) to enter environment, limited home support, advanced age, inability to perform IADLs, inability to navigate community distances and recent fall(s)/fall history  Pt will benefit from continued skilled PT services to address deficits as defined above and to maximize level of functional mobility to facilitate return toward PLOF and improved QOL  From PT/mobility standpoint, recommendation at time of d/c would be Home PT, home with family support and with walker pending progress in order to reduce fall risk and maximize pt's functional independence and consistency with mobility in order to facilitate return to PLOF  Recommend trial with cane next 1-2 sessions and ther ex next 1-2 sessions  The patient's AM-PAC Basic Mobility Inpatient Short Form Raw Score is 20, Standardized Score is 43 99   A standardized score greater than 42 9 suggests the patient may benefit from discharge to home  Please also refer to the recommendation of the Physical Therapist for safe discharge planning  Goals: Pt will: Perform bed mobility tasks with independent to reposition in bed and prepare for transfers  Pt will perform transfers with modified I to decrease burden of care, decrease risk for falls and improve activity tolerance and prepare for ambulation  Pt will ambulate with LRAD for >/= 250' with  modified I  to decrease risk for falls, improve activity tolerance and improve gait quality and to access home environment  Pt will complete >/= 8 steps with with unilateral handrail with modified I to decrease risk for falls and improve activity tolerance  Pt will demonstrate decreased fall risk as indicated by score of >/= 10/12 on 4 item DGI with LRAD  Pt will increase B LE strength >/= 1/2 MMT grade to facilitate functional mobility  Tonny Kelley, PT,DPT     PHYSICAL THERAPY TREATMENT NOTE  Time In:0720  Time TPY:5520  Total Time: 15'      S:  "I feel tired"  O:  Reviewed seated therapeutic exercise consisting of ankle pumps, LAQ and hip flexion  Min verbal cues for proper completion  Sit<>stand with supervision with min cues for hand placement  Spt with RW supervision  Ambulated 150'x1 with RW with supervision with slow tina, decreased step length and wide MINA  Min cues for increased step length and foot clearance  4 Item Dynamic Gait Index with RW  2/3 Gait level surface  2/3 Change in gait speed  1/3 Gait with horizontal head turns  2/3 Gait with vertical head turns  7/12 total score (less than 10/12 indicates increased risk of falls in elderly)    Declined 1 LY  Reports she will have assistance to complete  Reports she is going to look for assistance for IADLs at this time  Discussed HHPT services  Pt agreeable  A:  Pt requires min cues for hand placement for safety with RW   She was able t ambulate increased distance, but reports fatigue limiting further distance and stair trial  She is at an increased fall risk as indicated by score of 7/12 on 4 item DGI  She will continue to benefit from PT services to maximize LOF with LRAD  P:  Recommend LE strengthening, dynamic standing balance, endurance training, transfers and gait with LRAD, stairs       Julio Cesar Greco, PT, DPT

## 2021-08-26 ENCOUNTER — OFFICE VISIT (OUTPATIENT)
Dept: CARDIOLOGY CLINIC | Facility: CLINIC | Age: 86
End: 2021-08-26
Payer: MEDICARE

## 2021-08-26 VITALS
WEIGHT: 117 LBS | DIASTOLIC BLOOD PRESSURE: 70 MMHG | OXYGEN SATURATION: 97 % | HEIGHT: 63 IN | SYSTOLIC BLOOD PRESSURE: 166 MMHG | BODY MASS INDEX: 20.73 KG/M2 | HEART RATE: 73 BPM | TEMPERATURE: 97.4 F

## 2021-08-26 DIAGNOSIS — I10 ESSENTIAL HYPERTENSION: ICD-10-CM

## 2021-08-26 DIAGNOSIS — R55 VASOVAGAL SYNCOPE: ICD-10-CM

## 2021-08-26 DIAGNOSIS — E78.2 MIXED HYPERLIPIDEMIA: ICD-10-CM

## 2021-08-26 DIAGNOSIS — I48.0 PAROXYSMAL ATRIAL FIBRILLATION (HCC): Primary | ICD-10-CM

## 2021-08-26 PROCEDURE — 99214 OFFICE O/P EST MOD 30 MIN: CPT | Performed by: NURSE PRACTITIONER

## 2021-08-26 RX ORDER — METOPROLOL SUCCINATE 25 MG/1
TABLET, EXTENDED RELEASE ORAL
Qty: 135 TABLET | Refills: 3 | Status: ON HOLD | OUTPATIENT
Start: 2021-08-26 | End: 2022-02-22 | Stop reason: SDUPTHER

## 2021-08-26 RX ORDER — LORAZEPAM 2 MG/1
TABLET ORAL
COMMUNITY
Start: 2021-08-17

## 2021-08-26 RX ORDER — PAROXETINE 10 MG/1
20 TABLET, FILM COATED ORAL
COMMUNITY
Start: 2021-08-17 | End: 2022-01-24 | Stop reason: CLARIF

## 2021-08-26 NOTE — PATIENT INSTRUCTIONS
Will obtain a 24 hour holter monitor to assess your response to the adjustment of Metoprolol  Based on results Dr Jose M Lofton may want to adjust your medication at your upcoming visit  BP is elevated today, understandably  Please monitor at home and call our office if persistently > 150/90  Lab work did show cholesterol is borderline  I will not add medication at this time, however I recommend limiting saturated fats in diet and increasing omega 3 fatty acids in diet  Please call immediately with any new or worsening symptoms

## 2021-08-26 NOTE — PROGRESS NOTES
Cardiology Follow Up    Vicenta Longest  1935  [id number]  [facility] [address]  [facility] [address]  [phone]    Cheryl Meneses presents today for follow up of paroxysmal atrial fibrillation and HTN  1  Paroxysmal atrial fibrillation [facility])  Assessment & Plan:  Cheryl Meneses has a history of paroxysmal atrial fibrillation with use of Tikosyn for > 10 years  She presented with complaint of increasing "a fib episodes", feeling fluttering/palpitations in her chest   ZIO monitoring from 6/10-6/17/2021 reveals predominantly sinus rhythm, no evidence of atrial fibrillation, one 6-beat run of WCT/VT, 14 runs of SVT, fastest 4 beats at 156BPM and longest of 20 beats at 115 BPM, occasional SVEs at 3 9%, rare VEs at <1%, ventricular bigeminy and trigeminy were present  Echo 4/2021 with EF 55-60%  We reviewed the results of her ZIO monitoring  Continues Tikosyn 250mcg BID   Metoprolol succinate increased at last visit to 25mg in am, 12 5mg in pm  Due for updated holter monitor - will obtain 24 hour holter monitor to assess response  She is willing to discuss transition to Amiodarone at follow up with Dr Divya Hogan  Consider updated stress testing in the future  Continue Magnesium supplementation at 250-500mg daily  Continue Xarelto for anticoagulation      Orders:  -     Holter monitor - 24 hour; Future; Expected date: 08/26/2021  -     metoprolol succinate (TOPROL-XL) 25 mg 24 hr tablet; 1 tab po in am, 1/2 tab po in pm    2  Vasovagal syncope  Assessment & Plan:  Cheryl Meneses sustained a fall in 2/2019 when she was brushing her teeth in the bathroom and fell, hitting her head against the wall  EKG at that time with QTc of 470msec  She denies any recurrent syncopal events  We have reviewed the risk of Torsades in Tikosyn with advancing age   She wishes to continue medication for now, but is willing to discuss transition to Amiodarone at her follow up with Dr Raghav Aguilar  24 hour holter monitor now to assess response to adjustment of Metoprolol  Labs have remained stable      3  Essential hypertension  Assessment & Plan:  BP is elevated today, but she has been under considerable stress test with receiving some bad news  Will continue Amlodipine 10mg daily  She will monitor at home and report readings >150/90  She has close follow up      4  Mixed hyperlipidemia  Assessment & Plan:  Not on statin therapy at present  Lipid panel 8/3/2021 with     HDL 74         From my previous OV:  HPI     Issac aGrcia has a past medical history of paroxysmal atrial fibrillation, HTN, HLD, CKD, lumbar spinal stenosis, and osteoporosis      She was previously following with [facility], Dr Radha Shanks, and presented to Dr Raghav Aguilar to establish care on 6/10/2021   Lianna Peña has been treated for paroxysmal atrial fibrillation with Tikosyn for > 10 years  Her last symptomatic episode from a fib had been in 2019  She sustained a fall  from a potential syncopal event where her legs gave out and her head hit the wall while she was in the bathroom brushing her teeth in 2/2019  Her EKG at the time showed QTc of 470msec      She had met with [facility] EP clinic in 9/2020 and they had discussed switching to Amiodarone due to increased risk of Tikosyn induced torsades with advanced age, however, she decided against this      She continues to note fatigue and increasing palpitations over recent months  She denies lightheadedness, chest pain, shortness of breath, recurrent syncope, or leg swelling  She denies Covid 19 infection history  She is under a lot of stress caring for her spouse with Alzheimer's disease        7/21/2021: Today Issac Garcia presents for follow up  She completed a 7 day ZIO monitor and preliminary results are reviewed with her today  She tells me while she was wearing the patch her "a fib was going crazy", however, the past 3 days it has calmed down   I inquired as to why there were no triggered events on the ZIO and she stated that she did not trigger it because she was noticing symptoms "all the time"  She is taking her medications faithfully  She continues to deny syncope/near-syncope, chest pain, shortness of breath, or leg swelling  She tells me her last stress test was several years ago  8/26/2021: Marion Crawley presents today for follow up  She tells me that she has tolerated the increase in her Metoprolol dose  She did not complete her updated holter monitor however  Unfortunately she sustained a fall when she tripped and broke ribs on 8/10/2021  She was evaluated in [address] [place] [address]  She tells me she did not lose consciousness or get lightheaded leading up to the fall  She continues with pain in her sternal region  She reports significant stress - she cares for her spouse with dementia and found him attempting to order flood insurance unnecessarily today  Her ride today had car trouble  She also received a phone call from her daughter today stating she was ending her marriage of 28 years  She states these reasons are why her BP is elevated today  She reports good BP control at home in 929-427 systolic range  She denies chest pain, shortness of breath  She continues with palpitations but feels they are less frequent with the increase in Metoprolol  She does not feel symptoms similar to her prior a fib episodes      Medical Problems     Problem List     Constipation    Leukocytosis    Hypokalemia    Spinal stenosis of lumbar region with neurogenic claudication    Age-related osteoporosis without current pathological fracture    Primary osteoarthritis of both hips    Paroxysmal atrial fibrillation (HCC)    Syncope    Hypertension    Hyperlipidemia    Fracture of multiple ribs of right side              Past Medical History:   Diagnosis Date    [facility] )     Arthritis     R knee    Cardiac disease     GERD (gastroesophageal reflux disease)     Hyperlipidemia     Hypertension     Osteoporosis     Psychiatric disorder     anxiety     Social History     Socioeconomic History    Marital status: /Civil Union     Spouse name: Not on file    Number of children: Not on file    Years of education: Not on file    Highest education level: Not on file   Occupational History    Not on file   Tobacco Use    Smoking status: Never Smoker    Smokeless tobacco: Never Used   Vaping Use    Vaping Use: Never used   Substance and Sexual Activity    Alcohol use: Not Currently    Drug use: Never    Sexual activity: Not Currently   Other Topics Concern    Not on file   Social History Narrative    Not on file     Social Determinants of Health     Financial Resource Strain:     Difficulty of Paying Living Expenses:    Food Insecurity:     Worried About Running Out of Food in the Last Year:     [address] in the Last Year:    Transportation Needs:     Lack of Transportation (Medical):  Lack of Transportation (Non-Medical):    Physical Activity:     Days of Exercise per Week:     Minutes of Exercise per Session:    Stress:     Feeling of Stress :    Social Connections:     Frequency of Communication with Friends and Family:     Frequency of Social Gatherings with Friends and Family:     Attends Baptism Services:     Active Member of Clubs or Organizations:     Attends Club or Organization Meetings:     Marital Status:    Intimate Partner Violence:     Fear of Current or Ex-Partner:     Emotionally Abused:     Physically Abused:     Sexually Abused:       Family History   Problem Relation Age of Onset    Cancer Father     Cancer Brother     Heart disease Mother      Past Surgical History:   Procedure Laterality Date    BACK SURGERY      EYE SURGERY      cataracts       Current Outpatient Medications:     amLODIPine (NORVASC) 10 mg tablet, Take 1 tablet (10 mg total) by mouth daily Please do not fill until the patient needs refills    Please note this is a new prescribing physician and all refill requests should come to this office  , Disp: 90 tablet, Rfl: 3    calcium citrate-vitamin D (CALCIUM + D) 315-200 MG-UNIT per tablet, Take by mouth, Disp: , Rfl:     docusate sodium (COLACE) 100 mg capsule, Take 100 mg by mouth daily, Disp: , Rfl:     dofetilide (TIKOSYN) 250 mcg capsule, Take 1 capsule (250 mcg total) by mouth 2 (two) times a day, Disp: 28 capsule, Rfl: 0    LORazepam (ATIVAN) 2 mg tablet, , Disp: , Rfl:     magnesium (MAGTAB) 84 MG (7MEQ) TBCR, Take 84 mg by mouth daily Takes 2 tablets 84mg each, Disp: , Rfl:     metoprolol succinate (TOPROL-XL) 25 mg 24 hr tablet, 1 tab po in am, 1/2 tab po in pm, Disp: 135 tablet, Rfl: 3    Multiple Vitamins-Minerals (ICAPS AREDS 2 PO), Take 1 tablet by mouth daily, Disp: , Rfl:     PARoxetine (PAXIL) 10 mg tablet, , Disp: , Rfl:     polyethylene glycol (MIRALAX) 17 g packet, Take 17 g by mouth daily, Disp: , Rfl:     rivaroxaban (Xarelto) 15 mg tablet, Take 1 tablet (15 mg total) by mouth daily with dinner Please do not fill until the patient needs refills  Please note this is a new prescribing physician and all refill requests should come to this office  , Disp: 90 tablet, Rfl: 3  Allergies   Allergen Reactions    Ciprofloxacin     Epinephrine      Irregular heart ryhthm (afib) per pt      Bactrim [Sulfamethoxazole-Trimethoprim] Rash    Medical Tape Rash       Labs:     Chemistry        Component Value Date/Time    K 3 8 08/11/2021 0458     08/11/2021 0458    CO2 26 08/11/2021 0458    BUN 22 08/11/2021 0458    CREATININE 0 91 08/11/2021 0458        Component Value Date/Time    CALCIUM 9 2 08/11/2021 0458    ALKPHOS 60 08/10/2021 1441    AST 21 08/10/2021 1441    ALT 18 08/10/2021 1441            No results found for: CHOL  Lab Results   Component Value Date    HDL 74 08/03/2021     Lab Results   Component Value Date    LDLCALC 139 (H) 08/03/2021     Lab Results   Component Value Date    TRIG 149 08/03/2021 Cardiac Imaging:   ZIO 6/10-6/17/2021: (preliminary results) Min HR of 35 bpm, max HR of 193 bpm, and avg HR of 59 bpm   Predominant underlying rhythm was Sinus Rhythm  1 run of Ventricular Tachycardia occurred lasting 6 beats with a max rate of 193 bpm (avg 177 bpm)  14 Supraventricular Tachycardia runs occurred, the run with the fastest interval lasting 4 beats with a max rate of 156 bpm, the longest lasting 20 beats with an avg rate of 115 bpm  Isolated SVEs were occasional (3 9%, 84652), SVE Couplets were rare (<1 0%, 89), and SVE Triplets were rare (<1 0%, 15)  Isolated VEs were rare (<1 0%), VE Couplets were rare (<1 0%), and no VE Triplets were present  Ventricular Bigeminy and Trigeminy were present      Echocardiogram 4/2/2021 ([facility]): EF 55-60%  Grade 1 diastolic dysfunction  Mild-moderate AI  Mild-moderate MR  Mild TR      ECG 3/22/2021 ([facility]): sinus rhythm with one PAC, rate 68    Review of Systems   Constitutional: Negative  HENT: Negative  Cardiovascular: Negative for chest pain, dyspnea on exertion, irregular heartbeat, leg swelling, near-syncope, orthopnea and palpitations  Respiratory: Negative for cough and snoring  Endocrine: Negative  Skin: Negative  Musculoskeletal: Positive for falls (gait disturbance)  Rib pain   Gastrointestinal: Negative  Genitourinary: Negative  Neurological: Negative  Psychiatric/Behavioral: Negative  Vitals:    08/26/21 1513   BP: 166/70   Pulse: 73   Temp: (!) 97 4 °F (36 3 °C)   SpO2: 97%     Vitals:    08/26/21 1513   Weight: 53 1 kg (117 lb)     Height: 5' 3" (160 cm)   Body mass index is 20 73 kg/m²  Physical Exam  Vitals and nursing note reviewed  Constitutional:       General: She is not in acute distress  Appearance: She is well-developed  She is not diaphoretic  HENT:      Head: Normocephalic and atraumatic  Neck:      Thyroid: No thyromegaly  Vascular: No carotid bruit or JVD     Cardiovascular:      Rate and Rhythm: Normal rate and regular rhythm  Pulses: Intact distal pulses  Radial pulses are 2+ on the right side and 2+ on the left side  Heart sounds: Normal heart sounds, S1 normal and S2 normal  No murmur heard  Pulmonary:      Effort: Pulmonary effort is normal       Breath sounds: Normal breath sounds  Abdominal:      General: There is no distension  Palpations: Abdomen is soft  Tenderness: There is no abdominal tenderness  Musculoskeletal:         General: Normal range of motion  Cervical back: Normal range of motion and neck supple  Comments: Ray Fletcher relies on use of a rolling walker for ambulation in office   Lymphadenopathy:      Cervical: No cervical adenopathy  Skin:     General: Skin is warm and dry  Neurological:      Mental Status: She is alert and oriented to person, place, and time  Cranial Nerves: No cranial nerve deficit     Psychiatric:         Behavior: Behavior normal

## 2021-08-30 NOTE — ASSESSMENT & PLAN NOTE
Earnestine Larios sustained a fall in 2/2019 when she was brushing her teeth in the bathroom and fell, hitting her head against the wall  EKG at that time with QTc of 470msec  She denies any recurrent syncopal events  We have reviewed the risk of Torsades in Tikosyn with advancing age  She wishes to continue medication for now, but is willing to discuss transition to Amiodarone at her follow up with Dr Que Gomes  24 hour holter monitor now to assess response to adjustment of Metoprolol    Labs have remained stable

## 2021-08-30 NOTE — ASSESSMENT & PLAN NOTE
BP is elevated today, but she has been under considerable stress test with receiving some bad news    Will continue Amlodipine 10mg daily  She will monitor at home and report readings >150/90  She has close follow up

## 2021-08-30 NOTE — ASSESSMENT & PLAN NOTE
Gladys Alexandra has a history of paroxysmal atrial fibrillation with use of Tikosyn for > 10 years  She presented with complaint of increasing "a fib episodes", feeling fluttering/palpitations in her chest   ZIO monitoring from 6/10-6/17/2021 reveals predominantly sinus rhythm, no evidence of atrial fibrillation, one 6-beat run of WCT/VT, 14 runs of SVT, fastest 4 beats at 156BPM and longest of 20 beats at 115 BPM, occasional SVEs at 3 9%, rare VEs at <1%, ventricular bigeminy and trigeminy were present  Echo 4/2021 with EF 55-60%  We reviewed the results of her ZIO monitoring  Continues Tikosyn 250mcg BID   Metoprolol succinate increased at last visit to 25mg in am, 12 5mg in pm  Due for updated holter monitor - will obtain 24 hour holter monitor to assess response  She is willing to discuss transition to Amiodarone at follow up with Dr Nina Oconnell  Consider updated stress testing in the future    Continue Magnesium supplementation at 250-500mg daily  Continue Xarelto for anticoagulation

## 2021-09-02 ENCOUNTER — OFFICE VISIT (OUTPATIENT)
Dept: OBGYN CLINIC | Facility: CLINIC | Age: 86
End: 2021-09-02
Payer: MEDICARE

## 2021-09-02 VITALS
HEART RATE: 76 BPM | HEIGHT: 63 IN | SYSTOLIC BLOOD PRESSURE: 160 MMHG | TEMPERATURE: 96.6 F | DIASTOLIC BLOOD PRESSURE: 68 MMHG | BODY MASS INDEX: 20.73 KG/M2 | WEIGHT: 117 LBS

## 2021-09-02 DIAGNOSIS — M17.11 PRIMARY OSTEOARTHRITIS OF RIGHT KNEE: ICD-10-CM

## 2021-09-02 DIAGNOSIS — G89.29 CHRONIC PAIN OF RIGHT KNEE: Primary | ICD-10-CM

## 2021-09-02 DIAGNOSIS — M25.561 CHRONIC PAIN OF RIGHT KNEE: Primary | ICD-10-CM

## 2021-09-02 PROCEDURE — 99213 OFFICE O/P EST LOW 20 MIN: CPT | Performed by: STUDENT IN AN ORGANIZED HEALTH CARE EDUCATION/TRAINING PROGRAM

## 2021-09-02 NOTE — PROGRESS NOTES
1  Chronic pain of right knee  Injection procedure prior authorization   2  Primary osteoarthritis of right knee  Injection procedure prior authorization     Orders Placed This Encounter   Procedures    Injection procedure prior authorization        Imaging Studies (I personally reviewed images in PACS and report):    · Prior imaging from [facility] reviewed with radiology tech today as I do not have access to their imaging system  Interpreted as tricompartmental OA, severe in lateral tibiofemoral compartment  Otherwise, no acute osseous abnormalities  IMPRESSION:  Chronic right knee pain without precipitating injury  Primary OA of the right knee flare (degenerative meniscal tear/flare)  - No improvement from cortisone injection given last visit (8/5/2021) - reports only a couple days of relief    PLAN:     Clinical exam and radiographic imaging reviewed with patient today, with impression as per above  I have discussed with the patient the pathophysiology of this diagnosis and reviewed how the examination correlates with this diagnosis   Given patient reports less than 3 months of relief from cortisone injection from last visit, I have ordered viscosupplementation injections of her knee at this time as patient wishes to defer surgical intervention   I discussed with patient the viscosupplementation injection may be able to provide at least 6 months of relief  I counseled that if it does not provide this amount of relief, I could alternatively refer her to pain management for genicular nerve block procedure potentially  Currently, patient wishes to try viscosupplementation injection 1st, which is reasonable  Order placed  Return for F/u after insurance approval of viscosupplementation injection for the RIGHT knee (not U/S guided)  CHIEF COMPLAINT:  Follow up right knee pain    HPI:  Vicenta Alonso is a 80 y o  female  who presents for       Visit 09/02/2021 :   Follow up right knee pain 2/2 OA and s/p cortisone injection:   No improvement  Patient reports only experiencing 2 days with relief from the injection for re-aggravation over the medial aspect of her knee  She does report swelling did progressively improved  However she reports the medial-sided knee pain is constant, worse with activity, prolonged walking, transitioning from sitting to standing, sharp/ aching, moderate to severe intensity  Reports clicking/popping of knee  She denies new injuries of her right knee  She is interested in any method of nonsurgical intervention for her right knee if possible  Review of Systems   Constitutional: Negative for chills and fever  Musculoskeletal:        As per HPI   Neurological:        As per HPI         Medical, Surgical, Family, and Social History    Past Medical History:   Diagnosis Date    A-fib [facility])     Arthritis     R knee    Cardiac disease     GERD (gastroesophageal reflux disease)     Hyperlipidemia     Hypertension     Osteoporosis     Psychiatric disorder     anxiety     Past Surgical History:   Procedure Laterality Date    BACK SURGERY      EYE SURGERY      cataracts     Social History   Social History     Substance and Sexual Activity   Alcohol Use Not Currently     Social History     Substance and Sexual Activity   Drug Use Never     Social History     Tobacco Use   Smoking Status Never Smoker   Smokeless Tobacco Never Used     Family History   Problem Relation Age of Onset    Cancer Father     Cancer Brother     Heart disease Mother      Allergies   Allergen Reactions    Ciprofloxacin     Epinephrine      Irregular heart ryhthm (afib) per pt      Bactrim [Sulfamethoxazole-Trimethoprim] Rash    Medical Tape Rash          Physical Exam  /68   Pulse 76   Temp (!) 96 6 °F (35 9 °C)   Ht 5' 3" (1 6 m)   Wt 53 1 kg (117 lb)   BMI 20 73 kg/m²     Constitutional:  see vital signs  Gen: well-developed, normocephalic/atraumatic, well-groomed  MSK: no inflammation, lesion, mass, or clubbing of nails and digits except for other than mentioned below  SKIN: no visible rashes or skin lesions  Pulmonary/Chest: Effort normal  No respiratory distress       Ortho Exam  Right Knee Exam:  Erythema: no  Swelling: no  Increased Warmth: no  Tenderness: +MJL  ROM: 0-130  Patellar Apprehension: negative  Patellar Grind: negative  Lachman's: negative  Anterior Drawer: negative  Varus laxity: negative  Valgus laxity: negative  [facility]: negative     Procedures

## 2021-09-07 ENCOUNTER — HOSPITAL ENCOUNTER (OUTPATIENT)
Dept: NON INVASIVE DIAGNOSTICS | Facility: HOSPITAL | Age: 86
Discharge: HOME | End: 2021-09-07
Payer: MEDICARE

## 2021-09-07 DIAGNOSIS — I48.0 PAROXYSMAL ATRIAL FIBRILLATION (HCC): ICD-10-CM

## 2021-09-07 PROCEDURE — 93226 XTRNL ECG REC<48 HR SCAN A/R: CPT

## 2021-09-07 PROCEDURE — 93225 XTRNL ECG REC<48 HRS REC: CPT

## 2021-09-07 RX ORDER — DOFETILIDE 0.25 MG/1
250 CAPSULE ORAL 2 TIMES DAILY
COMMUNITY
End: 2021-10-12 | Stop reason: ALTCHOICE

## 2021-09-10 ENCOUNTER — TELEPHONE (OUTPATIENT)
Dept: CARDIOLOGY CLINIC | Facility: CLINIC | Age: 86
End: 2021-09-10

## 2021-09-10 NOTE — TELEPHONE ENCOUNTER
----- Message from Sharmin Jarrett 82 sent at 9/10/2021  4:22 PM EDT -----  Please notify pt that her monitor showed average HR of 56BPM with frequent PAC's (early beats from the top of the heart) and rare PVC's (early beats from the bottom of the heart)  The one entry in her diary was associated with an isolated PAC  No significant pauses or arrhythmias  How is she feeling with the adjustment in her Metoprolol dose?

## 2021-09-13 ENCOUNTER — PROCEDURE VISIT (OUTPATIENT)
Dept: OBGYN CLINIC | Facility: CLINIC | Age: 86
End: 2021-09-13
Payer: MEDICARE

## 2021-09-13 VITALS
HEIGHT: 63 IN | BODY MASS INDEX: 20.73 KG/M2 | SYSTOLIC BLOOD PRESSURE: 136 MMHG | HEART RATE: 55 BPM | DIASTOLIC BLOOD PRESSURE: 74 MMHG | WEIGHT: 117 LBS | TEMPERATURE: 97.3 F

## 2021-09-13 DIAGNOSIS — G89.29 CHRONIC PAIN OF RIGHT KNEE: Primary | ICD-10-CM

## 2021-09-13 DIAGNOSIS — M17.11 PRIMARY OSTEOARTHRITIS OF RIGHT KNEE: ICD-10-CM

## 2021-09-13 DIAGNOSIS — M25.561 CHRONIC PAIN OF RIGHT KNEE: Primary | ICD-10-CM

## 2021-09-13 PROCEDURE — 20610 DRAIN/INJ JOINT/BURSA W/O US: CPT | Performed by: STUDENT IN AN ORGANIZED HEALTH CARE EDUCATION/TRAINING PROGRAM

## 2021-09-13 NOTE — PATIENT INSTRUCTIONS
Hyaluronic Acid (By injection)   Hyaluronic Acid (gow-wh-acr-ON-ate AS-id)  Treats severe pain in your knee due to osteoarthritis  Brand Name(s): Durolane, Euflexxa, Gel-One, GelSyn-3, GenVisc 850, Hyalgan, Hymovis, Monovisc, Orthovisc, Supartz FX, TriLURON, TriVisc, Visco-3   There may be other brand names for this medicine  When This Medicine Should Not Be Used: This medicine is not right for everyone  You should not receive it if you had an allergic reaction to hyaluronic acid or if you have a bleeding disorder  How to Use This Medicine:   Injectable  · Your doctor will tell you how many injections you will need  This medicine is injected into your knee joint  · A nurse or other health provider will give you this medicine  Drugs and Foods to Avoid:      Ask your doctor or pharmacist before using any other medicine, including over-the-counter medicines, vitamins, and herbal products  Warnings While Using This Medicine:   · Tell your doctor if you are pregnant or breastfeeding, or if you have any allergies, including to birds, feathers, or eggs  · Rest your knee for 48 hours after you receive an injection  Do not do strenuous, weightbearing activities, such as jogging or tennis  Avoid activities that keep you standing for longer than 1 hour  Possible Side Effects While Using This Medicine:   Call your doctor right away if you notice any of these side effects:  · Allergic reaction: Itching or hives, swelling in your face or hands, swelling or tingling in your mouth or throat, chest tightness, trouble breathing  If you notice these less serious side effects, talk with your doctor:   · Mild increase in pain or swelling in your knee  · Pain, redness, or swelling where the medicine is injected  If you notice other side effects that you think are caused by this medicine, tell your doctor  Call your doctor for medical advice about side effects   You may report side effects to FDA at 1-996-FDA-6887  © 46[address] [facility] 2021 Information is for End User's use only and may not be sold, redistributed or otherwise used for commercial purposes  The above information is an  only  It is not intended as medical advice for individual conditions or treatments  Talk to your doctor, nurse or pharmacist before following any medical regimen to see if it is safe and effective for you

## 2021-09-13 NOTE — PROGRESS NOTES
Large joint arthrocentesis: R knee  Universal Protocol:  Consent: Verbal consent obtained  Risks and benefits: risks, benefits and alternatives were discussed  Consent given by: patient  Time out: Immediately prior to procedure a "time out" was called to verify the correct patient, procedure, equipment, support staff and site/side marked as required  Timeout called at: 9/13/2021 1:42 PM   Patient understanding: patient states understanding of the procedure being performed  Site marked: the operative site was marked  Radiology Images displayed and confirmed   If images not available, report reviewed: imaging studies available  Patient identity confirmed: verbally with patient    Supporting Documentation  Indications: pain   Procedure Details  Location: knee - R knee  Preparation: Patient was prepped and draped in the usual sterile fashion  Needle size: 22 G  Ultrasound guidance: no  Approach: anterolateral  Medications administered: 88 mg hyaluronan 88 MG/4ML    Patient tolerance: patient tolerated the procedure well with no immediate complications  Dressing:  Sterile dressing applied

## 2021-10-08 ENCOUNTER — OFFICE VISIT (OUTPATIENT)
Dept: CARDIOLOGY CLINIC | Facility: CLINIC | Age: 86
End: 2021-10-08
Payer: MEDICARE

## 2021-10-08 VITALS
BODY MASS INDEX: 20.84 KG/M2 | WEIGHT: 117.6 LBS | HEART RATE: 87 BPM | SYSTOLIC BLOOD PRESSURE: 144 MMHG | DIASTOLIC BLOOD PRESSURE: 60 MMHG | HEIGHT: 63 IN | OXYGEN SATURATION: 95 %

## 2021-10-08 DIAGNOSIS — R55 SYNCOPE, UNSPECIFIED SYNCOPE TYPE: ICD-10-CM

## 2021-10-08 DIAGNOSIS — E78.2 MIXED HYPERLIPIDEMIA: ICD-10-CM

## 2021-10-08 DIAGNOSIS — I48.0 PAROXYSMAL ATRIAL FIBRILLATION (HCC): Primary | ICD-10-CM

## 2021-10-08 DIAGNOSIS — I10 PRIMARY HYPERTENSION: ICD-10-CM

## 2021-10-08 PROCEDURE — 99214 OFFICE O/P EST MOD 30 MIN: CPT | Performed by: NURSE PRACTITIONER

## 2021-10-08 PROCEDURE — 93000 ELECTROCARDIOGRAM COMPLETE: CPT | Performed by: NURSE PRACTITIONER

## 2021-10-12 ENCOUNTER — TELEPHONE (OUTPATIENT)
Dept: CARDIOLOGY CLINIC | Facility: CLINIC | Age: 86
End: 2021-10-12

## 2021-10-12 DIAGNOSIS — I48.0 PAROXYSMAL ATRIAL FIBRILLATION (HCC): Primary | ICD-10-CM

## 2021-10-12 RX ORDER — AMIODARONE HYDROCHLORIDE 200 MG/1
200 TABLET ORAL DAILY
Qty: 90 TABLET | Refills: 3 | Status: SHIPPED | OUTPATIENT
Start: 2021-10-12 | End: 2022-01-24

## 2021-10-12 RX ORDER — AMIODARONE HYDROCHLORIDE 200 MG/1
200 TABLET ORAL 2 TIMES DAILY
Qty: 28 TABLET | Refills: 0 | Status: SHIPPED | OUTPATIENT
Start: 2021-10-12 | End: 2022-01-24 | Stop reason: ALTCHOICE

## 2021-10-13 ENCOUNTER — HOSPITAL ENCOUNTER (EMERGENCY)
Facility: HOSPITAL | Age: 86
Discharge: HOME | End: 2021-10-13
Attending: EMERGENCY MEDICINE | Admitting: EMERGENCY MEDICINE
Payer: MEDICARE

## 2021-10-13 VITALS
OXYGEN SATURATION: 97 % | BODY MASS INDEX: 20.82 KG/M2 | WEIGHT: 117.5 LBS | DIASTOLIC BLOOD PRESSURE: 85 MMHG | HEIGHT: 63 IN | RESPIRATION RATE: 17 BRPM | HEART RATE: 68 BPM | TEMPERATURE: 97.9 F | SYSTOLIC BLOOD PRESSURE: 164 MMHG

## 2021-10-13 DIAGNOSIS — M54.2 NECK PAIN: Primary | ICD-10-CM

## 2021-10-13 DIAGNOSIS — K12.0 APHTHOUS ULCER: ICD-10-CM

## 2021-10-13 PROCEDURE — 99283 EMERGENCY DEPT VISIT LOW MDM: CPT

## 2021-10-13 PROCEDURE — 93005 ELECTROCARDIOGRAM TRACING: CPT

## 2021-10-13 PROCEDURE — 99284 EMERGENCY DEPT VISIT MOD MDM: CPT | Performed by: EMERGENCY MEDICINE

## 2021-10-13 RX ORDER — DIAZEPAM 5 MG/1
2.5 TABLET ORAL ONCE
Status: COMPLETED | OUTPATIENT
Start: 2021-10-13 | End: 2021-10-13

## 2021-10-13 RX ORDER — ACETAMINOPHEN 325 MG/1
975 TABLET ORAL ONCE
Status: COMPLETED | OUTPATIENT
Start: 2021-10-13 | End: 2021-10-13

## 2021-10-13 RX ORDER — ACETAMINOPHEN 500 MG
1000 TABLET ORAL EVERY 6 HOURS PRN
Qty: 30 TABLET | Refills: 0 | Status: SHIPPED | OUTPATIENT
Start: 2021-10-13

## 2021-10-13 RX ORDER — DIAZEPAM 2 MG/1
2 TABLET ORAL EVERY 8 HOURS PRN
Qty: 20 TABLET | Refills: 0 | Status: ON HOLD | OUTPATIENT
Start: 2021-10-13 | End: 2022-02-22 | Stop reason: ALTCHOICE

## 2021-10-13 RX ADMIN — ACETAMINOPHEN 975 MG: 325 TABLET ORAL at 10:29

## 2021-10-13 RX ADMIN — DIAZEPAM 2.5 MG: 5 TABLET ORAL at 10:30

## 2021-10-14 ENCOUNTER — OFFICE VISIT (OUTPATIENT)
Dept: OBGYN CLINIC | Facility: CLINIC | Age: 86
End: 2021-10-14
Payer: MEDICARE

## 2021-10-14 VITALS
WEIGHT: 117 LBS | HEIGHT: 63 IN | TEMPERATURE: 97.4 F | DIASTOLIC BLOOD PRESSURE: 64 MMHG | HEART RATE: 61 BPM | SYSTOLIC BLOOD PRESSURE: 160 MMHG | BODY MASS INDEX: 20.73 KG/M2

## 2021-10-14 DIAGNOSIS — M25.561 CHRONIC PAIN OF RIGHT KNEE: Primary | ICD-10-CM

## 2021-10-14 DIAGNOSIS — M17.11 PRIMARY OSTEOARTHRITIS OF RIGHT KNEE: ICD-10-CM

## 2021-10-14 DIAGNOSIS — G89.29 CHRONIC PAIN OF RIGHT KNEE: Primary | ICD-10-CM

## 2021-10-14 PROCEDURE — 99213 OFFICE O/P EST LOW 20 MIN: CPT | Performed by: STUDENT IN AN ORGANIZED HEALTH CARE EDUCATION/TRAINING PROGRAM

## 2021-10-14 RX ORDER — PAROXETINE HYDROCHLORIDE 20 MG/1
TABLET, FILM COATED ORAL
COMMUNITY
Start: 2021-10-11 | End: 2022-01-24 | Stop reason: CLARIF

## 2021-10-18 ENCOUNTER — TELEPHONE (OUTPATIENT)
Dept: CARDIOLOGY CLINIC | Facility: CLINIC | Age: 86
End: 2021-10-18

## 2021-10-18 LAB
ATRIAL RATE: 66 BPM
P AXIS: 84 DEGREES
PR INTERVAL: 166 MS
QRS AXIS: -14 DEGREES
QRSD INTERVAL: 92 MS
QT INTERVAL: 438 MS
QTC INTERVAL: 459 MS
T WAVE AXIS: 75 DEGREES
VENTRICULAR RATE: 66 BPM

## 2021-10-22 ENCOUNTER — TELEPHONE (OUTPATIENT)
Dept: CARDIOLOGY CLINIC | Facility: CLINIC | Age: 86
End: 2021-10-22

## 2021-11-02 ENCOUNTER — TELEPHONE (OUTPATIENT)
Dept: CARDIOLOGY CLINIC | Facility: CLINIC | Age: 86
End: 2021-11-02

## 2021-11-02 ENCOUNTER — HOSPITAL ENCOUNTER (OUTPATIENT)
Dept: NUCLEAR MEDICINE | Facility: HOSPITAL | Age: 86
Discharge: HOME | End: 2021-11-02
Payer: MEDICARE

## 2021-11-02 ENCOUNTER — HOSPITAL ENCOUNTER (OUTPATIENT)
Dept: NON INVASIVE DIAGNOSTICS | Facility: HOSPITAL | Age: 86
Discharge: HOME | End: 2021-11-02
Payer: MEDICARE

## 2021-11-02 DIAGNOSIS — R55 SYNCOPE, UNSPECIFIED SYNCOPE TYPE: ICD-10-CM

## 2021-11-02 DIAGNOSIS — I48.0 PAROXYSMAL ATRIAL FIBRILLATION (HCC): ICD-10-CM

## 2021-11-02 LAB
SL CV REST NUCLEAR ISOTOPE DOSE: 10.3 MCI
SL CV STRESS NUCLEAR ISOTOPE DOSE: 31.6 MCI
SL CV STRESS RECOVERY BP: NORMAL MMHG
SL CV STRESS RECOVERY HR: 65 BPM
SL CV STRESS RECOVERY O2 SAT: 98 %
STRESS ANGINA INDEX: 0
STRESS BASELINE BP: NORMAL MMHG
STRESS BASELINE HR: 53 BPM
STRESS O2 SAT REST: 98 %
STRESS PEAK HR: 77 BPM
STRESS POST EXERCISE DUR MIN: 3 MIN
STRESS POST EXERCISE DUR SEC: 0 SEC
STRESS POST O2 SAT PEAK: 98 %
STRESS POST PEAK BP: NORMAL MMHG
STRESS TARGET HR: 77 BPM

## 2021-11-02 PROCEDURE — G1004 CDSM NDSC: HCPCS

## 2021-11-02 PROCEDURE — A9502 TC99M TETROFOSMIN: HCPCS

## 2021-11-02 PROCEDURE — 93017 CV STRESS TEST TRACING ONLY: CPT

## 2021-11-02 PROCEDURE — 78452 HT MUSCLE IMAGE SPECT MULT: CPT

## 2021-11-02 RX ADMIN — REGADENOSON 0.4 MG: 0.08 INJECTION, SOLUTION INTRAVENOUS at 10:15

## 2021-11-17 DIAGNOSIS — K55.1 CHRONIC VASCULAR DISORDERS OF INTESTINE (HCC): ICD-10-CM

## 2021-11-17 DIAGNOSIS — Z83.79 FAMILY HISTORY OF OTHER DISEASES OF THE DIGESTIVE SYSTEM: ICD-10-CM

## 2021-11-17 DIAGNOSIS — R10.32 LEFT LOWER QUADRANT PAIN: ICD-10-CM

## 2021-11-17 DIAGNOSIS — K57.30 DIVERTICULOSIS OF LARGE INTESTINE WITHOUT PERFORATION OR ABSCESS WITHOUT BLEEDING: ICD-10-CM

## 2021-11-17 DIAGNOSIS — R14.0 ABDOMINAL DISTENSION (GASEOUS): ICD-10-CM

## 2021-11-18 ENCOUNTER — APPOINTMENT (OUTPATIENT)
Dept: LAB | Facility: HOSPITAL | Age: 86
End: 2021-11-18
Payer: MEDICARE

## 2021-11-18 DIAGNOSIS — R14.0 BLOATING: ICD-10-CM

## 2021-11-18 DIAGNOSIS — K57.92 DIVERTICULITIS: ICD-10-CM

## 2021-11-18 DIAGNOSIS — Z83.79 FAMILY HISTORY OF ULCERATIVE COLITIS: ICD-10-CM

## 2021-11-18 DIAGNOSIS — R10.32 LLQ ABDOMINAL PAIN: ICD-10-CM

## 2021-11-18 LAB
CREAT SERPL-MCNC: 1.21 MG/DL (ref 0.6–1.3)
GFR SERPL CREATININE-BSD FRML MDRD: 41 ML/MIN/1.73SQ M

## 2021-11-18 PROCEDURE — 82565 ASSAY OF CREATININE: CPT

## 2021-11-18 PROCEDURE — 36415 COLL VENOUS BLD VENIPUNCTURE: CPT

## 2021-11-22 LAB
CHEST PAIN STATEMENT: NORMAL
MAX DIASTOLIC BP: 75 MMHG
MAX HEART RATE: 77 BPM
MAX PREDICTED HEART RATE: 134 BPM
MAX. SYSTOLIC BP: 197 MMHG
PROTOCOL NAME: NORMAL
REASON FOR TERMINATION: NORMAL
TARGET HR FORMULA: NORMAL
TEST INDICATION: NORMAL
TIME IN EXERCISE PHASE: NORMAL

## 2021-11-26 ENCOUNTER — HOSPITAL ENCOUNTER (OUTPATIENT)
Dept: CT IMAGING | Facility: HOSPITAL | Age: 86
Discharge: HOME | End: 2021-11-26
Payer: MEDICARE

## 2021-11-26 DIAGNOSIS — Z83.79 FAMILY HISTORY OF OTHER DISEASES OF THE DIGESTIVE SYSTEM: ICD-10-CM

## 2021-11-26 DIAGNOSIS — R10.32 LEFT LOWER QUADRANT PAIN: ICD-10-CM

## 2021-11-26 DIAGNOSIS — R14.0 ABDOMINAL DISTENSION (GASEOUS): ICD-10-CM

## 2021-11-26 DIAGNOSIS — K57.30 DIVERTICULOSIS OF LARGE INTESTINE WITHOUT PERFORATION OR ABSCESS WITHOUT BLEEDING: ICD-10-CM

## 2021-11-26 PROCEDURE — 74177 CT ABD & PELVIS W/CONTRAST: CPT

## 2021-11-26 RX ADMIN — IOHEXOL 85 ML: 350 INJECTION, SOLUTION INTRAVENOUS at 10:39

## 2021-11-30 ENCOUNTER — IMMUNIZATIONS (OUTPATIENT)
Dept: FAMILY MEDICINE CLINIC | Facility: HOSPITAL | Age: 86
End: 2021-11-30

## 2021-11-30 DIAGNOSIS — Z23 ENCOUNTER FOR IMMUNIZATION: Primary | ICD-10-CM

## 2021-11-30 PROCEDURE — 0064A COVID-19 MODERNA VACC 0.25 ML BOOSTER: CPT

## 2021-11-30 PROCEDURE — 91306 COVID-19 MODERNA VACC 0.25 ML BOOSTER: CPT

## 2021-12-09 ENCOUNTER — HOSPITAL ENCOUNTER (OUTPATIENT)
Dept: RADIOLOGY | Facility: CLINIC | Age: 86
Discharge: HOME | End: 2021-12-09
Payer: MEDICARE

## 2021-12-09 ENCOUNTER — OFFICE VISIT (OUTPATIENT)
Dept: OBGYN CLINIC | Facility: CLINIC | Age: 86
End: 2021-12-09
Payer: MEDICARE

## 2021-12-09 VITALS
WEIGHT: 117 LBS | HEART RATE: 61 BPM | HEIGHT: 63 IN | DIASTOLIC BLOOD PRESSURE: 70 MMHG | BODY MASS INDEX: 20.73 KG/M2 | TEMPERATURE: 97.2 F | SYSTOLIC BLOOD PRESSURE: 136 MMHG

## 2021-12-09 DIAGNOSIS — M25.511 ACUTE PAIN OF RIGHT SHOULDER: ICD-10-CM

## 2021-12-09 DIAGNOSIS — S42.031A CLOSED DISPLACED FRACTURE OF ACROMIAL END OF RIGHT CLAVICLE, INITIAL ENCOUNTER: ICD-10-CM

## 2021-12-09 DIAGNOSIS — M25.511 ACUTE PAIN OF RIGHT SHOULDER: Primary | ICD-10-CM

## 2021-12-09 PROCEDURE — 99214 OFFICE O/P EST MOD 30 MIN: CPT | Performed by: ORTHOPAEDIC SURGERY

## 2021-12-09 PROCEDURE — 73000 X-RAY EXAM OF COLLAR BONE: CPT

## 2021-12-09 PROCEDURE — 23500 CLTX CLAVICULAR FX W/O MNPJ: CPT | Performed by: ORTHOPAEDIC SURGERY

## 2021-12-30 ENCOUNTER — HOSPITAL ENCOUNTER (OUTPATIENT)
Dept: RADIOLOGY | Facility: CLINIC | Age: 86
Discharge: HOME | End: 2021-12-30
Payer: MEDICARE

## 2021-12-30 ENCOUNTER — OFFICE VISIT (OUTPATIENT)
Dept: OBGYN CLINIC | Facility: CLINIC | Age: 86
End: 2021-12-30

## 2021-12-30 VITALS
WEIGHT: 120 LBS | BODY MASS INDEX: 21.26 KG/M2 | SYSTOLIC BLOOD PRESSURE: 136 MMHG | HEART RATE: 56 BPM | DIASTOLIC BLOOD PRESSURE: 70 MMHG | TEMPERATURE: 97.1 F | HEIGHT: 63 IN

## 2021-12-30 DIAGNOSIS — S42.031D CLOSED DISPLACED FRACTURE OF ACROMIAL END OF RIGHT CLAVICLE WITH ROUTINE HEALING, SUBSEQUENT ENCOUNTER: Primary | ICD-10-CM

## 2021-12-30 DIAGNOSIS — S42.031D CLOSED DISPLACED FRACTURE OF ACROMIAL END OF RIGHT CLAVICLE WITH ROUTINE HEALING, SUBSEQUENT ENCOUNTER: ICD-10-CM

## 2021-12-30 PROCEDURE — 99024 POSTOP FOLLOW-UP VISIT: CPT | Performed by: ORTHOPAEDIC SURGERY

## 2021-12-30 PROCEDURE — 73000 X-RAY EXAM OF COLLAR BONE: CPT

## 2022-01-24 ENCOUNTER — OFFICE VISIT (OUTPATIENT)
Dept: CARDIOLOGY CLINIC | Facility: CLINIC | Age: 87
End: 2022-01-24
Payer: MEDICARE

## 2022-01-24 VITALS
HEIGHT: 63 IN | TEMPERATURE: 96.9 F | BODY MASS INDEX: 21.33 KG/M2 | SYSTOLIC BLOOD PRESSURE: 180 MMHG | DIASTOLIC BLOOD PRESSURE: 82 MMHG | HEART RATE: 78 BPM | OXYGEN SATURATION: 98 % | WEIGHT: 120.4 LBS

## 2022-01-24 DIAGNOSIS — R55 SYNCOPE, UNSPECIFIED SYNCOPE TYPE: ICD-10-CM

## 2022-01-24 DIAGNOSIS — I48.0 PAROXYSMAL ATRIAL FIBRILLATION (HCC): Primary | ICD-10-CM

## 2022-01-24 DIAGNOSIS — E78.2 MIXED HYPERLIPIDEMIA: ICD-10-CM

## 2022-01-24 DIAGNOSIS — I10 PRIMARY HYPERTENSION: ICD-10-CM

## 2022-01-24 PROCEDURE — 99214 OFFICE O/P EST MOD 30 MIN: CPT | Performed by: INTERNAL MEDICINE

## 2022-01-24 RX ORDER — LOSARTAN POTASSIUM 25 MG/1
25 TABLET ORAL DAILY
Qty: 30 TABLET | Refills: 11 | Status: SHIPPED | OUTPATIENT
Start: 2022-01-24 | End: 2022-03-01 | Stop reason: SDUPTHER

## 2022-01-24 RX ORDER — AMIODARONE HYDROCHLORIDE 200 MG/1
100 TABLET ORAL DAILY
Qty: 90 TABLET | Refills: 3
Start: 2022-01-24 | End: 2022-03-01 | Stop reason: ALTCHOICE

## 2022-01-24 NOTE — PATIENT INSTRUCTIONS
Decrease amiodarone to 100 mg daily  (1/2 of your 200 mg pill)  Add losartan 25 mg in the am for blood pressure  Blood work in 2 weeks  I do think our best option to control atrial fibrillation would be to consider AV node ablation and pacemaker implant as this would allow us to get you off of some of these more difficult medications  Call me when you decide if you want to proceed with pacemaker placement

## 2022-01-26 ENCOUNTER — TELEPHONE (OUTPATIENT)
Dept: CARDIOLOGY CLINIC | Facility: CLINIC | Age: 87
End: 2022-01-26

## 2022-01-26 NOTE — TELEPHONE ENCOUNTER
Pt called and wanted to let Dr Prema Baeza know that she DOES want to have a pacemaker put in    Please call [phone]

## 2022-01-26 NOTE — TELEPHONE ENCOUNTER
Juan! Dr Jessica Garcia agreed to place a pacemaker on this patient  Pt is agreeable  Can you reach out to her to schedule? She will need to plan the placement around finding someone to help care for her spouse with dementia

## 2022-01-27 ENCOUNTER — PREP FOR PROCEDURE (OUTPATIENT)
Dept: CARDIOLOGY CLINIC | Facility: CLINIC | Age: 87
End: 2022-01-27

## 2022-01-27 ENCOUNTER — TELEPHONE (OUTPATIENT)
Dept: CARDIOLOGY CLINIC | Facility: CLINIC | Age: 87
End: 2022-01-27

## 2022-01-27 DIAGNOSIS — I48.0 PAROXYSMAL ATRIAL FIBRILLATION (HCC): Primary | ICD-10-CM

## 2022-01-27 NOTE — TELEPHONE ENCOUNTER
Pt LM stating that she would like to speak to Evert Aguilera did not say what it was in reference to

## 2022-01-27 NOTE — TELEPHONE ENCOUNTER
She wanted to change her appointment with me because she is scheduled to see me 2/22/2022, which is the same day she is scheduled for her pacemaker implantation  Please reschedule her to 3/1/2022 at 3:40pm  She is aware of this date and time    Thank you

## 2022-01-27 NOTE — TELEPHONE ENCOUNTER
Patient scheduled for Pacer implant at [facility] on 2/22/22 with Dr Nj Collado  Patient aware of general instructions and labs require  Can we please have insurance approval     Dr Nj Collado can you please advise type of Pacer to use for this patient procedure  Also patient is on Xarelto, can you please provide meds holds  Thank you

## 2022-01-28 ENCOUNTER — OFFICE VISIT (OUTPATIENT)
Dept: OBGYN CLINIC | Facility: CLINIC | Age: 87
End: 2022-01-28

## 2022-01-28 ENCOUNTER — HOSPITAL ENCOUNTER (OUTPATIENT)
Dept: RADIOLOGY | Facility: CLINIC | Age: 87
Discharge: HOME | End: 2022-01-28
Payer: MEDICARE

## 2022-01-28 VITALS
HEART RATE: 68 BPM | TEMPERATURE: 97.4 F | BODY MASS INDEX: 21.26 KG/M2 | SYSTOLIC BLOOD PRESSURE: 137 MMHG | DIASTOLIC BLOOD PRESSURE: 70 MMHG | HEIGHT: 63 IN | WEIGHT: 120 LBS

## 2022-01-28 DIAGNOSIS — S42.031D CLOSED DISPLACED FRACTURE OF ACROMIAL END OF RIGHT CLAVICLE WITH ROUTINE HEALING, SUBSEQUENT ENCOUNTER: Primary | ICD-10-CM

## 2022-01-28 DIAGNOSIS — S42.031D CLOSED DISPLACED FRACTURE OF ACROMIAL END OF RIGHT CLAVICLE WITH ROUTINE HEALING, SUBSEQUENT ENCOUNTER: ICD-10-CM

## 2022-01-28 PROCEDURE — 99024 POSTOP FOLLOW-UP VISIT: CPT | Performed by: ORTHOPAEDIC SURGERY

## 2022-01-28 PROCEDURE — 73000 X-RAY EXAM OF COLLAR BONE: CPT

## 2022-01-28 NOTE — H&P (VIEW-ONLY)
Patient Name:  Mason Singh  MRN:  [id number]    Assessment     1  Closed displaced fracture of acromial end of right clavicle with routine healing, subsequent encounter  XR clavicle right       Plan     1  The patient is now 2 months post injury  She is overall doing very well with full range of motion of the shoulder on exam without pain  X-rays of the right clavicle taken today in office were reviewed and discussed with the patient which demonstrated a healing clavicle fracture with good callus formation, in well-maintained alignment  At this time the patient was instructed that she may transition back to her normal activity levels without restrictions  She may discontinue the sling  Patient was instructed to call or return to the office if she experiences any issues while returning to normal activity levels or if any problems arise  Return if symptoms worsen or fail to improve  Subjective   Unknown Francisco returns for follow-up of right clavicle fracture sustained on 11/30/2021  The patient is 2 month(s) post injury and returns for routine follow-up  Today the patient states that she is overall doing very well  She reports no pain in the shoulder, and denies any numbness or tingling  Patient has no questions or concerns today  Objective     /70   Pulse 68   Temp (!) 97 4 °F (36 3 °C) (Temporal)   Ht 5' 3" (1 6 m)   Wt 54 4 kg (120 lb)   BMI 21 26 kg/m²     Right clavicle/upper extremity:  -skin without lesions, effusion, ecchymosis, erythema, warmth, swelling, or other signs of infection     - There is a palpable deformity felt along the site of the clavicle fracture without pain    No palpable tenderness along the shoulder   - patient has full active range of motion of the right shoulder equal to the contralateral side, without pain  -full active range of motion of the digits, wrist, and elbow without pain  -full supination/pronation  -5/5 strength with resisted elbow flexion and extension  -sensation intact along the radial, median, and ulnar nerve distributions  -5 5  strength  -strong radial pulse  -brisk cap refill all fingers      Data Review     I have personally reviewed pertinent films and reports in PACS and my interpretation is as follows:     X-ray of the right clavicle taken today in office demonstrates      Ena Castillo PA-C

## 2022-01-28 NOTE — PROGRESS NOTES
Patient Name:  Doug Ragland  MRN:  [id number]    Assessment     1  Closed displaced fracture of acromial end of right clavicle with routine healing, subsequent encounter  XR clavicle right       Plan     1  The patient is now 2 months post injury  She is overall doing very well with full range of motion of the shoulder on exam without pain  X-rays of the right clavicle taken today in office were reviewed and discussed with the patient which demonstrated a healing clavicle fracture with good callus formation, in well-maintained alignment  At this time the patient was instructed that she may transition back to her normal activity levels without restrictions  She may discontinue the sling  Patient was instructed to call or return to the office if she experiences any issues while returning to normal activity levels or if any problems arise  Return if symptoms worsen or fail to improve  Subjective   Doug Ragland returns for follow-up of right clavicle fracture sustained on 11/30/2021  The patient is 2 month(s) post injury and returns for routine follow-up  Today the patient states that she is overall doing very well  She reports no pain in the shoulder, and denies any numbness or tingling  Patient has no questions or concerns today  Objective     /70   Pulse 68   Temp (!) 97 4 °F (36 3 °C) (Temporal)   Ht 5' 3" (1 6 m)   Wt 54 4 kg (120 lb)   BMI 21 26 kg/m²     Right clavicle/upper extremity:  -skin without lesions, effusion, ecchymosis, erythema, warmth, swelling, or other signs of infection     - There is a palpable deformity felt along the site of the clavicle fracture without pain    No palpable tenderness along the shoulder   - patient has full active range of motion of the right shoulder equal to the contralateral side, without pain  -full active range of motion of the digits, wrist, and elbow without pain  -full supination/pronation  -5/5 strength with resisted elbow flexion and extension  -sensation intact along the radial, median, and ulnar nerve distributions  -5 5  strength  -strong radial pulse  -brisk cap refill all fingers      Data Review     I have personally reviewed pertinent films and reports in PACS and my interpretation is as follows:     X-ray of the right clavicle taken today in office demonstrates      Victorine Goodpasture, PA-C

## 2022-02-08 ENCOUNTER — APPOINTMENT (OUTPATIENT)
Dept: LAB | Facility: HOSPITAL | Age: 87
End: 2022-02-08
Attending: INTERNAL MEDICINE
Payer: MEDICARE

## 2022-02-08 DIAGNOSIS — I10 PRIMARY HYPERTENSION: ICD-10-CM

## 2022-02-08 DIAGNOSIS — I48.0 PAROXYSMAL ATRIAL FIBRILLATION (HCC): ICD-10-CM

## 2022-02-08 LAB
ALBUMIN SERPL BCP-MCNC: 3.4 G/DL (ref 3.5–5)
ALP SERPL-CCNC: 96 U/L (ref 46–116)
ALT SERPL W P-5'-P-CCNC: 22 U/L (ref 12–78)
ANION GAP SERPL CALCULATED.3IONS-SCNC: 8 MMOL/L (ref 4–13)
AST SERPL W P-5'-P-CCNC: 24 U/L (ref 5–45)
BASOPHILS # BLD AUTO: 0.06 THOUSANDS/ΜL (ref 0–0.1)
BASOPHILS NFR BLD AUTO: 1 % (ref 0–1)
BILIRUB SERPL-MCNC: 0.45 MG/DL (ref 0.2–1)
BUN SERPL-MCNC: 25 MG/DL (ref 5–25)
CALCIUM ALBUM COR SERPL-MCNC: 9.6 MG/DL (ref 8.3–10.1)
CALCIUM SERPL-MCNC: 9.1 MG/DL (ref 8.3–10.1)
CHLORIDE SERPL-SCNC: 104 MMOL/L (ref 100–108)
CO2 SERPL-SCNC: 29 MMOL/L (ref 21–32)
CREAT SERPL-MCNC: 0.93 MG/DL (ref 0.6–1.3)
EOSINOPHIL # BLD AUTO: 0.22 THOUSAND/ΜL (ref 0–0.61)
EOSINOPHIL NFR BLD AUTO: 3 % (ref 0–6)
ERYTHROCYTE [DISTWIDTH] IN BLOOD BY AUTOMATED COUNT: 14.6 % (ref 11.6–15.1)
GFR SERPL CREATININE-BSD FRML MDRD: 55 ML/MIN/1.73SQ M
GLUCOSE P FAST SERPL-MCNC: 100 MG/DL (ref 65–99)
HCT VFR BLD AUTO: 42.4 % (ref 34.8–46.1)
HGB BLD-MCNC: 13.4 G/DL (ref 11.5–15.4)
IMM GRANULOCYTES # BLD AUTO: 0.04 THOUSAND/UL (ref 0–0.2)
IMM GRANULOCYTES NFR BLD AUTO: 1 % (ref 0–2)
LYMPHOCYTES # BLD AUTO: 2.16 THOUSANDS/ΜL (ref 0.6–4.47)
LYMPHOCYTES NFR BLD AUTO: 33 % (ref 14–44)
MCH RBC QN AUTO: 29.5 PG (ref 26.8–34.3)
MCHC RBC AUTO-ENTMCNC: 31.6 G/DL (ref 31.4–37.4)
MCV RBC AUTO: 93 FL (ref 82–98)
MONOCYTES # BLD AUTO: 0.59 THOUSAND/ΜL (ref 0.17–1.22)
MONOCYTES NFR BLD AUTO: 9 % (ref 4–12)
NEUTROPHILS # BLD AUTO: 3.51 THOUSANDS/ΜL (ref 1.85–7.62)
NEUTS SEG NFR BLD AUTO: 53 % (ref 43–75)
NRBC BLD AUTO-RTO: 0 /100 WBCS
PLATELET # BLD AUTO: 358 THOUSANDS/UL (ref 149–390)
PMV BLD AUTO: 9.7 FL (ref 8.9–12.7)
POTASSIUM SERPL-SCNC: 4.2 MMOL/L (ref 3.5–5.3)
PROT SERPL-MCNC: 6.9 G/DL (ref 6.4–8.2)
RBC # BLD AUTO: 4.54 MILLION/UL (ref 3.81–5.12)
SODIUM SERPL-SCNC: 141 MMOL/L (ref 136–145)
WBC # BLD AUTO: 6.58 THOUSAND/UL (ref 4.31–10.16)

## 2022-02-08 PROCEDURE — 80053 COMPREHEN METABOLIC PANEL: CPT

## 2022-02-08 PROCEDURE — 85025 COMPLETE CBC W/AUTO DIFF WBC: CPT

## 2022-02-08 PROCEDURE — 36415 COLL VENOUS BLD VENIPUNCTURE: CPT

## 2022-02-21 ENCOUNTER — ANESTHESIA EVENT (OUTPATIENT)
Dept: NON INVASIVE DIAGNOSTICS | Facility: HOSPITAL | Age: 87
End: 2022-02-21
Payer: MEDICARE

## 2022-02-22 ENCOUNTER — APPOINTMENT (OUTPATIENT)
Dept: RADIOLOGY | Facility: HOSPITAL | Age: 87
End: 2022-02-22
Payer: MEDICARE

## 2022-02-22 ENCOUNTER — HOSPITAL ENCOUNTER (OUTPATIENT)
Facility: HOSPITAL | Age: 87
Setting detail: OUTPATIENT SURGERY
Discharge: HOME | End: 2022-02-22
Attending: INTERNAL MEDICINE | Admitting: INTERNAL MEDICINE
Payer: MEDICARE

## 2022-02-22 ENCOUNTER — ANESTHESIA (OUTPATIENT)
Dept: NON INVASIVE DIAGNOSTICS | Facility: HOSPITAL | Age: 87
End: 2022-02-22
Payer: MEDICARE

## 2022-02-22 VITALS
OXYGEN SATURATION: 96 % | TEMPERATURE: 98.4 F | BODY MASS INDEX: 20.91 KG/M2 | WEIGHT: 118 LBS | HEIGHT: 63 IN | DIASTOLIC BLOOD PRESSURE: 70 MMHG | HEART RATE: 69 BPM | SYSTOLIC BLOOD PRESSURE: 156 MMHG | RESPIRATION RATE: 17 BRPM

## 2022-02-22 DIAGNOSIS — I48.0 PAROXYSMAL ATRIAL FIBRILLATION (HCC): ICD-10-CM

## 2022-02-22 LAB
ANION GAP SERPL CALCULATED.3IONS-SCNC: 5 MMOL/L (ref 4–13)
ATRIAL RATE: 55 BPM
BASOPHILS # BLD AUTO: 0.06 THOUSANDS/ΜL (ref 0–0.1)
BASOPHILS NFR BLD AUTO: 1 % (ref 0–1)
BUN SERPL-MCNC: 22 MG/DL (ref 5–25)
CALCIUM SERPL-MCNC: 9.8 MG/DL (ref 8.3–10.1)
CHLORIDE SERPL-SCNC: 108 MMOL/L (ref 100–108)
CO2 SERPL-SCNC: 28 MMOL/L (ref 21–32)
CREAT SERPL-MCNC: 0.99 MG/DL (ref 0.6–1.3)
EOSINOPHIL # BLD AUTO: 0.08 THOUSAND/ΜL (ref 0–0.61)
EOSINOPHIL NFR BLD AUTO: 1 % (ref 0–6)
ERYTHROCYTE [DISTWIDTH] IN BLOOD BY AUTOMATED COUNT: 14.4 % (ref 11.6–15.1)
GFR SERPL CREATININE-BSD FRML MDRD: 51 ML/MIN/1.73SQ M
GLUCOSE P FAST SERPL-MCNC: 91 MG/DL (ref 65–99)
GLUCOSE SERPL-MCNC: 91 MG/DL (ref 65–140)
HCT VFR BLD AUTO: 40.7 % (ref 34.8–46.1)
HGB BLD-MCNC: 12.8 G/DL (ref 11.5–15.4)
IMM GRANULOCYTES # BLD AUTO: 0.03 THOUSAND/UL (ref 0–0.2)
IMM GRANULOCYTES NFR BLD AUTO: 1 % (ref 0–2)
INR PPP: 1.04 (ref 0.84–1.19)
LYMPHOCYTES # BLD AUTO: 1.4 THOUSANDS/ΜL (ref 0.6–4.47)
LYMPHOCYTES NFR BLD AUTO: 22 % (ref 14–44)
MCH RBC QN AUTO: 29.3 PG (ref 26.8–34.3)
MCHC RBC AUTO-ENTMCNC: 31.4 G/DL (ref 31.4–37.4)
MCV RBC AUTO: 93 FL (ref 82–98)
MONOCYTES # BLD AUTO: 0.64 THOUSAND/ΜL (ref 0.17–1.22)
MONOCYTES NFR BLD AUTO: 10 % (ref 4–12)
NEUTROPHILS # BLD AUTO: 4.18 THOUSANDS/ΜL (ref 1.85–7.62)
NEUTS SEG NFR BLD AUTO: 65 % (ref 43–75)
NRBC BLD AUTO-RTO: 0 /100 WBCS
P AXIS: 77 DEGREES
PLATELET # BLD AUTO: 276 THOUSANDS/UL (ref 149–390)
PMV BLD AUTO: 10.3 FL (ref 8.9–12.7)
POTASSIUM SERPL-SCNC: 4 MMOL/L (ref 3.5–5.3)
PR INTERVAL: 180 MS
PROTHROMBIN TIME: 13.1 SECONDS (ref 11.6–14.5)
QRS AXIS: 13 DEGREES
QRSD INTERVAL: 94 MS
QT INTERVAL: 460 MS
QTC INTERVAL: 440 MS
RBC # BLD AUTO: 4.37 MILLION/UL (ref 3.81–5.12)
SODIUM SERPL-SCNC: 141 MMOL/L (ref 136–145)
T WAVE AXIS: 68 DEGREES
VENTRICULAR RATE: 55 BPM
WBC # BLD AUTO: 6.39 THOUSAND/UL (ref 4.31–10.16)

## 2022-02-22 PROCEDURE — C1898 LEAD, PMKR, OTHER THAN TRANS: HCPCS | Performed by: INTERNAL MEDICINE

## 2022-02-22 PROCEDURE — 93005 ELECTROCARDIOGRAM TRACING: CPT

## 2022-02-22 PROCEDURE — 85025 COMPLETE CBC W/AUTO DIFF WBC: CPT | Performed by: PHYSICIAN ASSISTANT

## 2022-02-22 PROCEDURE — 71045 X-RAY EXAM CHEST 1 VIEW: CPT

## 2022-02-22 PROCEDURE — C1892 INTRO/SHEATH,FIXED,PEEL-AWAY: HCPCS | Performed by: INTERNAL MEDICINE

## 2022-02-22 PROCEDURE — 33208 INSRT HEART PM ATRIAL & VENT: CPT | Performed by: INTERNAL MEDICINE

## 2022-02-22 PROCEDURE — 80048 BASIC METABOLIC PNL TOTAL CA: CPT | Performed by: PHYSICIAN ASSISTANT

## 2022-02-22 PROCEDURE — 85610 PROTHROMBIN TIME: CPT | Performed by: PHYSICIAN ASSISTANT

## 2022-02-22 PROCEDURE — C1769 GUIDE WIRE: HCPCS | Performed by: INTERNAL MEDICINE

## 2022-02-22 PROCEDURE — C1785 PMKR, DUAL, RATE-RESP: HCPCS | Performed by: INTERNAL MEDICINE

## 2022-02-22 PROCEDURE — 93010 ELECTROCARDIOGRAM REPORT: CPT | Performed by: INTERNAL MEDICINE

## 2022-02-22 DEVICE — IPG W1DR01 AZURE XT DR MRI USA
Type: IMPLANTABLE DEVICE | Site: CHEST  WALL | Status: FUNCTIONAL
Brand: AZURE™ XT DR MRI SURESCAN™

## 2022-02-22 DEVICE — LEAD 457453 MRI US BI RCMCRD MVC
Type: IMPLANTABLE DEVICE | Status: FUNCTIONAL
Brand: CAPSURE SENSE MRI™ SURESCAN™

## 2022-02-22 DEVICE — LEAD 383069 MRI US
Type: IMPLANTABLE DEVICE | Status: FUNCTIONAL
Brand: SELECTSECURE™ MRI SURESCAN™

## 2022-02-22 DEVICE — ENVELOPE CMRM6122 ABSORB MED MR
Type: IMPLANTABLE DEVICE | Site: CHEST  WALL | Status: FUNCTIONAL
Brand: TYRX™

## 2022-02-22 RX ORDER — SODIUM CHLORIDE 9 MG/ML
INJECTION, SOLUTION INTRAVENOUS CONTINUOUS PRN
Status: DISCONTINUED | OUTPATIENT
Start: 2022-02-22 | End: 2022-02-22

## 2022-02-22 RX ORDER — PROPOFOL 10 MG/ML
INJECTION, EMULSION INTRAVENOUS CONTINUOUS PRN
Status: DISCONTINUED | OUTPATIENT
Start: 2022-02-22 | End: 2022-02-22

## 2022-02-22 RX ORDER — METOPROLOL SUCCINATE 25 MG/1
TABLET, EXTENDED RELEASE ORAL
Qty: 270 TABLET | Refills: 0 | Status: SHIPPED | OUTPATIENT
Start: 2022-02-22 | End: 2022-03-01 | Stop reason: SDUPTHER

## 2022-02-22 RX ORDER — CEFAZOLIN SODIUM 2 G/50ML
2000 SOLUTION INTRAVENOUS ONCE
Status: COMPLETED | OUTPATIENT
Start: 2022-02-22 | End: 2022-02-22

## 2022-02-22 RX ORDER — ACETAMINOPHEN 325 MG/1
650 TABLET ORAL EVERY 4 HOURS PRN
Status: DISCONTINUED | OUTPATIENT
Start: 2022-02-22 | End: 2022-02-22 | Stop reason: HOSPADM

## 2022-02-22 RX ORDER — FENTANYL CITRATE 50 UG/ML
INJECTION, SOLUTION INTRAMUSCULAR; INTRAVENOUS AS NEEDED
Status: DISCONTINUED | OUTPATIENT
Start: 2022-02-22 | End: 2022-02-22

## 2022-02-22 RX ORDER — GENTAMICIN SULFATE 40 MG/ML
INJECTION, SOLUTION INTRAMUSCULAR; INTRAVENOUS AS NEEDED
Status: DISCONTINUED | OUTPATIENT
Start: 2022-02-22 | End: 2022-02-22 | Stop reason: HOSPADM

## 2022-02-22 RX ORDER — LIDOCAINE HYDROCHLORIDE 10 MG/ML
INJECTION, SOLUTION EPIDURAL; INFILTRATION; INTRACAUDAL; PERINEURAL AS NEEDED
Status: DISCONTINUED | OUTPATIENT
Start: 2022-02-22 | End: 2022-02-22 | Stop reason: HOSPADM

## 2022-02-22 RX ADMIN — FENTANYL CITRATE 25 MCG: 50 INJECTION INTRAMUSCULAR; INTRAVENOUS at 14:38

## 2022-02-22 RX ADMIN — ACETAMINOPHEN 650 MG: 325 TABLET, FILM COATED ORAL at 18:09

## 2022-02-22 RX ADMIN — PHENYLEPHRINE HYDROCHLORIDE 20 MCG/MIN: 10 INJECTION INTRAVENOUS at 14:51

## 2022-02-22 RX ADMIN — PROPOFOL 50 MCG/KG/MIN: 10 INJECTION, EMULSION INTRAVENOUS at 14:38

## 2022-02-22 RX ADMIN — FENTANYL CITRATE 25 MCG: 50 INJECTION INTRAMUSCULAR; INTRAVENOUS at 15:59

## 2022-02-22 RX ADMIN — FENTANYL CITRATE 25 MCG: 50 INJECTION INTRAMUSCULAR; INTRAVENOUS at 15:10

## 2022-02-22 RX ADMIN — SODIUM CHLORIDE: 0.9 INJECTION, SOLUTION INTRAVENOUS at 14:31

## 2022-02-22 RX ADMIN — CEFAZOLIN SODIUM 2000 MG: 2 SOLUTION INTRAVENOUS at 14:38

## 2022-02-22 NOTE — PROGRESS NOTES
Report called to [facility]  PPM implanted to [facility] with no complications, VSS  Patient transported via stretcher back to [place] [id number]

## 2022-02-22 NOTE — DISCHARGE INSTRUCTIONS
MEDICATION CHANGES:  Please double your metoprolol (two tablets in the morning and one tablet at night)  Can restart your Xarelto tomorrow at the time you normally take your Xarelto  PACEMAKER INSTRUCTIONS:  Please refer to post pacemaker implantation discharge instructions and restrictions and your pacemaker booklet/temporary card  Keep incision dry for one week  Do not use lotions/powders/creams on incision  Leave outer bandage in place for 1 week - it is water proof, and as long as it is fully adhered to your skin you may shower with it  If it appears as though the bandage is coming off and/or there is any communication to the area of device incision, please then keep the whole area dry for the remaining week  After 1 week, please remove by pulling all edges away from the center of the bandage  No overhead reaching/pushing/pulling/lifting greater than 5-10lbs with left arm for six weeks  Please call the office if you notice redness, swelling, bleeding, or drainage from incision or if you develop fevers  AFTER PACEMAKER CARE:    If you have any questions, please call [phone] to speak with a nurse (8:30am-4pm, or [phone] after hours)  For appointments, please call [phone]  WHAT YOU SHOULD KNOW:   A pacemaker is a small, battery-powered device that is placed under your skin in your upper chest area with wires placed through a vein that lead directly into the heart  It helps regulate your heart rate and prevent your heart from beating too slowly  AFTER YOU LEAVE:     Medicines:     · Pain medicine: You may need medicine to take away or decrease pain  ¨ Learn how to take your medicine  Ask what medicine and how much you should take  Be sure you know how, when, and how often to take it  Usually Over the counter pain medicine is sufficient to control pain (Acetominophen or Ibuprofen) Ask your doctor if you may take these   If this does not control your pain, narcotic pain killers may be prescribed, please call if you need prescription  ¨ Do not wait until the pain is severe before you take your medicine  Tell caregivers if your pain does not decrease  ¨ Pain medicine can make you dizzy or sleepy  Prevent falls by calling someone when you get out of bed or if you need help  Take your medicine as directed  Call your healthcare provider if you think your medicine is not helping or if you have side effects  Tell him if you are allergic to any medicine  Follow up with your cardiologist after your procedure: You will need a follow-up visit approximately 2 weeks after you leave the hospital  Your cardiologist will check your wound and make sure that your pacemaker is working correctly  Follow the instructions to check your pacemaker: Your cardiologist or primary healthcare provider will check your pacemaker and the battery regularly  He will use a computer to check your pacemaker over the telephone or wireless device which will be given to you  Pacemaker batteries usually last 8 to 10 years  The pacemaker unit will be replaced when the battery gets low  This is a simpler procedure than the original one to implant your pacemaker  Wound care:  Keep your incision dry for one week  Do not use lotions/powders/creams on incision  Leave outer bandage in place for 1 week - it is water proof, and as long as it is fully adhered to your skin you may shower with it  If it appears as though the bandage is coming off and/or there is any communication to the area of device incision, please then keep the whole area dry for the remaining week  After 1 week, please remove by pulling all edges away from the center of the bandage  Please call the office if you notice redness, swelling, bleeding, or drainage from incision or if you develop fevers  Activity:   · Arm movement and lifting:  Be careful using the arm on the side of your pacemaker  Do not move your arm for the first 24 hours after your procedure  Do not  lift your arm above your shoulder or lift more than 10 pounds for one month after your procedure  Avoid pushing, pulling, or repetitive arm movements for one month  This helps the leads stay in place and helps your wound heal  Ask your caregiver when you can drive after your procedure  You may move your arm side to side without lifting above your shoulder, and do not need to wear a sling at home  · Driving: you are ok to drive 48 hours after pacemaker is implanted   · Sports:  Ask your caregiver when it is okay to play tennis, golf, basketball, or any sport that requires you to lift your arms  Do not play full contact sports, such as football, that could damage your pacemaker  Ask your cardiologist or primary healthcare provider how much and what kinds of physical activity are safe for you  Living with a pacemaker:   · Tell all caregivers you have a pacemaker: This includes surgeons, radiologists, and medical technicians  You may want to wear a medical alert ID bracelet or necklace that states that you have a pacemaker  · Carry your pacemaker ID card: Make sure you receive a pacemaker ID card  Carry it with you at all times  It lists important information about your pacemaker  Show it to airport security if you travel  · Avoid electrical interference:  Avoid welding equipment and other equipment with large magnets or electric fields  These things could interfere with how your pacemaker works  Use your cell phone on the ear opposite from your pacemaker  Do not carry your cell phone in your shirt pocket over your chest      · Some Pacemakers are MRI safe  Ask you doctor if it is safe to proceed with MRI and let the radiologist and staff know you have a pacemaker  · Do not touch the skin around your pacemaker: This can cause damage to the lead wires or move the pacemaker unit from where it should be      Contact your cardiologist or primary healthcare provider if:   · The area around your pacemaker has increasing amount of pain after surgery  The pain should improve over first few days after implantation  · The skin around your stitches has increasing redness, swelling, or has drainage  This may mean that you have an infection  · You have a fever  · You have chills, a cough, and feel weak or achy  These are also signs of infection  · Your feet or ankles are more swollen than your baseline  · Your Heart rate is less than 50 beats per minute     Seek care immediately if:   · Your bandage becomes soaked with blood  · Your pacemaker is swelling rapidly    · Your stitches open up  · You feel your heart suddenly beating very slowly or quickly  · You become too weak or dizzy to stand, or you pass out  · Your arm or leg feels warm, tender, and painful  It may look swollen and red  · You have chest pain that does not go away with rest or medicine  · You feel lightheaded, short of breath, and have chest pain  · You cough up blood  © 2014 [address] is for End User's use only and may not be sold, redistributed or otherwise used for commercial purposes  All illustrations and images included in CareNotes® are the copyrighted property of [employer]  or [employer] Chowdhury  The above information is an  only  It is not intended as medical advice for individual conditions or treatments  Talk to your doctor, nurse or pharmacist before following any medical regimen to see if it is safe and effective for you

## 2022-02-22 NOTE — Clinical Note
The Lea Gallardo device was inserted  The leads were placed into the connector and visually verified to be in correct position

## 2022-02-22 NOTE — ANESTHESIA POSTPROCEDURE EVALUATION
Post-Op Assessment Note    CV Status:  Stable  Pain Score: 0    Pain management: adequate     Mental Status:  Alert and awake   Hydration Status:  Euvolemic   PONV Controlled:  Controlled   Airway Patency:  Patent      Post Op Vitals Reviewed: Yes      Staff: CRNA         No complications documented      BP  178/69   Temp      Pulse  78   Resp   16   SpO2   98% ra

## 2022-02-22 NOTE — Clinical Note
Report called to Klaudia Melendrez RN, patient has PPM to [facility]  No complications, VSS  Patient transported via stretcher back to [place] [id number]

## 2022-02-22 NOTE — ANESTHESIA PREPROCEDURE EVALUATION
Procedure:  Cardiac pacer implant (N/A Chest)    Relevant Problems   CARDIO   (+) Hyperlipidemia   (+) Hypertension   (+) Paroxysmal atrial fibrillation (HCC)      MUSCULOSKELETAL   (+) Primary osteoarthritis of both hips        Physical Exam    Airway    Mallampati score: III  TM Distance: >3 FB  Neck ROM: limited     Dental   No notable dental hx     Cardiovascular  Cardiovascular exam normal    Pulmonary  Pulmonary exam normal     Other Findings        Anesthesia Plan  ASA Score- 3     Anesthesia Type- IV sedation with anesthesia with ASA Monitors  Additional Monitors:   Airway Plan:           Plan Factors-Exercise tolerance (METS): <4 METS  Chart reviewed  EKG reviewed  Imaging results reviewed  Existing labs reviewed  Patient summary reviewed  Patient is not a current smoker  Induction- intravenous  Postoperative Plan-     Informed Consent- Anesthetic plan and risks discussed with patient  I personally reviewed this patient with the CRNA  Discussed and agreed on the Anesthesia Plan with the DALLIN Boykin

## 2022-02-22 NOTE — INTERVAL H&P NOTE
AF is an 80year old female w/ syncope, paroxysmal atrial fibrillation despite tikosyn and amiodarone AC w/ xarelto, HTN who presents to B for DC PPM implant  Briefly, this is a patient followed by Dr Dara West for atrial fibrillation  Patient has been trialed on tikosyn and amiodarone both being discontinued  Patient has had multiple outpatient monitors demonstrating bradycardia limiting up titration of AVNB therapy  Due to this a PPM was recommended thus prompting procedure today  There were no vitals taken for this visit  No ECG yet for this visit  Labs pending   Plan for PPM and discharge home post      Jes Sherwood PA-C/February 22, 2022/11:56 AM

## 2022-02-23 LAB
ATRIAL RATE: 70 BPM
P AXIS: 99 DEGREES
PR INTERVAL: 252 MS
QRS AXIS: -21 DEGREES
QRSD INTERVAL: 98 MS
QT INTERVAL: 452 MS
QTC INTERVAL: 488 MS
T WAVE AXIS: 94 DEGREES
VENTRICULAR RATE: 70 BPM

## 2022-02-23 PROCEDURE — 93010 ELECTROCARDIOGRAM REPORT: CPT | Performed by: INTERNAL MEDICINE

## 2022-02-28 ENCOUNTER — TELEPHONE (OUTPATIENT)
Dept: CARDIOLOGY CLINIC | Facility: CLINIC | Age: 87
End: 2022-02-28

## 2022-03-01 ENCOUNTER — OFFICE VISIT (OUTPATIENT)
Dept: CARDIOLOGY CLINIC | Facility: CLINIC | Age: 87
End: 2022-03-01
Payer: MEDICARE

## 2022-03-01 VITALS
SYSTOLIC BLOOD PRESSURE: 128 MMHG | BODY MASS INDEX: 21.69 KG/M2 | HEART RATE: 71 BPM | WEIGHT: 122.4 LBS | OXYGEN SATURATION: 95 % | HEIGHT: 63 IN | DIASTOLIC BLOOD PRESSURE: 70 MMHG

## 2022-03-01 DIAGNOSIS — I48.0 PAROXYSMAL ATRIAL FIBRILLATION (HCC): Primary | ICD-10-CM

## 2022-03-01 DIAGNOSIS — R55 SYNCOPE, UNSPECIFIED SYNCOPE TYPE: ICD-10-CM

## 2022-03-01 DIAGNOSIS — Z95.0 PACEMAKER: ICD-10-CM

## 2022-03-01 DIAGNOSIS — E78.2 MIXED HYPERLIPIDEMIA: ICD-10-CM

## 2022-03-01 DIAGNOSIS — I10 PRIMARY HYPERTENSION: ICD-10-CM

## 2022-03-01 PROCEDURE — 99214 OFFICE O/P EST MOD 30 MIN: CPT | Performed by: NURSE PRACTITIONER

## 2022-03-01 PROCEDURE — 93000 ELECTROCARDIOGRAM COMPLETE: CPT | Performed by: NURSE PRACTITIONER

## 2022-03-01 RX ORDER — METOPROLOL SUCCINATE 25 MG/1
TABLET, EXTENDED RELEASE ORAL
Qty: 270 TABLET | Refills: 3 | Status: SHIPPED | OUTPATIENT
Start: 2022-03-01

## 2022-03-01 RX ORDER — LOSARTAN POTASSIUM 25 MG/1
25 TABLET ORAL DAILY
Qty: 90 TABLET | Refills: 3 | Status: SHIPPED | OUTPATIENT
Start: 2022-03-01

## 2022-03-01 NOTE — PROGRESS NOTES
Cardiology Follow Up    Timothy Robles  1935  [id number]  [facility] [address]  [place] [address]  [phone]    Jony Gomes presents today for follow up s/p PPM insertion  1  Paroxysmal atrial fibrillation [facility])  Assessment & Plan:  Jony Gomes has a history of paroxysmal atrial fibrillation with use of Tikosyn for > 10 years  She presented with complaint of increasing "a fib episodes", feeling fluttering/palpitations in her chest   ZIO monitoring from 6/10-6/17/2021 reveals predominantly sinus rhythm, no evidence of atrial fibrillation, one 6-beat run of WCT/VT, 14 runs of SVT with longest run of 20 beats at 115 BPM, occasional SVEs at 3 9%, rare VEs at <1%, ventricular bigeminy and trigeminy were present  Echo 4/2021 with EF 55-60%  Metoprolol succinate increased in August to 25mg in am, 12 5mg in pm  Repeat 24 hour holter shows avg HR of 56bpm with 7 9% PAC burden  She transitioned off Tikosyn to amiodarone, which unfortunately she did not tolerate due to side effects  Referred to EP who recommended PPM with possible AV node ablation  Brock Barrios nuclear stress test ordered for ischemic work up due to VT on monitoring and recurrent syncopal events which was completed 11/2/2021 and negative  S/P PPM insertion 2/22/2022 for SSS by Dr Preethi Zepeda with consideration for AV node ablation later on if unable to control symptoms medically  She continues on metoprolol succinate, titrated to 50 mg in am, 25 mg in pm   Continue Magnesium supplementation at 250-500mg daily  Continue Xarelto for anticoagulation      Orders:  -     POCT ECG  -     metoprolol succinate (TOPROL-XL) 25 mg 24 hr tablet; Please take 2 tablets in the morning and 1 tablet in the evening    2  Syncope, unspecified syncope type  Assessment & Plan:  Jony Gomes sustained a fall in 2/2019 when she was brushing her teeth in the bathroom and fell, hitting her head against the wall   EKG at that time with QTc of 470msec  She reports today another syncopal event while standing in her kitchen  She had received a steroid injection in her knee and her flu vaccine earlier that same day  We have reviewed the risk of Torsades in Tikosyn with advancing age  24 hour holter monitor results above (see atrial fibrillation)  Labs have remained stable  Lexiscan 11/2/21 negative for ischemia  S/P PPM insertion 2/22/22 for sick sinus syndrome  Off amiodarone since that time  No recurrent syncopal events  3  Pacemaker  Assessment & Plan:  S/P insertion of Medtronic dual chamber PPM on 2/22/2022 for indication of sick sinus syndrome  Insertion site approximated and healing well with no signs of infection  [address] will be managing device with first interrogation scheduled for 3/10/2022  We reviewed again today wound care and activity limitations  4  Primary hypertension  Assessment & Plan:  BP is excellent today  Losartan 25 mg daily added at last OV  Continue amlodipine 10 mg daily, metoprolol succinate 50 mg in am, 25 mg in pm   We reviewed benefits of 2 g sodium diet  Orders:  -     losartan (COZAAR) 25 mg tablet; Take 1 tablet (25 mg total) by mouth daily    5  Mixed hyperlipidemia  Assessment & Plan:  Not on statin therapy at present  Lipid panel 8/3/2021 with     HDL 74         HPI  Denver Antonio has a past medical history of paroxysmal atrial fibrillation, HTN, HLD, CKD, lumbar spinal stenosis, and osteoporosis      She was previously following with [facility], Dr Elsie Rudd presented to Dr Dara West to establish care on 6/10/2021   Addison Ragsdale has been treated for paroxysmal atrial fibrillation with Tikosyn for > 10 years  Her last symptomatic episode from a fib had been in 2019  She sustained a fall  from a potential syncopal event where her legs gave out and her head hit the wall while she was in the bathroom brushing her teeth in 2/2019   Her EKG at the time showed QTc of 470msec      She had met with [facility] EP clinic in 9/2020 and they had discussed switching to Amiodarone due to increased risk of Tikosyn induced torsades with advanced age, however, she decided against this      She continued to note fatigue and increasing palpitations over recent months  She denied lightheadedness, chest pain, shortness of breath, recurrent syncope, or leg swelling  She denies Covid 19 infection history  She is under a lot of stress caring for her spouse with Alzheimer's disease       She followed up with me 7/21/21 to review her 7 day ZIO results which revealed 1 run of VT (6 beats) and 14 runs of SVT and occasional PAC's  She noted that her "a fib was going crazy" while wearing the patch  Her metoprolol succinate was increased to 25 mg in am, 12 5 mg in pm  Updated holter monitor was ordered  She followed up with me 8/26/2021 at which time she had tripped, fallen, and broke ribs earlier in the month  Her BP was elevated  She was under significant stress to which she attributed the elevated BP readings  She had not yet completed her holter monitor but was tolerating the increase of metoprolol      She followed up 10/8/21 where we reviewed her holter monitor results which showed 7 9% PAC burden despite the increased beta blocker dose  Her average HR was 56 bpm, limiting further titration of the beta blocker  She reported another syncopal event on 9/13/2021 when she was standing in the kitchen and suddenly woke on the floor  When she woke she was tired and sweaty but denies chest pain, shortness of breath, or palpitations  A nuclear stress test was ordered for ischemic work up due to VT on her monitor and syncopal events  We had previously reviewed risks of Tikosyn in advanced age and she was now agreeable to change  She was transitioned off Tikosyn and onto amiodarone  She met with Dr Jennifer Randhawa 1/24/2022 at which time her BP remained elevated   Results of her negative Lexiscan stress test were reviewed  She reported dizziness, sweating, balance issues with the amiodarone  Her amiodarone was reduced to 100 mg daily down from 200 mg daily  Losartan 25 mg daily was added for BP control  She was recommended to undergo PPM insertion with possible AV node ablation and was referred to EP  She underwent dual chamber Medtronic PPM insertion at hospitals on 2/22/2022 by Dr Jessica Garcia  The procedure was uneventful  She was instructed that she may discontinue her amiodarone if she wished and her metoprolol succinate was increased to 50 mg in am, 25 mg in pm  With discussion for possible AV node ablation if rates continued to be difficult to control despite titration of beta blocker  3/1/2022: Valla Dandy presents today for close follow up  She asks me to remove her bulky bandage from her PPM site today  She denies complaints, the site is healing well  She has mild soreness, but this is resolving  She denies any recurrent syncopal events  She did stop the amiodarone on 2/23/22  She feels that she is tolerating the increased metoprolol dose with good control of symptoms at this time  She tells me she would not be interested in AV node ablation if needed       Medical Problems             Problem List     Constipation    Leukocytosis    Hypokalemia    Spinal stenosis of lumbar region with neurogenic claudication    Age-related osteoporosis without current pathological fracture    Primary osteoarthritis of both hips    Paroxysmal atrial fibrillation (HCC)    Syncope    Hypertension    Hyperlipidemia    Fracture of multiple ribs of right side    Acute pain of right shoulder    Closed displaced fracture of lateral end of right clavicle              Past Medical History:   Diagnosis Date    [facility]     Arthritis     R knee    Cardiac disease     GERD (gastroesophageal reflux disease)     Hyperlipidemia     Hypertension     Muscle spasms of neck     Osteoporosis     Psychiatric disorder     anxiety Social History     Socioeconomic History    Marital status: /Civil Union     Spouse name: Not on file    Number of children: Not on file    Years of education: Not on file    Highest education level: Not on file   Occupational History    Not on file   Tobacco Use    Smoking status: Never Smoker    Smokeless tobacco: Never Used   Vaping Use    Vaping Use: Never used   Substance and Sexual Activity    Alcohol use: Not Currently    Drug use: Never    Sexual activity: Not Currently   Other Topics Concern    Not on file   Social History Narrative    Not on file     Social Determinants of Health     Financial Resource Strain: Not on file   Food Insecurity: Not on file   Transportation Needs: Not on file   Physical Activity: Not on file   Stress: Not on file   Social Connections: Not on file   Intimate Partner Violence: Not on file   Housing Stability: Not on file      Family History   Problem Relation Age of Onset    Cancer Father     Cancer Brother     Heart disease Mother      Past Surgical History:   Procedure Laterality Date    BACK SURGERY      CARDIAC ELECTROPHYSIOLOGY PROCEDURE N/A 2/22/2022    Procedure: Cardiac pacer implant;  Surgeon: Mare Zelaya MD;  [facility]; Service: Cardiology    EYE SURGERY      cataracts       Current Outpatient Medications:     acetaminophen (TYLENOL) 500 mg tablet, Take 2 tablets (1,000 mg total) by mouth every 6 (six) hours as needed for mild pain or moderate pain, Disp: 30 tablet, Rfl: 0    amLODIPine (NORVASC) 10 mg tablet, Take 1 tablet (10 mg total) by mouth daily Please do not fill until the patient needs refills  Please note this is a new prescribing physician and all refill requests should come to this office  , Disp: 90 tablet, Rfl: 3    calcium citrate-vitamin D (CALCIUM + D) 315-200 MG-UNIT per tablet, Take by mouth, Disp: , Rfl:     docusate sodium (COLACE) 100 mg capsule, Take 100 mg by mouth daily, Disp: , Rfl:    LORazepam (ATIVAN) 2 mg tablet, , Disp: , Rfl:     losartan (COZAAR) 25 mg tablet, Take 1 tablet (25 mg total) by mouth daily, Disp: 90 tablet, Rfl: 3    magnesium oxide (MAG-OX) 400 mg, Take 400 mg by mouth once, Disp: , Rfl:     metoprolol succinate (TOPROL-XL) 25 mg 24 hr tablet, Please take 2 tablets in the morning and 1 tablet in the evening, Disp: 270 tablet, Rfl: 3    Multiple Vitamins-Minerals (ICAPS AREDS 2 PO), Take 1 tablet by mouth daily , Disp: , Rfl:     rivaroxaban (Xarelto) 15 mg tablet, Take 1 tablet (15 mg total) by mouth daily with dinner Please do not fill until the patient needs refills  Please note this is a new prescribing physician and all refill requests should come to this office  , Disp: 90 tablet, Rfl: 3    polyethylene glycol (MIRALAX) 17 g packet, Take 17 g by mouth daily (Patient not taking: Reported on 3/1/2022 ), Disp: , Rfl:   Allergies   Allergen Reactions    Ciprofloxacin     Epinephrine      Irregular heart ryhthm (afib) per pt   Bactrim [Sulfamethoxazole-Trimethoprim] Rash    Medical Tape Rash       Labs:     Chemistry        Component Value Date/Time    K 4 0 02/22/2022 1227     02/22/2022 1227    CO2 28 02/22/2022 1227    BUN 22 02/22/2022 1227    CREATININE 0 99 02/22/2022 1227        Component Value Date/Time    CALCIUM 9 8 02/22/2022 1227    ALKPHOS 96 02/08/2022 0814    AST 24 02/08/2022 0814    ALT 22 02/08/2022 0814            No results found for: CHOL  Lab Results   Component Value Date    HDL 74 08/03/2021     Lab Results   Component Value Date    LDLCALC 139 (H) 08/03/2021     Lab Results   Component Value Date    TRIG 149 08/03/2021     Cardiac Imaging:  ECG 3/1/2022: Atrial-paced rhythm with prolonged AV conduction  Left ventricular hypertrophy   Rate 73 bpm     Lexiscan nuclear stress test 11/2/2021:  Normal stress perfusion pattern  Hyperdynamic LV function gated analysis  Low risk perfusion pattern  Stress ECG: No ECG changes meeting criteria for ischemia  Nondiagnostic ECG portion given vasodilator protocol  ECG 10/8/2021: Sinus bradycardia, rate 53, cannot rule out septal infarct, no significant change from previous ECG on 8/10/2021     24 hour holter monitor 9/7/2021:  Predominantly sinus rhythm, HR varied from 39 bpm to 103 bpm   The patients average heart rate was 56 bpm     17 ventricular ectopic activity  6195 (7 9%)  supraventricular ectopy  Longest R/R interval was 1 8 seconds      ZIO 6/10-6/17/2021:   Min HR of 35, max HR of 193, and avg HR of 59 bpm   Predominant underlying rhythm was Sinus Rhythm  1 run of Ventricular Tachycardia occurred lasting 6 beats with a max rate of 193 bpm (avg 177 bpm)  14 Supraventricular Tachycardia runs occurred, the run with the longest lasting 20 beats with an avg rate of 115 bpm    Occasional PAC's (3 9%, 16[address])  Rare PVC's (<1 0%)     Echocardiogram 4/2/2021 ([facility]): EF 55-60%  Grade 1 diastolic dysfunction  Mild-moderate AI  Mild-moderate MR  Mild TR      ECG 3/22/2021 ([facility]): sinus rhythm with one PAC, rate 68    Review of Systems   Constitutional: Negative  HENT: Negative  Cardiovascular: Negative for chest pain, dyspnea on exertion, irregular heartbeat, leg swelling, near-syncope, orthopnea and palpitations  Respiratory: Negative for cough and snoring  Endocrine: Negative  Skin: Negative  Musculoskeletal: Positive for arthritis (right shoulder)  Gastrointestinal: Negative  Genitourinary: Negative  Neurological: Negative  Psychiatric/Behavioral: Negative  Vitals:    03/01/22 1605   BP: 128/70   Pulse: 71   SpO2: 95%     Vitals:    03/01/22 1605   Weight: 55 5 kg (122 lb 6 4 oz)     Height: 5' 3" (160 cm)   Body mass index is 21 68 kg/m²  Physical Exam  Vitals and nursing note reviewed  Constitutional:       General: She is not in acute distress  Appearance: She is well-developed  She is not diaphoretic  HENT:      Head: Normocephalic and atraumatic  Neck:      Thyroid: No thyromegaly  Vascular: No carotid bruit or JVD  Cardiovascular:      Rate and Rhythm: Normal rate and regular rhythm  No extrasystoles are present  Pulses: Intact distal pulses  Radial pulses are 2+ on the right side and 2+ on the left side  Heart sounds: Normal heart sounds, S1 normal and S2 normal  No murmur heard  Comments: No lower leg edema  Pulmonary:      Effort: Pulmonary effort is normal       Breath sounds: Normal breath sounds  Abdominal:      General: There is no distension  Palpations: Abdomen is soft  Tenderness: There is no abdominal tenderness  Musculoskeletal:         General: Normal range of motion  Cervical back: Normal range of motion and neck supple  Lymphadenopathy:      Cervical: No cervical adenopathy  Skin:     General: Skin is warm and dry  Neurological:      Mental Status: She is alert and oriented to person, place, and time  Cranial Nerves: No cranial nerve deficit  Psychiatric:         Mood and Affect: Mood and affect normal          Speech: Speech normal          Behavior: Behavior normal  Behavior is cooperative           Cognition and Memory: Cognition and memory normal

## 2022-03-01 NOTE — PROGRESS NOTES
Cardiology Office Visit    Brown Mcbride  [id number]  1935  [facility] [address] [place] [address] [phone]      Dear Lawson Veliz MD,    I had the pleasure of seeing your patient at our [place] [address] Cardiology [facility] office today 1/24/2022  As you know she is a pleasant 80y o  year old female with a medical history as described below  Reason for office visit:         1  Paroxysmal atrial fibrillation (HCC)  -     amiodarone 200 mg tablet; Take 0 5 tablets (100 mg total) by mouth daily    2  Primary hypertension  -     losartan (COZAAR) 25 mg tablet; Take 1 tablet (25 mg total) by mouth daily  -     Basic metabolic panel; Future; Expected date: 02/07/2022    3  Mixed hyperlipidemia    4   Syncope, unspecified syncope type             HPI     Patient Active Problem List   Diagnosis    Paroxysmal atrial fibrillation (HCC)    Syncope    Constipation    Leukocytosis    Hypokalemia    Spinal stenosis of lumbar region with neurogenic claudication    Age-related osteoporosis without current pathological fracture    Primary osteoarthritis of both hips    Hypertension    Hyperlipidemia    Fracture of multiple ribs of right side    Acute pain of right shoulder    Closed displaced fracture of lateral end of right clavicle     Past Medical History:   Diagnosis Date    A-fib ([address] )     Arthritis     R knee    Cardiac disease     GERD (gastroesophageal reflux disease)     Hyperlipidemia     Hypertension     Muscle spasms of neck     Osteoporosis     Psychiatric disorder     anxiety     Social History     Socioeconomic History    Marital status: /Civil Union     Spouse name: Not on file    Number of children: Not on file    Years of education: Not on file    Highest education level: Not on file   Occupational History    Not on file   Tobacco Use    Smoking status: Never Smoker    Smokeless tobacco: Never Used   Vaping Use    Vaping Use: Never used   Substance and Sexual Activity    Alcohol use: Not Currently    Drug use: Never    Sexual activity: Not Currently   Other Topics Concern    Not on file   Social History Narrative    Not on file     Social Determinants of Health     Financial Resource Strain: Not on file   Food Insecurity: Not on file   Transportation Needs: Not on file   Physical Activity: Not on file   Stress: Not on file   Social Connections: Not on file   Intimate Partner Violence: Not on file   Housing Stability: Not on file      Family History   Problem Relation Age of Onset    Cancer Father     Cancer Brother     Heart disease Mother      Past Surgical History:   Procedure Laterality Date    BACK SURGERY      CARDIAC ELECTROPHYSIOLOGY PROCEDURE N/A 2/22/2022    Procedure: Cardiac pacer implant;  Surgeon: Luciano Aquino MD;  [facility]; Service: Cardiology    EYE SURGERY      cataracts       Current Outpatient Medications:     acetaminophen (TYLENOL) 500 mg tablet, Take 2 tablets (1,000 mg total) by mouth every 6 (six) hours as needed for mild pain or moderate pain, Disp: 30 tablet, Rfl: 0    amiodarone 200 mg tablet, Take 0 5 tablets (100 mg total) by mouth daily, Disp: 90 tablet, Rfl: 3    amLODIPine (NORVASC) 10 mg tablet, Take 1 tablet (10 mg total) by mouth daily Please do not fill until the patient needs refills  Please note this is a new prescribing physician and all refill requests should come to this office  , Disp: 90 tablet, Rfl: 3    calcium citrate-vitamin D (CALCIUM + D) 315-200 MG-UNIT per tablet, Take by mouth, Disp: , Rfl:     docusate sodium (COLACE) 100 mg capsule, Take 100 mg by mouth daily, Disp: , Rfl:     LORazepam (ATIVAN) 2 mg tablet, , Disp: , Rfl:     magnesium oxide (MAG-OX) 400 mg, Take 400 mg by mouth once, Disp: , Rfl:     Multiple Vitamins-Minerals (ICAPS AREDS 2 PO), Take 1 tablet by mouth daily , Disp: , Rfl:     polyethylene glycol (MIRALAX) 17 g packet, Take 17 g by mouth daily, Disp: , Rfl:     rivaroxaban (Xarelto) 15 mg tablet, Take 1 tablet (15 mg total) by mouth daily with dinner Please do not fill until the patient needs refills  Please note this is a new prescribing physician and all refill requests should come to this office  , Disp: 90 tablet, Rfl: 3    losartan (COZAAR) 25 mg tablet, Take 1 tablet (25 mg total) by mouth daily, Disp: 30 tablet, Rfl: 11    metoprolol succinate (TOPROL-XL) 25 mg 24 hr tablet, Please take 2 tablets in the morning and 1 tablet in the evening, Disp: 270 tablet, Rfl: 0  Allergies   Allergen Reactions    Ciprofloxacin     Epinephrine      Irregular heart ryhthm (afib) per pt   Bactrim [Sulfamethoxazole-Trimethoprim] Rash    Medical Tape Rash         Imaging: XR chest portable    Result Date: 2022  Narrative: CHEST INDICATION:   Patient s/p Pacemaker/ICD Insertion  COMPARISON:  None EXAM PERFORMED/VIEWS:  XR CHEST PORTABLE 1 image FINDINGS: Cardiomediastinal silhouette appears unremarkable  Left-sided permanent pacemaker  The lungs are clear  Biapical scarring  No pneumothorax or pleural effusion  Osseous structures appear within normal limits for patient age  Old right lateral rib fractures  Impression: No acute cardiopulmonary disease  Workstation performed: [id number]     Cardiac ep lab eps/ablations    Result Date: 2022  Narrative: ELECTROPHYSIOLOGY OPERATIVE REPORT PATIENT NAME: Doug Ragland :  1935 MRN: [id number] Date of surgery: 22 Surgeon: Eliezer Garcia MD Pt Location: Cath Lab PROCEDURE PERFORMED: 1)Dual Chamber Permanent Pacemaker Implantation- HIS/Left bundle pacing w deep septal lead to preserve narrow QRS Preoperative Medications: Ancef ANESTHESIA: conscious sedation PREOPERATIVE DIAGNOSIS: Symptomatic Sick Sinus syndrome POSTOPERATIVE DIAGNOSIS: Successful Dual Chamber Permanent Pacemaker implant  Same as Preop  Informed Consent: Risks, benefits, and alternatives discussed with patient and any family present  The patient understands risks, which include but are not limited to life threatening  bleeding, infection, air around lungs, blood around the heart and reoperation dislodged or malfunctioning device  Procedure Description: After informed consent was obtained, the patient was brought to the electrophysiology laboratory NPO  A time out was called and the patient was properly identified  The patient was pre-medicated as above  The left infraclavicular area was prepped and draped in the usual sterile fashion  After local anesthetic infiltration with 1% lidocaine, an incision was made below clavicle  The incision was extended down to the level of the pectoral fascia  A pocket was formed above the pectoral fascia using cautery and blunt dissection  A cephalic cut down approach was done but making small venotomy in cephalic vein and using 2-0 silk ties distally and proximally to control bleeding  A safe sheath introducer was advanced over a wire into the axillary vein, the wire was confirmed to be in the right atrium on flouroscopy, the wire was removed  Under fluoroscopic guidance the right atrial lead was placed in the right atrial appendage using a passive atrial lead, tissue contact was confirmed and good lead parameters were seen, the Safe-sheath was removed and the lead was tied down with 2-0 Ethibond sutures to the muscle  Under fluoroscopic guidance the right ventricular lead was positioned on the right ventricular septum basal septum, delivered with Medtronic sheath and screwed in deeply to to capture the left bunlde with narrow QRS with right bundle branch block pattern in V1,  we confirmed it to be intraseptal pacing with relatively high impedence and good threshold and sensing with unipolar pacing   After satisfactory ventricular sensing and pacing thresholds were confirmed, the lead was sewn to the pectoral fascia/muscle using 2-0 Ethibond sutures  A Medtronic Absorbable Antibiotic pouch was used  The lead and pulse generator were placed in the pre-pectoral pocket  The pocket was then closed with 3 layers of 2-0, 3-0, 4-0 -Vicryl suture was used to close the skin  EBL: Minimal Complications: None [id number] for venogram Findings: The patient tolerated the procedure well  Plan: Routine postoperative care, CXR  Follow-up in 2 weeks with incision check and interrogation  Recommend double metoprolol  She can remain on amiodarone 100mg daily as tolerated  If unable to control rhythm medically consider AV node ablation           Review of Systems:    Review of Systems      Vitals:    01/24/22 1519 01/24/22 1618   BP: (!) 180/78 (!) 180/82   BP Location: Left arm Left arm   Patient Position: Sitting Sitting   Cuff Size: Standard    Pulse: 78    Temp: (!) 96 9 °F (36 1 °C)    SpO2: 98%    Weight: 54 6 kg (120 lb 6 4 oz)    Height: 5' 3" (1 6 m)      Vitals:    01/24/22 1519   Weight: 54 6 kg (120 lb 6 4 oz)     Height: 5' 3" (160 cm)     Physical Exam

## 2022-03-01 NOTE — PATIENT INSTRUCTIONS
I removed the amiodarone from your medication list   Please let us know if you develop any heart racing/shortness of breath, lightheadedness, or any other concerns  I will be in touch when I hear back from our device clinic about your check scheduled in [place] next week  Please notify me immediately if any redness, swelling, heat, drainage, or pain at your pacemaker site  Your pacemaker is device compatible but no MRI's for at least 6 weeks from placement

## 2022-03-06 PROBLEM — Z95.0 PACEMAKER: Status: ACTIVE | Noted: 2022-03-06

## 2022-03-06 NOTE — ASSESSMENT & PLAN NOTE
Lidia Valencia has a history of paroxysmal atrial fibrillation with use of Tikosyn for > 10 years  She presented with complaint of increasing "a fib episodes", feeling fluttering/palpitations in her chest   ZIO monitoring from 6/10-6/17/2021 reveals predominantly sinus rhythm, no evidence of atrial fibrillation, one 6-beat run of WCT/VT, 14 runs of SVT with longest run of 20 beats at 115 BPM, occasional SVEs at 3 9%, rare VEs at <1%, ventricular bigeminy and trigeminy were present  Echo 4/2021 with EF 55-60%  Metoprolol succinate increased in August to 25mg in am, 12 5mg in pm  Repeat 24 hour holter shows avg HR of 56bpm with 7 9% PAC burden  She transitioned off Tikosyn to amiodarone, which unfortunately she did not tolerate due to side effects  Referred to EP who recommended PPM with possible AV node ablation  [facility] nuclear stress test ordered for ischemic work up due to VT on monitoring and recurrent syncopal events which was completed 11/2/2021 and negative  S/P PPM insertion 2/22/2022 for SSS by Dr Nick Francois with consideration for AV node ablation later on if unable to control symptoms medically    She continues on metoprolol succinate, titrated to 50 mg in am, 25 mg in pm   Continue Magnesium supplementation at 250-500mg daily  Continue Xarelto for anticoagulation

## 2022-03-06 NOTE — ASSESSMENT & PLAN NOTE
Nayan Negron sustained a fall in 2/2019 when she was brushing her teeth in the bathroom and fell, hitting her head against the wall  EKG at that time with QTc of 470msec  She reports today another syncopal event while standing in her kitchen  She had received a steroid injection in her knee and her flu vaccine earlier that same day  We have reviewed the risk of Torsades in Tikosyn with advancing age  24 hour holter monitor results above (see atrial fibrillation)  Labs have remained stable  Lexiscan 11/2/21 negative for ischemia  S/P PPM insertion 2/22/22 for sick sinus syndrome  Off amiodarone since that time  No recurrent syncopal events

## 2022-03-06 NOTE — ASSESSMENT & PLAN NOTE
BP is excellent today  Losartan 25 mg daily added at last OV  Continue amlodipine 10 mg daily, metoprolol succinate 50 mg in am, 25 mg in pm   We reviewed benefits of 2 g sodium diet

## 2022-03-06 NOTE — ASSESSMENT & PLAN NOTE
S/P insertion of Medtronic dual chamber PPM on 2/22/2022 for indication of sick sinus syndrome  Insertion site approximated and healing well with no signs of infection  [address] will be managing device with first interrogation scheduled for 3/10/2022  We reviewed again today wound care and activity limitations

## 2022-03-13 PROBLEM — I65.23 CAROTID ATHEROSCLEROSIS, BILATERAL: Status: ACTIVE | Noted: 2022-03-13

## 2022-03-13 NOTE — ASSESSMENT & PLAN NOTE
Patient sustained a fall in 2/2019 when she was brushing her teeth in the bathroom and fell, hitting her head against the wall  EKG at that time with QTc of 470msec  No reported LOC at that time  She did have syncopal episode 2/2020 while admitted at [address] [place] [address] in the setting of constipation and straining during BM  She tells me she had two total syncopal events that both occurred while having BM  She denies any recent recurrent syncopal events  We have reviewed the risk of Torsades in Tikosyn with advancing age  She wishes to continue medication for now, but we will continue to follow closely and continue to discuss transitioning to amiodarone which would be safer

## 2022-03-13 NOTE — ASSESSMENT & PLAN NOTE
Patient has a history of paroxysmal atrial fibrillation with use of Tikosyn for > 10 years  She presents with complaint of increasing "a fib episodes", feeling fluttering/palpitations in her chest   Recommend 7 day ZIO to try to determine atrial fibrillation burden  Echocardiogram 4/2021 with EF 55-60% with mild to moderate MR/AI and mild LAE  She is currently on Tikosyn 250mcg BID despite prior discussion with her previous cardiologist about the dangers of Torsades in the setting of her advanced age  Continue metoprolol succinate 25mg daily  Patient was instructed that she can take an extra  1/2 to whole dose of metoprolol succinate if she is having a lot of palpitations  Continue magnesium  Pending results of ZIO we will re discuss transitioning to amiodarone from Tikosyn  Continue renally dose adjusted Xarelto 15 mg daily for anticoagulation

## 2022-03-13 NOTE — ASSESSMENT & PLAN NOTE
No recent lipid panel for review  Should have updated lipid panel  She is currently not on statin therapy  Prior total cholesterol noted 10/2016 was 290  Given age, there likely would be no significant benefit from adding statin therapy

## 2022-03-13 NOTE — ASSESSMENT & PLAN NOTE
Carotid ultrasound 5/1/2017 showed moderate bilateral carotid atherosclerosis with 20-49% carotid stenosis bilaterally  Normal vertebral artery flow  Patient should have updated carotid ultrasound at some point in the next year  Ideally LDL < 70 given carotid artery disease (see discussion under hyperlipidemia)

## 2022-03-21 ENCOUNTER — HOSPITAL ENCOUNTER (OUTPATIENT)
Dept: NON INVASIVE DIAGNOSTICS | Facility: HOSPITAL | Age: 87
Discharge: HOME | End: 2022-03-21
Payer: MEDICARE

## 2022-03-21 DIAGNOSIS — K55.1 CHRONIC VASCULAR DISORDERS OF INTESTINE (HCC): ICD-10-CM

## 2022-03-21 PROCEDURE — 93975 VASCULAR STUDY: CPT | Performed by: SURGERY

## 2022-03-21 PROCEDURE — 93975 VASCULAR STUDY: CPT

## 2022-03-23 ENCOUNTER — IN-CLINIC DEVICE VISIT (OUTPATIENT)
Dept: CARDIOLOGY CLINIC | Facility: CLINIC | Age: 87
End: 2022-03-23

## 2022-03-23 DIAGNOSIS — Z95.0 PRESENCE OF CARDIAC PACEMAKER: Primary | ICD-10-CM

## 2022-03-23 PROCEDURE — 99024 POSTOP FOLLOW-UP VISIT: CPT | Performed by: INTERNAL MEDICINE

## 2022-03-23 NOTE — PROGRESS NOTES
Results for orders placed or performed in visit on 03/23/22   Cardiac EP device report    Narrative    MDT DUAL PM/ACTIVE SYSTEM IS MRI CONDITIONAL  DEVICE INTERROGATED IN THE [place] OFFICE: WOUND CHECK: INCISION CLEAN AND DRY WITH EDGES APPROXIMATED; WOUND CARE AND RESTRICTIONS REVIEWED WITH PATIENT  BATTERY VOLTAGE ADEQUATE (11 4 YRS)  AP: 93 3%  : 16 7%  ALL LEAD PARAMETERS WITHIN NORMAL LIMITS  NO SIGNIFICANT HIGH RATE EPISODES  NO PROGRAMMING CHANGES MADE TO DEVICE PARAMETERS  PACEMAKER FUNCTIONING APPROPRIATELY    [address] [place] [address] Street

## 2022-03-29 ENCOUNTER — TELEPHONE (OUTPATIENT)
Dept: CARDIOLOGY CLINIC | Facility: CLINIC | Age: 87
End: 2022-03-29

## 2022-03-29 ENCOUNTER — OFFICE VISIT (OUTPATIENT)
Dept: CARDIOLOGY CLINIC | Facility: CLINIC | Age: 87
End: 2022-03-29
Payer: MEDICARE

## 2022-03-29 VITALS
HEIGHT: 63 IN | BODY MASS INDEX: 21.48 KG/M2 | SYSTOLIC BLOOD PRESSURE: 144 MMHG | OXYGEN SATURATION: 97 % | HEART RATE: 77 BPM | TEMPERATURE: 97.6 F | WEIGHT: 121.2 LBS | DIASTOLIC BLOOD PRESSURE: 70 MMHG

## 2022-03-29 DIAGNOSIS — Z95.0 PACEMAKER: ICD-10-CM

## 2022-03-29 DIAGNOSIS — I65.23 CAROTID ATHEROSCLEROSIS, BILATERAL: ICD-10-CM

## 2022-03-29 DIAGNOSIS — I10 PRIMARY HYPERTENSION: ICD-10-CM

## 2022-03-29 DIAGNOSIS — R55 SYNCOPE, UNSPECIFIED SYNCOPE TYPE: ICD-10-CM

## 2022-03-29 DIAGNOSIS — E78.2 MIXED HYPERLIPIDEMIA: ICD-10-CM

## 2022-03-29 DIAGNOSIS — K55.1 MESENTERIC ARTERY STENOSIS (HCC): ICD-10-CM

## 2022-03-29 DIAGNOSIS — I48.0 PAROXYSMAL ATRIAL FIBRILLATION (HCC): Primary | ICD-10-CM

## 2022-03-29 PROCEDURE — 99214 OFFICE O/P EST MOD 30 MIN: CPT | Performed by: NURSE PRACTITIONER

## 2022-03-29 NOTE — PROGRESS NOTES
Cardiology Follow Up    Evelyne Flores  1935  [phone]  [facility] [place]  [address] [facility] [address]  [address]  [phone]    Monroe Dawson presents for close follow up of atrial fibrillation, syncope, s/p PPM, HTN, HLD      1  Paroxysmal atrial fibrillation ([address] )  Assessment & Plan:  Monroe Dawson has a history of paroxysmal atrial fibrillation with previous use of Tikosyn for > 10 years  She presented with complaint of increasing "a fib episodes", feeling fluttering/palpitations in her chest   ZIO monitoring from 6/10-6/17/2021 revealed no a fib, one 6-beat run of WCT/VT, 14 runs of SVT, longest 20 beats at 115 BPM, occasional PAC's (3 9%)  Echo 4/2021 with EF 55-60%  Metoprolol succinate increased in August 2021 to 25mg in am, 12 5mg in pm  Repeat 24 hour holter showed avg HR of 56bpm with 7 9% PAC burden  She sustained recurrent syncopal events  [facility] nuclear stress test 11/2/2021 without evidence of ischemia  She was transitioned to amiodarone, which she did not tolerate  Referred to EP for possible AV node ablation and PPM insertion  Dr Juani Chen performed pacemaker insertion on 2/22/2022 and up-titrated beta blocker  Consider AV node ablation if a fib remains difficult to control  Continue Magnesium supplementation at 250-500mg daily  Continue metoprolol succinate 50 mg in am, 25 mg in pm   Continue Xarelto for anticoagulation  Will monitor rates with upcoming device interrogations  2  Syncope, unspecified syncope type  Assessment & Plan:  Monroe Dawson sustained a fall in 2/2019 when she was brushing her teeth in the bathroom and fell, hitting her head against the wall  EKG at that time with QTc of 470msec  She reports today another syncopal event while standing in her kitchen  She had received a steroid injection in her knee and her flu vaccine earlier that same day  We have reviewed the risk of Torsades in Tikosyn with advancing age  24 hour holter monitor results above (see atrial fibrillation)  Labs have remained stable  Lexiscan 11/2/21 negative for ischemia  S/P PPM insertion 2/22/22 for sick sinus syndrome  Off amiodarone since that time  No recurrent syncopal events  3  Pacemaker  Assessment & Plan:  S/P insertion of Medtronic dual chamber PPM on 2/22/2022 for indication of sick sinus syndrome  Insertion site approximated and healing well with no signs of infection  Device managed by [facility]  Interrogation 3/1/22 reveals no high rates with normal device function  4  Primary hypertension  Assessment & Plan:  BP is acceptable  Continue amlodipine 10 mg daily, losartan 25 mg daily, metoprolol succinate 50 mg in am, 25 mg in pm   We reviewed benefits of 2 g sodium diet  Will obtain lab work completed yesterday at PCP office  5  Mixed hyperlipidemia  Assessment & Plan:  Not on statin therapy at present  Lipid panel 8/3/2021 with     HDL 74    Recommend LDL < 70 given carotid and mesenteric artery stenosis  Will obtain copy of blood work drawn at PCP yesterday and further medication recommendations to be provided  Patient has not tolerated several statins in the past       6  Carotid atherosclerosis, bilateral  Assessment & Plan:  Carotid ultrasound 5/1/2017 showed moderate bilateral carotid atherosclerosis with 20-49% carotid stenosis bilaterally  Normal vertebral artery flow  Will plan for updated carotid ultrasound at some point in the next year  Ideally LDL < 70  She has not tolerated statins in the past  Will obtain lab work drawn yesterday and further recommendations for medical therapy based on results  Not on aspirin in ashely of Xarelto  7  Mesenteric artery stenosis [facility])  Assessment & Plan:  U/s imaging 3/21/2022 ordered by PCP for ongoing abdominal pain reveals > 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, Dr Glory Brady presented to Dr Tricia Jessica to establish care on 6/10/2021   Bianca Jane has been treated for paroxysmal atrial fibrillation with Tikosyn for > 10 years  Her last symptomatic episode from a fib had been in 2019  She sustained a fall  from a potential syncopal event where her legs gave out and her head hit the wall while she was in the bathroom brushing her teeth in 2/2019  Her EKG at the time showed QTc of 470msec      She had met with [facility] EP clinic in 9/2020 and they had discussed switching to amiodarone due to increased risk of Tikosyn induced torsades with advanced age, however, she decided against this      She continued to note fatigue and increasing palpitations over recent months  She denied lightheadedness, chest pain, shortness of breath, recurrent syncope, or leg swelling  She denies Covid 19 infection history  She is under a lot of stress caring for her spouse with Alzheimer's disease        She followed up with me 7/21/21 to review her 7 day ZIO results which revealed 1 run of VT (6 beats) and 14 runs of SVT and occasional PAC's  She noted that her "a fib was going crazy" while wearing the patch  Her metoprolol succinate was increased to 25 mg in am, 12 5 mg in pm  Updated holter monitor was ordered      She followed up with me 8/26/2021 at which time she had tripped, fallen, and broke ribs earlier in the month  Her BP was elevated  She was under significant stress to which she attributed the elevated BP readings   She had not yet completed her holter monitor but was tolerating the increase of metoprolol      She followed up 10/8/21 where we reviewed her holter monitor results which showed 7 9% PAC burden despite the increased beta blocker dose  Her average HR was 56 bpm, limiting further titration of the beta blocker  She reported another syncopal event on 9/13/2021 when she was standing in the kitchen and suddenly woke on the floor  When she woke she was tired and sweaty but denies chest pain, shortness of breath, or palpitations  A nuclear stress test was ordered for ischemic work up due to VT on her monitor and syncopal events  We had previously reviewed risks of Tikosyn in advanced age and she was now agreeable to change  She was transitioned off Tikosyn and onto amiodarone      She met with Dr Carmine Lucio 1/24/2022 at which time her BP remained elevated  Results of her negative Lexiscan stress test were reviewed  She reported dizziness, sweating, balance issues with the amiodarone  Her amiodarone was reduced to 100 mg daily down from 200 mg daily  Losartan 25 mg daily was added for BP control  She was recommended to undergo PPM insertion with possible AV node ablation and was referred to EP      She underwent dual chamber Medtronic PPM insertion at [facility] on 2/22/2022 by Dr Blayne Olguin  The procedure was uneventful  She was instructed that she may discontinue her amiodarone if she wished and her metoprolol succinate was increased to 50 mg in am, 25 mg in pm  With discussion for possible AV node ablation if rates continued to be difficult to control despite titration of beta blocker  She followed up with me on 3/1/2022 for close follow up of her PPM insertion site, which was healing well  She had stopped amiodarone on 2/23 and felt much better off of it  She denied any recurrent syncopal events  She felt that she was tolerating the increased dose of metoprolol  She told me she would not be interested in AV node ablation if needed  Her pacemaker was interrogated that day remotely  3/29/2022: Lesley Devries presents today accompanied by her friend for routine follow up   She wishes to discuss the results of her mesenteric doppler completed 3/21/2022  Her PCP ordered this for work up of an ongoing pain in her left lower abdomen for which she has undergone extensive GI work up with no cause identified  The doppler does indicate > 70% stenosis of her celiac and superior mesenteric arteries  Her PCP advised she discuss with me today  She states she did have updated blood work for her PCP yesterday but I do not have the results  Her cholesterol has been elevated in the past however she has not tolerated at least 4 different statins  At this time she denies palpitations, heart racing, lightheadedness, increased fatigue  No recent falls  Her pacemaker site has healed well  We reviewed the results of her interrogation  On 3/23/22 showing no high rate episodes with normal device function      Medical Problems             Problem List     Constipation    Leukocytosis    Hypokalemia    Spinal stenosis of lumbar region with neurogenic claudication    Age-related osteoporosis without current pathological fracture    Primary osteoarthritis of both hips    Paroxysmal atrial fibrillation (HCC)    Syncope    Pacemaker    Hypertension    Hyperlipidemia    Fracture of multiple ribs of right side    Acute pain of right shoulder    Closed displaced fracture of lateral end of right clavicle    Carotid atherosclerosis, bilateral              Past Medical History:   Diagnosis Date    [facility]     Anxiety     Arthritis     R knee    Carotid atherosclerosis, bilateral     GERD (gastroesophageal reflux disease)     Hyperlipidemia     Hypertension     Muscle spasms of neck     Osteoporosis     Spinal stenosis      Social History     Socioeconomic History    Marital status: /Civil Union     Spouse name: Not on file    Number of children: Not on file    Years of education: Not on file    Highest education level: Not on file   Occupational History    Not on file   Tobacco Use    Smoking status: Never Smoker    Smokeless tobacco: Never Used   Vaping Use    Vaping Use: Never used   Substance and Sexual Activity    Alcohol use: Not Currently    Drug use: Never    Sexual activity: Not Currently   Other Topics Concern    Not on file   Social History Narrative    Not on file     Social Determinants of Health     Financial Resource Strain: Not on file   Food Insecurity: Not on file   Transportation Needs: Not on file   Physical Activity: Not on file   Stress: Not on file   Social Connections: Not on file   Intimate Partner Violence: Not on file   Housing Stability: Not on file      Family History   Problem Relation Age of Onset    Cancer Father     Cancer Brother     Heart disease Mother      Past Surgical History:   Procedure Laterality Date    BACK SURGERY      CARDIAC ELECTROPHYSIOLOGY PROCEDURE N/A 2/22/2022    Procedure: Cardiac pacer implant;  Surgeon: Kenia Cruz MD;  Location: [facility]; Service: Cardiology    EYE SURGERY      cataracts       Current Outpatient Medications:     acetaminophen (TYLENOL) 500 mg tablet, Take 2 tablets (1,000 mg total) by mouth every 6 (six) hours as needed for mild pain or moderate pain, Disp: 30 tablet, Rfl: 0    amLODIPine (NORVASC) 10 mg tablet, Take 1 tablet (10 mg total) by mouth daily Please do not fill until the patient needs refills  Please note this is a new prescribing physician and all refill requests should come to this office  , Disp: 90 tablet, Rfl: 3    calcium citrate-vitamin D (CALCIUM + D) 315-200 MG-UNIT per tablet, Take by mouth, Disp: , Rfl:     docusate sodium (COLACE) 100 mg capsule, Take 100 mg by mouth daily, Disp: , Rfl:     LORazepam (ATIVAN) 2 mg tablet, , Disp: , Rfl:     losartan (COZAAR) 25 mg tablet, Take 1 tablet (25 mg total) by mouth daily, Disp: 90 tablet, Rfl: 3    magnesium oxide (MAG-OX) 400 mg, Take 400 mg by mouth once, Disp: , Rfl:     metoprolol succinate (TOPROL-XL) 25 mg 24 hr tablet, Please take 2 tablets in the morning and 1 tablet in the evening, Disp: 270 tablet, Rfl: 3    Multiple Vitamins-Minerals (ICAPS AREDS 2 PO), Take 1 tablet by mouth daily , Disp: , Rfl:     rivaroxaban (Xarelto) 15 mg tablet, Take 1 tablet (15 mg total) by mouth daily with dinner Please do not fill until the patient needs refills  Please note this is a new prescribing physician and all refill requests should come to this office  , Disp: 90 tablet, Rfl: 3    polyethylene glycol (MIRALAX) 17 g packet, Take 17 g by mouth daily (Patient not taking: Reported on 3/1/2022 ), Disp: , Rfl:   Allergies   Allergen Reactions    Ciprofloxacin     Epinephrine      Irregular heart ryhthm (afib) per pt   Statins Myalgia     Patient has tried 3-4 different statins and developed muscle pain with all    Bactrim [Sulfamethoxazole-Trimethoprim] Rash    Medical Tape Rash       Labs:     Chemistry        Component Value Date/Time    K 4 0 02/22/2022 1227     02/22/2022 1227    CO2 28 02/22/2022 1227    BUN 22 02/22/2022 1227    CREATININE 0 99 02/22/2022 1227        Component Value Date/Time    CALCIUM 9 8 02/22/2022 1227    ALKPHOS 96 02/08/2022 0814    AST 24 02/08/2022 0814    ALT 22 02/08/2022 0814            No results found for: CHOL  Lab Results   Component Value Date    HDL 74 08/03/2021     Lab Results   Component Value Date    LDLCALC 139 (H) 08/03/2021     Lab Results   Component Value Date    TRIG 149 08/03/2021     Imaging:   VAS celiac and/or mesenteric duplex 3/21/2022: The aorta is patent and normal in caliber  >70% stenosis in the celiac artery  >70% stenosis of the superior mesenteric artery  ECG 3/1/2022: Atrial-paced rhythm with prolonged AV conduction  Left ventricular hypertrophy  Rate 73 bpm      Lexiscan nuclear stress test 11/2/2021:  Normal stress perfusion pattern  Hyperdynamic LV function gated analysis  Low risk perfusion pattern  Stress ECG: No ECG changes meeting criteria for ischemia   Nondiagnostic ECG portion given vasodilator protocol      ECG 10/8/2021: Sinus bradycardia, rate 53, cannot rule out septal infarct, no significant change from previous ECG on 8/10/2021     24 hour holter monitor 9/7/2021:  Predominantly sinus rhythm, HR varied from 39 bpm to 103 bpm   The patients average heart rate was 56 bpm     17 ventricular ectopic activity  6195 (7 9%)  supraventricular ectopy  Longest R/R interval was 1 8 seconds      ZIO 6/10-6/17/2021:   Min HR of 35, max HR of 193, and avg HR of 59 bpm   Predominant underlying rhythm was Sinus Rhythm  1 run of Ventricular Tachycardia occurred lasting 6 beats with a max rate of 193 bpm (avg 177 bpm)  14 Supraventricular Tachycardia runs occurred, the run with the longest lasting 20 beats with an avg rate of 115 bpm    Occasional PAC's (3 9%, 49659)  Rare PVC's (<1 0%)     Echocardiogram 4/2/2021 ([facility]): EF 55-60%  Grade 1 diastolic dysfunction  Mild-moderate AI  Mild-moderate MR  Mild TR      ECG 3/22/2021 ([facility]): sinus rhythm with one PAC, rate 68     Review of Systems   Constitutional: Negative  HENT: Negative  Cardiovascular: Negative for chest pain, dyspnea on exertion, irregular heartbeat, leg swelling, near-syncope, orthopnea and palpitations  Respiratory: Negative for cough and snoring  Endocrine: Negative  Skin: Negative  Musculoskeletal: Negative  Gastrointestinal: Positive for abdominal pain (LLQ)  Genitourinary: Negative  Neurological: Negative  Psychiatric/Behavioral: Negative  Vitals:    03/29/22 1512   BP: 144/70   Pulse: 77   Temp: 97 6 °F (36 4 °C)   SpO2: 97%     Vitals:    03/29/22 1512   Weight: 55 kg (121 lb 3 2 oz)     Height: 5' 3" (160 cm)   Body mass index is 21 47 kg/m²  Physical Exam  Vitals and nursing note reviewed  Constitutional:       General: She is not in acute distress  Appearance: She is well-developed  She is not diaphoretic     HENT:      Head: Normocephalic and atraumatic  Neck:      Thyroid: No thyromegaly  Vascular: No carotid bruit or JVD  Cardiovascular:      Rate and Rhythm: Normal rate and regular rhythm  No extrasystoles are present  Pulses: Intact distal pulses  Radial pulses are 2+ on the right side and 2+ on the left side  Heart sounds: Normal heart sounds, S1 normal and S2 normal  No murmur heard  Comments: No lower leg edema  Pulmonary:      Effort: Pulmonary effort is normal       Breath sounds: Normal breath sounds  Abdominal:      General: There is no distension  Palpations: Abdomen is soft  Tenderness: There is no abdominal tenderness  Musculoskeletal:         General: Normal range of motion  Cervical back: Normal range of motion and neck supple  Lymphadenopathy:      Cervical: No cervical adenopathy  Skin:     General: Skin is warm and dry  Neurological:      Mental Status: She is alert and oriented to person, place, and time  Cranial Nerves: No cranial nerve deficit  Psychiatric:         Mood and Affect: Mood and affect normal          Speech: Speech normal          Behavior: Behavior normal  Behavior is cooperative           Cognition and Memory: Cognition and memory normal

## 2022-03-29 NOTE — TELEPHONE ENCOUNTER
Call to [facility], requested recent office note asd labs from PCP [address]  Notes to be faxed to our office

## 2022-03-29 NOTE — PATIENT INSTRUCTIONS
Your pacemaker check showed no fast rates with normal device function  I will request a copy of blood work done yesterday at Dr Carola Galvan office  We do want your LDL (bad cholesterol) less than 70  If he did not order lipid panel I will order an updated test   I will reach out to the vascular surgeon about your ultrasound results and let you know the recommendations  Contact us with any concerns

## 2022-03-31 PROBLEM — K55.1 MESENTERIC ARTERY STENOSIS (HCC): Status: ACTIVE | Noted: 2022-03-31

## 2022-03-31 NOTE — ASSESSMENT & PLAN NOTE
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

## 2022-03-31 NOTE — ASSESSMENT & PLAN NOTE
Deven Choudhury has a history of paroxysmal atrial fibrillation with previous use of Tikosyn for > 10 years  She presented with complaint of increasing "a fib episodes", feeling fluttering/palpitations in her chest   ZIO monitoring from 6/10-6/17/2021 revealed no a fib, one 6-beat run of WCT/VT, 14 runs of SVT, longest 20 beats at 115 BPM, occasional PAC's (3 9%)  Echo 4/2021 with EF 55-60%  Metoprolol succinate increased in August 2021 to 25mg in am, 12 5mg in pm  Repeat 24 hour holter showed avg HR of 56bpm with 7 9% PAC burden  She sustained recurrent syncopal events  [employer] nuclear stress test 11/2/2021 without evidence of ischemia  She was transitioned to amiodarone, which she did not tolerate  Referred to EP for possible AV node ablation and PPM insertion  Dr Bon Tai performed pacemaker insertion on 2/22/2022 and up-titrated beta blocker  Consider AV node ablation if a fib remains difficult to control  Continue Magnesium supplementation at 250-500mg daily  Continue metoprolol succinate 50 mg in am, 25 mg in pm   Continue Xarelto for anticoagulation  Will monitor rates with upcoming device interrogations

## 2022-04-03 NOTE — ASSESSMENT & PLAN NOTE
Not on statin therapy at present  Lipid panel 8/3/2021 with     HDL 74    Recommend LDL < 70 given carotid and mesenteric artery stenosis  Will obtain copy of blood work drawn at PCP yesterday and further medication recommendations to be provided    Patient has not tolerated several statins in the past

## 2022-04-03 NOTE — ASSESSMENT & PLAN NOTE
BP is acceptable  Continue amlodipine 10 mg daily, losartan 25 mg daily, metoprolol succinate 50 mg in am, 25 mg in pm   We reviewed benefits of 2 g sodium diet  Will obtain lab work completed yesterday at PCP office

## 2022-04-03 NOTE — ASSESSMENT & PLAN NOTE
Carotid ultrasound 5/1/2017 showed moderate bilateral carotid atherosclerosis with 20-49% carotid stenosis bilaterally  Normal vertebral artery flow  Will plan for updated carotid ultrasound at some point in the next year  Ideally LDL < 70  She has not tolerated statins in the past  Will obtain lab work drawn yesterday and further recommendations for medical therapy based on results  Not on aspirin in ashely of Xarelto

## 2022-04-03 NOTE — ASSESSMENT & PLAN NOTE
Torrey Carvalho sustained a fall in 2/2019 when she was brushing her teeth in the bathroom and fell, hitting her head against the wall  EKG at that time with QTc of 470msec  She reports today another syncopal event while standing in her kitchen  She had received a steroid injection in her knee and her flu vaccine earlier that same day  We have reviewed the risk of Torsades in Tikosyn with advancing age  24 hour holter monitor results above (see atrial fibrillation)  Labs have remained stable  Lexiscan 11/2/21 negative for ischemia  S/P PPM insertion 2/22/22 for sick sinus syndrome  Off amiodarone since that time  No recurrent syncopal events

## 2022-04-03 NOTE — ASSESSMENT & PLAN NOTE
S/P insertion of [employer]onic dual chamber PPM on 2/22/2022 for indication of sick sinus syndrome  Insertion site approximated and healing well with no signs of infection  Device managed by [facility]  Interrogation 3/1/22 reveals no high rates with normal device function

## 2022-04-05 ENCOUNTER — TELEPHONE (OUTPATIENT)
Dept: CARDIOLOGY CLINIC | Facility: CLINIC | Age: 87
End: 2022-04-05

## 2022-04-05 DIAGNOSIS — K55.1 MESENTERIC ARTERY STENOSIS (HCC): Primary | ICD-10-CM

## 2022-04-05 NOTE — TELEPHONE ENCOUNTER
Call placed to patient, She was made aware that someone from vascular will be reaching out to her for an appt  I also spoke to her about taking the pravastatin  She stated that she has had many problems in the past when taking that type of medication  She asked if she soul take a couple days to think it over  I told her that I would call her back on Friday for her answer

## 2022-04-05 NOTE — TELEPHONE ENCOUNTER
Can you please let patient know that I heard back from vascular surgery  He is recommending an appointment so I have created a referral to Dr Radha Callejas  Also, I reviewed lab work scanned into her chart which shows a very elevated LDL (bad cholesterol) at 171  Would she be willing to try pravastatin at a low dose 3 days per week to see if she tolerates?

## 2022-04-08 ENCOUNTER — TELEPHONE (OUTPATIENT)
Dept: CARDIOLOGY CLINIC | Facility: CLINIC | Age: 87
End: 2022-04-08

## 2022-04-08 DIAGNOSIS — I65.23 CAROTID ATHEROSCLEROSIS, BILATERAL: Primary | ICD-10-CM

## 2022-04-08 RX ORDER — PRAVASTATIN SODIUM 10 MG
10 TABLET ORAL 3 TIMES WEEKLY
Qty: 12 TABLET | Refills: 11 | Status: SHIPPED | OUTPATIENT
Start: 2022-04-08 | End: 2022-07-08

## 2022-04-08 NOTE — TELEPHONE ENCOUNTER
----- Message from [employer] sent at 4/5/2022  3:47 PM EDT -----  Call patient Friday  for update if she wants to take pravastatin

## 2022-04-08 NOTE — TELEPHONE ENCOUNTER
Call to patient to see if she thought over if she wanted to take pravastatin 3x weekly  Patient is in agreement to statt cholesterol lowering medication  She stated that she will try it and if she gets any pains she will stop it  Advised her to call office if that happens  Patient also stated that someone from vascular dept called her to set up appt but that she does not have any transportation to get to any location  She was wondering if they could do a phone visit?  Please advise

## 2022-06-20 ENCOUNTER — OFFICE VISIT (OUTPATIENT)
Dept: OBGYN CLINIC | Facility: CLINIC | Age: 87
End: 2022-06-20
Payer: MEDICARE

## 2022-06-20 ENCOUNTER — HOSPITAL ENCOUNTER (OUTPATIENT)
Dept: RADIOLOGY | Facility: CLINIC | Age: 87
Discharge: HOME | End: 2022-06-20
Payer: MEDICARE

## 2022-06-20 VITALS
DIASTOLIC BLOOD PRESSURE: 70 MMHG | HEART RATE: 77 BPM | HEIGHT: 63 IN | SYSTOLIC BLOOD PRESSURE: 136 MMHG | TEMPERATURE: 96.8 F | BODY MASS INDEX: 21.44 KG/M2 | WEIGHT: 121 LBS

## 2022-06-20 DIAGNOSIS — M25.551 GREATER TROCHANTERIC PAIN SYNDROME OF RIGHT LOWER EXTREMITY: ICD-10-CM

## 2022-06-20 DIAGNOSIS — M17.11 PRIMARY OSTEOARTHRITIS OF RIGHT KNEE: ICD-10-CM

## 2022-06-20 DIAGNOSIS — M25.551 ACUTE RIGHT HIP PAIN: Primary | ICD-10-CM

## 2022-06-20 DIAGNOSIS — M25.551 ACUTE RIGHT HIP PAIN: ICD-10-CM

## 2022-06-20 DIAGNOSIS — M54.50 CHRONIC LOW BACK PAIN, UNSPECIFIED BACK PAIN LATERALITY, UNSPECIFIED WHETHER SCIATICA PRESENT: ICD-10-CM

## 2022-06-20 DIAGNOSIS — G89.29 CHRONIC PAIN OF RIGHT KNEE: ICD-10-CM

## 2022-06-20 DIAGNOSIS — G89.29 CHRONIC LOW BACK PAIN, UNSPECIFIED BACK PAIN LATERALITY, UNSPECIFIED WHETHER SCIATICA PRESENT: ICD-10-CM

## 2022-06-20 DIAGNOSIS — M25.561 CHRONIC PAIN OF RIGHT KNEE: ICD-10-CM

## 2022-06-20 PROCEDURE — 20610 DRAIN/INJ JOINT/BURSA W/O US: CPT | Performed by: STUDENT IN AN ORGANIZED HEALTH CARE EDUCATION/TRAINING PROGRAM

## 2022-06-20 PROCEDURE — 73502 X-RAY EXAM HIP UNI 2-3 VIEWS: CPT

## 2022-06-20 PROCEDURE — 99214 OFFICE O/P EST MOD 30 MIN: CPT | Performed by: STUDENT IN AN ORGANIZED HEALTH CARE EDUCATION/TRAINING PROGRAM

## 2022-06-20 NOTE — PROGRESS NOTES
1  Acute right hip pain  XR hip/pelv 2-3 vws right if performed   2  Chronic pain of right knee  Injection Procedure Prior Authorization   3  Primary osteoarthritis of right knee  Injection Procedure Prior Authorization   4  Chronic low back pain, unspecified back pain laterality, unspecified whether sciatica present  Ambulatory referral to Pain Management     Orders Placed This Encounter   Procedures    Large joint arthrocentesis    XR hip/pelv 2-3 vws right if performed    Injection Procedure Prior Authorization    Ambulatory referral to Pain Management        Imaging Studies (I personally reviewed images in PACS and report):    · X-ray right hip 06/20/2022: There is evidence of lumbar kyphoplasty  Otherwise in regards to her right hip there is no acute osseous abnormalities or significant degenerative changes  · MRI lumbar spine w/o contrast 3/19/2019  1  Acute compression fractures at L1, L3, and L4  2  Moderate central canal stenosis at L3-4 and L4-5  Multilevel bilateral neural foraminal stenosis as described  IMPRESSION:   Acute right hip pain - ongoing 1 week w/o injury   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    Other factors:   Patient had similar pain on left hip in the past - unsure of relief from prior CSI    PLAN:     Clinical exam and radiographic imaging reviewed with patient today, with impression as per above  I have discussed with the patient the pathophysiology of this diagnosis and reviewed how the examination correlates with this diagnosis   Imaging obtained of right hip today as noted above  Will follow up official radiology interpretation   Treatment options were discussed and patient agreeable to cortisone injection of her right greater trochanteric bursa as per procedure note below  I counseled compete this injection every 3 months as needed in regards to pain control    I did recommend starting formal physical therapy as well prevent this type of pain from recurring in the future to minimize use of injections, but patient prefers to defer this for now and wait for the cortisone injection to take effect before making this decision   Separately, I have made a new order for a gel injection for right knee which she has had more than 6 months of relief in the past   She also wishes to discuss about her left knee pain as well  Thus, will follow-up when the gel injections are improved and I will also discuss about her contralateral knee pain as well which has not had a workup yet   Patient also wishes to discuss about her chronic low back pain which she has had surgical intervention before  I recommended she start this workup with her pain management doctor and a referral was placed as well  Return for Follow up with me in regards to knees; follow up with pain management for your low back  Portions of the record may have been created with voice recognition software  Occasional wrong word or "sound a like" substitutions may have occurred due to the inherent limitations of voice recognition software  Read the chart carefully and recognize, using context, where substitutions have occurred  CHIEF COMPLAINT:  Chief Complaint   Patient presents with    Right Hip - Pain         HPI:  Nohemi Garcia is a 80 y o  female  who presents for       Visit 6/20/2022 :  Initial evaluation of right hip pain:  Patient previously seen for left hip pain secondary to trochanteric pain syndrome (pt had a CSI but in unsure whether it helped; she reports today that she has no pain of her left hip)  She states she has been having similar pain of her right hip over the past 1 week without a precipitating injury  She states pain is mainly located over lateral aspect of her hip and can radiate down the lateral aspect of her thigh to her knee  She also states he can intermittently radiate to her lower lumbar spine    Pain aggravated with prolonged standing and walking as well as lying on her right hip  Patient has been taking Tylenol without relief  She states a sense of weakness of her right hip with walking/standing as if she would give out, thus she uses a rolling walker  No recent hip imaging  Separately, she also wishes to address her bilateral knee pain  She previously had a gel injection of her right knee and is interested in a repeat injection has been more than 6 months as pain has been returning without any new precipitating injury  She also is interested in discussing about her left knee pain as well which has been developing a similar type of pain as her right knee  She is agreeable to defer this to a separate visit  Separately, she also wishes to discuss about her low back pain  She states she has had surgical intervention for this issue in the past       Medical, Surgical, Family, and Social History    Past Medical History:   Diagnosis Date    A-fib [facility])     Anxiety     Arthritis     R knee    Carotid atherosclerosis, bilateral     GERD (gastroesophageal reflux disease)     Hyperlipidemia     Hypertension     Muscle spasms of neck     Osteoporosis     Spinal stenosis      Past Surgical History:   Procedure Laterality Date    BACK SURGERY      CARDIAC ELECTROPHYSIOLOGY PROCEDURE N/A 2/22/2022    Procedure: Cardiac pacer implant;  Surgeon: Kenia Cruz MD;  [facility]; Service: Cardiology    EYE SURGERY      cataracts     Social History   Social History     Substance and Sexual Activity   Alcohol Use Not Currently     Social History     Substance and Sexual Activity   Drug Use Never     Social History     Tobacco Use   Smoking Status Never Smoker   Smokeless Tobacco Never Used     Family History   Problem Relation Age of Onset    Cancer Father     Cancer Brother     Heart disease Mother      Allergies   Allergen Reactions    Ciprofloxacin     Epinephrine      Irregular heart ryhthm (afib) per pt      Statins Myalgia Patient has tried 3-4 different statins and developed muscle pain with all    Bactrim [Sulfamethoxazole-Trimethoprim] Rash    Medical Tape Rash          Physical Exam  /70   Pulse 77   Temp (!) 96 8 °F (36 °C) (Temporal)   Ht 5' 3" (1 6 m)   Wt 54 9 kg (121 lb)   BMI 21 43 kg/m²     Constitutional:  see vital signs  Gen: well-developed, normocephalic/atraumatic, well-groomed  Eyes: No inflammation or discharge of conjunctiva or lids; sclera clear   Pulmonary/Chest: Effort normal  No respiratory distress  Right Hip Exam     Tenderness   The patient is experiencing tenderness in the greater trochanter and lateral     Range of Motion   Flexion: 110   External rotation: 50   Internal rotation: 25     Muscle Strength   Abduction: 4/5   Adduction: 4/5   Flexion: 4/5     Tests   WILVER: negative    Other   Erythema: absent              Large joint arthrocentesis: R greater trochanteric bursa  Universal Protocol:  Consent: Verbal consent obtained  Risks and benefits: risks, benefits and alternatives were discussed  Consent given by: patient  Time out: Immediately prior to procedure a "time out" was called to verify the correct patient, procedure, equipment, support staff and site/side marked as required  Timeout called at: 6/20/2022 2:40 PM   Patient understanding: patient states understanding of the procedure being performed  Site marked: the operative site was marked  Radiology Images displayed and confirmed   If images not available, report reviewed: imaging studies available  Patient identity confirmed: verbally with patient    Supporting Documentation  Indications: pain   Procedure Details  Location: hip - R greater trochanteric bursa  Preparation: Patient was prepped and draped in the usual sterile fashion  Needle size: 22 G (3 5 inch spinal needle)  Ultrasound guidance: no  Approach: lateral (perpindicular over greater trochanter at site of maximal tenderness)  Medications administered: 4 mL lidocaine 1 %; 40 mg triamcinolone acetonide 40 mg/mL    Patient tolerance: patient tolerated the procedure well with no immediate complications  Dressing:  Sterile dressing applied

## 2022-06-21 RX ORDER — LIDOCAINE HYDROCHLORIDE 10 MG/ML
4 INJECTION, SOLUTION INFILTRATION; PERINEURAL
Status: COMPLETED | OUTPATIENT
Start: 2022-06-21 | End: 2022-06-21

## 2022-06-21 RX ORDER — TRIAMCINOLONE ACETONIDE 40 MG/ML
40 INJECTION, SUSPENSION INTRA-ARTICULAR; INTRAMUSCULAR
Status: COMPLETED | OUTPATIENT
Start: 2022-06-21 | End: 2022-06-21

## 2022-06-21 RX ADMIN — LIDOCAINE HYDROCHLORIDE 4 ML: 10 INJECTION, SOLUTION INFILTRATION; PERINEURAL at 07:35

## 2022-06-21 RX ADMIN — TRIAMCINOLONE ACETONIDE 40 MG: 40 INJECTION, SUSPENSION INTRA-ARTICULAR; INTRAMUSCULAR at 07:35

## 2022-06-23 ENCOUNTER — PROCEDURE VISIT (OUTPATIENT)
Dept: OBGYN CLINIC | Facility: CLINIC | Age: 87
End: 2022-06-23
Payer: MEDICARE

## 2022-06-23 VITALS
HEART RATE: 82 BPM | SYSTOLIC BLOOD PRESSURE: 122 MMHG | BODY MASS INDEX: 21.44 KG/M2 | TEMPERATURE: 97 F | WEIGHT: 121 LBS | DIASTOLIC BLOOD PRESSURE: 68 MMHG | HEIGHT: 63 IN

## 2022-06-23 DIAGNOSIS — M17.12 PRIMARY OSTEOARTHRITIS OF LEFT KNEE: ICD-10-CM

## 2022-06-23 DIAGNOSIS — M25.562 CHRONIC PAIN OF LEFT KNEE: ICD-10-CM

## 2022-06-23 DIAGNOSIS — G89.29 CHRONIC PAIN OF LEFT KNEE: ICD-10-CM

## 2022-06-23 DIAGNOSIS — M25.561 CHRONIC PAIN OF RIGHT KNEE: Primary | ICD-10-CM

## 2022-06-23 DIAGNOSIS — G89.29 CHRONIC PAIN OF RIGHT KNEE: Primary | ICD-10-CM

## 2022-06-23 DIAGNOSIS — M17.11 PRIMARY OSTEOARTHRITIS OF RIGHT KNEE: ICD-10-CM

## 2022-06-23 PROCEDURE — 99213 OFFICE O/P EST LOW 20 MIN: CPT | Performed by: STUDENT IN AN ORGANIZED HEALTH CARE EDUCATION/TRAINING PROGRAM

## 2022-06-23 PROCEDURE — 20610 DRAIN/INJ JOINT/BURSA W/O US: CPT | Performed by: STUDENT IN AN ORGANIZED HEALTH CARE EDUCATION/TRAINING PROGRAM

## 2022-06-23 NOTE — PROGRESS NOTES
1  Chronic pain of right knee  Large joint arthrocentesis: R knee   2  Primary osteoarthritis of right knee  Large joint arthrocentesis: R knee   3  Chronic pain of left knee  Large joint arthrocentesis: L knee   4  Primary osteoarthritis of left knee  Large joint arthrocentesis: L knee     Orders Placed This Encounter   Procedures    Large joint arthrocentesis: R knee    Large joint arthrocentesis: L knee        Imaging Studies (I personally reviewed images in PACS and report):    · Prior [facility] Cyndy Chang reviewed with radiology tech previously as I do not have access to their imaging system  Interpreted as tricompartmental OA, severe in lateral tibiofemoral compartment  Otherwise, no acute osseous abnormalities  IMPRESSION:   Chronic bilateral knee taking secondary to primary osteoarthritis   Right knee with limited relief from cortisone injections but adequate relief from viscosupplementation injections   Separate issue of left knee pain of similar quality in which she has not had cortisone injections before      PLAN:     Clinical exam and radiographic imaging reviewed with patient today, with impression as per above  I have discussed with the patient the pathophysiology of this diagnosis and reviewed how the examination correlates with this diagnosis   In regards to her right knee, Duralone injection done as per procedure note below   Treatment options were discussed at length and after discussing these treatment options, the patient elected for a left knee cortisone injection as per procedure note below  I counseled that we can repeat this injection every 3 months provided a gives at least 3 months of relief  Alternatively, we could also try a viscosupplementation injection if it does not provide at least 3 months of relief  Patient prefers to call back/follow-up if she feels cortisone does not provide enough relief     Patient is aware that viscosupplementation injections can be provided every 6 months  Return if symptoms worsen or fail to improve  Portions of the record may have been created with voice recognition software  Occasional wrong word or "sound a like" substitutions may have occurred due to the inherent limitations of voice recognition software  Read the chart carefully and recognize, using context, where substitutions have occurred  CHIEF COMPLAINT:  Chief Complaint   Patient presents with    Right Knee - Injections         HPI:  Terrance Velasquez is a 80 y o  female  who presents for       Visit 6/23/2022 : Follow-up evaluation right knee pain:  Patient is here today to receive her Durolane injection  Of note, she also wishes to address left knee pain of similar quality as her right  She denies any precipitating injury  She states it has been ongoing issue months but worsening progressively over time  Pain is similar to her right knee pain that is over the anterior/medial/lateral aspects of her knee describes as an aching/sharp pain  She reports grinding sensation with knee and intermittent swelling of her knee pain  She denies any numbness/tingling, her knee  She has not had a CSI before for her left knee  Denies prior surgeries/injuries of left knee  No relief with tylneol  Medical, Surgical, Family, and Social History    Past Medical History:   Diagnosis Date    A-fib [facility])     Anxiety     Arthritis     R knee    Carotid atherosclerosis, bilateral     GERD (gastroesophageal reflux disease)     Hyperlipidemia     Hypertension     Muscle spasms of neck     Osteoporosis     Spinal stenosis      Past Surgical History:   Procedure Laterality Date    BACK SURGERY      CARDIAC ELECTROPHYSIOLOGY PROCEDURE N/A 2/22/2022    Procedure: Cardiac pacer implant;  Surgeon: Milena Rdeman MD;  [facility];   Service: Cardiology    EYE SURGERY      cataracts     Social History   Social History     Substance and Sexual Activity Alcohol Use Not Currently     Social History     Substance and Sexual Activity   Drug Use Never     Social History     Tobacco Use   Smoking Status Never Smoker   Smokeless Tobacco Never Used     Family History   Problem Relation Age of Onset    Cancer Father     Cancer Brother     Heart disease Mother      Allergies   Allergen Reactions    Ciprofloxacin     Epinephrine      Irregular heart ryhthm (afib) per pt   Statins Myalgia     Patient has tried 3-4 different statins and developed muscle pain with all    Bactrim [Sulfamethoxazole-Trimethoprim] Rash    Medical Tape Rash          Physical Exam  /68   Pulse 82   Temp (!) 97 °F (36 1 °C) (Temporal)   Ht 5' 3" (1 6 m)   Wt 54 9 kg (121 lb)   BMI 21 43 kg/m²     Constitutional:  see vital signs  Gen: well-developed, normocephalic/atraumatic, well-groomed    Pulmonary/Chest: Effort normal  No respiratory distress  Ortho Exam  Right Knee Exam:  Erythema: no  Swelling: no  Increased Warmth: no  Tenderness:+MJL  ROM: 0-130  Varus laxity: negative  Valgus laxity: negative    Left Exam:  Erythema: no  Swelling: no  Increased Warmth: no  Tenderness:+MJL  ROM: 0-130  Varus laxity: negative  Valgus laxity: negative    Gait: Use of assistive device while short step gait    Large joint arthrocentesis: R knee  Universal Protocol:  Consent: Verbal consent obtained  Risks and benefits: risks, benefits and alternatives were discussed  Consent given by: patient  Time out: Immediately prior to procedure a "time out" was called to verify the correct patient, procedure, equipment, support staff and site/side marked as required  Timeout called at: 6/23/2022 7:23 AM   Patient understanding: patient states understanding of the procedure being performed  Site marked: the operative site was marked  Radiology Images displayed and confirmed   If images not available, report reviewed: imaging studies available  Patient identity confirmed: verbally with patient    Supporting Documentation  Indications: pain   Procedure Details  Location: knee - R knee  Preparation: Patient was prepped and draped in the usual sterile fashion  Needle size: 22 G  Ultrasound guidance: no  Approach: anterolateral  Medications administered: 3 mL sodium hyaluronate 60 MG/3ML    Patient tolerance: patient tolerated the procedure well with no immediate complications  Dressing:  Sterile dressing applied    Large joint arthrocentesis: L knee  Universal Protocol:  Consent: Verbal consent obtained  Risks and benefits: risks, benefits and alternatives were discussed  Consent given by: patient  Time out: Immediately prior to procedure a "time out" was called to verify the correct patient, procedure, equipment, support staff and site/side marked as required  Timeout called at: 6/23/2022 1:26 PM   Patient understanding: patient states understanding of the procedure being performed  Site marked: the operative site was marked  Radiology Images displayed and confirmed   If images not available, report reviewed: imaging studies available  Patient identity confirmed: verbally with patient    Supporting Documentation  Indications: pain   Procedure Details  Location: knee - L knee  Preparation: Patient was prepped and draped in the usual sterile fashion  Needle size: 22 G  Ultrasound guidance: no  Approach: anterolateral  Medications administered: 4 mL lidocaine 1 %; 40 mg triamcinolone acetonide 40 mg/mL    Patient tolerance: patient tolerated the procedure well with no immediate complications  Dressing:  Sterile dressing applied

## 2022-06-23 NOTE — PATIENT INSTRUCTIONS

## 2022-06-24 RX ORDER — TRIAMCINOLONE ACETONIDE 40 MG/ML
40 INJECTION, SUSPENSION INTRA-ARTICULAR; INTRAMUSCULAR
Status: COMPLETED | OUTPATIENT
Start: 2022-06-24 | End: 2022-06-24

## 2022-06-24 RX ORDER — LIDOCAINE HYDROCHLORIDE 10 MG/ML
4 INJECTION, SOLUTION INFILTRATION; PERINEURAL
Status: COMPLETED | OUTPATIENT
Start: 2022-06-24 | End: 2022-06-24

## 2022-06-24 RX ADMIN — TRIAMCINOLONE ACETONIDE 40 MG: 40 INJECTION, SUSPENSION INTRA-ARTICULAR; INTRAMUSCULAR at 07:35

## 2022-06-24 RX ADMIN — LIDOCAINE HYDROCHLORIDE 4 ML: 10 INJECTION, SOLUTION INFILTRATION; PERINEURAL at 07:35

## 2022-07-08 ENCOUNTER — CONSULT (OUTPATIENT)
Dept: PAIN MEDICINE | Facility: CLINIC | Age: 87
End: 2022-07-08
Payer: MEDICARE

## 2022-07-08 ENCOUNTER — HOSPITAL ENCOUNTER (OUTPATIENT)
Dept: RADIOLOGY | Facility: CLINIC | Age: 87
Discharge: HOME | End: 2022-07-08
Payer: MEDICARE

## 2022-07-08 VITALS
SYSTOLIC BLOOD PRESSURE: 132 MMHG | HEART RATE: 96 BPM | BODY MASS INDEX: 21.65 KG/M2 | RESPIRATION RATE: 20 BRPM | WEIGHT: 122.2 LBS | DIASTOLIC BLOOD PRESSURE: 80 MMHG | HEIGHT: 63 IN | TEMPERATURE: 97.7 F

## 2022-07-08 DIAGNOSIS — M47.816 LUMBAR FACET ARTHROPATHY: ICD-10-CM

## 2022-07-08 DIAGNOSIS — M54.50 CHRONIC LOW BACK PAIN, UNSPECIFIED BACK PAIN LATERALITY, UNSPECIFIED WHETHER SCIATICA PRESENT: ICD-10-CM

## 2022-07-08 DIAGNOSIS — N18.30 STAGE 3 CHRONIC KIDNEY DISEASE, UNSPECIFIED WHETHER STAGE 3A OR 3B CKD (HCC): ICD-10-CM

## 2022-07-08 DIAGNOSIS — G89.29 CHRONIC LOW BACK PAIN, UNSPECIFIED BACK PAIN LATERALITY, UNSPECIFIED WHETHER SCIATICA PRESENT: ICD-10-CM

## 2022-07-08 DIAGNOSIS — M54.16 LUMBAR RADICULOPATHY: Primary | ICD-10-CM

## 2022-07-08 DIAGNOSIS — M54.16 LUMBAR RADICULOPATHY: ICD-10-CM

## 2022-07-08 PROCEDURE — 72100 X-RAY EXAM L-S SPINE 2/3 VWS: CPT

## 2022-07-08 PROCEDURE — 99204 OFFICE O/P NEW MOD 45 MIN: CPT | Performed by: ANESTHESIOLOGY

## 2022-07-08 NOTE — H&P (VIEW-ONLY)
Assessment  1  Lumbar radiculopathy  -     CT lumbar spine without contrast; Future; Expected date: 07/08/2022  -     X-ray lumbar spine 2 or 3 views; Future; Expected date: 07/08/2022    2  Chronic low back pain, unspecified back pain laterality, unspecified whether sciatica present  -     Ambulatory referral to Pain Management    3  Lumbar facet arthropathy  -     CT lumbar spine without contrast; Future; Expected date: 07/08/2022  -     X-ray lumbar spine 2 or 3 views; Future; Expected date: 07/08/2022    4  Stage 3 chronic kidney disease, unspecified whether stage 3a or 3b CKD (HCC)    Axial low back pain described primarily by arthritic features  Aching, nagging, indolent, stabbing, throbbing features in axial low back without radicular components  5/5 strength bilaterally, negative SLR  Positive facet loading maneuvers in lumbar spine elicited pain, positive tenderness to palpation over lumbar paraspinal muscles  Positive reynaldo's test, gaenslen's, positive SIJ loading bilaterally  Pain with internal/external rotation of right hip; ttp over right gtb  Recently had hip injection with Dr Joanne Cerda  Findings correlate with prominent lumbar facet arthropathy seen throughout axial low back on MRI from 2019  Additionally describes radicular pain numbness and weakness to a lesser degree in bilateral L4 dermatomes  Currently she is neurologically intact without gait instability, saddle anesthesia or bowel/bladder abnormality  Risks, benefits alternative to medial branch blocks and subsequent radiofrequency ablation of successful thoroughly discussed with patient  Handouts provided questions answered to patient satisfaction  Has been participant with PT but fell twice thereafter and has since discontinued        Plan  -xray, CT lumbar spine noncontrast;; f/u 2 weeks post procedure  -script provided for physician directed home exercise plan as per [facility] demonstrated and handouts provided that patient plans to participate with for 1 hour, twice a week for the next 6 weeks  There are risks associated with opioid medications, including dependence, addiction and tolerance  The patient understands and agrees to use these medications only as prescribed  Potential side effects of the medications include, but are not limited to, constipation, drowsiness, addiction, impaired judgment and risk of fatal overdose if not taken as prescribed  The patient was warned against driving while taking sedation medications or operating heavy machinery  The patient voiced understanding  Sharing medications is a felony  At this point in time, the patient is showing no signs of addiction, abuse, diversion or suicidal ideation  [place] Prescription Drug Monitoring Program report was reviewed and was appropriate      Complete risks and benefits including bleeding, infection, tissue reaction, nerve injury and allergic reaction were discussed  The approach was demonstrated using models and literature was provided  Verbal and written consent was obtained  My impressions and treatment recommendations were discussed in detail with the patient who verbalized understanding and had no further questions  Discharge instructions were provided  I personally saw and examined the patient and I agree with the above discussed plan of care  My impressions and treatment recommendations were discussed in detail with the patient who verbalized understanding and had no further questions  Discharge instructions were provided  I personally saw and examined the patient and I agree with the above discussed plan of care  No orders of the defined types were placed in this encounter  History of Present Illness    Bebo Schafer is a 80 y o  female with a past medical history of Afib on xarelto with pacemaker presenting with chronic low back pain described primarily as arthritic in nature    She describes 8/10 low back pain that is worse in the mornings and worse at the end of the day  The pain is characterized by achy, nagging, indolent, crampy, stabbing pain in her axial low back  The patient describes that the pain is worse with standing for long periods of time on hard surfaces as well as with walking  She ambulates with a walker  The patient is a very active individual and feels as though this pain compromises his/her participation with independent activities of daily living  The pain can be debilitating at times and contribute to significant disability, compromising overall activity and independent activities of daily living  She has tried physical therapy with limited relief of symptoms and discontinued after falling twice  Medications the patient has tried in the past include   tylenol  She describes minor degree radicular symptoms in bilateral L4 dermatome but otherwise has good strength  She denies any weakness numbness or paresthesias that are particularly problematic  The patient denies any bowel or bladder dysfunction as well, gait instability or saddle anesthesia  I have personally reviewed and/or updated the patient's past medical history, past surgical history, family history, social history, current medications, allergies, and vital signs today  Review of Systems   Constitutional: Positive for activity change  HENT: Negative  Eyes: Negative  Respiratory: Negative  Cardiovascular: Negative  Gastrointestinal: Negative  Endocrine: Negative  Genitourinary: Negative  Musculoskeletal: Positive for arthralgias, back pain, gait problem and myalgias  Skin: Negative  Allergic/Immunologic: Negative  Neurological: Positive for weakness and numbness  Hematological: Negative  Psychiatric/Behavioral: Negative  All other systems reviewed and are negative        Patient Active Problem List   Diagnosis    Paroxysmal atrial fibrillation (HCC)    Syncope    Constipation    Leukocytosis    Hypokalemia  Spinal stenosis of lumbar region with neurogenic claudication    Age-related osteoporosis without current pathological fracture    Primary osteoarthritis of both hips    Hypertension    Hyperlipidemia    Fracture of multiple ribs of right side    Acute pain of right shoulder    Closed displaced fracture of lateral end of right clavicle    Pacemaker    Carotid atherosclerosis, bilateral    Mesenteric artery stenosis (HCC)    Stage 3 chronic kidney disease, unspecified whether stage 3a or 3b CKD ([facility] )       Past Medical History:   Diagnosis Date    A[facility]fib ([facility] )     Anxiety     Arthritis     R knee    Carotid atherosclerosis, bilateral     GERD (gastroesophageal reflux disease)     Hyperlipidemia     Hypertension     Muscle spasms of neck     Osteoporosis     Spinal stenosis        Past Surgical History:   Procedure Laterality Date    BACK SURGERY      CARDIAC ELECTROPHYSIOLOGY PROCEDURE N/A 2/22/2022    Procedure: Cardiac pacer implant;  Surgeon: Cliff Washburn MD;  [facility]; Service: Cardiology    EYE SURGERY      cataracts       Family History   Problem Relation Age of Onset    Cancer Father     Cancer Brother     Heart disease Mother        Social History     Occupational History    Not on file   Tobacco Use    Smoking status: Never Smoker    Smokeless tobacco: Never Used   Vaping Use    Vaping Use: Never used   Substance and Sexual Activity    Alcohol use: Not Currently    Drug use: Never    Sexual activity: Not Currently       Current Outpatient Medications on File Prior to Visit   Medication Sig    acetaminophen (TYLENOL) 500 mg tablet Take 2 tablets (1,000 mg total) by mouth every 6 (six) hours as needed for mild pain or moderate pain    amLODIPine (NORVASC) 10 mg tablet Take 1 tablet (10 mg total) by mouth daily Please do not fill until the patient needs refills    Please note this is a new prescribing physician and all refill requests should come to this office   calcium citrate-vitamin D (CITRACAL+D) 315-200 MG-UNIT per tablet Take by mouth    docusate sodium (COLACE) 100 mg capsule Take 100 mg by mouth daily    LORazepam (ATIVAN) 2 mg tablet     losartan (COZAAR) 25 mg tablet Take 1 tablet (25 mg total) by mouth daily    magnesium oxide (MAG-OX) 400 mg Take 400 mg by mouth once    metoprolol succinate (TOPROL-XL) 25 mg 24 hr tablet Please take 2 tablets in the morning and 1 tablet in the evening    Multiple Vitamins-Minerals (ICAPS AREDS 2 PO) Take 1 tablet by mouth daily     polyethylene glycol (MIRALAX) 17 g packet Take 17 g by mouth daily    rivaroxaban (Xarelto) 15 mg tablet Take 1 tablet (15 mg total) by mouth daily with dinner Please do not fill until the patient needs refills  Please note this is a new prescribing physician and all refill requests should come to this office   [DISCONTINUED] pravastatin (PRAVACHOL) 10 mg tablet Take 1 tablet (10 mg total) by mouth 3 (three) times a week     No current facility-administered medications on file prior to visit  Allergies   Allergen Reactions    Ciprofloxacin     Epinephrine      Irregular heart ryhthm (afib) per pt   Statins Myalgia     Patient has tried 3-4 different statins and developed muscle pain with all    Bactrim [Sulfamethoxazole-Trimethoprim] Rash    Medical Tape Rash         Physical Exam    /80   Pulse 96   Temp 97 7 °F (36 5 °C)   Resp 20   Ht 5' 3" (1 6 m)   Wt 55 4 kg (122 lb 3 2 oz)   BMI 21 65 kg/m²     Constitutional: normal, well developed, well nourished, alert, in no distress and non-toxic and no overt pain behavior     Eyes: anicteric  HEENT: grossly intact  Neck: supple, symmetric, trachea midline and no masses   Pulmonary:even and unlabored  Cardiovascular:No edema or pitting edema present  Skin:Normal without rashes or lesions and well hydrated  Psychiatric:Mood and affect appropriate  Neurologic:Cranial Nerves II-XII grossly intact Sensation grossly intact; no clonus negative phoenix's  Reflexes 2+ and brisk  SLR negative bilaterally  Musculoskeletal:normal gait  5/5 strength bilaterally with AROM in all extremities  ambulates with walker; unable to perform normal heel toe and tip toe walking  significant pain with lumbar facet loading bilaterally and with lateral spine rotation  ttp over lumbar paraspinal muscles  Positive reynaldo's test, gaenslen's, positive SIJ loading bilaterally  Pain with internal/external rotation of right hip; ttp over right gtb    Imaging    CLINICAL HISTORY: Back pain, fall     COMPARISON: None     TECHNIQUE: Routine MRI of the lumbar spine was performed  FINDINGS:   L1 acute compression fracture is noted, with loss of height measuring approximately 50%, and retropulsion measuring 5 mm  L2 is intact  There is acute L3 compression deformity predominantly affecting the right lateral aspect of the superior endplate, with 39% loss of height and no significant retropulsion  There is subtle acute fracture of the superior endplate of L4, predominantly the right lateral aspect, with no significant loss of height and no retropulsion  No malalignment seen  There is multilevel loss of disc space height with disc desiccation and disc bulging  There is multilevel facet arthropathy  Visualized cord is normal in signal and appearance with the conus terminating at the level of L1  Paravertebral soft tissues are unremarkable  T12-L1: Retropulsion and disc bulge result in mild central canal stenosis  No significant neural foraminal stenosis  L1-L2: Unremarkable  L2-L3: Mild circumferential disc bulge with facet arthropathy and ligamentum flavum hypertrophy  There is mild central canal stenosis with mild left neural foraminal stenosis  No significant right neural foraminal stenosis       L3-L4: Mild circumferential disc bulge with facet arthropathy and ligamentum flavum hypertrophy, resulting in moderate central canal stenosis with mild left and moderate right neural foraminal stenosis  L4-L5: Mild circumferential disc bulge with facet arthropathy and ligamentum flavum hypertrophy  There is moderate central canal stenosis with mild bilateral neural foraminal stenosis  L5-S1: Mild circumferential disc bulge with facet arthropathy  No significant central canal stenosis or neural foraminal stenosis

## 2022-07-08 NOTE — PATIENT INSTRUCTIONS
Core Strengthening Exercises   WHAT YOU NEED TO KNOW:   Your core includes the muscles of your lower back, hip, pelvis, and abdomen  Core strengthening exercises help heal and strengthen these muscles  This helps prevent another injury, and keeps your pelvis, spine, and hips in the correct position  DISCHARGE INSTRUCTIONS:   Contact your healthcare provider if:   You have sharp or worsening pain during exercise or at rest     You have questions or concerns about your shoulder exercises  Safety tips:  Talk to your healthcare provider before you start an exercise program  A physical therapist can teach you how to do core strengthening exercises safely  Do the exercises on a mat or firm surface  A firm surface will support your spine and prevent low back pain  Do not do these exercises on a bed  Move slowly and smoothly  Avoid fast or jerky motions  Stop if you feel pain  Core exercises should not be painful  Stop if you feel pain  Breathe normally during core exercises  Do not hold your breath  This may cause an increase in blood pressure and prevent muscle strengthening  Your healthcare provider will tell you when to inhale and exhale during the exercise  Begin all of your exercises with abdominal bracing  Abdominal bracing helps warm up your core muscles  You can also practice abdominal bracing throughout the day  Lie on your back with your knees bent and feet flat on the floor  Place your arms in a relaxed position beside your body  Tighten your abdominal muscles  Pull your belly button in and up toward your spine  Hold for 5 seconds  Relax your muscles  Repeat 10 times  Core strengthening exercises: Your healthcare provider will tell you how often to do these exercises  The provider will also tell you how many repetitions of each exercise you should do  Hold each exercise for 5 seconds or as directed  As you get stronger, increase your hold to 10 to 15 seconds   You can do some of these exercises on a stability ball, or with a weight  Ask your healthcare provider how to use a stability ball or weight for these exercises:  Bridging:  Lie on your back with your knees bent and feet flat on the floor  Rest your arms at your side  Tighten your buttocks, and then lift your hips 1 inch off the floor  Hold for 5 seconds  When you can do this exercise without pain for 10 seconds, increase the distance you lift your hips  A good goal is to be able to lift your hips so that your shoulders, hips, and knees are in a straight line  Dead bug:  Lie on your back with your knees bent and feet flat on the floor  Place your arms in a relaxed position beside your body  Begin with abdominal bracing  Next, raise one leg, keeping your knee bent  Hold for 5 seconds  Repeat with the other leg  When you can do this exercise without pain for 10 to 15 seconds, you may raise one straight leg and hold  Repeat with the other leg  Quadruped:  Place your hands and knees on the floor  Keep your wrists directly below your shoulders and your knees directly below your hips  Pull your belly button in toward your spine  Do not flatten or arch your back  Tighten your abdominal muscles below your belly button  Hold for 5 seconds  When you can do this exercise without pain for 10 to 15 seconds, you may extend one arm and hold  Repeat on the other side  Side bridge exercises:      Standing side bridge:  Stand next to a wall and extend one arm toward the wall  Place your palm flat on the wall with your fingers pointing upward  Begin with abdominal bracing  Next, without moving your feet, slowly bend your arm to 90 degrees  Hold for 5 seconds  Repeat on the other side  When you can do this exercise without pain for 10 to 15 seconds, you may do the bent leg side bridge on the floor  Bent leg side bridge:  Lie on one side with your legs, hips, and shoulders in a straight line   Prop yourself up onto your forearm so your elbow is directly below your shoulder  Bend your knees back to 90 degrees  Begin with abdominal bracing  Next, lift your hips and balance yourself on your forearm and knees  Hold for 5 seconds  Repeat on the other side  When you can do this exercise without pain for 10 to 15 seconds, you may do the straight leg side bridge on the floor  Straight leg side bridge:  Lie on one side with your legs, hips, and shoulders in a straight line  Prop yourself up onto your forearm so your elbow is directly below your shoulder  Begin with abdominal bracing  Lift your hips off the floor and balance yourself on your forearm and the outside of your flexed foot  Do not let your ankle bend sideways  Hold for 5 seconds  Repeat on the other side  When you can do this exercise without pain for 10 to 15 seconds, ask your healthcare provider for more advanced exercises  Superman:  Lie on your stomach  Extend your arms forward on the floor  Tighten your abdominal muscles and lift your right hand and left leg off the floor  Hold this position  Slowly return to the starting position  Tighten your abdominal muscles and lift your left hand and right leg off the floor  Hold this position  Slowly return to the starting position  Clam:  Lie on your side with your knees bent  Put your bottom arm under your head to keep your neck in line  Put your top hand on your hip to keep your pelvis from moving  Put your heels together, and keep them together during this exercise  Slowly raise your top knee toward the ceiling  Then lower your leg so your knees are together  Repeat this exercise 10 times  Then switch sides and do the exercise 10 times with the other leg  Curl up:  Lie on your back with your knees bent and feet flat on the floor  Place your hands, palms down, underneath your lower back  Next, with your elbows on the floor, lift your shoulders and chest 2 to 3 inches off the floor   Keep your head in line with your shoulders  Hold this position  Slowly return to the starting position  Straight leg raises:  Lie on your back with one leg straight  Bend the other knee and place your foot flat on the floor  Tighten your abdominal muscles  Keep your leg straight and slowly lift it straight up 6 to 12 inches off the floor  Hold this position  Lower your leg slowly  Do as many repetitions as directed on this side  Repeat with the other leg  Plank:  Lie on your stomach  Bend your elbows and place your forearms flat on the floor  Lift your chest, stomach, and knees off the floor  Make sure your elbows are below your shoulders  Your body should be in a straight line  Do not let your hips or lower back sink to the ground  Squeeze your abdominal muscles together and hold for 15 seconds  To make this exercise harder, hold for 30 seconds or lift 1 leg at a time  Bicycles:  Lie on your back  Bend both knees and bring them toward your chest  Your calves should be parallel to the floor  Place the palms of your hands on the back of your head  Straighten your right leg and keep it lifted 2 inches off the floor  Raise your head and shoulders off the floor and twist towards your left  Keep your head and shoulders lifted  Bend your right knee while you straighten your left leg  Keep your left leg 2 inches off the floor  Twist your head and chest towards the left leg  Continue to straighten 1 leg at a time and twist        Follow up with your doctor as directed:  Write down your questions so you remember to ask them during your visits  © Copyright [employer] 2022 Information is for End User's use only and may not be sold, redistributed or otherwise used for commercial purposes  All illustrations and images included in CareNotes® are the copyrighted property of [employer]  or [facility] Erick Wallace   The above information is an  only   It is not intended as medical advice for individual conditions or treatments  Talk to your doctor, nurse or pharmacist before following any medical regimen to see if it is safe and effective for you

## 2022-07-10 DIAGNOSIS — I10 ESSENTIAL HYPERTENSION: ICD-10-CM

## 2022-07-10 DIAGNOSIS — I48.0 PAROXYSMAL ATRIAL FIBRILLATION (HCC): ICD-10-CM

## 2022-07-11 RX ORDER — RIVAROXABAN 15 MG/1
TABLET, FILM COATED ORAL
Qty: 90 TABLET | Refills: 3 | Status: SHIPPED | OUTPATIENT
Start: 2022-07-11

## 2022-07-11 RX ORDER — AMLODIPINE BESYLATE 10 MG/1
TABLET ORAL
Qty: 90 TABLET | Refills: 3 | Status: SHIPPED | OUTPATIENT
Start: 2022-07-11 | End: 2022-07-13 | Stop reason: SDUPTHER

## 2022-07-12 ENCOUNTER — HOSPITAL ENCOUNTER (OUTPATIENT)
Dept: CT IMAGING | Facility: HOSPITAL | Age: 87
Discharge: HOME | End: 2022-07-12
Attending: ANESTHESIOLOGY
Payer: MEDICARE

## 2022-07-12 DIAGNOSIS — M54.16 LUMBAR RADICULOPATHY: ICD-10-CM

## 2022-07-12 DIAGNOSIS — M47.816 LUMBAR FACET ARTHROPATHY: ICD-10-CM

## 2022-07-12 PROCEDURE — 72131 CT LUMBAR SPINE W/O DYE: CPT

## 2022-07-13 ENCOUNTER — IN-CLINIC DEVICE VISIT (OUTPATIENT)
Dept: CARDIOLOGY CLINIC | Facility: CLINIC | Age: 87
End: 2022-07-13
Payer: MEDICARE

## 2022-07-13 ENCOUNTER — TELEPHONE (OUTPATIENT)
Dept: PAIN MEDICINE | Facility: CLINIC | Age: 87
End: 2022-07-13

## 2022-07-13 ENCOUNTER — TELEPHONE (OUTPATIENT)
Dept: CARDIOLOGY CLINIC | Facility: CLINIC | Age: 87
End: 2022-07-13

## 2022-07-13 DIAGNOSIS — Z95.0 PRESENCE OF CARDIAC PACEMAKER: Primary | ICD-10-CM

## 2022-07-13 DIAGNOSIS — I10 ESSENTIAL HYPERTENSION: ICD-10-CM

## 2022-07-13 PROCEDURE — 93280 PM DEVICE PROGR EVAL DUAL: CPT | Performed by: INTERNAL MEDICINE

## 2022-07-13 RX ORDER — AMLODIPINE BESYLATE 10 MG/1
10 TABLET ORAL DAILY
Qty: 90 TABLET | Refills: 3 | Status: SHIPPED | OUTPATIENT
Start: 2022-07-13

## 2022-07-13 NOTE — PROGRESS NOTES
Results for orders placed or performed in visit on 07/13/22   Cardiac EP device report    Narrative    MDT DUAL PM/ACTIVE SYSTEM IS MRI CONDITIONAL  DEVICE INTERROGATED IN THE [facility] OFFICE: BATTERY VOLTAGE ADEQUATE (13 3 YRS)  AP: 87 9%  : 8% (MVP-ON)  ALL LEAD PARAMETERS WITHIN NORMAL LIMITS  1 TREATED AT/AF EPISODE W/ rATP (0:1~0%) AF BURDEN: <0 1%  PT TAKES METOPROLOL SUCC, XARELTO  EF: 70 % (NU[facility] ST TX~ 11/2/21)  NO PROGRAMMING CHANGES MADE TO DEVICE PARAMETERS  PACEMAKER FUNCTIONING APPROPRIATELY    [address] [place] [address] Street

## 2022-07-13 NOTE — TELEPHONE ENCOUNTER
L1-2:  No significant central or foraminal narrowing      L2-3:  Moderate facet hypertrophy  There is mild annular bulge with marginal osteophytes which result in mild central stenosis      L3-4:  Degenerative disc osteophyte complex with marginal osteophytes eccentric to the right results in mild right foraminal narrowing and mild central stenosis      L4-5:  Moderate facet hypertrophy  There is mild annular bulge with minimal right foraminal narrowing and mild to moderate central stenosis noted      L5-S1:  Calcified posterior disc margin with annular bulging  Minimal central stenosis  Foramina are patent        Prominent lumbar facet arthropathy noted throughout CT lumbar spine noncontrast without significant nerve root compression  Risks, benefits alternatives to medial branch blocks and subsequent RFA discussed with patient; informed consent to be obtained day of procedure

## 2022-07-13 NOTE — TELEPHONE ENCOUNTER
Patient here for device check stated that she stopped her pravastatin,due to muscle pain she wanted you to know

## 2022-07-23 NOTE — PROGRESS NOTES
-- DO NOT REPLY / DO NOT REPLY ALL --  -- Message is from the [facility]--    General Patient Message      Reason for Call: Patient is calling because she has an appointment scheduled for 7/25 at 1:30 and she can not make it and would like for her grand daughter Allison Rubio to take the appointment, MRN [id number], please call and advise.     Caller Information       Type Contact Phone/Fax    07/23/2022 02:09 PM CDT Phone (Incoming) Enriqueta Rolle (Self) [phone] (M)          Alternative phone number: N/A        Did the caller agree that this message can wait until the office reopens on Monday (or after the holiday)? YES - The Message Can Wait      Send a message to the provider's clinical support pool.     Turnaround time given to caller:   \"This message will be sent to [state Provider's name]. The clinical team will fulfill your request as soon as they review your message when the office opens on Monday (or after the holiday).\"       Reviewed discharge instructions follow-up appointment and medications with the patient   All questions answered

## 2022-08-02 ENCOUNTER — HOSPITAL ENCOUNTER (OUTPATIENT)
Facility: HOSPITAL | Age: 87
Setting detail: OUTPATIENT SURGERY
Discharge: HOME | End: 2022-08-02
Attending: ANESTHESIOLOGY | Admitting: ANESTHESIOLOGY
Payer: MEDICARE

## 2022-08-02 ENCOUNTER — APPOINTMENT (OUTPATIENT)
Dept: RADIOLOGY | Facility: HOSPITAL | Age: 87
End: 2022-08-02
Payer: MEDICARE

## 2022-08-02 VITALS
HEART RATE: 83 BPM | HEIGHT: 63 IN | WEIGHT: 122 LBS | SYSTOLIC BLOOD PRESSURE: 163 MMHG | RESPIRATION RATE: 18 BRPM | DIASTOLIC BLOOD PRESSURE: 72 MMHG | OXYGEN SATURATION: 96 % | BODY MASS INDEX: 21.62 KG/M2 | TEMPERATURE: 97.9 F

## 2022-08-02 PROCEDURE — 64493 INJ PARAVERT F JNT L/S 1 LEV: CPT | Performed by: ANESTHESIOLOGY

## 2022-08-02 PROCEDURE — 64494 INJ PARAVERT F JNT L/S 2 LEV: CPT | Performed by: ANESTHESIOLOGY

## 2022-08-02 RX ORDER — BUPIVACAINE HYDROCHLORIDE 2.5 MG/ML
INJECTION, SOLUTION EPIDURAL; INFILTRATION; INTRACAUDAL AS NEEDED
Status: DISCONTINUED | OUTPATIENT
Start: 2022-08-02 | End: 2022-08-02 | Stop reason: HOSPADM

## 2022-08-02 RX ORDER — METHYLPREDNISOLONE ACETATE 80 MG/ML
INJECTION, SUSPENSION INTRA-ARTICULAR; INTRALESIONAL; INTRAMUSCULAR; SOFT TISSUE AS NEEDED
Status: DISCONTINUED | OUTPATIENT
Start: 2022-08-02 | End: 2022-08-02 | Stop reason: HOSPADM

## 2022-08-02 RX ORDER — LIDOCAINE HYDROCHLORIDE 10 MG/ML
INJECTION, SOLUTION EPIDURAL; INFILTRATION; INTRACAUDAL; PERINEURAL AS NEEDED
Status: DISCONTINUED | OUTPATIENT
Start: 2022-08-02 | End: 2022-08-02 | Stop reason: HOSPADM

## 2022-08-02 NOTE — PROCEDURES
Pre-procedure Diagnosis: Lumbar Facet Arthropathy  Post-procedure Diagnosis: Lumbar Facet Arthropathy  Procedure Title(s):  [BILATERAL L3, L4, L5] medial branch nerve blocks [(DIAGNOSTIC)]  Attending Surgeon:   Vargas Naidu MD  Anesthesia:   Local     Indications: The patient is a 80y o  year-old female with a diagnosis of lumbar facet arthropathy  The patient's history and physical exam were reviewed  The risks, benefits and alternatives to the procedure were discussed, and all questions were answered to the patient's satisfaction  The patient agreed to proceed, and written informed consent was obtained  Procedure in Detail: The patient was brought into the procedure room and placed in the prone position on the fluoroscopy table  The area of the lumbar spine was prepped with chloraprep and then draped in a sterile manner  AP fluoroscopy was used to identify the [L3-L5] levels on the [LEFT] side  The C-arm was obliqued to visualize the junction of the superior articulate process and transverse process  The sacral ala was identified and marked  The skin in these identified areas was anesthetized with 1% lidocaine  A 22-gauge, 3½-inch spinal needle was advanced toward each of these points under fluoroscopic guidance  Once bone was contacted, negative aspiration was confirmed and [1-mL] of a [6mL]mixture of [5mL] [0 25% bupivicaine] and 1mL of 80mg/mL Depomedrol was injected at each level  (The same procedure was performed on the RIGHT side )    After the procedure was completed, the patient's back was cleaned and bandages were placed at the needle insertion sites  Disposition: The patient tolerated the procedure well, and there were no apparent complications  The patient was taken to the recovery area where written discharge instructions for the procedure were given  The patient was given a pain diary to determine if the patient's pain improves following the injection   Once the diary is returned we will consider next appropriate course of treatment      Postoperative pain relief [WAS] significant    Estimated Blood Loss: None  Specimens Obtained: N/A

## 2022-08-02 NOTE — OP NOTE
OPERATIVE REPORT  PATIENT NAME: Debra Weber    :  1935  MRN: [id number]  Pt Location:  GI ROOM 01    SURGERY DATE: 2022    Surgeon(s) and Role: Briana Wesley MD - Primary    Preop Diagnosis:  Lumbar radiculopathy [M54 16]  Lumbar facet arthropathy [M47 816]    Post-Op Diagnosis Codes:     * Lumbar radiculopathy [M54 16]     * Lumbar facet arthropathy [M47 816]    Procedure(s) (LRB):  BLOCK MEDIAL BRANCH L3, L4, L5 MBB #1 (Bilateral)    Specimen(s):  * No specimens in log *    Estimated Blood Loss:   Minimal    Drains:  * No LDAs found *    Anesthesia Type:   Local    Operative Indications:  Lumbar radiculopathy [M54 16]  Lumbar facet arthropathy [M47 816]    Operative Findings:  Bilateral L3, L4, L5 medial branch nerve regions identified under fluoroscopic guidance      Complications:   None    Procedure and Technique:  Please see detailed procedure note     I was present for the entire procedure    Patient Disposition:  PACU       SIGNATURE: Nora Roberts MD  DATE: 2022  TIME: 12:58 PM

## 2022-08-02 NOTE — DISCHARGE INSTRUCTIONS
YOUR 2 WEEK FOLLOW UP HAS BEEN SCHEDULED; IF YOU WISH TO CHANGE THE FOLLOW UP, PLEASE CALL THE [facility] [place]: [phone]    MEDIAL BRANCH BLOCK DISCHARGE INSTRUCTIONS  ACTIVITY  Please do activities that will bring the normal pain that we are rating  For example, if vacuuming or walking increases the painm do that  Danis twill give the most accurate response to the diary  You may shower, but no tub baths today, or applied heat  CARE OF THE INJECTION SITE  This area may be numb for several hours after the injection  Notify the [facility] if you have any of the following: redness, drainage, swelling or fever above 100°F     SPECIAL INSTRUCTIONS  Please return the MBB diary to our office by mail, fax, or drop it off  MEDICATIONS  Please do not take any break through or short acting pain medications for 8 hours after the block  Continue to take all routine medications  Our office may have instructed you to hold some medications  You may resume _______________________________________________  If you have any problems specifically related to your procedure, please call our office at [phone]  Problems not related to your procedure should be directed at your primary care physician  Lumbar Radiofrequency Ablation   WHAT YOU NEED TO KNOW:   What do I need to know about lumbar radiofrequency ablation? Lumbar radiofrequency ablation (RFA) is a procedure used to treat facet joint pain in your lower back  Facet joints are found at the back of each vertebra  A needle electrode is used to send electrical currents to the nerves in your facet joint  The electrical currents create heat that damages the nerve so it cannot send pain signals  How do I prepare for lumbar RFA? Your healthcare provider will talk to you about how to prepare for this procedure  He may tell you not to eat or drink anything after midnight on the day of your procedure   He will tell you what medicines to take or not take on the day of your procedure  What will happen during lumbar RFA? You will lie on your stomach  You will be given local anesthesia to numb the area of your back where the needle electrode will be inserted  You may be given a sedative to help keep you relaxed  You may still feel pressure or pushing during the procedure, but you should not feel any pain  Your healthcare provider will use fluoroscopy (a type of x-ray) to guide the needle electrode to the nerves near your facet joint  Your healthcare provider may touch the affected nerve to make sure the needle electrode is in the right place  You will feel tingling or pressure when he does this  He will then apply local anesthesia to the nerve to numb it  This will prevent you from feeling pain when he applies heat to the nerve  Your healthcare provider will then apply heat to the nerve using the needle electrode  He may need to apply heat to more than one nerve  He will remove the needle electrode and apply a bandage over the area  What are the risks of lumbar RFA? You may have pain, numbness, tingling, or burning in the area where the lumbar RFA was done  These normally go away within 6 weeks  The needle electrode may injure your spinal nerves  This may cause permanent leg weakness or nerve pain  CARE AGREEMENT:   You have the right to help plan your care  Learn about your health condition and how it may be treated  Discuss treatment options with your healthcare providers to decide what care you want to receive  You always have the right to refuse treatment  The above information is an  only  It is not intended as medical advice for individual conditions or treatments  Talk to your doctor, nurse or pharmacist before following any medical regimen to see if it is safe and effective for you    © Copyright [employer] 2022 Information is for End User's use only and may not be sold, redistributed or otherwise used for commercial purposes   All illustrations and images included in CareNotes® are the copyrighted property of [employer] A M [employer]  or [facility] Erick Tucker

## 2022-08-02 NOTE — INTERVAL H&P NOTE
H&P reviewed  After examining the patient discussed elevated BP and all cause mortality, risk of stroke, MI with steroid in epidural injection administration; will work with pcp to lower in coming weeks      Vitals:    08/02/22 1206   BP: (!) 190/77   Pulse: 81   Resp: 18   Temp: 97 9 °F (36 6 °C)   SpO2: 99%

## 2022-08-10 ENCOUNTER — OFFICE VISIT (OUTPATIENT)
Dept: PAIN MEDICINE | Facility: CLINIC | Age: 87
End: 2022-08-10
Payer: MEDICARE

## 2022-08-10 VITALS
DIASTOLIC BLOOD PRESSURE: 74 MMHG | HEART RATE: 84 BPM | TEMPERATURE: 97.6 F | SYSTOLIC BLOOD PRESSURE: 126 MMHG | BODY MASS INDEX: 21.4 KG/M2 | WEIGHT: 120.8 LBS | HEIGHT: 63 IN

## 2022-08-10 DIAGNOSIS — M47.816 LUMBAR SPONDYLOSIS: Primary | ICD-10-CM

## 2022-08-10 PROCEDURE — 1124F ACP DISCUSS-NO DSCNMKR DOCD: CPT | Performed by: ANESTHESIOLOGY

## 2022-08-10 PROCEDURE — 99214 OFFICE O/P EST MOD 30 MIN: CPT | Performed by: ANESTHESIOLOGY

## 2022-08-10 NOTE — PATIENT INSTRUCTIONS
Core Strengthening Exercises   WHAT YOU NEED TO KNOW:   Your core includes the muscles of your lower back, hip, pelvis, and abdomen  Core strengthening exercises help heal and strengthen these muscles  This helps prevent another injury, and keeps your pelvis, spine, and hips in the correct position  DISCHARGE INSTRUCTIONS:   Contact your healthcare provider if:   You have sharp or worsening pain during exercise or at rest     You have questions or concerns about your shoulder exercises  Safety tips:  Talk to your healthcare provider before you start an exercise program  A physical therapist can teach you how to do core strengthening exercises safely  Do the exercises on a mat or firm surface  A firm surface will support your spine and prevent low back pain  Do not do these exercises on a bed  Move slowly and smoothly  Avoid fast or jerky motions  Stop if you feel pain  Core exercises should not be painful  Stop if you feel pain  Breathe normally during core exercises  Do not hold your breath  This may cause an increase in blood pressure and prevent muscle strengthening  Your healthcare provider will tell you when to inhale and exhale during the exercise  Begin all of your exercises with abdominal bracing  Abdominal bracing helps warm up your core muscles  You can also practice abdominal bracing throughout the day  Lie on your back with your knees bent and feet flat on the floor  Place your arms in a relaxed position beside your body  Tighten your abdominal muscles  Pull your belly button in and up toward your spine  Hold for 5 seconds  Relax your muscles  Repeat 10 times  Core strengthening exercises: Your healthcare provider will tell you how often to do these exercises  The provider will also tell you how many repetitions of each exercise you should do  Hold each exercise for 5 seconds or as directed  As you get stronger, increase your hold to 10 to 15 seconds   You can do some of these exercises on a stability ball, or with a weight  Ask your healthcare provider how to use a stability ball or weight for these exercises:  Bridging:  Lie on your back with your knees bent and feet flat on the floor  Rest your arms at your side  Tighten your buttocks, and then lift your hips 1 inch off the floor  Hold for 5 seconds  When you can do this exercise without pain for 10 seconds, increase the distance you lift your hips  A good goal is to be able to lift your hips so that your shoulders, hips, and knees are in a straight line  Dead bug:  Lie on your back with your knees bent and feet flat on the floor  Place your arms in a relaxed position beside your body  Begin with abdominal bracing  Next, raise one leg, keeping your knee bent  Hold for 5 seconds  Repeat with the other leg  When you can do this exercise without pain for 10 to 15 seconds, you may raise one straight leg and hold  Repeat with the other leg  Quadruped:  Place your hands and knees on the floor  Keep your wrists directly below your shoulders and your knees directly below your hips  Pull your belly button in toward your spine  Do not flatten or arch your back  Tighten your abdominal muscles below your belly button  Hold for 5 seconds  When you can do this exercise without pain for 10 to 15 seconds, you may extend one arm and hold  Repeat on the other side  Side bridge exercises:      Standing side bridge:  Stand next to a wall and extend one arm toward the wall  Place your palm flat on the wall with your fingers pointing upward  Begin with abdominal bracing  Next, without moving your feet, slowly bend your arm to 90 degrees  Hold for 5 seconds  Repeat on the other side  When you can do this exercise without pain for 10 to 15 seconds, you may do the bent leg side bridge on the floor  Bent leg side bridge:  Lie on one side with your legs, hips, and shoulders in a straight line   Prop yourself up onto your forearm so your elbow is directly below your shoulder  Bend your knees back to 90 degrees  Begin with abdominal bracing  Next, lift your hips and balance yourself on your forearm and knees  Hold for 5 seconds  Repeat on the other side  When you can do this exercise without pain for 10 to 15 seconds, you may do the straight leg side bridge on the floor  Straight leg side bridge:  Lie on one side with your legs, hips, and shoulders in a straight line  Prop yourself up onto your forearm so your elbow is directly below your shoulder  Begin with abdominal bracing  Lift your hips off the floor and balance yourself on your forearm and the outside of your flexed foot  Do not let your ankle bend sideways  Hold for 5 seconds  Repeat on the other side  When you can do this exercise without pain for 10 to 15 seconds, ask your healthcare provider for more advanced exercises  Superman:  Lie on your stomach  Extend your arms forward on the floor  Tighten your abdominal muscles and lift your right hand and left leg off the floor  Hold this position  Slowly return to the starting position  Tighten your abdominal muscles and lift your left hand and right leg off the floor  Hold this position  Slowly return to the starting position  Clam:  Lie on your side with your knees bent  Put your bottom arm under your head to keep your neck in line  Put your top hand on your hip to keep your pelvis from moving  Put your heels together, and keep them together during this exercise  Slowly raise your top knee toward the ceiling  Then lower your leg so your knees are together  Repeat this exercise 10 times  Then switch sides and do the exercise 10 times with the other leg  Curl up:  Lie on your back with your knees bent and feet flat on the floor  Place your hands, palms down, underneath your lower back  Next, with your elbows on the floor, lift your shoulders and chest 2 to 3 inches off the floor   Keep your head in line with your shoulders  Hold this position  Slowly return to the starting position  Straight leg raises:  Lie on your back with one leg straight  Bend the other knee and place your foot flat on the floor  Tighten your abdominal muscles  Keep your leg straight and slowly lift it straight up 6 to 12 inches off the floor  Hold this position  Lower your leg slowly  Do as many repetitions as directed on this side  Repeat with the other leg  Plank:  Lie on your stomach  Bend your elbows and place your forearms flat on the floor  Lift your chest, stomach, and knees off the floor  Make sure your elbows are below your shoulders  Your body should be in a straight line  Do not let your hips or lower back sink to the ground  Squeeze your abdominal muscles together and hold for 15 seconds  To make this exercise harder, hold for 30 seconds or lift 1 leg at a time  Bicycles:  Lie on your back  Bend both knees and bring them toward your chest  Your calves should be parallel to the floor  Place the palms of your hands on the back of your head  Straighten your right leg and keep it lifted 2 inches off the floor  Raise your head and shoulders off the floor and twist towards your left  Keep your head and shoulders lifted  Bend your right knee while you straighten your left leg  Keep your left leg 2 inches off the floor  Twist your head and chest towards the left leg  Continue to straighten 1 leg at a time and twist        Follow up with your doctor as directed:  Write down your questions so you remember to ask them during your visits  © Copyright [employer] 2022 Information is for End User's use only and may not be sold, redistributed or otherwise used for commercial purposes  All illustrations and images included in CareNotes® are the copyrighted property of [employer]  or [facility] Erick Wallace   The above information is an  only   It is not intended as medical advice for individual conditions or treatments  Talk to your doctor, nurse or pharmacist before following any medical regimen to see if it is safe and effective for you

## 2022-08-10 NOTE — H&P (VIEW-ONLY)
Assessment  1  Lumbar spondylosis - Bilateral    Greater than 90% relief of pain with improved ability to participate with IADLs after bilateral L3, L4, L5 medial branch blocks #1-relief continues to this day  Previously reported the following symptomatology:     Axial low back pain described primarily by arthritic features  Aching, nagging, indolent, stabbing, throbbing features in axial low back without radicular components  5/5 strength bilaterally, negative SLR  Positive facet loading maneuvers in lumbar spine elicited pain, positive tenderness to palpation over lumbar paraspinal muscles  Positive reynaldo's test, gaenslen's, positive SIJ loading bilaterally  Pain with internal/external rotation of right hip; ttp over right gtb  Recently had hip injection with Dr Chapis Boss  Findings correlate with prominent lumbar facet arthropathy seen throughout axial low back on MRI from 2019 and recent CT scan; only mild to moderate central canal stenosis L4-L5  Additionally describes radicular pain numbness and weakness to a lesser degree in bilateral L4 dermatomes  Currently she is neurologically intact without gait instability, saddle anesthesia or bowel/bladder abnormality  Risks, benefits alternative to medial branch blocks and subsequent radiofrequency ablation of successful thoroughly discussed with patient  Handouts provided questions answered to patient satisfaction  Has been participant with PT but fell twice thereafter and has since discontinued  Plan  -call back to schedule bilateral L3, L4, L5 medial branch blocks #2; f/u 2 weeks post procedure  -continue HEP- physician directed home exercise plan as per [facility] demonstrated and handouts provided that patient plans to participate with for 1 hour, twice a week for the next 6 weeks  There are risks associated with opioid medications, including dependence, addiction and tolerance   The patient understands and agrees to use these medications only as prescribed  Potential side effects of the medications include, but are not limited to, constipation, drowsiness, addiction, impaired judgment and risk of fatal overdose if not taken as prescribed  The patient was warned against driving while taking sedation medications or operating heavy machinery  The patient voiced understanding  Sharing medications is a felony  At this point in time, the patient is showing no signs of addiction, abuse, diversion or suicidal ideation  [place] [employer] Monitoring Program report was reviewed and was appropriate      Complete risks and benefits including bleeding, infection, tissue reaction, nerve injury and allergic reaction were discussed  The approach was demonstrated using models and literature was provided  Verbal and written consent was obtained  My impressions and treatment recommendations were discussed in detail with the patient who verbalized understanding and had no further questions  Discharge instructions were provided  I personally saw and examined the patient and I agree with the above discussed plan of care  My impressions and treatment recommendations were discussed in detail with the patient who verbalized understanding and had no further questions  Discharge instructions were provided  I personally saw and examined the patient and I agree with the above discussed plan of care  No orders of the defined types were placed in this encounter  History of Present Illness    Greater than 90% relief of pain with improved ability to participate with IADLs after bilateral L3, L4, L5 medial branch blocks #1-relief continues to this day  Previously reported the following symptomatology:     Juan José Garcia is a 80 y o  female with a past medical history of Afib on xarelto with pacemaker presenting with chronic low back pain described primarily as arthritic in nature    She describes 8/10 low back pain that is worse in the mornings and worse at the end of the day  The pain is characterized by achy, nagging, indolent, crampy, stabbing pain in her axial low back  The patient describes that the pain is worse with standing for long periods of time on hard surfaces as well as with walking  She ambulates with a walker  The patient is a very active individual and feels as though this pain compromises his/her participation with independent activities of daily living  The pain can be debilitating at times and contribute to significant disability, compromising overall activity and independent activities of daily living  She has tried physical therapy with limited relief of symptoms and discontinued after falling twice  Medications the patient has tried in the past include   tylenol  She describes minor degree radicular symptoms in bilateral L4 dermatome but otherwise has good strength  She denies any weakness numbness or paresthesias that are particularly problematic  The patient denies any bowel or bladder dysfunction as well, gait instability or saddle anesthesia  I have personally reviewed and/or updated the patient's past medical history, past surgical history, family history, social history, current medications, allergies, and vital signs today  Review of Systems   Constitutional: Positive for activity change  HENT: Negative  Eyes: Negative  Respiratory: Negative  Cardiovascular: Negative  Gastrointestinal: Negative  Endocrine: Negative  Genitourinary: Negative  Musculoskeletal: Positive for arthralgias, back pain, gait problem and myalgias  Skin: Negative  Allergic/Immunologic: Negative  Neurological: Positive for weakness and numbness  Hematological: Negative  Psychiatric/Behavioral: Negative  All other systems reviewed and are negative        Patient Active Problem List   Diagnosis    Paroxysmal atrial fibrillation (HCC)    Syncope    Constipation    Leukocytosis    Hypokalemia    Spinal stenosis of lumbar region with neurogenic claudication    Age-related osteoporosis without current pathological fracture    Primary osteoarthritis of both hips    Hypertension    Hyperlipidemia    Fracture of multiple ribs of right side    Acute pain of right shoulder    Closed displaced fracture of lateral end of right clavicle    Pacemaker    Carotid atherosclerosis, bilateral    Mesenteric artery stenosis (HCC)    Stage 3 chronic kidney disease, unspecified whether stage 3a or 3b [facility]       Past Medical History:   Diagnosis Date    [facility]     Anxiety     Arthritis     R knee    Carotid atherosclerosis, bilateral     GERD (gastroesophageal reflux disease)     Hyperlipidemia     Hypertension     Muscle spasms of neck     Osteoporosis     Spinal stenosis        Past Surgical History:   Procedure Laterality Date    BACK SURGERY      CARDIAC ELECTROPHYSIOLOGY PROCEDURE N/A 2/22/2022    Procedure: Cardiac pacer implant;  Surgeon: Mare Zelaya MD;  [facility];   Service: Cardiology    EYE SURGERY      cataracts    FL GUIDED NEEDLE PLAC BX/ASP/INJ  8/2/2022    NERVE BLOCK Bilateral 8/2/2022    Procedure: BLOCK MEDIAL BRANCH L3, L4, L5 MBB #1;  Surgeon: Jada Middleton MD;  Location: [facility];  Service: Pain Management        Family History   Problem Relation Age of Onset    Cancer Father     Cancer Brother     Heart disease Mother        Social History     Occupational History    Not on file   Tobacco Use    Smoking status: Never Smoker    Smokeless tobacco: Never Used   Vaping Use    Vaping Use: Never used   Substance and Sexual Activity    Alcohol use: Not Currently    Drug use: Never    Sexual activity: Not Currently       Current Outpatient Medications on File Prior to Visit   Medication Sig    acetaminophen (TYLENOL) 500 mg tablet Take 2 tablets (1,000 mg total) by mouth every 6 (six) hours as needed for mild pain or moderate pain    amLODIPine (NORVASC) 10 mg tablet Take 1 tablet (10 mg total) by mouth daily    calcium citrate-vitamin D (CITRACAL+D) 315-200 MG-UNIT per tablet Take by mouth    docusate sodium (COLACE) 100 mg capsule Take 100 mg by mouth daily    LORazepam (ATIVAN) 2 mg tablet     losartan (COZAAR) 25 mg tablet Take 1 tablet (25 mg total) by mouth daily    magnesium oxide (MAG-OX) 400 mg Take 400 mg by mouth once    metoprolol succinate (TOPROL-XL) 25 mg 24 hr tablet Please take 2 tablets in the morning and 1 tablet in the evening    Multiple Vitamins-Minerals (ICAPS AREDS 2 PO) Take 1 tablet by mouth daily     polyethylene glycol (MIRALAX) 17 g packet Take 17 g by mouth daily    Xarelto 15 MG tablet TAKE 1 TABLET BY MOUTH  DAILY WITH DINNER     No current facility-administered medications on file prior to visit  Allergies   Allergen Reactions    Ciprofloxacin     Epinephrine      Irregular heart ryhthm (afib) per pt   Statins Myalgia     Patient has tried 3-4 different statins and developed muscle pain with all    Bactrim [Sulfamethoxazole-Trimethoprim] Rash    Medical Tape Rash         Physical Exam    /74   Pulse 84   Temp 97 6 °F (36 4 °C) (Temporal)   Ht 5' 3" (1 6 m)   Wt 54 8 kg (120 lb 12 8 oz)   BMI 21 40 kg/m²     Constitutional: normal, well developed, well nourished, alert, in no distress and non-toxic and no overt pain behavior  Eyes: anicteric  HEENT: grossly intact  Neck: supple, symmetric, trachea midline and no masses   Pulmonary:even and unlabored  Cardiovascular:No edema or pitting edema present  Skin:Normal without rashes or lesions and well hydrated  Psychiatric:Mood and affect appropriate  Neurologic:Cranial Nerves II-XII grossly intact Sensation grossly intact; no clonus negative phoenix's  Reflexes 2+ and brisk  SLR negative bilaterally  Musculoskeletal:normal gait  5/5 strength bilaterally with AROM in all extremities   ambulates with walker; unable to perform normal heel toe and tip toe walking  significant pain with lumbar facet loading bilaterally and with lateral spine rotation  ttp over lumbar paraspinal muscles  Positive reynaldo's test, gaenslen's, positive SIJ loading bilaterally  Pain with internal/external rotation of right hip; ttp over right gtb    Imaging  CT LUMBAR SPINE     INDICATION:   M54 16: Radiculopathy, lumbar region  M47 816: Spondylosis without myelopathy or radiculopathy, lumbar region      COMPARISON: Prior CT December 2017, 2019     TECHNIQUE:  Contiguous axial images through the lumbar spine were obtained  Sagittal and coronal reconstructions were performed        Radiation dose length product (DLP) for this visit:  591 9 mGy-cm   This examination, like all CT scans performed in the [facility], was performed utilizing techniques to minimize radiation dose exposure, including the use of iterative   reconstruction and automated exposure control        IMAGE QUALITY:  Diagnostic      FINDINGS:     ALIGNMENT:  There are 5 lumbar type vertebral bodies  Patient has undergone L1, L3, and L4 vertebral plasty  Vertebral body heights are unchanged from the prior study  There is no acute new compression fracture identified  Rightward convex scoliosis   noted at the thoracolumbar junction      VERTEBRAL BODIES:  Postoperative changes of multilevel vertebroplasty as described  Slight retropulsion of the posterior vertebral body of L1 noted unchanged from prior      DEGENERATIVE CHANGES:     Lower Thoracic spine:  Mild central stenosis related to retropulsion of the L1 vertebral body      L1-2:  No significant central or foraminal narrowing      L2-3:  Moderate facet hypertrophy    There is mild annular bulge with marginal osteophytes which result in mild central stenosis      L3-4:  Degenerative disc osteophyte complex with marginal osteophytes eccentric to the right results in mild right foraminal narrowing and mild central stenosis      L4-5:  Moderate facet hypertrophy  There is mild annular bulge with minimal right foraminal narrowing and mild to moderate central stenosis noted      L5-S1:  Calcified posterior disc margin with annular bulging  Minimal central stenosis  Foramina are patent      PARASPINAL SOFT TISSUES:   Normal      IMPRESSION:     Stable lumbar CT status post L1, L3, and L4 kyphoplasty  Stable vertebral heights are noted  Persistent slight retropulsion of the L1 posterior vertebral body contributes to mild central stenosis      Spondylotic degenerative changes result in mild to moderate central stenosis at L4-5 and no greater than mild central or foraminal narrowing at remaining levels

## 2022-08-10 NOTE — PROGRESS NOTES
Assessment  1  Lumbar spondylosis - Bilateral    Greater than 90% relief of pain with improved ability to participate with IADLs after bilateral L3, L4, L5 medial branch blocks #1-relief continues to this day  Previously reported the following symptomatology:     Axial low back pain described primarily by arthritic features  Aching, nagging, indolent, stabbing, throbbing features in axial low back without radicular components  5/5 strength bilaterally, negative SLR  Positive facet loading maneuvers in lumbar spine elicited pain, positive tenderness to palpation over lumbar paraspinal muscles  Positive reynaldo's test, gaenslen's, positive SIJ loading bilaterally  Pain with internal/external rotation of right hip; ttp over right gtb  Recently had hip injection with Dr Nur Jump  Findings correlate with prominent lumbar facet arthropathy seen throughout axial low back on MRI from 2019 and recent CT scan; only mild to moderate central canal stenosis L4-L5  Additionally describes radicular pain numbness and weakness to a lesser degree in bilateral L4 dermatomes  Currently she is neurologically intact without gait instability, saddle anesthesia or bowel/bladder abnormality  Risks, benefits alternative to medial branch blocks and subsequent radiofrequency ablation of successful thoroughly discussed with patient  Handouts provided questions answered to patient satisfaction  Has been participant with PT but fell twice thereafter and has since discontinued  Plan  -call back to schedule bilateral L3, L4, L5 medial branch blocks #2; f/u 2 weeks post procedure  -continue HEP- physician directed home exercise plan as per [facility] demonstrated and handouts provided that patient plans to participate with for 1 hour, twice a week for the next 6 weeks  There are risks associated with opioid medications, including dependence, addiction and tolerance   The patient understands and agrees to use these medications only as prescribed  Potential side effects of the medications include, but are not limited to, constipation, drowsiness, addiction, impaired judgment and risk of fatal overdose if not taken as prescribed  The patient was warned against driving while taking sedation medications or operating heavy machinery  The patient voiced understanding  Sharing medications is a felony  At this point in time, the patient is showing no signs of addiction, abuse, diversion or suicidal ideation  [place] [employer] Drug Monitoring Program report was reviewed and was appropriate      Complete risks and benefits including bleeding, infection, tissue reaction, nerve injury and allergic reaction were discussed  The approach was demonstrated using models and literature was provided  Verbal and written consent was obtained  My impressions and treatment recommendations were discussed in detail with the patient who verbalized understanding and had no further questions  Discharge instructions were provided  I personally saw and examined the patient and I agree with the above discussed plan of care  My impressions and treatment recommendations were discussed in detail with the patient who verbalized understanding and had no further questions  Discharge instructions were provided  I personally saw and examined the patient and I agree with the above discussed plan of care  No orders of the defined types were placed in this encounter  History of Present Illness    Greater than 90% relief of pain with improved ability to participate with IADLs after bilateral L3, L4, L5 medial branch blocks #1-relief continues to this day  Previously reported the following symptomatology:     Toy Aranda is a 80 y o  female with a past medical history of Afib on xarelto with pacemaker presenting with chronic low back pain described primarily as arthritic in nature    She describes 8/10 low back pain that is worse in the mornings and worse at the end of the day  The pain is characterized by achy, nagging, indolent, crampy, stabbing pain in her axial low back  The patient describes that the pain is worse with standing for long periods of time on hard surfaces as well as with walking  She ambulates with a walker  The patient is a very active individual and feels as though this pain compromises his/her participation with independent activities of daily living  The pain can be debilitating at times and contribute to significant disability, compromising overall activity and independent activities of daily living  She has tried physical therapy with limited relief of symptoms and discontinued after falling twice  Medications the patient has tried in the past include   tylenol  She describes minor degree radicular symptoms in bilateral L4 dermatome but otherwise has good strength  She denies any weakness numbness or paresthesias that are particularly problematic  The patient denies any bowel or bladder dysfunction as well, gait instability or saddle anesthesia  I have personally reviewed and/or updated the patient's past medical history, past surgical history, family history, social history, current medications, allergies, and vital signs today  Review of Systems   Constitutional: Positive for activity change  HENT: Negative  Eyes: Negative  Respiratory: Negative  Cardiovascular: Negative  Gastrointestinal: Negative  Endocrine: Negative  Genitourinary: Negative  Musculoskeletal: Positive for arthralgias, back pain, gait problem and myalgias  Skin: Negative  Allergic/Immunologic: Negative  Neurological: Positive for weakness and numbness  Hematological: Negative  Psychiatric/Behavioral: Negative  All other systems reviewed and are negative        Patient Active Problem List   Diagnosis    Paroxysmal atrial fibrillation (HCC)    Syncope    Constipation    Leukocytosis    Hypokalemia    Spinal stenosis of lumbar region with neurogenic claudication    Age-related osteoporosis without current pathological fracture    Primary osteoarthritis of both hips    Hypertension    Hyperlipidemia    Fracture of multiple ribs of right side    Acute pain of right shoulder    Closed displaced fracture of lateral end of right clavicle    Pacemaker    Carotid atherosclerosis, bilateral    Mesenteric artery stenosis (HCC)    Stage 3 chronic kidney disease, unspecified whether stage 3a or 3b CKD ([facility] )       Past Medical History:   Diagnosis Date    A[facility]fib [facility] )     Anxiety     Arthritis     R knee    Carotid atherosclerosis, bilateral     GERD (gastroesophageal reflux disease)     Hyperlipidemia     Hypertension     Muscle spasms of neck     Osteoporosis     Spinal stenosis        Past Surgical History:   Procedure Laterality Date    BACK SURGERY      CARDIAC ELECTROPHYSIOLOGY PROCEDURE N/A 2/22/2022    Procedure: Cardiac pacer implant;  Surgeon: Silvia Valerio MD;  [facility];   Service: Cardiology    EYE SURGERY      cataracts    FL GUIDED NEEDLE PLAC BX/ASP/INJ  8/2/2022    NERVE BLOCK Bilateral 8/2/2022    Procedure: BLOCK MEDIAL BRANCH L3, L4, L5 MBB #1;  Surgeon: Dariana Abraham MD;  Location: [facility];  Service: Pain Management        Family History   Problem Relation Age of Onset    Cancer Father     Cancer Brother     Heart disease Mother        Social History     Occupational History    Not on file   Tobacco Use    Smoking status: Never Smoker    Smokeless tobacco: Never Used   Vaping Use    Vaping Use: Never used   Substance and Sexual Activity    Alcohol use: Not Currently    Drug use: Never    Sexual activity: Not Currently       Current Outpatient Medications on File Prior to Visit   Medication Sig    acetaminophen (TYLENOL) 500 mg tablet Take 2 tablets (1,000 mg total) by mouth every 6 (six) hours as needed for mild pain or moderate pain    amLODIPine (NORVASC) 10 mg tablet Take 1 tablet (10 mg total) by mouth daily    calcium citrate-vitamin D (CITRACAL+D) 315-200 MG-UNIT per tablet Take by mouth    docusate sodium (COLACE) 100 mg capsule Take 100 mg by mouth daily    LORazepam (ATIVAN) 2 mg tablet     losartan (COZAAR) 25 mg tablet Take 1 tablet (25 mg total) by mouth daily    magnesium oxide (MAG-OX) 400 mg Take 400 mg by mouth once    metoprolol succinate (TOPROL-XL) 25 mg 24 hr tablet Please take 2 tablets in the morning and 1 tablet in the evening    Multiple Vitamins-Minerals (ICAPS AREDS 2 PO) Take 1 tablet by mouth daily     polyethylene glycol (MIRALAX) 17 g packet Take 17 g by mouth daily    Xarelto 15 MG tablet TAKE 1 TABLET BY MOUTH  DAILY WITH DINNER     No current facility-administered medications on file prior to visit  Allergies   Allergen Reactions    Ciprofloxacin     Epinephrine      Irregular heart ryhthm (afib) per pt   Statins Myalgia     Patient has tried 3-4 different statins and developed muscle pain with all    Bactrim [Sulfamethoxazole-Trimethoprim] Rash    Medical Tape Rash         Physical Exam    /74   Pulse 84   Temp 97 6 °F (36 4 °C) (Temporal)   Ht 5' 3" (1 6 m)   Wt 54 8 kg (120 lb 12 8 oz)   BMI 21 40 kg/m²     Constitutional: normal, well developed, well nourished, alert, in no distress and non-toxic and no overt pain behavior  Eyes: anicteric  HEENT: grossly intact  Neck: supple, symmetric, trachea midline and no masses   Pulmonary:even and unlabored  Cardiovascular:No edema or pitting edema present  Skin:Normal without rashes or lesions and well hydrated  Psychiatric:Mood and affect appropriate  Neurologic:Cranial Nerves II-XII grossly intact Sensation grossly intact; no clonus negative phoenix's  Reflexes 2+ and brisk  SLR negative bilaterally  Musculoskeletal:normal gait  5/5 strength bilaterally with AROM in all extremities   ambulates with walker; unable to perform normal heel toe and tip toe walking  significant pain with lumbar facet loading bilaterally and with lateral spine rotation  ttp over lumbar paraspinal muscles  Positive reynaldo's test, gaenslen's, positive SIJ loading bilaterally  Pain with internal/external rotation of right hip; ttp over right gtb    Imaging  CT LUMBAR SPINE     INDICATION:   M54 16: Radiculopathy, lumbar region  M47 816: Spondylosis without myelopathy or radiculopathy, lumbar region      COMPARISON: Prior CT December 2017, 2019     TECHNIQUE:  Contiguous axial images through the lumbar spine were obtained  Sagittal and coronal reconstructions were performed        Radiation dose length product (DLP) for this visit:  591 9 mGy-cm   This examination, like all CT scans performed in the [facility], was performed utilizing techniques to minimize radiation dose exposure, including the use of iterative   reconstruction and automated exposure control        IMAGE QUALITY:  Diagnostic      FINDINGS:     ALIGNMENT:  There are 5 lumbar type vertebral bodies  Patient has undergone L1, L3, and L4 vertebral plasty  Vertebral body heights are unchanged from the prior study  There is no acute new compression fracture identified  Rightward convex scoliosis   noted at the thoracolumbar junction      VERTEBRAL BODIES:  Postoperative changes of multilevel vertebroplasty as described  Slight retropulsion of the posterior vertebral body of L1 noted unchanged from prior      DEGENERATIVE CHANGES:     Lower Thoracic spine:  Mild central stenosis related to retropulsion of the L1 vertebral body      L1-2:  No significant central or foraminal narrowing      L2-3:  Moderate facet hypertrophy    There is mild annular bulge with marginal osteophytes which result in mild central stenosis      L3-4:  Degenerative disc osteophyte complex with marginal osteophytes eccentric to the right results in mild right foraminal narrowing and mild central stenosis      L4-5:  Moderate facet hypertrophy  There is mild annular bulge with minimal right foraminal narrowing and mild to moderate central stenosis noted      L5-S1:  Calcified posterior disc margin with annular bulging  Minimal central stenosis  Foramina are patent      PARASPINAL SOFT TISSUES:   Normal      IMPRESSION:     Stable lumbar CT status post L1, L3, and L4 kyphoplasty  Stable vertebral heights are noted  Persistent slight retropulsion of the L1 posterior vertebral body contributes to mild central stenosis      Spondylotic degenerative changes result in mild to moderate central stenosis at L4-5 and no greater than mild central or foraminal narrowing at remaining levels

## 2022-08-24 ENCOUNTER — OFFICE VISIT (OUTPATIENT)
Dept: CARDIOLOGY CLINIC | Facility: CLINIC | Age: 87
End: 2022-08-24
Payer: MEDICARE

## 2022-08-24 VITALS
HEIGHT: 63 IN | TEMPERATURE: 97.3 F | OXYGEN SATURATION: 96 % | HEART RATE: 63 BPM | BODY MASS INDEX: 21.37 KG/M2 | SYSTOLIC BLOOD PRESSURE: 138 MMHG | DIASTOLIC BLOOD PRESSURE: 82 MMHG | WEIGHT: 120.6 LBS

## 2022-08-24 DIAGNOSIS — E78.2 MIXED HYPERLIPIDEMIA: ICD-10-CM

## 2022-08-24 DIAGNOSIS — I65.23 CAROTID ATHEROSCLEROSIS, BILATERAL: ICD-10-CM

## 2022-08-24 DIAGNOSIS — R55 SYNCOPE, UNSPECIFIED SYNCOPE TYPE: ICD-10-CM

## 2022-08-24 DIAGNOSIS — I10 PRIMARY HYPERTENSION: ICD-10-CM

## 2022-08-24 DIAGNOSIS — I48.0 PAROXYSMAL ATRIAL FIBRILLATION (HCC): Primary | ICD-10-CM

## 2022-08-24 PROCEDURE — 99214 OFFICE O/P EST MOD 30 MIN: CPT | Performed by: INTERNAL MEDICINE

## 2022-08-24 NOTE — PATIENT INSTRUCTIONS
Blood pressure is acceptable  Heart rate is great and pacemaker checks show minimal atrial fibrillation and no fast heart rates  Call me with any change in symptoms

## 2022-08-24 NOTE — PROGRESS NOTES
Cardiology Office Visit    Unknown Francisco  [id number]  1935    [facility]  [address] [facility] [address]  [address]      Dear Herbie Hines MD,    I had the pleasure of seeing your patient at our [address] [facility] [place] office today 8/24/2022  As you know she is a pleasant 80y o  year old female with a medical history as described below  Reason for office visit: Follow up atrial fibrillation, hypertension and hyperlipidemia  1  Paroxysmal atrial fibrillation ([address] )    2  Primary hypertension    3  Mixed hyperlipidemia    4  Syncope, unspecified syncope type    5  Carotid atherosclerosis, bilateral        Plan:    Blood pressure is acceptable  Heart rate is great and pacemaker checks show minimal atrial fibrillation and no fast heart rates  Call me with any change in symptoms  HPI   Tessie Person has a past medical history of paroxysmal atrial fibrillation, HTN, HLD, CKD, lumbar spinal stenosis, and osteoporosis      She was previously following with [facility], Dr Sudhir Duarte presented to Dr Ben Wilder to establish care on 6/10/2021   Kandi Montanez has been treated for paroxysmal atrial fibrillation with Tikosyn for > 10 years  Her last symptomatic episode from a fib had been in 2019  She sustained a fall  from a potential syncopal event where her legs gave out and her head hit the wall while she was in the bathroom brushing her teeth in 2/2019  Her EKG at the time showed QTc of 470msec      She had met with [facility] EP clinic in 9/2020 and they had discussed switching to amiodarone due to increased risk of Tikosyn induced torsades with advanced age, however, she decided against this      She continued to note fatigue and increasing palpitations over recent months  She denied lightheadedness, chest pain, shortness of breath, recurrent syncope, or leg swelling  She denies Covid 19 infection history   She is under a lot of stress caring for her spouse with Alzheimer's disease        She followed up 7/21/21 to review her 7 day ZIO results which revealed 1 run of VT (6 beats) and 14 runs of SVT and occasional PAC's  She noted that her "a fib was going crazy" while wearing the patch  Her metoprolol succinate was increased to 25 mg in am, 12 5 mg in pm  Updated holter monitor was ordered  She followed up 10/8/21 at which time her holter monitor results were reviewed and showed 7 9% PAC burden despite the increased beta blocker dose  Her average HR was 56 bpm, limiting further titration of the beta blocker  She reported another syncopal event on 9/13/2021 when she was standing in the kitchen and suddenly woke on the floor  When she woke she was tired and sweaty but denies chest pain, shortness of breath or palpitations  A nuclear stress test was ordered for ischemic work up due to VT on her monitor and syncopal events  We had previously reviewed risks of Tikosyn in advanced age and she was now agreeable to change  She was transitioned off Tikosyn and onto amiodarone        I had evaluated her 1/24/2022 at which time her BP remained elevated  Results of her negative Lexiscan stress test were reviewed  She reported dizziness, sweating and balance issues with the amiodarone  Her amiodarone was reduced to 100 mg daily down from 200 mg daily  Losartan 25 mg daily was added for BP control  It was recommended she undergo PPM insertion with possible AV node ablation and was referred to EP  Pacemaker placed 2/22/2022 by Dr David Kendall  The procedure was uneventful  She was instructed that she may discontinue her amiodarone if she wished and her metoprolol succinate was increased to 50 mg in am and  25 mg in pm  There was a discussion for possible AV node ablation if rates continued to be difficult to control despite titration of beta blocker      * Patient was seen by [place] 3/1/2022 and again 3/29/2022 at which time her > 70% celiac and superior mesenteric arteries  CRNP did reach out to vascular surgery  Vascular surgery referral made  8/24/2022: Patient returns to the office today for follow up  She reports a lot of stress regarding her husbands health  No chest pain  No shortness of breath  Lipid panel reviewed with   She did not tolerate statin therapy  She also recently had her good friend pass away  She tells me that she does occasionally notice her heart rate seems a little fast for about 1-2 minutes when she first gets out of bed which then resolves  Device check 7/13/2022 showed normal device function and afib burden <0 1%  * Need to see if she ever heard from vascular surgery regarding appointment       Patient Active Problem List   Diagnosis    Paroxysmal atrial fibrillation (HCC)    Syncope    Constipation    Leukocytosis    Hypokalemia    Spinal stenosis of lumbar region with neurogenic claudication    Age-related osteoporosis without current pathological fracture    Primary osteoarthritis of both hips    Hypertension    Hyperlipidemia    Fracture of multiple ribs of right side    Acute pain of right shoulder    Closed displaced fracture of lateral end of right clavicle    Pacemaker    Carotid atherosclerosis, bilateral    Mesenteric artery stenosis (HCC)    Stage 3 chronic kidney disease, unspecified whether stage 3a or 3b CKD ([facility] )    Lumbar spondylosis     Past Medical History:   Diagnosis Date    A-fib ([facility] )     Anxiety     Arthritis     R knee    Carotid atherosclerosis, bilateral     GERD (gastroesophageal reflux disease)     Hyperlipidemia     Hypertension     Muscle spasms of neck     Osteoporosis     Spinal stenosis      Social History     Socioeconomic History    Marital status: /Civil Union     Spouse name: Not on file    Number of children: Not on file    Years of education: Not on file    Highest education level: Not on file   Occupational History    Not on file Tobacco Use    Smoking status: Never Smoker    Smokeless tobacco: Never Used   Vaping Use    Vaping Use: Never used   Substance and Sexual Activity    Alcohol use: Not Currently    Drug use: Never    Sexual activity: Not Currently   Other Topics Concern    Not on file   Social History Narrative    Not on file     Social Determinants of Health     Financial Resource Strain: Not on file   Food Insecurity: Not on file   Transportation Needs: Not on file   Physical Activity: Not on file   Stress: Not on file   Social Connections: Not on file   Intimate Partner Violence: Not on file   Housing Stability: Not on file      Family History   Problem Relation Age of Onset    Cancer Father     Cancer Brother     Heart disease Mother      Past Surgical History:   Procedure Laterality Date    BACK SURGERY      CARDIAC ELECTROPHYSIOLOGY PROCEDURE N/A 2/22/2022    Procedure: Cardiac pacer implant;  Surgeon: Mare Zelaya MD;  Location: [facility];   Service: Cardiology    EYE SURGERY      cataracts    FL GUIDED NEEDLE PLAC BX/ASP/INJ  8/2/2022    NERVE BLOCK Bilateral 8/2/2022    Procedure: BLOCK MEDIAL BRANCH L3, L4, L5 MBB #1;  Surgeon: Jada Middleton MD;  Location: [facility];  Service: Pain Management        Current Outpatient Medications:     acetaminophen (TYLENOL) 500 mg tablet, Take 2 tablets (1,000 mg total) by mouth every 6 (six) hours as needed for mild pain or moderate pain, Disp: 30 tablet, Rfl: 0    amLODIPine (NORVASC) 10 mg tablet, Take 1 tablet (10 mg total) by mouth daily, Disp: 90 tablet, Rfl: 3    calcium citrate-vitamin D (CITRACAL+D) 315-200 MG-UNIT per tablet, Take by mouth, Disp: , Rfl:     docusate sodium (COLACE) 100 mg capsule, Take 100 mg by mouth daily, Disp: , Rfl:     LORazepam (ATIVAN) 2 mg tablet, , Disp: , Rfl:     losartan (COZAAR) 25 mg tablet, Take 1 tablet (25 mg total) by mouth daily, Disp: 90 tablet, Rfl: 3    magnesium oxide (MAG-OX) 400 mg, Take 400 mg by mouth once, Disp: , Rfl:     metoprolol succinate (TOPROL-XL) 25 mg 24 hr tablet, Please take 2 tablets in the morning and 1 tablet in the evening, Disp: 270 tablet, Rfl: 3    Multiple Vitamins-Minerals (ICAPS AREDS 2 PO), Take 1 tablet by mouth daily , Disp: , Rfl:     polyethylene glycol (MIRALAX) 17 g packet, Take 17 g by mouth daily, Disp: , Rfl:     Xarelto 15 MG tablet, TAKE 1 TABLET BY MOUTH  DAILY WITH DINNER, Disp: 90 tablet, Rfl: 3       Allergies   Allergen Reactions    Ciprofloxacin     Epinephrine      Irregular heart ryhthm (afib) per pt   Statins Myalgia     Patient has tried 3-4 different statins and developed muscle pain with all    Bactrim [Sulfamethoxazole-Trimethoprim] Rash    Medical Tape Rash       Cardiac Test Results:     VAS celiac and/or mesenteric duplex 3/21/2022: The aorta is patent and normal in caliber  >70% stenosis in the celiac artery  >70% stenosis of the superior mesenteric artery       ECG 3/1/2022: Atrial-paced rhythm with prolonged AV conduction  Left ventricular hypertrophy  Rate 73 bpm      Lexiscan nuclear stress test 11/2/2021:  Normal stress perfusion pattern  Hyperdynamic LV function gated analysis  Low risk perfusion pattern  Stress ECG: No ECG changes meeting criteria for ischemia   Nondiagnostic ECG portion given vasodilator protocol      ECG 10/8/2021: Sinus bradycardia, rate 53, cannot rule out septal infarct, no significant change from previous ECG on 8/10/2021     24 hour holter monitor 9/7/2021:  Predominantly sinus rhythm, HR varied from 39 bpm to 103 bpm   The patients average heart rate was 56 bpm     17 ventricular ectopic activity  6195 (7 9%)  supraventricular ectopy  Longest R/R interval was 1 8 seconds      ZIO 6/10-6/17/2021:   Min HR of 35, max HR of 193, and avg HR of 59 bpm   Predominant underlying rhythm was Sinus Rhythm  1 run of Ventricular Tachycardia occurred lasting 6 beats with a max rate of 193 bpm (avg 177 bpm)     15 Supraventricular Tachycardia runs occurred, the run with the longest lasting 20 beats with an avg rate of 115 bpm    Occasional PAC's (3 9%, 56739)  Rare PVC's (<1 0%)     Echocardiogram 4/2/2021 ([facility]): EF 55-60%  Grade 1 diastolic dysfunction  Mild-moderate AI  Mild-moderate MR  Mild TR      ECG 3/22/2021 ([facility]): Sinus rhythm with one PAC  68 bpm             Review of Systems:    Review of Systems   Constitutional: Negative for activity change, appetite change and fatigue  HENT: Negative for congestion, hearing loss, tinnitus and trouble swallowing  Eyes: Negative for visual disturbance  Respiratory: Negative for cough, chest tightness, shortness of breath and wheezing  Cardiovascular: Negative for chest pain, palpitations and leg swelling  Gastrointestinal: Negative for abdominal distention, abdominal pain, nausea and vomiting  Genitourinary: Negative for difficulty urinating  Musculoskeletal: Negative for arthralgias  Skin: Negative for rash  Neurological: Negative for dizziness, syncope and light-headedness  Hematological: Does not bruise/bleed easily  Psychiatric/Behavioral: Negative for confusion  The patient is not nervous/anxious  All other systems reviewed and are negative  Vitals:    08/24/22 1503 08/24/22 1536   BP: 152/74 138/82   BP Location: Left arm Left arm   Patient Position: Sitting    Cuff Size: Standard    Pulse: 63    Temp: (!) 97 3 °F (36 3 °C)    SpO2: 96%    Weight: 54 7 kg (120 lb 9 6 oz)    Height: 5' 3" (1 6 m)      Vitals:    08/24/22 1503   Weight: 54 7 kg (120 lb 9 6 oz)     Height: 5' 3" (160 cm)     Physical Exam  Constitutional:       Appearance: She is well-developed  HENT:      Head: Normocephalic and atraumatic  Eyes:      Conjunctiva/sclera: Conjunctivae normal       Pupils: Pupils are equal, round, and reactive to light  Neck:      Vascular: No JVD  Cardiovascular:      Rate and Rhythm: Normal rate and regular rhythm        Heart sounds: Normal heart sounds  No murmur heard  No friction rub  No gallop  Pulmonary:      Effort: Pulmonary effort is normal       Breath sounds: Normal breath sounds  Abdominal:      General: Bowel sounds are normal       Palpations: Abdomen is soft  Musculoskeletal:      Cervical back: Normal range of motion  Skin:     General: Skin is warm and dry  Neurological:      Mental Status: She is alert and oriented to person, place, and time     Psychiatric:         Behavior: Behavior normal

## 2022-08-29 ENCOUNTER — TELEPHONE (OUTPATIENT)
Dept: PAIN MEDICINE | Facility: CLINIC | Age: 87
End: 2022-08-29

## 2022-08-29 ENCOUNTER — PREP FOR PROCEDURE (OUTPATIENT)
Dept: PAIN MEDICINE | Facility: CLINIC | Age: 87
End: 2022-08-29

## 2022-08-29 DIAGNOSIS — M47.816 LUMBAR SPONDYLOSIS: Primary | ICD-10-CM

## 2022-09-01 ENCOUNTER — APPOINTMENT (OUTPATIENT)
Dept: RADIOLOGY | Facility: HOSPITAL | Age: 87
End: 2022-09-01
Payer: MEDICARE

## 2022-09-01 ENCOUNTER — HOSPITAL ENCOUNTER (OUTPATIENT)
Facility: HOSPITAL | Age: 87
Setting detail: OUTPATIENT SURGERY
Discharge: HOME | End: 2022-09-01
Attending: ANESTHESIOLOGY | Admitting: ANESTHESIOLOGY
Payer: MEDICARE

## 2022-09-01 VITALS
OXYGEN SATURATION: 97 % | WEIGHT: 120 LBS | TEMPERATURE: 98.1 F | SYSTOLIC BLOOD PRESSURE: 156 MMHG | BODY MASS INDEX: 21.26 KG/M2 | HEART RATE: 70 BPM | RESPIRATION RATE: 18 BRPM | DIASTOLIC BLOOD PRESSURE: 68 MMHG | HEIGHT: 63 IN

## 2022-09-01 PROCEDURE — 64494 INJ PARAVERT F JNT L/S 2 LEV: CPT | Performed by: ANESTHESIOLOGY

## 2022-09-01 PROCEDURE — 77002 NEEDLE LOCALIZATION BY XRAY: CPT

## 2022-09-01 PROCEDURE — 64493 INJ PARAVERT F JNT L/S 1 LEV: CPT | Performed by: ANESTHESIOLOGY

## 2022-09-01 RX ORDER — METHYLPREDNISOLONE ACETATE 80 MG/ML
INJECTION, SUSPENSION INTRA-ARTICULAR; INTRALESIONAL; INTRAMUSCULAR; SOFT TISSUE AS NEEDED
Status: DISCONTINUED | OUTPATIENT
Start: 2022-09-01 | End: 2022-09-01 | Stop reason: HOSPADM

## 2022-09-01 RX ORDER — LIDOCAINE HYDROCHLORIDE 10 MG/ML
INJECTION, SOLUTION EPIDURAL; INFILTRATION; INTRACAUDAL; PERINEURAL AS NEEDED
Status: DISCONTINUED | OUTPATIENT
Start: 2022-09-01 | End: 2022-09-01 | Stop reason: HOSPADM

## 2022-09-01 RX ORDER — BUPIVACAINE HYDROCHLORIDE 2.5 MG/ML
INJECTION, SOLUTION EPIDURAL; INFILTRATION; INTRACAUDAL AS NEEDED
Status: DISCONTINUED | OUTPATIENT
Start: 2022-09-01 | End: 2022-09-01 | Stop reason: HOSPADM

## 2022-09-01 NOTE — PROCEDURES
Pre-procedure Diagnosis: Lumbar Facet Arthropathy  Post-procedure Diagnosis: Lumbar Facet Arthropathy  Procedure Title(s):  [BILATERAL L3, L4, L5] medial branch nerve blocks [(CONFIRMATORY)]  Attending Surgeon:   Vargas Naidu MD  Anesthesia:   Local     Indications: The patient is a 80y o  year-old female with a diagnosis of lumbar facet arthropathy  The patient's history and physical exam were reviewed  The risks, benefits and alternatives to the procedure were discussed, and all questions were answered to the patient's satisfaction  The patient agreed to proceed, and written informed consent was obtained  Procedure in Detail: The patient was brought into the procedure room and placed in the prone position on the fluoroscopy table  The area of the lumbar spine was prepped with chloraprep and then draped in a sterile manner  AP fluoroscopy was used to identify the [L3-L5] levels on the [LEFT] side  The C-arm was obliqued to visualize the junction of the superior articulate process and transverse process  The sacral ala was identified and marked  The skin in these identified areas was anesthetized with 1% lidocaine  A 22-gauge, 3½-inch spinal needle was advanced toward each of these points under fluoroscopic guidance  Once bone was contacted, negative aspiration was confirmed and [1-mL] of a [6mL]mixture of [5mL] [0 25% bupivicaine] and 1mL of 80mg/mL Depomedrol was injected at each level  (The same procedure was performed on the RIGHT side )    After the procedure was completed, the patient's back was cleaned and bandages were placed at the needle insertion sites  Disposition: The patient tolerated the procedure well, and there were no apparent complications  The patient was taken to the recovery area where written discharge instructions for the procedure were given  The patient was given a pain diary to determine if the patient's pain improves following the injection   Once the diary is returned we will consider next appropriate course of treatment      Postoperative pain relief [WAS] significant    Estimated Blood Loss: None  Specimens Obtained: N/A

## 2022-09-01 NOTE — INTERVAL H&P NOTE
H&P reviewed  After examining the patient I find no changes in the patients condition since the H&P had been written      Vitals:    09/01/22 1201   BP: 141/65   Pulse: 85   Resp: 18   Temp: 98 3 °F (36 8 °C)   SpO2: 98%

## 2022-09-01 NOTE — OP NOTE
OPERATIVE REPORT  PATIENT NAME: Rosalia Dominguez    :  1935  MRN: [id number]  Pt Location:  GI ROOM 01    SURGERY DATE: 2022    Surgeon(s) and Role: Rebecca Sharma MD - Primary    Preop Diagnosis:  Lumbar spondylosis [M47 816]    Post-Op Diagnosis Codes:     * Lumbar spondylosis [M47 816]    Procedure(s) (LRB):  BLOCK MEDIAL BRANCH L3, L4, L5 (Bilateral)    Specimen(s):  * No specimens in log *    Estimated Blood Loss:   Minimal    Drains:  * No LDAs found *    Anesthesia Type:   Local    Operative Indications:  Lumbar spondylosis [M47 816]    Operative Findings:  Bilateral L3, L4, L5 medial branch nerve regions identified under fluoroscopic guidance      Complications:   None    Procedure and Technique:  Please see detailed procedure note     I was present for the entire procedure    Patient Disposition:  PACU     SIGNATURE: Aleksey Sanchez MD  DATE: 2022  TIME: 12:47 PM

## 2022-09-01 NOTE — DISCHARGE INSTRUCTIONS
YOUR 2 WEEK FOLLOW UP HAS BEEN SCHEDULED; IF YOU WISH TO CHANGE THE FOLLOW UP, PLEASE CALL THE [facility] AT [facility]: [phone]    MEDIAL BRANCH BLOCK DISCHARGE INSTRUCTIONS  ACTIVITY  Please do activities that will bring the normal pain that we are rating  For example, if vacuuming or walking increases the painm do that  Danis twill give the most accurate response to the diary  You may shower, but no tub baths today, or applied heat  CARE OF THE INJECTION SITE  This area may be numb for several hours after the injection  Notify the [facility] if you have any of the following: redness, drainage, swelling or fever above 100°F     SPECIAL INSTRUCTIONS  Please return the MBB diary to our office by mail, fax, or drop it off  MEDICATIONS  Please do not take any break through or short acting pain medications for 8 hours after the block  Continue to take all routine medications  Our office may have instructed you to hold some medications  You may resume _______________________________________________  If you have any problems specifically related to your procedure, please call our office at [phone]  Problems not related to your procedure should be directed at your primary care physician  Lumbar Radiofrequency Ablation   WHAT YOU NEED TO KNOW:   What do I need to know about lumbar radiofrequency ablation? Lumbar radiofrequency ablation (RFA) is a procedure used to treat facet joint pain in your lower back  Facet joints are found at the back of each vertebra  A needle electrode is used to send electrical currents to the nerves in your facet joint  The electrical currents create heat that damages the nerve so it cannot send pain signals  How do I prepare for lumbar RFA? Your healthcare provider will talk to you about how to prepare for this procedure  He may tell you not to eat or drink anything after midnight on the day of your procedure   He will tell you what medicines to take or not take on the day of your procedure  What will happen during lumbar RFA? You will lie on your stomach  You will be given local anesthesia to numb the area of your back where the needle electrode will be inserted  You may be given a sedative to help keep you relaxed  You may still feel pressure or pushing during the procedure, but you should not feel any pain  Your healthcare provider will use fluoroscopy (a type of x-ray) to guide the needle electrode to the nerves near your facet joint  Your healthcare provider may touch the affected nerve to make sure the needle electrode is in the right place  You will feel tingling or pressure when he does this  He will then apply local anesthesia to the nerve to numb it  This will prevent you from feeling pain when he applies heat to the nerve  Your healthcare provider will then apply heat to the nerve using the needle electrode  He may need to apply heat to more than one nerve  He will remove the needle electrode and apply a bandage over the area  What are the risks of lumbar RFA? You may have pain, numbness, tingling, or burning in the area where the lumbar RFA was done  These normally go away within 6 weeks  The needle electrode may injure your spinal nerves  This may cause permanent leg weakness or nerve pain  CARE AGREEMENT:   You have the right to help plan your care  Learn about your health condition and how it may be treated  Discuss treatment options with your healthcare providers to decide what care you want to receive  You always have the right to refuse treatment  The above information is an  only  It is not intended as medical advice for individual conditions or treatments  Talk to your doctor, nurse or pharmacist before following any medical regimen to see if it is safe and effective for you    © Copyright [employer] 2022 Information is for End User's use only and may not be sold, redistributed or otherwise used for commercial purposes   All illustrations and images included in CareNotes® are the copyrighted property of A D A M , Inc  or [facility] Erick Tucker

## 2022-10-17 ENCOUNTER — REMOTE DEVICE CLINIC VISIT (OUTPATIENT)
Dept: CARDIOLOGY CLINIC | Facility: CLINIC | Age: 87
End: 2022-10-17
Payer: MEDICARE

## 2022-10-17 DIAGNOSIS — Z95.0 PRESENCE OF PERMANENT CARDIAC PACEMAKER: Primary | ICD-10-CM

## 2022-10-17 PROCEDURE — 93296 REM INTERROG EVL PM/IDS: CPT | Performed by: INTERNAL MEDICINE

## 2022-10-17 PROCEDURE — 93294 REM INTERROG EVL PM/LDLS PM: CPT | Performed by: INTERNAL MEDICINE

## 2022-10-17 NOTE — PROGRESS NOTES
Results for orders placed or performed in visit on 10/17/22   Cardiac EP device report    Narrative    MDT DUAL PM/ACTIVE SYSTEM IS MRI CONDITIONAL  CARELINK TRANSMISSION: BATTERY VOLTAGE ADEQUATE  (13 2 YRS) AP 83%  2%  ALL AVAILABLE LEAD PARAMETERS WITHIN NORMAL LIMITS  4 rATP TREATED AF EPISODES DETECTED (3 5% OF TIME)  1 VHR EPISODE DETECTED 13 BEATS @ 360ms  PATIENT IS ON XARELTO AND METOPROLOL SUCC; EF 70% (2021, STRESS)  NORMAL DEVICE FUNCTION  ---INFANTE

## 2022-10-20 ENCOUNTER — OFFICE VISIT (OUTPATIENT)
Dept: OBGYN CLINIC | Facility: CLINIC | Age: 87
End: 2022-10-20
Payer: MEDICARE

## 2022-10-20 VITALS
TEMPERATURE: 96.9 F | WEIGHT: 120 LBS | DIASTOLIC BLOOD PRESSURE: 80 MMHG | HEART RATE: 61 BPM | SYSTOLIC BLOOD PRESSURE: 136 MMHG | BODY MASS INDEX: 21.26 KG/M2 | HEIGHT: 63 IN

## 2022-10-20 DIAGNOSIS — M25.551 ACUTE RIGHT HIP PAIN: Primary | ICD-10-CM

## 2022-10-20 DIAGNOSIS — M25.551 GREATER TROCHANTERIC PAIN SYNDROME OF RIGHT LOWER EXTREMITY: ICD-10-CM

## 2022-10-20 PROCEDURE — 99213 OFFICE O/P EST LOW 20 MIN: CPT | Performed by: STUDENT IN AN ORGANIZED HEALTH CARE EDUCATION/TRAINING PROGRAM

## 2022-10-20 PROCEDURE — 20610 DRAIN/INJ JOINT/BURSA W/O US: CPT | Performed by: STUDENT IN AN ORGANIZED HEALTH CARE EDUCATION/TRAINING PROGRAM

## 2022-10-20 RX ORDER — TRIAMCINOLONE ACETONIDE 40 MG/ML
40 INJECTION, SUSPENSION INTRA-ARTICULAR; INTRAMUSCULAR
Status: COMPLETED | OUTPATIENT
Start: 2022-10-20 | End: 2022-10-20

## 2022-10-20 RX ORDER — BUPIVACAINE HYDROCHLORIDE 2.5 MG/ML
2 INJECTION, SOLUTION INFILTRATION; PERINEURAL
Status: COMPLETED | OUTPATIENT
Start: 2022-10-20 | End: 2022-10-20

## 2022-10-20 RX ADMIN — TRIAMCINOLONE ACETONIDE 40 MG: 40 INJECTION, SUSPENSION INTRA-ARTICULAR; INTRAMUSCULAR at 09:04

## 2022-10-20 RX ADMIN — BUPIVACAINE HYDROCHLORIDE 2 ML: 2.5 INJECTION, SOLUTION INFILTRATION; PERINEURAL at 09:04

## 2022-10-20 NOTE — PROGRESS NOTES
1  Acute right hip pain  Large joint arthrocentesis: R greater trochanteric bursa   2  Greater trochanteric pain syndrome of right lower extremity  Large joint arthrocentesis: R greater trochanteric bursa     Orders Placed This Encounter   Procedures   • Large joint arthrocentesis: R greater trochanteric bursa        Imaging Studies (I personally reviewed images in PACS and report):    · X-ray right hip 06/20/2022: There is evidence of lumbar kyphoplasty  Otherwise in regards to her right hip there is no acute osseous abnormalities or significant degenerative changes  · MRI lumbar spine w/o contrast 3/19/2019  1  Acute compression fractures at L1, L3, and L4  2  Moderate central canal stenosis at L3-4 and L4-5  Multilevel bilateral neural foraminal stenosis as described  IMPRESSION:  • Acute right hip pain - ongoing <1 week w/o injury  • 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  • Previously had significant relief with GTB cortisone injection in June of 2022 until recently    PLAN:    • Clinical exam and radiographic imaging reviewed with patient today, with impression as per above  I have discussed with the patient the pathophysiology of this diagnosis and reviewed how the examination correlates with this diagnosis  • Treatment options were discussed and patient agreeable to cortisone injection of her right greater trochanteric bursa as per procedure note below  I counseled compete this injection every 3 months as needed in regards to pain control  I did recommend starting formal physical therapy as well prevent this type of pain from recurring in the future to minimize use of injections, but patient prefers to defer this for now and wait for the cortisone injection to take effect before making this decision  • Patient deferred formal PT, thus I did provide her home exercises and demonstrated in clinic today about a performed    Discussed the importance of doing a home stretch program in order to prevent recurrence of this pain in the future  Return if symptoms worsen or fail to improve  Portions of the record may have been created with voice recognition software  Occasional wrong word or "sound a like" substitutions may have occurred due to the inherent limitations of voice recognition software  Read the chart carefully and recognize, using context, where substitutions have occurred  CHIEF COMPLAINT:  Chief Complaint   Patient presents with   • Follow-up         HPI:  Laura Mccabe is a 80 y o  female  who presents for     Visit 10/20/2022: Follow up right hip pain  Patient last seen in June, 2022 in which she had symptoms consistent with GTB  She had a subsequent GT be cortisone injection with significant relief and has been doing well until this past weekend  She states she denied a precipitating injury but progressively had worsening right lateral hip pain that her intermittently radiate down her lateral thigh to her knee  She states the pain is similar to her right hip pain in the past   She states it is sharp/aching and of moderate to severe intensity  She states pain is aggravated from prolonged sitting and especially when lying on her right hip  She is able to tolerate weight-bearing on her right lower extremity  Denies hip swelling, discoloration, numbness/tingling  She is interested in a repeat injection if warranted  She has not seen formal physical therapy for this issue and is not open to attending due to time constraints    She also reports that she recently had to move her  to an assisted living due to dementia and does not feel she would have time to attend PT formally        Medical, Surgical, Family, and Social History    Past Medical History:   Diagnosis Date   • A-fib [facility])    • Anxiety    • Arthritis     R knee   • Carotid atherosclerosis, bilateral    • GERD (gastroesophageal reflux disease)    • Hyperlipidemia    • Hypertension    • Muscle spasms of neck    • Osteoporosis    • Spinal stenosis      Past Surgical History:   Procedure Laterality Date   • BACK SURGERY     • CARDIAC ELECTROPHYSIOLOGY PROCEDURE N/A 2/22/2022    Procedure: Cardiac pacer implant;  Surgeon: Olive Jung MD;  Location:  CARDIAC CATH LAB; Service: Cardiology   • EYE SURGERY      cataracts   • FL GUIDED NEEDLE PLAC BX/ASP/INJ  8/2/2022   • FL GUIDED NEEDLE PLAC BX/ASP/INJ  9/1/2022   • NERVE BLOCK Bilateral 8/2/2022    Procedure: BLOCK MEDIAL BRANCH L3, L4, L5 MBB #1;  Surgeon: Julian Lujan MD;  Location: [facility];  Service: Pain Management    • NERVE BLOCK Bilateral 9/1/2022    Procedure: BLOCK MEDIAL BRANCH L3, L4, L5;  Surgeon: Julian Lujan MD;  Location: [facility];  Service: Pain Management      Social History   Social History     Substance and Sexual Activity   Alcohol Use Not Currently     Social History     Substance and Sexual Activity   Drug Use Never     Social History     Tobacco Use   Smoking Status Never Smoker   Smokeless Tobacco Never Used     Family History   Problem Relation Age of Onset   • Cancer Father    • Cancer Brother    • Heart disease Mother      Allergies   Allergen Reactions   • Ciprofloxacin    • Epinephrine      Irregular heart ryhthm (afib) per pt  • Statins Myalgia     Patient has tried 3-4 different statins and developed muscle pain with all   • Bactrim [Sulfamethoxazole-Trimethoprim] Rash   • Medical Tape Rash          Physical Exam  /80   Pulse 61   Temp (!) 96 9 °F (36 1 °C) (Temporal)   Ht 5' 3" (1 6 m)   Wt 54 4 kg (120 lb)   BMI 21 26 kg/m²     Constitutional:  see vital signs  Gen: well-developed, normocephalic/atraumatic, well-groomed  Eyes: No inflammation or discharge of conjunctiva or lids; sclera clear   Pulmonary/Chest: Effort normal  No respiratory distress         Right Hip Exam     Tenderness   The patient is experiencing tenderness in the greater trochanter and lateral     Range of Motion   Flexion: 110 External rotation: 50   Internal rotation: 25     Muscle Strength   Abduction: 4/5   Adduction: 4/5   Flexion: 4/5     Tests   WILVER: negative    Other   Erythema: absent              Large joint arthrocentesis: R greater trochanteric bursa  Universal Protocol:  Consent: Verbal consent obtained  Risks and benefits: risks, benefits and alternatives were discussed  Consent given by: patient  Patient understanding: patient states understanding of the procedure being performed  Site marked: the operative site was marked  Radiology Images displayed and confirmed   If images not available, report reviewed: imaging studies available  Required items: required blood products, implants, devices, and special equipment available  Patient identity confirmed: verbally with patient    Supporting Documentation  Indications: pain   Procedure Details  Location: hip - R greater trochanteric bursa  Preparation: Patient was prepped and draped in the usual sterile fashion  Needle size: 22 G (3 5 inch spinal needle)  Ultrasound guidance: no  Approach: lateral (perpindicular over greater trochanter at site of maximal tenderness)  Medications administered: 40 mg triamcinolone acetonide 40 mg/mL; 2 mL bupivacaine 0 25 %    Patient tolerance: patient tolerated the procedure well with no immediate complications  Dressing:  Sterile dressing applied

## 2022-10-20 NOTE — PATIENT INSTRUCTIONS

## 2022-10-31 ENCOUNTER — OFFICE VISIT (OUTPATIENT)
Dept: PAIN MEDICINE | Facility: CLINIC | Age: 87
End: 2022-10-31

## 2022-10-31 VITALS
DIASTOLIC BLOOD PRESSURE: 62 MMHG | TEMPERATURE: 98.2 F | SYSTOLIC BLOOD PRESSURE: 118 MMHG | HEIGHT: 63 IN | HEART RATE: 74 BPM | OXYGEN SATURATION: 99 % | BODY MASS INDEX: 21.62 KG/M2 | WEIGHT: 122 LBS

## 2022-10-31 DIAGNOSIS — M47.816 LUMBAR SPONDYLOSIS: Primary | ICD-10-CM

## 2022-10-31 DIAGNOSIS — Z95.0 PACEMAKER: ICD-10-CM

## 2022-10-31 RX ORDER — LIDOCAINE HYDROCHLORIDE 10 MG/ML
10 INJECTION, SOLUTION EPIDURAL; INFILTRATION; INTRACAUDAL; PERINEURAL ONCE
OUTPATIENT
Start: 2022-10-31 | End: 2022-10-31

## 2022-10-31 RX ORDER — BUPIVACAINE HCL/PF 2.5 MG/ML
5 VIAL (ML) INJECTION ONCE
OUTPATIENT
Start: 2022-10-31 | End: 2022-10-31

## 2022-10-31 RX ORDER — METHYLPREDNISOLONE ACETATE 80 MG/ML
80 INJECTION, SUSPENSION INTRA-ARTICULAR; INTRALESIONAL; INTRAMUSCULAR; PARENTERAL; SOFT TISSUE ONCE
OUTPATIENT
Start: 2022-10-31 | End: 2022-10-31

## 2022-10-31 RX ORDER — LANSOPRAZOLE 30 MG/1
CAPSULE, DELAYED RELEASE ORAL
COMMUNITY
Start: 2022-10-20

## 2022-10-31 NOTE — PATIENT INSTRUCTIONS
Core Strengthening Exercises   WHAT YOU NEED TO KNOW:   Your core includes the muscles of your lower back, hip, pelvis, and abdomen  Core strengthening exercises help heal and strengthen these muscles  This helps prevent another injury, and keeps your pelvis, spine, and hips in the correct position  DISCHARGE INSTRUCTIONS:   Contact your healthcare provider if:   You have sharp or worsening pain during exercise or at rest     You have questions or concerns about your shoulder exercises  Safety tips:  Talk to your healthcare provider before you start an exercise program  A physical therapist can teach you how to do core strengthening exercises safely  Do the exercises on a mat or firm surface  A firm surface will support your spine and prevent low back pain  Do not do these exercises on a bed  Move slowly and smoothly  Avoid fast or jerky motions  Stop if you feel pain  Core exercises should not be painful  Stop if you feel pain  Breathe normally during core exercises  Do not hold your breath  This may cause an increase in blood pressure and prevent muscle strengthening  Your healthcare provider will tell you when to inhale and exhale during the exercise  Begin all of your exercises with abdominal bracing  Abdominal bracing helps warm up your core muscles  You can also practice abdominal bracing throughout the day  Lie on your back with your knees bent and feet flat on the floor  Place your arms in a relaxed position beside your body  Tighten your abdominal muscles  Pull your belly button in and up toward your spine  Hold for 5 seconds  Relax your muscles  Repeat 10 times  Core strengthening exercises: Your healthcare provider will tell you how often to do these exercises  The provider will also tell you how many repetitions of each exercise you should do  Hold each exercise for 5 seconds or as directed  As you get stronger, increase your hold to 10 to 15 seconds   You can do some of these exercises on a stability ball, or with a weight  Ask your healthcare provider how to use a stability ball or weight for these exercises:  Bridging:  Lie on your back with your knees bent and feet flat on the floor  Rest your arms at your side  Tighten your buttocks, and then lift your hips 1 inch off the floor  Hold for 5 seconds  When you can do this exercise without pain for 10 seconds, increase the distance you lift your hips  A good goal is to be able to lift your hips so that your shoulders, hips, and knees are in a straight line  Dead bug:  Lie on your back with your knees bent and feet flat on the floor  Place your arms in a relaxed position beside your body  Begin with abdominal bracing  Next, raise one leg, keeping your knee bent  Hold for 5 seconds  Repeat with the other leg  When you can do this exercise without pain for 10 to 15 seconds, you may raise one straight leg and hold  Repeat with the other leg  Quadruped:  Place your hands and knees on the floor  Keep your wrists directly below your shoulders and your knees directly below your hips  Pull your belly button in toward your spine  Do not flatten or arch your back  Tighten your abdominal muscles below your belly button  Hold for 5 seconds  When you can do this exercise without pain for 10 to 15 seconds, you may extend one arm and hold  Repeat on the other side  Side bridge exercises:      Standing side bridge:  Stand next to a wall and extend one arm toward the wall  Place your palm flat on the wall with your fingers pointing upward  Begin with abdominal bracing  Next, without moving your feet, slowly bend your arm to 90 degrees  Hold for 5 seconds  Repeat on the other side  When you can do this exercise without pain for 10 to 15 seconds, you may do the bent leg side bridge on the floor  Bent leg side bridge:  Lie on one side with your legs, hips, and shoulders in a straight line   Prop yourself up onto your forearm so your elbow is directly below your shoulder  Bend your knees back to 90 degrees  Begin with abdominal bracing  Next, lift your hips and balance yourself on your forearm and knees  Hold for 5 seconds  Repeat on the other side  When you can do this exercise without pain for 10 to 15 seconds, you may do the straight leg side bridge on the floor  Straight leg side bridge:  Lie on one side with your legs, hips, and shoulders in a straight line  Prop yourself up onto your forearm so your elbow is directly below your shoulder  Begin with abdominal bracing  Lift your hips off the floor and balance yourself on your forearm and the outside of your flexed foot  Do not let your ankle bend sideways  Hold for 5 seconds  Repeat on the other side  When you can do this exercise without pain for 10 to 15 seconds, ask your healthcare provider for more advanced exercises  Superman:  Lie on your stomach  Extend your arms forward on the floor  Tighten your abdominal muscles and lift your right hand and left leg off the floor  Hold this position  Slowly return to the starting position  Tighten your abdominal muscles and lift your left hand and right leg off the floor  Hold this position  Slowly return to the starting position  Clam:  Lie on your side with your knees bent  Put your bottom arm under your head to keep your neck in line  Put your top hand on your hip to keep your pelvis from moving  Put your heels together, and keep them together during this exercise  Slowly raise your top knee toward the ceiling  Then lower your leg so your knees are together  Repeat this exercise 10 times  Then switch sides and do the exercise 10 times with the other leg  Curl up:  Lie on your back with your knees bent and feet flat on the floor  Place your hands, palms down, underneath your lower back  Next, with your elbows on the floor, lift your shoulders and chest 2 to 3 inches off the floor   Keep your head in line with your shoulders  Hold this position  Slowly return to the starting position  Straight leg raises:  Lie on your back with one leg straight  Bend the other knee and place your foot flat on the floor  Tighten your abdominal muscles  Keep your leg straight and slowly lift it straight up 6 to 12 inches off the floor  Hold this position  Lower your leg slowly  Do as many repetitions as directed on this side  Repeat with the other leg  Plank:  Lie on your stomach  Bend your elbows and place your forearms flat on the floor  Lift your chest, stomach, and knees off the floor  Make sure your elbows are below your shoulders  Your body should be in a straight line  Do not let your hips or lower back sink to the ground  Squeeze your abdominal muscles together and hold for 15 seconds  To make this exercise harder, hold for 30 seconds or lift 1 leg at a time  Bicycles:  Lie on your back  Bend both knees and bring them toward your chest  Your calves should be parallel to the floor  Place the palms of your hands on the back of your head  Straighten your right leg and keep it lifted 2 inches off the floor  Raise your head and shoulders off the floor and twist towards your left  Keep your head and shoulders lifted  Bend your right knee while you straighten your left leg  Keep your left leg 2 inches off the floor  Twist your head and chest towards the left leg  Continue to straighten 1 leg at a time and twist        Follow up with your doctor as directed:  Write down your questions so you remember to ask them during your visits  © Copyright [employer] 2022 Information is for End User's use only and may not be sold, redistributed or otherwise used for commercial purposes  All illustrations and images included in CareNotes® are the copyrighted property of [employer]  or [facility] Erick Wallace   The above information is an  only   It is not intended as medical advice for individual conditions or treatments  Talk to your doctor, nurse or pharmacist before following any medical regimen to see if it is safe and effective for you

## 2022-10-31 NOTE — PROGRESS NOTES
Assessment  1  Lumbar spondylosis  -     Case request operating room: RHIZOTOMY LUMBAR L3, L4, L5 medial branch nerves; Standing  -     Case request operating room: RHIZOTOMY LUMBAR L3, L4, L5 medial branch nerves    2  Pacemaker    Greater than 90% relief of pain with improved ability to participate with IADLs after bilateral L3, L4, L5 medial branch blocks #1 and #2-relief continues to this day  Previously reported the following symptomatology:     Axial low back pain described primarily by arthritic features  Aching, nagging, indolent, stabbing, throbbing features in axial low back without radicular components  5/5 strength bilaterally, negative SLR  Positive facet loading maneuvers in lumbar spine elicited pain, positive tenderness to palpation over lumbar paraspinal muscles  Positive reynaldo's test, gaenslen's, positive SIJ loading bilaterally  Pain with internal/external rotation of right hip; ttp over right gtb  Recently had hip injection with Dr Pelaez Karla  Findings correlate with prominent lumbar facet arthropathy seen throughout axial low back on MRI from 2019 and recent CT scan; only mild to moderate central canal stenosis L4-L5  Additionally describes radicular pain numbness and weakness to a lesser degree in bilateral L4 dermatomes  Currently she is neurologically intact without gait instability, saddle anesthesia or bowel/bladder abnormality  Risks, benefits alternative to medial branch blocks and subsequent radiofrequency ablation of successful thoroughly discussed with patient  Handouts provided questions answered to patient satisfaction  Has been participant with PT but fell twice thereafter and has since discontinued        Plan  -bilateral L3, L4, L5 medial branch nerve radiofrequency ablation;will need to arrange for pacemaker interrogation post procedure; f/u 3 weeks post procedure  -continue HEP- physician directed home exercise plan as per [facility] demonstrated and handouts provided that patient plans to participate with for 1 hour, twice a week for the next 6 weeks  There are risks associated with opioid medications, including dependence, addiction and tolerance  The patient understands and agrees to use these medications only as prescribed  Potential side effects of the medications include, but are not limited to, constipation, drowsiness, addiction, impaired judgment and risk of fatal overdose if not taken as prescribed  The patient was warned against driving while taking sedation medications or operating heavy machinery  The patient voiced understanding  Sharing medications is a felony  At this point in time, the patient is showing no signs of addiction, abuse, diversion or suicidal ideation  [place] Pres[employer] Drug Monitoring Program report was reviewed and was appropriate      Complete risks and benefits including bleeding, infection, tissue reaction, nerve injury and allergic reaction were discussed  The approach was demonstrated using models and literature was provided  Verbal and written consent was obtained  My impressions and treatment recommendations were discussed in detail with the patient who verbalized understanding and had no further questions  Discharge instructions were provided  I personally saw and examined the patient and I agree with the above discussed plan of care  My impressions and treatment recommendations were discussed in detail with the patient who verbalized understanding and had no further questions  Discharge instructions were provided  I personally saw and examined the patient and I agree with the above discussed plan of care  New Medications Ordered This Visit   Medications   • lansoprazole (PREVACID) 30 mg capsule       History of Present Illness    Greater than 90% relief of pain with improved ability to participate with IADLs after bilateral L3, L4, L5 medial branch blocks #1 and #2-relief continues to this day   Previously reported the following symptomatology:     Luciano Neely is a 80 y o  female with a past medical history of Afib on xarelto with pacemaker presenting with chronic low back pain described primarily as arthritic in nature  She describes 8/10 low back pain that is worse in the mornings and worse at the end of the day  The pain is characterized by achy, nagging, indolent, crampy, stabbing pain in her axial low back  The patient describes that the pain is worse with standing for long periods of time on hard surfaces as well as with walking  She ambulates with a walker  The patient is a very active individual and feels as though this pain compromises his/her participation with independent activities of daily living  The pain can be debilitating at times and contribute to significant disability, compromising overall activity and independent activities of daily living  She has tried physical therapy with limited relief of symptoms and discontinued after falling twice  Medications the patient has tried in the past include   tylenol  She describes minor degree radicular symptoms in bilateral L4 dermatome but otherwise has good strength  She denies any weakness numbness or paresthesias that are particularly problematic  The patient denies any bowel or bladder dysfunction as well, gait instability or saddle anesthesia  I have personally reviewed and/or updated the patient's past medical history, past surgical history, family history, social history, current medications, allergies, and vital signs today  Review of Systems   Constitutional: Positive for activity change  HENT: Negative  Eyes: Negative  Respiratory: Negative  Cardiovascular: Negative  Gastrointestinal: Negative  Endocrine: Negative  Genitourinary: Negative  Musculoskeletal: Positive for arthralgias, back pain, gait problem and myalgias  Skin: Negative  Allergic/Immunologic: Negative      Neurological: Positive for weakness and numbness  Hematological: Negative  Psychiatric/Behavioral: Negative  All other systems reviewed and are negative  Patient Active Problem List   Diagnosis   • Paroxysmal atrial fibrillation (HCC)   • Syncope   • Constipation   • Leukocytosis   • Hypokalemia   • Spinal stenosis of lumbar region with neurogenic claudication   • Age-related osteoporosis without current pathological fracture   • Primary osteoarthritis of both hips   • Hypertension   • Hyperlipidemia   • Fracture of multiple ribs of right side   • Acute pain of right shoulder   • Closed displaced fracture of lateral end of right clavicle   • Pacemaker   • Carotid atherosclerosis, bilateral   • Mesenteric artery stenosis (HCC)   • Stage 3 chronic kidney disease, unspecified whether stage 3a or 3b CKD ([facility] [address] )   • Lumbar spondylosis       Past Medical History:   Diagnosis Date   [facility]    • Anxiety    • Arthritis     R knee   • Carotid atherosclerosis, bilateral    • GERD (gastroesophageal reflux disease)    • Hyperlipidemia    • Hypertension    • Muscle spasms of neck    • Osteoporosis    • Spinal stenosis        Past Surgical History:   Procedure Laterality Date   • BACK SURGERY     • CARDIAC ELECTROPHYSIOLOGY PROCEDURE N/A 2/22/2022    Procedure: Cardiac pacer implant;  Surgeon: Candelario Cheung MD;  [facility];   Service: Cardiology   • EYE SURGERY      cataracts   • FL GUIDED NEEDLE PLAC BX/ASP/INJ  8/2/2022   • FL GUIDED NEEDLE PLAC BX/ASP/INJ  9/1/2022   • NERVE BLOCK Bilateral 8/2/2022    Procedure: BLOCK MEDIAL BRANCH L3, L4, L5 MBB #1;  Surgeon: Mel Meza MD;  Location: [facility];  Service: Pain Management    • NERVE BLOCK Bilateral 9/1/2022    Procedure: BLOCK MEDIAL BRANCH L3, L4, L5;  Surgeon: Mel Meza MD;  Location: [facility];  Service: Pain Management        Family History   Problem Relation Age of Onset   • Cancer Father    • Cancer Brother    • Heart disease Mother        Social History Occupational History   • Not on file   Tobacco Use   • Smoking status: Never Smoker   • Smokeless tobacco: Never Used   Vaping Use   • Vaping Use: Never used   Substance and Sexual Activity   • Alcohol use: Not Currently   • Drug use: Never   • Sexual activity: Not Currently       Current Outpatient Medications on File Prior to Visit   Medication Sig   • acetaminophen (TYLENOL) 500 mg tablet Take 2 tablets (1,000 mg total) by mouth every 6 (six) hours as needed for mild pain or moderate pain   • amLODIPine (NORVASC) 10 mg tablet Take 1 tablet (10 mg total) by mouth daily   • calcium citrate-vitamin D (CITRACAL+D) 315-200 MG-UNIT per tablet Take by mouth   • docusate sodium (COLACE) 100 mg capsule Take 100 mg by mouth daily   • lansoprazole (PREVACID) 30 mg capsule    • LORazepam (ATIVAN) 2 mg tablet 3 (three) times a day prn   • losartan (COZAAR) 25 mg tablet Take 1 tablet (25 mg total) by mouth daily   • magnesium oxide (MAG-OX) 400 mg Take 400 mg by mouth once   • metoprolol succinate (TOPROL-XL) 25 mg 24 hr tablet Please take 2 tablets in the morning and 1 tablet in the evening   • Multiple Vitamins-Minerals (ICAPS AREDS 2 PO) Take 1 tablet by mouth daily    • polyethylene glycol (MIRALAX) 17 g packet Take 17 g by mouth daily   • Xarelto 15 MG tablet TAKE 1 TABLET BY MOUTH  DAILY WITH DINNER     No current facility-administered medications on file prior to visit  Allergies   Allergen Reactions   • Ciprofloxacin    • Epinephrine      Irregular heart ryhthm (afib) per pt     • Statins Myalgia     Patient has tried 3-4 different statins and developed muscle pain with all   • Bactrim [Sulfamethoxazole-Trimethoprim] Rash   • Medical Tape Rash         Physical Exam    /62 (BP Location: Right arm, Patient Position: Sitting, Cuff Size: Standard)   Pulse 74   Temp 98 2 °F (36 8 °C)   Ht 5' 3" (1 6 m)   Wt 55 3 kg (122 lb)   SpO2 99%   BMI 21 61 kg/m²     Constitutional: normal, well developed, well nourished, alert, in no distress and non-toxic and no overt pain behavior  Eyes: anicteric  HEENT: grossly intact  Neck: supple, symmetric, trachea midline and no masses   Pulmonary:even and unlabored  Cardiovascular:No edema or pitting edema present  Skin:Normal without rashes or lesions and well hydrated  Psychiatric:Mood and affect appropriate  Neurologic:Cranial Nerves II-XII grossly intact Sensation grossly intact; no clonus negative phoenix's  Reflexes 2+ and brisk  SLR negative bilaterally  Musculoskeletal:normal gait  5/5 strength bilaterally with AROM in all extremities  ambulates with walker; unable to perform normal heel toe and tip toe walking  significant pain with lumbar facet loading bilaterally and with lateral spine rotation  ttp over lumbar paraspinal muscles  Positive reynaldo's test, gaenslen's, positive SIJ loading bilaterally  Pain with internal/external rotation of right hip; ttp over right gtb    Imaging  CT LUMBAR SPINE     INDICATION:   M54 16: Radiculopathy, lumbar region  M47 816: Spondylosis without myelopathy or radiculopathy, lumbar region      COMPARISON: Prior CT December 2017, 2019     TECHNIQUE:  Contiguous axial images through the lumbar spine were obtained  Sagittal and coronal reconstructions were performed        Radiation dose length product (DLP) for this visit:  591 9 mGy-cm   This examination, like all CT scans performed in the [facility], was performed utilizing techniques to minimize radiation dose exposure, including the use of iterative   reconstruction and automated exposure control        IMAGE QUALITY:  Diagnostic      FINDINGS:     ALIGNMENT:  There are 5 lumbar type vertebral bodies  Patient has undergone L1, L3, and L4 vertebral plasty  Vertebral body heights are unchanged from the prior study  There is no acute new compression fracture identified    Rightward convex scoliosis   noted at the thoracolumbar junction      VERTEBRAL BODIES: Postoperative changes of multilevel vertebroplasty as described  Slight retropulsion of the posterior vertebral body of L1 noted unchanged from prior      DEGENERATIVE CHANGES:     Lower Thoracic spine:  Mild central stenosis related to retropulsion of the L1 vertebral body      L1-2:  No significant central or foraminal narrowing      L2-3:  Moderate facet hypertrophy  There is mild annular bulge with marginal osteophytes which result in mild central stenosis      L3-4:  Degenerative disc osteophyte complex with marginal osteophytes eccentric to the right results in mild right foraminal narrowing and mild central stenosis      L4-5:  Moderate facet hypertrophy  There is mild annular bulge with minimal right foraminal narrowing and mild to moderate central stenosis noted      L5-S1:  Calcified posterior disc margin with annular bulging  Minimal central stenosis  Foramina are patent      PARASPINAL SOFT TISSUES:   Normal      IMPRESSION:     Stable lumbar CT status post L1, L3, and L4 kyphoplasty  Stable vertebral heights are noted  Persistent slight retropulsion of the L1 posterior vertebral body contributes to mild central stenosis      Spondylotic degenerative changes result in mild to moderate central stenosis at L4-5 and no greater than mild central or foraminal narrowing at remaining levels

## 2022-10-31 NOTE — H&P (VIEW-ONLY)
Assessment  1  Lumbar spondylosis  -     Case request operating room: RHIZOTOMY LUMBAR L3, L4, L5 medial branch nerves; Standing  -     Case request operating room: RHIZOTOMY LUMBAR L3, L4, L5 medial branch nerves    2  Pacemaker    Greater than 90% relief of pain with improved ability to participate with IADLs after bilateral L3, L4, L5 medial branch blocks #1 and #2-relief continues to this day  Previously reported the following symptomatology:     Axial low back pain described primarily by arthritic features  Aching, nagging, indolent, stabbing, throbbing features in axial low back without radicular components  5/5 strength bilaterally, negative SLR  Positive facet loading maneuvers in lumbar spine elicited pain, positive tenderness to palpation over lumbar paraspinal muscles  Positive reynaldo's test, gaenslen's, positive SIJ loading bilaterally  Pain with internal/external rotation of right hip; ttp over right gtb  Recently had hip injection with Dr Ofelia Rivas  Findings correlate with prominent lumbar facet arthropathy seen throughout axial low back on MRI from 2019 and recent CT scan; only mild to moderate central canal stenosis L4-L5  Additionally describes radicular pain numbness and weakness to a lesser degree in bilateral L4 dermatomes  Currently she is neurologically intact without gait instability, saddle anesthesia or bowel/bladder abnormality  Risks, benefits alternative to medial branch blocks and subsequent radiofrequency ablation of successful thoroughly discussed with patient  Handouts provided questions answered to patient satisfaction  Has been participant with PT but fell twice thereafter and has since discontinued        Plan  -bilateral L3, L4, L5 medial branch nerve radiofrequency ablation;will need to arrange for pacemaker interrogation post procedure; f/u 3 weeks post procedure  -continue HEP- physician directed home exercise plan as per [facility] demonstrated and handouts provided that patient plans to participate with for 1 hour, twice a week for the next 6 weeks  There are risks associated with opioid medications, including dependence, addiction and tolerance  The patient understands and agrees to use these medications only as prescribed  Potential side effects of the medications include, but are not limited to, constipation, drowsiness, addiction, impaired judgment and risk of fatal overdose if not taken as prescribed  The patient was warned against driving while taking sedation medications or operating heavy machinery  The patient voiced understanding  Sharing medications is a felony  At this point in time, the patient is showing no signs of addiction, abuse, diversion or suicidal ideation  [place] Prescription Drug Monitoring Program report was reviewed and was appropriate      Complete risks and benefits including bleeding, infection, tissue reaction, nerve injury and allergic reaction were discussed  The approach was demonstrated using models and literature was provided  Verbal and written consent was obtained  My impressions and treatment recommendations were discussed in detail with the patient who verbalized understanding and had no further questions  Discharge instructions were provided  I personally saw and examined the patient and I agree with the above discussed plan of care  My impressions and treatment recommendations were discussed in detail with the patient who verbalized understanding and had no further questions  Discharge instructions were provided  I personally saw and examined the patient and I agree with the above discussed plan of care  New Medications Ordered This Visit   Medications   • lansoprazole (PREVACID) 30 mg capsule       History of Present Illness    Greater than 90% relief of pain with improved ability to participate with IADLs after bilateral L3, L4, L5 medial branch blocks #1 and #2-relief continues to this day   Previously reported the following symptomatology:     Debra Weber is a 80 y o  female with a past medical history of Afib on xarelto with pacemaker presenting with chronic low back pain described primarily as arthritic in nature  She describes 8/10 low back pain that is worse in the mornings and worse at the end of the day  The pain is characterized by achy, nagging, indolent, crampy, stabbing pain in her axial low back  The patient describes that the pain is worse with standing for long periods of time on hard surfaces as well as with walking  She ambulates with a walker  The patient is a very active individual and feels as though this pain compromises his/her participation with independent activities of daily living  The pain can be debilitating at times and contribute to significant disability, compromising overall activity and independent activities of daily living  She has tried physical therapy with limited relief of symptoms and discontinued after falling twice  Medications the patient has tried in the past include   tylenol  She describes minor degree radicular symptoms in bilateral L4 dermatome but otherwise has good strength  She denies any weakness numbness or paresthesias that are particularly problematic  The patient denies any bowel or bladder dysfunction as well, gait instability or saddle anesthesia  I have personally reviewed and/or updated the patient's past medical history, past surgical history, family history, social history, current medications, allergies, and vital signs today  Review of Systems   Constitutional: Positive for activity change  HENT: Negative  Eyes: Negative  Respiratory: Negative  Cardiovascular: Negative  Gastrointestinal: Negative  Endocrine: Negative  Genitourinary: Negative  Musculoskeletal: Positive for arthralgias, back pain, gait problem and myalgias  Skin: Negative  Allergic/Immunologic: Negative      Neurological: Positive for weakness and numbness  Hematological: Negative  Psychiatric/Behavioral: Negative  All other systems reviewed and are negative  Patient Active Problem List   Diagnosis   • Paroxysmal atrial fibrillation (HCC)   • Syncope   • Constipation   • Leukocytosis   • Hypokalemia   • Spinal stenosis of lumbar region with neurogenic claudication   • Age-related osteoporosis without current pathological fracture   • Primary osteoarthritis of both hips   • Hypertension   • Hyperlipidemia   • Fracture of multiple ribs of right side   • Acute pain of right shoulder   • Closed displaced fracture of lateral end of right clavicle   • Pacemaker   • Carotid atherosclerosis, bilateral   • Mesenteric artery stenosis (HCC)   • Stage 3 chronic kidney disease, unspecified whether stage 3a or 3b CKD ([facility] [address] )   • Lumbar spondylosis       Past Medical History:   Diagnosis Date   [facility]    • Anxiety    • Arthritis     R knee   • Carotid atherosclerosis, bilateral    • GERD (gastroesophageal reflux disease)    • Hyperlipidemia    • Hypertension    • Muscle spasms of neck    • Osteoporosis    • Spinal stenosis        Past Surgical History:   Procedure Laterality Date   • BACK SURGERY     • CARDIAC ELECTROPHYSIOLOGY PROCEDURE N/A 2/22/2022    Procedure: Cardiac pacer implant;  Surgeon: Charla Irvin MD;  [facility]: [facility];   Service: Cardiology   • EYE SURGERY      cataracts   • FL GUIDED NEEDLE PLAC BX/ASP/INJ  8/2/2022   • FL GUIDED NEEDLE PLAC BX/ASP/INJ  9/1/2022   • NERVE BLOCK Bilateral 8/2/2022    Procedure: BLOCK MEDIAL BRANCH L3, L4, L5 MBB #1;  Surgeon: Sidney Nielsen MD;  Location: [facility];  Service: Pain Management    • NERVE BLOCK Bilateral 9/1/2022    Procedure: BLOCK MEDIAL BRANCH L3, L4, L5;  Surgeon: Sidney Nielsen MD;  Location: [facility];  Service: Pain Management        Family History   Problem Relation Age of Onset   • Cancer Father    • Cancer Brother    • Heart disease Mother        Social History Occupational History   • Not on file   Tobacco Use   • Smoking status: Never Smoker   • Smokeless tobacco: Never Used   Vaping Use   • Vaping Use: Never used   Substance and Sexual Activity   • Alcohol use: Not Currently   • Drug use: Never   • Sexual activity: Not Currently       Current Outpatient Medications on File Prior to Visit   Medication Sig   • acetaminophen (TYLENOL) 500 mg tablet Take 2 tablets (1,000 mg total) by mouth every 6 (six) hours as needed for mild pain or moderate pain   • amLODIPine (NORVASC) 10 mg tablet Take 1 tablet (10 mg total) by mouth daily   • calcium citrate-vitamin D (CITRACAL+D) 315-200 MG-UNIT per tablet Take by mouth   • docusate sodium (COLACE) 100 mg capsule Take 100 mg by mouth daily   • lansoprazole (PREVACID) 30 mg capsule    • LORazepam (ATIVAN) 2 mg tablet 3 (three) times a day prn   • losartan (COZAAR) 25 mg tablet Take 1 tablet (25 mg total) by mouth daily   • magnesium oxide (MAG-OX) 400 mg Take 400 mg by mouth once   • metoprolol succinate (TOPROL-XL) 25 mg 24 hr tablet Please take 2 tablets in the morning and 1 tablet in the evening   • Multiple Vitamins-Minerals (ICAPS AREDS 2 PO) Take 1 tablet by mouth daily    • polyethylene glycol (MIRALAX) 17 g packet Take 17 g by mouth daily   • Xarelto 15 MG tablet TAKE 1 TABLET BY MOUTH  DAILY WITH DINNER     No current facility-administered medications on file prior to visit  Allergies   Allergen Reactions   • Ciprofloxacin    • Epinephrine      Irregular heart ryhthm (afib) per pt     • Statins Myalgia     Patient has tried 3-4 different statins and developed muscle pain with all   • Bactrim [Sulfamethoxazole-Trimethoprim] Rash   • Medical Tape Rash         Physical Exam    /62 (BP Location: Right arm, Patient Position: Sitting, Cuff Size: Standard)   Pulse 74   Temp 98 2 °F (36 8 °C)   Ht 5' 3" (1 6 m)   Wt 55 3 kg (122 lb)   SpO2 99%   BMI 21 61 kg/m²     Constitutional: normal, well developed, well nourished, alert, in no distress and non-toxic and no overt pain behavior  Eyes: anicteric  HEENT: grossly intact  Neck: supple, symmetric, trachea midline and no masses   Pulmonary:even and unlabored  Cardiovascular:No edema or pitting edema present  Skin:Normal without rashes or lesions and well hydrated  Psychiatric:Mood and affect appropriate  Neurologic:Cranial Nerves II-XII grossly intact Sensation grossly intact; no clonus negative phoenix's  Reflexes 2+ and brisk  SLR negative bilaterally  Musculoskeletal:normal gait  5/5 strength bilaterally with AROM in all extremities  ambulates with walker; unable to perform normal heel toe and tip toe walking  significant pain with lumbar facet loading bilaterally and with lateral spine rotation  ttp over lumbar paraspinal muscles  Positive reynaldo's test, gaenslen's, positive SIJ loading bilaterally  Pain with internal/external rotation of right hip; ttp over right gtb    Imaging  CT LUMBAR SPINE     INDICATION:   M54 16: Radiculopathy, lumbar region  M47 816: Spondylosis without myelopathy or radiculopathy, lumbar region      COMPARISON: Prior CT December 2017, 2019     TECHNIQUE:  Contiguous axial images through the lumbar spine were obtained  Sagittal and coronal reconstructions were performed        Radiation dose length product (DLP) for this visit:  591 9 mGy-cm   This examination, like all CT scans performed in the [facility], was performed utilizing techniques to minimize radiation dose exposure, including the use of iterative   reconstruction and automated exposure control        IMAGE QUALITY:  Diagnostic      FINDINGS:     ALIGNMENT:  There are 5 lumbar type vertebral bodies  Patient has undergone L1, L3, and L4 vertebral plasty  Vertebral body heights are unchanged from the prior study  There is no acute new compression fracture identified    Rightward convex scoliosis   noted at the thoracolumbar junction      VERTEBRAL BODIES: Postoperative changes of multilevel vertebroplasty as described  Slight retropulsion of the posterior vertebral body of L1 noted unchanged from prior      DEGENERATIVE CHANGES:     Lower Thoracic spine:  Mild central stenosis related to retropulsion of the L1 vertebral body      L1-2:  No significant central or foraminal narrowing      L2-3:  Moderate facet hypertrophy  There is mild annular bulge with marginal osteophytes which result in mild central stenosis      L3-4:  Degenerative disc osteophyte complex with marginal osteophytes eccentric to the right results in mild right foraminal narrowing and mild central stenosis      L4-5:  Moderate facet hypertrophy  There is mild annular bulge with minimal right foraminal narrowing and mild to moderate central stenosis noted      L5-S1:  Calcified posterior disc margin with annular bulging  Minimal central stenosis  Foramina are patent      PARASPINAL SOFT TISSUES:   Normal      IMPRESSION:     Stable lumbar CT status post L1, L3, and L4 kyphoplasty  Stable vertebral heights are noted  Persistent slight retropulsion of the L1 posterior vertebral body contributes to mild central stenosis      Spondylotic degenerative changes result in mild to moderate central stenosis at L4-5 and no greater than mild central or foraminal narrowing at remaining levels

## 2022-11-04 ENCOUNTER — TELEPHONE (OUTPATIENT)
Dept: CARDIOLOGY CLINIC | Facility: CLINIC | Age: 87
End: 2022-11-04

## 2022-11-04 NOTE — TELEPHONE ENCOUNTER
11/04/22 per dr Arthur Liu pt to send [facility] tx s/p procedure on 11/17    L/m for pt re: sending tx~ch

## 2022-11-16 ENCOUNTER — TELEPHONE (OUTPATIENT)
Dept: OBGYN CLINIC | Facility: CLINIC | Age: 87
End: 2022-11-16

## 2022-11-16 NOTE — TELEPHONE ENCOUNTER
Recalled patient she opted to schedule f/u appt  With you  Scheduled her 12/2 (will call her if sooner appointment opens) She is keeping her procedure with Dr Jake Cain tomorrow   She said maybe it will help with her pain in the her hip

## 2022-11-16 NOTE — TELEPHONE ENCOUNTER
----- Message from Grace Christiansen MD sent at 11/16/2022  2:48 PM EST -----  I can make a follow up visit with her, but since I will be away - it would not be for a 2-3 weeks  She can continue ice/heat/tylenol for now  I can also refer her to physical therapy in the interim  If she wants to see someone sooner, she could see Dr Tabitha Phillips if he has availability sooner than me   ----- Message -----  From: Meg Baron  Sent: 11/16/2022  11:34 AM EST  To: Grace Christiansen MD    Pt stopped in our office stating she is experiencing right hip pain  She has been taking Tylenol and has been using ice/heat (said heat helps a little better) She is scheduled for lumbar rhizotomy with Dr Zonia Christopher tomorrow and said she may have to cancel procedure because of the pain she is having   Please advise  [phone]

## 2022-11-17 ENCOUNTER — REMOTE DEVICE CLINIC VISIT (OUTPATIENT)
Dept: CARDIOLOGY CLINIC | Facility: CLINIC | Age: 87
End: 2022-11-17

## 2022-11-17 ENCOUNTER — HOSPITAL ENCOUNTER (OUTPATIENT)
Facility: HOSPITAL | Age: 87
Setting detail: OUTPATIENT SURGERY
Discharge: HOME | End: 2022-11-17
Attending: ANESTHESIOLOGY | Admitting: ANESTHESIOLOGY

## 2022-11-17 ENCOUNTER — TELEPHONE (OUTPATIENT)
Dept: RADIOLOGY | Facility: CLINIC | Age: 87
End: 2022-11-17

## 2022-11-17 ENCOUNTER — APPOINTMENT (OUTPATIENT)
Dept: RADIOLOGY | Facility: HOSPITAL | Age: 87
End: 2022-11-17

## 2022-11-17 VITALS
DIASTOLIC BLOOD PRESSURE: 75 MMHG | HEIGHT: 63 IN | OXYGEN SATURATION: 98 % | RESPIRATION RATE: 18 BRPM | TEMPERATURE: 97.2 F | BODY MASS INDEX: 21.62 KG/M2 | SYSTOLIC BLOOD PRESSURE: 180 MMHG | WEIGHT: 122 LBS | HEART RATE: 74 BPM

## 2022-11-17 DIAGNOSIS — Z95.0 PRESENCE OF CARDIAC PACEMAKER: Primary | ICD-10-CM

## 2022-11-17 RX ORDER — LIDOCAINE HYDROCHLORIDE 10 MG/ML
10 INJECTION, SOLUTION EPIDURAL; INFILTRATION; INTRACAUDAL; PERINEURAL ONCE
Status: COMPLETED | OUTPATIENT
Start: 2022-11-17 | End: 2022-11-17

## 2022-11-17 RX ORDER — BUPIVACAINE HCL/PF 2.5 MG/ML
5 VIAL (ML) INJECTION ONCE
Status: COMPLETED | OUTPATIENT
Start: 2022-11-17 | End: 2022-11-17

## 2022-11-17 RX ORDER — METHYLPREDNISOLONE ACETATE 80 MG/ML
80 INJECTION, SUSPENSION INTRA-ARTICULAR; INTRALESIONAL; INTRAMUSCULAR; PARENTERAL; SOFT TISSUE ONCE
Status: COMPLETED | OUTPATIENT
Start: 2022-11-17 | End: 2022-11-17

## 2022-11-17 NOTE — OP NOTE
OPERATIVE REPORT  PATIENT NAME: Tito Chavez    :  1935  MRN: [id number]  Pt Location:  GI ROOM 01    SURGERY DATE: 2022    Surgeon(s) and Role: Debra Dupont MD - Primary    Preop Diagnosis:  Lumbar spondylosis [M47 816]    Post-Op Diagnosis Codes:     * Lumbar spondylosis [M47 816]    Procedure(s) (LRB):  RHIZOTOMY LUMBAR L3, L4, L5 medial branch nerves (Bilateral)    Specimen(s):  * No specimens in log *    Estimated Blood Loss:   Minimal    Drains:  * No LDAs found *    Anesthesia Type:   Local    Operative Indications:  Lumbar spondylosis [M47 816]    Operative Findings:  Bilateral L3, L4, L5 medial branch nerve regions identified under fluoroscopic guidance   Appropriate motor testing performed prior to radiofrequency lesioning of medial branch nerve regions    Complications:   None    Procedure and Technique:  Please see detailed procedure note    I was present for the entire procedure    Patient Disposition:  PACU     SIGNATURE: Luisa Alberto MD  DATE: 2022  TIME: 10:27 AM

## 2022-11-17 NOTE — PROCEDURES
Pre-procedure Diagnosis: Lumbar facet arthropathy  Post-procedure Diagnosis: Lumbar facet arthropathy  Operation Title(s):  1  Radiofrequency ablation of [BILATERAL] L3, L4, L5 medial branch nerves      2  Intraoperative fluoroscopy  Attending Surgeon:   Jennie Cai MD  Anesthesia:   Local    Indications: The patient is a 80y o  year-old female with a diagnosis of lumbar facet arthropathy  The patient's history and physical exam were reviewed  The patient has previously undergone diagnostic and confirmatory lumbar medial branch blocks  Fluoroscopy is being used for the precise placement of the needles at the lumbar medial branch nerves  The risks, benefits and alternatives to the procedure were discussed, and all questions were answered to the patient's satisfaction  The patient agreed to proceed, and written informed consent was obtained  Procedure in Detail: The patient was brought into the procedure room and placed in the prone position on the fluoroscopy table  The low back and upper buttock were prepped with chloraprep times two and draped in a sterile manner  AP fluoroscopy was used to identify the lumbar levels on the [LEFT] side  The fluoroscope beam was then obliqued to better visualize the junction of the superior articular process and transverse process on the [LEFT] side and then tilted caudally about 25 degrees  An 18-gauge, 100mm length, 10mm curved active tip radiofrequency probe was advanced toward the targeted points until bone was contacted  Multiple fluoroscopic views were made to ensure placement of the needle tip at the appropriate location of the medial branch nerve  Motor stimulation was performed at 2 Hz and 1 2 volts generating a twitch in the paraspinal muscles with no motor activity in the lower extremities  Next, AP fluoroscopy was used to identify the L5-S1 level  The fluoroscope been was tilted cephalad to visualize the sacral ala   The fluoroscope beam was then tilted about 45 degrees from that point and the skin and subcutaneous tissues in these identified areas were anesthetized with 1% lidocaine  An 18-gauge, 100mm length, 10mm curved active tip radiofrequency probe was advanced toward the targeted points until bone was contacted  Motor stimulation was performed at 2 Hz and 1 2 volts generating a twitch in the paraspinal muscles with no motor activity in the lower extremities  0 5ml of 2% lidocaine was injected prior to lesioning, which was performed for 90 seconds at 80 degrees centigrade  The lesion was repeated once more after slight repositioning of the needles on an oblique view  Once the lesions were complete, 1ml of a solution consisting of 5mL 0 25% bupivacaine and 1 mL Depo-medrol (80mg/mL) was injected through each needle and then removed with a 1% lidocaine flush  The patient's back was cleaned, and bandages were placed at the needle insertion site  The same procedure was repeated at the lumbar levels on the [RIGHT] side    Disposition: The patient tolerated the procedure well and there were no apparent complications  Vital signs remained stable throughout the procedure  The patient was taken to the recovery area where written discharge instructions for the procedure were given      Estimated Blood Loss: None  Specimens Obtained: N/A

## 2022-11-17 NOTE — INTERVAL H&P NOTE
H&P reviewed  After examining the patient I find no changes in the patients condition since the H&P had been written      Vitals:    11/17/22 0947   BP: 159/71   Pulse: 87   Resp: 18   Temp: 97 6 °F (36 4 °C)   SpO2: 98%

## 2022-11-17 NOTE — PROGRESS NOTES
Results for orders placed or performed in visit on 11/17/22   Cardiac EP device report    Narrative    MDT DUAL PM/ACTIVE SYSTEM IS MRI CONDITIONAL  NON-BILLABLE CARELINK TRANSMISSION: PRE PROCEDURE TODAY: WILL HAVE PT SEND POST PROCEDURE CARELINK PER DR FAIR  PT HAVING B/L L3-L5 RFA: BATTERY VOLTAGE ADEQUATE (13 2 YRS)  AP: 71%  : 12 6% (MVP-ON)  ALL AVAILABLE LEAD PARAMETERS WITHIN NORMAL LIMITS  12 AT/AF EPISODES W/ AVAIL EGMS SHOWING AF, MAX DURATION 40 HRS 38 MINS  AF BURDEN: 19 8%  17 TREATED AT/AF EPISODES W/ rATP (5:17~29 4%)  PT TAKES XARELTO, METOPROLOL SUCC  EF: 70% (NU[facility] ST TX 11/2/21)  PACEMAKER FUNCTIONING APPROPRIATELY    [address] [place] [address] Street

## 2022-11-17 NOTE — DISCHARGE INSTRUCTIONS
YOUR 2 WEEK FOLLOW UP HAS BEEN SCHEDULED; IF YOU WISH TO CHANGE THE FOLLOW UP, PLEASE CALL THE SPINE AND PAIN CENTER AT [facility]: [phone]  Lumbar Radiofrequency Ablation   WHAT YOU NEED TO KNOW:   Lumbar radiofrequency ablation (RFA) is a procedure used to treat facet joint pain in your lower back  Facet joints are found at the back of each vertebra  A needle electrode is used to send electrical currents to the nerves in your facet joint  The electrical currents create heat that damages the nerve so it cannot send pain signals  DISCHARGE INSTRUCTIONS:   Seek care immediately if:   You cannot move your leg  You cannot control your urine or bowel movements  You have severe pain in your lower back  Contact your healthcare provider if:   You have leg weakness  You develop new symptoms  You have questions or concerns about your condition or care  Medicines:   Pain medicine  may be given  Ask how to take this medicine safely  Take your medicine as directed  Contact your healthcare provider if you think your medicine is not helping or if you have side effects  Tell him or her if you are allergic to any medicine  Keep a list of the medicines, vitamins, and herbs you take  Include the amounts, and when and why you take them  Bring the list or the pill bottles to follow-up visits  Carry your medicine list with you in case of an emergency  Follow up with your healthcare provider as directed:  Write down your questions so you remember to ask them during your visits  Activity:  Do not drive a car or operate machinery within 24 hours after your procedure  Ask your healthcare provider about any other activities you should avoid  © Copyright [employer] 2022 Information is for End User's use only and may not be sold, redistributed or otherwise used for commercial purposes   All illustrations and images included in CareNotes® are the copyrighted property of [employer]  or [employer] [facility]  The above information is an  only  It is not intended as medical advice for individual conditions or treatments  Talk to your doctor, nurse or pharmacist before following any medical regimen to see if it is safe and effective for you

## 2022-11-18 NOTE — TELEPHONE ENCOUNTER
MARVIN-    S/w pt  Pt when asked how she did after her procedure yesterday " I am doing ok " Pt advised she has some post procedural bleeding at her inj sites which has resolved  Pt takes Xarelto  Pt states she lives alone and is not able to assess her injection sites  Pt was advised to try and use a mirror unless there is someone that ca check her sites for her and call our office if she has any redness, swelling, additional drainage or sunburn like sensation  Reminded it takes 2 weeks to notice a difference and 4-6 weeks for the full benefit of the procedure  Pt verbalized understanding   Appt confirmed on 11/30 with a 2 pm arrival

## 2022-11-20 ENCOUNTER — HOSPITAL ENCOUNTER (EMERGENCY)
Facility: HOSPITAL | Age: 87
Discharge: HOME | End: 2022-11-20
Attending: EMERGENCY MEDICINE

## 2022-11-20 ENCOUNTER — APPOINTMENT (EMERGENCY)
Dept: RADIOLOGY | Facility: HOSPITAL | Age: 87
End: 2022-11-20

## 2022-11-20 VITALS
SYSTOLIC BLOOD PRESSURE: 187 MMHG | HEART RATE: 92 BPM | RESPIRATION RATE: 18 BRPM | TEMPERATURE: 97 F | BODY MASS INDEX: 21.61 KG/M2 | OXYGEN SATURATION: 99 % | WEIGHT: 122 LBS | DIASTOLIC BLOOD PRESSURE: 76 MMHG

## 2022-11-20 DIAGNOSIS — M25.551 CHRONIC HIP PAIN, RIGHT: Primary | ICD-10-CM

## 2022-11-20 DIAGNOSIS — G89.29 CHRONIC HIP PAIN, RIGHT: Primary | ICD-10-CM

## 2022-11-20 DIAGNOSIS — R26.2 AMBULATORY DYSFUNCTION: ICD-10-CM

## 2022-11-20 RX ORDER — OXYCODONE HYDROCHLORIDE AND ACETAMINOPHEN 5; 325 MG/1; MG/1
1 TABLET ORAL ONCE
Status: COMPLETED | OUTPATIENT
Start: 2022-11-20 | End: 2022-11-20

## 2022-11-20 RX ORDER — OXYCODONE HYDROCHLORIDE AND ACETAMINOPHEN 5; 325 MG/1; MG/1
1 TABLET ORAL EVERY 8 HOURS PRN
Qty: 10 TABLET | Refills: 0 | Status: SHIPPED | OUTPATIENT
Start: 2022-11-20 | End: 2022-11-30

## 2022-11-20 RX ADMIN — OXYCODONE HYDROCHLORIDE AND ACETAMINOPHEN 1 TABLET: 5; 325 TABLET ORAL at 11:23

## 2022-11-20 NOTE — DISCHARGE INSTRUCTIONS
Take Percocet as needed for severe pain  Please be aware that this medication does contain Tylenol so do not take Tylenol in addition to this medication  You may take regular Tylenol if you are not taking this medication  Also be aware that this medication can be constipating and a stool softener should be taken while taking Percocet  Percocet at times could also be addicting so we are only prescribing you a small amount of that  PLEASE FOLLOW-UP WITH PAIN MANAGEMENT    Your imaging studies have been preliminarily reviewed by the emergency department  Further review by Radiology is pending at this time  If there is a discrepancy or a finding of additional concern identified, we will attempt to contact you at the number you have provided us  If you do not hear from us, follow-up with your primary care provider within 1-2 weeks is always recommended to ensure that all findings were normal or as initially reported    Your results may also be available on [facility]

## 2022-11-20 NOTE — ED PROVIDER NOTES
History  Chief Complaint   Patient presents with   • Hip Pain     Presents due to chronic R hip pain, also has red area to R hip from burn from heating pad, skin intact      27-year-old female who recently underwent RFA for spinal stenosis and also sees sports medicine secondary to chronic right hip pain presents emergency room with chief complaint of worsening right hip pain  Patient reports that she was having difficulty getting dressed this morning was able to do so  Was having difficulty ambulating  No fevers or chills  No new injury or fall  Took Tylenol at home without any significant relief and called EMS for further evaluation  Patient also did cause a slight burn to her right hip secondary to a heating gel  History provided by:  Patient  Hip Pain  Location:  Right hip  Severity:  Severe  Onset quality:  Gradual  Timing:  Intermittent  Progression:  Waxing and waning  Context: With movement  Relieved by:  Not moving  Worsened by: Movement  Associated symptoms: no abdominal pain, no chest pain, no cough, no diarrhea, no fatigue, no fever, no headaches, no myalgias, no nausea, no rash, no shortness of breath, no vomiting and no wheezing        Prior to Admission Medications   Prescriptions Last Dose Informant Patient Reported? Taking?    LORazepam (ATIVAN) 2 mg tablet   Yes No   Sig: 3 (three) times a day prn   Multiple Vitamins-Minerals (ICAPS AREDS 2 PO)   Yes No   Sig: Take 1 tablet by mouth daily    Xarelto 15 MG tablet   No No   Sig: TAKE 1 TABLET BY MOUTH  DAILY WITH DINNER   acetaminophen (TYLENOL) 500 mg tablet   No No   Sig: Take 2 tablets (1,000 mg total) by mouth every 6 (six) hours as needed for mild pain or moderate pain   amLODIPine (NORVASC) 10 mg tablet   No No   Sig: Take 1 tablet (10 mg total) by mouth daily   calcium citrate-vitamin D (CITRACAL+D) 315-200 MG-UNIT per tablet   Yes No   Sig: Take by mouth   docusate sodium (COLACE) 100 mg capsule   Yes No   Sig: Take 100 mg by mouth daily   lansoprazole (PREVACID) 30 mg capsule   Yes No   losartan (COZAAR) 25 mg tablet   No No   Sig: Take 1 tablet (25 mg total) by mouth daily   magnesium oxide (MAG-OX) 400 mg   Yes No   Sig: Take 400 mg by mouth once   metoprolol succinate (TOPROL-XL) 25 mg 24 hr tablet   No No   Sig: Please take 2 tablets in the morning and 1 tablet in the evening   polyethylene glycol (MIRALAX) 17 g packet   Yes No   Sig: Take 17 g by mouth daily      Facility-Administered Medications: None       Past Medical History:   Diagnosis Date   • A-fib (HCC)    • Anxiety    • Arthritis     R knee   • Carotid atherosclerosis, bilateral    • GERD (gastroesophageal reflux disease)    • Hyperlipidemia    • Hypertension    • Muscle spasms of neck    • Osteoporosis    • Spinal stenosis        Past Surgical History:   Procedure Laterality Date   • BACK SURGERY     • CARDIAC ELECTROPHYSIOLOGY PROCEDURE N/A 2/22/2022    Procedure: Cardiac pacer implant;  Surgeon: Tiesha Horowitz MD;  [facility]; Service: Cardiology   • EYE SURGERY      cataracts   • FL GUIDED NEEDLE PLAC BX/ASP/INJ  8/2/2022   • FL GUIDED NEEDLE PLAC BX/ASP/INJ  9/1/2022   • NERVE BLOCK Bilateral 8/2/2022    Procedure: BLOCK MEDIAL BRANCH L3, L4, L5 MBB #1;  Surgeon: Kenia Brower MD;  [facility];  Service: Pain Management    • NERVE BLOCK Bilateral 9/1/2022    Procedure: BLOCK MEDIAL BRANCH L3, L4, L5;  Surgeon: Kenia Brower MD;  [facility];  Service: Pain Management    • RHIZOTOMY Bilateral 11/17/2022    Procedure: RHIZOTOMY LUMBAR L3, L4, L5 medial branch nerves;  Surgeon: Kenia Brower MD;  [facility];  Service: Pain Management        Family History   Problem Relation Age of Onset   • Cancer Father    • Cancer Brother    • Heart disease Mother      I have reviewed and agree with the history as documented      E-Cigarette/Vaping   • E-Cigarette Use Never User      E-Cigarette/Vaping Substances     Social History Tobacco Use   • Smoking status: Never   • Smokeless tobacco: Never   Vaping Use   • Vaping Use: Never used   Substance Use Topics   • Alcohol use: Not Currently   • Drug use: Never       Review of Systems   Constitutional: Negative for chills, diaphoresis, fatigue and fever  HENT: Negative  Eyes: Negative  Respiratory: Negative  Negative for cough, shortness of breath and wheezing  Cardiovascular: Negative  Negative for chest pain  Gastrointestinal: Negative  Negative for abdominal pain, diarrhea, nausea and vomiting  Endocrine: Negative  Genitourinary: Negative  Negative for dysuria, flank pain and frequency  Musculoskeletal: Positive for arthralgias, back pain and gait problem  Negative for joint swelling and myalgias  Skin: Negative  Negative for rash  Allergic/Immunologic: Negative  Neurological: Negative for dizziness, weakness, light-headedness and headaches  Hematological: Negative  Psychiatric/Behavioral: Negative  All other systems reviewed and are negative  Physical Exam  Physical Exam  Vitals and nursing note reviewed  Constitutional:       General: She is not in acute distress  Appearance: She is normal weight  She is not toxic-appearing  HENT:      Head: Normocephalic and atraumatic  Right Ear: External ear normal       Left Ear: External ear normal       Nose: Nose normal       Mouth/Throat:      Mouth: Mucous membranes are moist    Pulmonary:      Effort: Pulmonary effort is normal  No respiratory distress  Breath sounds: No wheezing  Abdominal:      General: Abdomen is flat  Musculoskeletal:         General: Tenderness present  No deformity  Right hip: Tenderness present  No bony tenderness  Decreased range of motion  Right upper leg: Normal       Right knee: Normal    Skin:     General: Skin is warm  Capillary Refill: Capillary refill takes less than 2 seconds        Findings: Burn (Superficial burn lateral aspect right hip) and rash present  Neurological:      General: No focal deficit present  Mental Status: She is alert  Mental status is at baseline  Psychiatric:         Mood and Affect: Mood normal          Thought Content: Thought content normal          Judgment: Judgment normal          Vital Signs  ED Triage Vitals [11/20/22 1052]   Temperature Pulse Respirations Blood Pressure SpO2   (!) 97 °F (36 1 °C) 92 18 (!) 201/87 99 %      Temp Source Heart Rate Source Patient Position - Orthostatic VS BP Location FiO2 (%)   Temporal -- Lying Right arm --      Pain Score       10 - Worst Possible Pain           Vitals:    11/20/22 1052 11/20/22 1112   BP: (!) 201/87 (!) 187/76   Pulse: 92    Patient Position - Orthostatic VS: Lying          Visual Acuity      ED Medications  Medications   oxyCODONE-acetaminophen (PERCOCET) 5-325 mg per tablet 1 tablet (1 tablet Oral Given 11/20/22 1123)       Diagnostic Studies  Results Reviewed     None                 XR hip/pelv 2-3 vws right if performed   ED Interpretation by Saba Amaya DO (11/20 1136)   Negative study and no change from June 20, 2022                 Procedures  Procedures         ED Course  ED Course as of 11/20/22 1218   Sun Nov 20, 2022   1213 No acute findings on imaging studies  Patient is stable for discharge  Will attempt ambulate  SBIRT 22yo+    Flowsheet Row Most Recent Value   SBIRT (25 yo +)    In order to provide better care to our patients, we are screening all of our patients for alcohol and drug use  Would it be okay to ask you these screening questions? Unable to answer at this time Filed at: 11/20/2022 1112                    MDM  Number of Diagnoses or Management Options  Ambulatory dysfunction  Chronic hip pain, right: established and worsening  Diagnosis management comments: Patient with chronic right hip pain  Follows with sports medicine and pain management  No new findings today    Pain medications provided  Patient to follow-up with pain management as scheduled on 11/30/2022  Amount and/or Complexity of Data Reviewed  Tests in the radiology section of CPT®: ordered and reviewed  Independent visualization of images, tracings, or specimens: yes    Risk of Complications, Morbidity, and/or Mortality  Presenting problems: moderate  Diagnostic procedures: moderate  Management options: moderate    Patient Progress  Patient progress: stable      Disposition  Final diagnoses:   Chronic hip pain, right   Ambulatory dysfunction     Time reflects when diagnosis was documented in both MDM as applicable and the Disposition within this note     Time User Action Codes Description Comment    11/20/2022 12:14 PM Elena Mendez Phi [U37 026,  G89 29] Chronic hip pain, right     11/20/2022 12:14 PM Elena Vanessa Yip [R26 2] Ambulatory dysfunction       ED Disposition     ED Disposition   Discharge    Condition   Stable    Date/Time   Sun Nov 20, 2022 12:13 PM    Comment   Joyce Snyder discharge to home/self care  Follow-up Information     Follow up With Specialties Details Why Cam Moore MD Pain Medicine On 11/30/2022 As scheduled [facility]  [phone]            Patient's Medications   Discharge Prescriptions    OXYCODONE-ACETAMINOPHEN (PERCOCET) 5-325 MG PER TABLET    Take 1 tablet by mouth every 8 (eight) hours as needed for moderate pain for up to 10 days Max Daily Amount: 3 tablets       Start Date: 11/20/2022End Date: 11/30/2022       Order Dose: 1 tablet       Quantity: 10 tablet    Refills: 0       No discharge procedures on file      PDMP Review       Value Time User    PDMP Reviewed  Yes 11/20/2022 11:14 AM Wild Troncoso DO          ED Provider  Electronically Signed by           Wild Troncoso DO  11/20/22 4141

## 2022-11-30 ENCOUNTER — OFFICE VISIT (OUTPATIENT)
Dept: PAIN MEDICINE | Facility: CLINIC | Age: 87
End: 2022-11-30

## 2022-11-30 VITALS
SYSTOLIC BLOOD PRESSURE: 120 MMHG | DIASTOLIC BLOOD PRESSURE: 60 MMHG | WEIGHT: 125.8 LBS | BODY MASS INDEX: 22.29 KG/M2 | HEART RATE: 80 BPM | HEIGHT: 63 IN | TEMPERATURE: 97.1 F | RESPIRATION RATE: 20 BRPM

## 2022-11-30 DIAGNOSIS — M47.816 LUMBAR SPONDYLOSIS: Primary | ICD-10-CM

## 2022-11-30 NOTE — PROGRESS NOTES
Assessment  1  Lumbar spondylosis    Greater than 90% relief of pain with improved ability to participate with IADLs after bilateral L3, L4, L5 medial branch blocks #1 and #2 as well as with subsequent RF lesioning of medial branch nerves performed 11/17/22  Previously reported the following symptomatology:     Axial low back pain described primarily by arthritic features  Aching, nagging, indolent, stabbing, throbbing features in axial low back without radicular components  5/5 strength bilaterally, negative SLR  Positive facet loading maneuvers in lumbar spine elicited pain, positive tenderness to palpation over lumbar paraspinal muscles  Positive reynaldo's test, gaenslen's, positive SIJ loading bilaterally  Pain with internal/external rotation of right hip; ttp over right gtb  Recently had hip injection with Dr Brandt Hernandez  Findings correlate with prominent lumbar facet arthropathy seen throughout axial low back on MRI from 2019 and recent CT scan; only mild to moderate central canal stenosis L4-L5  Additionally describes radicular pain numbness and weakness to a lesser degree in bilateral L4 dermatomes  Currently she is neurologically intact without gait instability, saddle anesthesia or bowel/bladder abnormality  Risks, benefits alternative to medial branch blocks and subsequent radiofrequency ablation of successful thoroughly discussed with patient  Handouts provided questions answered to patient satisfaction  Has been participant with PT but fell twice thereafter and has since discontinued  Plan  -rtc prn  -to follow up with dr Brandt Hernandez regarding hip pain/OA  -continue HEP- physician directed home exercise plan as per [facility] demonstrated and handouts provided that patient plans to participate with for 1 hour, twice a week for the next 6 weeks  There are risks associated with opioid medications, including dependence, addiction and tolerance   The patient understands and agrees to use these medications only as prescribed  Potential side effects of the medications include, but are not limited to, constipation, drowsiness, addiction, impaired judgment and risk of fatal overdose if not taken as prescribed  The patient was warned against driving while taking sedation medications or operating heavy machinery  The patient voiced understanding  Sharing medications is a felony  At this point in time, the patient is showing no signs of addiction, abuse, diversion or suicidal ideation  [place] Prescription Drug Monitoring Program report was reviewed and was appropriate      Complete risks and benefits including bleeding, infection, tissue reaction, nerve injury and allergic reaction were discussed  The approach was demonstrated using models and literature was provided  Verbal and written consent was obtained  My impressions and treatment recommendations were discussed in detail with the patient who verbalized understanding and had no further questions  Discharge instructions were provided  I personally saw and examined the patient and I agree with the above discussed plan of care  My impressions and treatment recommendations were discussed in detail with the patient who verbalized understanding and had no further questions  Discharge instructions were provided  I personally saw and examined the patient and I agree with the above discussed plan of care  No orders of the defined types were placed in this encounter  History of Present Illness    Greater than 90% relief of pain with improved ability to participate with IADLs after bilateral L3, L4, L5 medial branch blocks #1 and #2 as well as with subsequent RF lesioning of medial branch nerves performed 11/17/22  Previously reported the following symptomatology:     Khadra Ji is a 80 y o  female with a past medical history of Afib on xarelto with pacemaker presenting with chronic low back pain described primarily as arthritic in nature    She describes 8/10 low back pain that is worse in the mornings and worse at the end of the day  The pain is characterized by achy, nagging, indolent, crampy, stabbing pain in her axial low back  The patient describes that the pain is worse with standing for long periods of time on hard surfaces as well as with walking  She ambulates with a walker  The patient is a very active individual and feels as though this pain compromises his/her participation with independent activities of daily living  The pain can be debilitating at times and contribute to significant disability, compromising overall activity and independent activities of daily living  She has tried physical therapy with limited relief of symptoms and discontinued after falling twice  Medications the patient has tried in the past include   tylenol  She describes minor degree radicular symptoms in bilateral L4 dermatome but otherwise has good strength  She denies any weakness numbness or paresthesias that are particularly problematic  The patient denies any bowel or bladder dysfunction as well, gait instability or saddle anesthesia  I have personally reviewed and/or updated the patient's past medical history, past surgical history, family history, social history, current medications, allergies, and vital signs today  Review of Systems   Constitutional: Positive for activity change  HENT: Negative  Eyes: Negative  Respiratory: Negative  Cardiovascular: Negative  Gastrointestinal: Negative  Endocrine: Negative  Genitourinary: Negative  Musculoskeletal: Positive for arthralgias, back pain, gait problem and myalgias  Skin: Negative  Allergic/Immunologic: Negative  Neurological: Positive for weakness and numbness  Hematological: Negative  Psychiatric/Behavioral: Negative  All other systems reviewed and are negative        Patient Active Problem List   Diagnosis   • Paroxysmal atrial fibrillation [facility])   • Syncope • Constipation   • Leukocytosis   • Hypokalemia   • Spinal stenosis of lumbar region with neurogenic claudication   • Age-related osteoporosis without current pathological fracture   • Primary osteoarthritis of both hips   • Hypertension   • Hyperlipidemia   • Fracture of multiple ribs of right side   • Acute pain of right shoulder   • Closed displaced fracture of lateral end of right clavicle   • Pacemaker   • Carotid atherosclerosis, bilateral   • Mesenteric artery stenosis (HCC)   • Stage 3 chronic kidney disease, unspecified whether stage 3a or 3b CKD ([facility] [address] )   • Lumbar spondylosis       Past Medical History:   Diagnosis Date   • A-fib [facility])    • Anxiety    • Arthritis     R knee   • Carotid atherosclerosis, bilateral    • GERD (gastroesophageal reflux disease)    • Hyperlipidemia    • Hypertension    • Muscle spasms of neck    • Osteoporosis    • Spinal stenosis        Past Surgical History:   Procedure Laterality Date   • BACK SURGERY     • CARDIAC ELECTROPHYSIOLOGY PROCEDURE N/A 2/22/2022    Procedure: Cardiac pacer implant;  Surgeon: Jrodan Hernandez MD;  [facility];   Service: Cardiology   • EYE SURGERY      cataracts   • FL GUIDED NEEDLE PLAC BX/ASP/INJ  8/2/2022   • FL GUIDED NEEDLE PLAC BX/ASP/INJ  9/1/2022   • NERVE BLOCK Bilateral 8/2/2022    Procedure: BLOCK MEDIAL BRANCH L3, L4, L5 MBB #1;  Surgeon: Olvin Gee MD;  [facility];  Service: Pain Management    • NERVE BLOCK Bilateral 9/1/2022    Procedure: BLOCK MEDIAL BRANCH L3, L4, L5;  Surgeon: Olvin Gee MD;  [facility];  Service: Pain Management    • RHIZOTOMY Bilateral 11/17/2022    Procedure: RHIZOTOMY LUMBAR L3, L4, L5 medial branch nerves;  Surgeon: Olvin Gee MD;  [facility];  Service: Pain Management        Family History   Problem Relation Age of Onset   • Cancer Father    • Cancer Brother    • Heart disease Mother        Social History     Occupational History   • Not on file Tobacco Use   • Smoking status: Never   • Smokeless tobacco: Never   Vaping Use   • Vaping Use: Never used   Substance and Sexual Activity   • Alcohol use: Not Currently   • Drug use: Never   • Sexual activity: Not Currently       Current Outpatient Medications on File Prior to Visit   Medication Sig   • acetaminophen (TYLENOL) 500 mg tablet Take 2 tablets (1,000 mg total) by mouth every 6 (six) hours as needed for mild pain or moderate pain   • amLODIPine (NORVASC) 10 mg tablet Take 1 tablet (10 mg total) by mouth daily   • calcium citrate-vitamin D (CITRACAL+D) 315-200 MG-UNIT per tablet Take by mouth   • docusate sodium (COLACE) 100 mg capsule Take 100 mg by mouth daily   • lansoprazole (PREVACID) 30 mg capsule    • LORazepam (ATIVAN) 2 mg tablet 3 (three) times a day prn   • losartan (COZAAR) 25 mg tablet Take 1 tablet (25 mg total) by mouth daily   • magnesium oxide (MAG-OX) 400 mg Take 400 mg by mouth once   • metoprolol succinate (TOPROL-XL) 25 mg 24 hr tablet Please take 2 tablets in the morning and 1 tablet in the evening   • Multiple Vitamins-Minerals (ICAPS AREDS 2 PO) Take 1 tablet by mouth daily    • polyethylene glycol (MIRALAX) 17 g packet Take 17 g by mouth daily   • Xarelto 15 MG tablet TAKE 1 TABLET BY MOUTH  DAILY WITH DINNER   • oxyCODONE-acetaminophen (Percocet) 5-325 mg per tablet Take 1 tablet by mouth every 8 (eight) hours as needed for moderate pain for up to 10 days Max Daily Amount: 3 tablets (Patient not taking: Reported on 11/30/2022)     No current facility-administered medications on file prior to visit  Allergies   Allergen Reactions   • Ciprofloxacin    • Epinephrine      Irregular heart ryhthm (afib) per pt     • Statins Myalgia     Patient has tried 3-4 different statins and developed muscle pain with all   • Bactrim [Sulfamethoxazole-Trimethoprim] Rash   • Medical Tape Rash         Physical Exam    /60   Pulse 80   Temp (!) 97 1 °F (36 2 °C)   Resp 20   Ht 5' 3" (1 6 m)   Wt 57 1 kg (125 lb 12 8 oz)   BMI 22 28 kg/m²     Constitutional: normal, well developed, well nourished, alert, in no distress and non-toxic and no overt pain behavior  Eyes: anicteric  HEENT: grossly intact  Neck: supple, symmetric, trachea midline and no masses   Pulmonary:even and unlabored  Cardiovascular:No edema or pitting edema present  Skin:Normal without rashes or lesions and well hydrated  Psychiatric:Mood and affect appropriate  Neurologic:Cranial Nerves II-XII grossly intact Sensation grossly intact; no clonus negative phoenix's  Reflexes 2+ and brisk  SLR negative bilaterally  Musculoskeletal:normal gait  5/5 strength bilaterally with AROM in all extremities  ambulates with walker; unable to perform normal heel toe and tip toe walking  significant pain with lumbar facet loading bilaterally and with lateral spine rotation  ttp over lumbar paraspinal muscles  Positive reynaldo's test, gaenslen's, positive SIJ loading bilaterally  Pain with internal/external rotation of right hip; ttp over right gtb    Imaging  CT LUMBAR SPINE     INDICATION:   M54 16: Radiculopathy, lumbar region  M47 816: Spondylosis without myelopathy or radiculopathy, lumbar region      COMPARISON: Prior CT December 2017, 2019     TECHNIQUE:  Contiguous axial images through the lumbar spine were obtained  Sagittal and coronal reconstructions were performed        Radiation dose length product (DLP) for this visit:  591 9 mGy-cm   This examination, like all CT scans performed in the [facility], was performed utilizing techniques to minimize radiation dose exposure, including the use of iterative   reconstruction and automated exposure control        IMAGE QUALITY:  Diagnostic      FINDINGS:     ALIGNMENT:  There are 5 lumbar type vertebral bodies  Patient has undergone L1, L3, and L4 vertebral plasty  Vertebral body heights are unchanged from the prior study    There is no acute new compression fracture identified  Rightward convex scoliosis   noted at the thoracolumbar junction      VERTEBRAL BODIES:  Postoperative changes of multilevel vertebroplasty as described  Slight retropulsion of the posterior vertebral body of L1 noted unchanged from prior      DEGENERATIVE CHANGES:     Lower Thoracic spine:  Mild central stenosis related to retropulsion of the L1 vertebral body      L1-2:  No significant central or foraminal narrowing      L2-3:  Moderate facet hypertrophy  There is mild annular bulge with marginal osteophytes which result in mild central stenosis      L3-4:  Degenerative disc osteophyte complex with marginal osteophytes eccentric to the right results in mild right foraminal narrowing and mild central stenosis      L4-5:  Moderate facet hypertrophy  There is mild annular bulge with minimal right foraminal narrowing and mild to moderate central stenosis noted      L5-S1:  Calcified posterior disc margin with annular bulging  Minimal central stenosis  Foramina are patent      PARASPINAL SOFT TISSUES:   Normal      IMPRESSION:     Stable lumbar CT status post L1, L3, and L4 kyphoplasty  Stable vertebral heights are noted  Persistent slight retropulsion of the L1 posterior vertebral body contributes to mild central stenosis      Spondylotic degenerative changes result in mild to moderate central stenosis at L4-5 and no greater than mild central or foraminal narrowing at remaining levels

## 2022-11-30 NOTE — H&P (VIEW-ONLY)
Assessment  1  Lumbar spondylosis    Greater than 90% relief of pain with improved ability to participate with IADLs after bilateral L3, L4, L5 medial branch blocks #1 and #2 as well as with subsequent RF lesioning of medial branch nerves performed 11/17/22  Previously reported the following symptomatology:     Axial low back pain described primarily by arthritic features  Aching, nagging, indolent, stabbing, throbbing features in axial low back without radicular components  5/5 strength bilaterally, negative SLR  Positive facet loading maneuvers in lumbar spine elicited pain, positive tenderness to palpation over lumbar paraspinal muscles  Positive reynaldo's test, gaenslen's, positive SIJ loading bilaterally  Pain with internal/external rotation of right hip; ttp over right gtb  Recently had hip injection with Dr Dotson Console  Findings correlate with prominent lumbar facet arthropathy seen throughout axial low back on MRI from 2019 and recent CT scan; only mild to moderate central canal stenosis L4-L5  Additionally describes radicular pain numbness and weakness to a lesser degree in bilateral L4 dermatomes  Currently she is neurologically intact without gait instability, saddle anesthesia or bowel/bladder abnormality  Risks, benefits alternative to medial branch blocks and subsequent radiofrequency ablation of successful thoroughly discussed with patient  Handouts provided questions answered to patient satisfaction  Has been participant with PT but fell twice thereafter and has since discontinued  Plan  -rtc prn  -to follow up with dr Nila Negronole regarding hip pain/OA  -continue HEP- physician directed home exercise plan as per [facility] demonstrated and handouts provided that patient plans to participate with for 1 hour, twice a week for the next 6 weeks  There are risks associated with opioid medications, including dependence, addiction and tolerance   The patient understands and agrees to use these medications only as prescribed  Potential side effects of the medications include, but are not limited to, constipation, drowsiness, addiction, impaired judgment and risk of fatal overdose if not taken as prescribed  The patient was warned against driving while taking sedation medications or operating heavy machinery  The patient voiced understanding  Sharing medications is a felony  At this point in time, the patient is showing no signs of addiction, abuse, diversion or suicidal ideation  [place] Prescription Drug Monitoring Program report was reviewed and was appropriate      Complete risks and benefits including bleeding, infection, tissue reaction, nerve injury and allergic reaction were discussed  The approach was demonstrated using models and literature was provided  Verbal and written consent was obtained  My impressions and treatment recommendations were discussed in detail with the patient who verbalized understanding and had no further questions  Discharge instructions were provided  I personally saw and examined the patient and I agree with the above discussed plan of care  My impressions and treatment recommendations were discussed in detail with the patient who verbalized understanding and had no further questions  Discharge instructions were provided  I personally saw and examined the patient and I agree with the above discussed plan of care  No orders of the defined types were placed in this encounter  History of Present Illness    Greater than 90% relief of pain with improved ability to participate with IADLs after bilateral L3, L4, L5 medial branch blocks #1 and #2 as well as with subsequent RF lesioning of medial branch nerves performed 11/17/22  Previously reported the following symptomatology:     Maeve Boland is a 80 y o  female with a past medical history of Afib on xarelto with pacemaker presenting with chronic low back pain described primarily as arthritic in nature    She describes 8/10 low back pain that is worse in the mornings and worse at the end of the day  The pain is characterized by achy, nagging, indolent, crampy, stabbing pain in her axial low back  The patient describes that the pain is worse with standing for long periods of time on hard surfaces as well as with walking  She ambulates with a walker  The patient is a very active individual and feels as though this pain compromises his/her participation with independent activities of daily living  The pain can be debilitating at times and contribute to significant disability, compromising overall activity and independent activities of daily living  She has tried physical therapy with limited relief of symptoms and discontinued after falling twice  Medications the patient has tried in the past include   tylenol  She describes minor degree radicular symptoms in bilateral L4 dermatome but otherwise has good strength  She denies any weakness numbness or paresthesias that are particularly problematic  The patient denies any bowel or bladder dysfunction as well, gait instability or saddle anesthesia  I have personally reviewed and/or updated the patient's past medical history, past surgical history, family history, social history, current medications, allergies, and vital signs today  Review of Systems   Constitutional: Positive for activity change  HENT: Negative  Eyes: Negative  Respiratory: Negative  Cardiovascular: Negative  Gastrointestinal: Negative  Endocrine: Negative  Genitourinary: Negative  Musculoskeletal: Positive for arthralgias, back pain, gait problem and myalgias  Skin: Negative  Allergic/Immunologic: Negative  Neurological: Positive for weakness and numbness  Hematological: Negative  Psychiatric/Behavioral: Negative  All other systems reviewed and are negative        Patient Active Problem List   Diagnosis   • Paroxysmal atrial fibrillation [facility])   • Syncope • Constipation   • Leukocytosis   • Hypokalemia   • Spinal stenosis of lumbar region with neurogenic claudication   • Age-related osteoporosis without current pathological fracture   • Primary osteoarthritis of both hips   • Hypertension   • Hyperlipidemia   • Fracture of multiple ribs of right side   • Acute pain of right shoulder   • Closed displaced fracture of lateral end of right clavicle   • Pacemaker   • Carotid atherosclerosis, bilateral   • Mesenteric artery stenosis (HCC)   • Stage 3 chronic kidney disease, unspecified whether stage 3a or 3b CKD ([facility] [address] )   • Lumbar spondylosis       Past Medical History:   Diagnosis Date   • A-fib [facility])    • Anxiety    • Arthritis     R knee   • Carotid atherosclerosis, bilateral    • GERD (gastroesophageal reflux disease)    • Hyperlipidemia    • Hypertension    • Muscle spasms of neck    • Osteoporosis    • Spinal stenosis        Past Surgical History:   Procedure Laterality Date   • BACK SURGERY     • CARDIAC ELECTROPHYSIOLOGY PROCEDURE N/A 2/22/2022    Procedure: Cardiac pacer implant;  Surgeon: Julian Jauregui MD;  [facility]: [facility];   Service: Cardiology   • EYE SURGERY      cataracts   • FL GUIDED NEEDLE PLAC BX/ASP/INJ  8/2/2022   • FL GUIDED NEEDLE PLAC BX/ASP/INJ  9/1/2022   • NERVE BLOCK Bilateral 8/2/2022    Procedure: BLOCK MEDIAL BRANCH L3, L4, L5 MBB #1;  Surgeon: Helene Gomez MD;  Location: [facility];  Service: Pain Management    • NERVE BLOCK Bilateral 9/1/2022    Procedure: BLOCK MEDIAL BRANCH L3, L4, L5;  Surgeon: Helene Gomez MD;  Location: [facility];  Service: Pain Management    • RHIZOTOMY Bilateral 11/17/2022    Procedure: RHIZOTOMY LUMBAR L3, L4, L5 medial branch nerves;  Surgeon: Helene Gomez MD;  Location: [facility];  Service: Pain Management        Family History   Problem Relation Age of Onset   • Cancer Father    • Cancer Brother    • Heart disease Mother        Social History     Occupational History   • Not on file Tobacco Use   • Smoking status: Never   • Smokeless tobacco: Never   Vaping Use   • Vaping Use: Never used   Substance and Sexual Activity   • Alcohol use: Not Currently   • Drug use: Never   • Sexual activity: Not Currently       Current Outpatient Medications on File Prior to Visit   Medication Sig   • acetaminophen (TYLENOL) 500 mg tablet Take 2 tablets (1,000 mg total) by mouth every 6 (six) hours as needed for mild pain or moderate pain   • amLODIPine (NORVASC) 10 mg tablet Take 1 tablet (10 mg total) by mouth daily   • calcium citrate-vitamin D (CITRACAL+D) 315-200 MG-UNIT per tablet Take by mouth   • docusate sodium (COLACE) 100 mg capsule Take 100 mg by mouth daily   • lansoprazole (PREVACID) 30 mg capsule    • LORazepam (ATIVAN) 2 mg tablet 3 (three) times a day prn   • losartan (COZAAR) 25 mg tablet Take 1 tablet (25 mg total) by mouth daily   • magnesium oxide (MAG-OX) 400 mg Take 400 mg by mouth once   • metoprolol succinate (TOPROL-XL) 25 mg 24 hr tablet Please take 2 tablets in the morning and 1 tablet in the evening   • Multiple Vitamins-Minerals (ICAPS AREDS 2 PO) Take 1 tablet by mouth daily    • polyethylene glycol (MIRALAX) 17 g packet Take 17 g by mouth daily   • Xarelto 15 MG tablet TAKE 1 TABLET BY MOUTH  DAILY WITH DINNER   • oxyCODONE-acetaminophen (Percocet) 5-325 mg per tablet Take 1 tablet by mouth every 8 (eight) hours as needed for moderate pain for up to 10 days Max Daily Amount: 3 tablets (Patient not taking: Reported on 11/30/2022)     No current facility-administered medications on file prior to visit  Allergies   Allergen Reactions   • Ciprofloxacin    • Epinephrine      Irregular heart ryhthm (afib) per pt     • Statins Myalgia     Patient has tried 3-4 different statins and developed muscle pain with all   • Bactrim [Sulfamethoxazole-Trimethoprim] Rash   • Medical Tape Rash         Physical Exam    /60   Pulse 80   Temp (!) 97 1 °F (36 2 °C)   Resp 20   Ht 5' 3" (1 6 m)   Wt 57 1 kg (125 lb 12 8 oz)   BMI 22 28 kg/m²     Constitutional: normal, well developed, well nourished, alert, in no distress and non-toxic and no overt pain behavior  Eyes: anicteric  HEENT: grossly intact  Neck: supple, symmetric, trachea midline and no masses   Pulmonary:even and unlabored  Cardiovascular:No edema or pitting edema present  Skin:Normal without rashes or lesions and well hydrated  Psychiatric:Mood and affect appropriate  Neurologic:Cranial Nerves II-XII grossly intact Sensation grossly intact; no clonus negative phoenix's  Reflexes 2+ and brisk  SLR negative bilaterally  Musculoskeletal:normal gait  5/5 strength bilaterally with AROM in all extremities  ambulates with walker; unable to perform normal heel toe and tip toe walking  significant pain with lumbar facet loading bilaterally and with lateral spine rotation  ttp over lumbar paraspinal muscles  Positive reynaldo's test, gaenslen's, positive SIJ loading bilaterally  Pain with internal/external rotation of right hip; ttp over right gtb    Imaging  CT LUMBAR SPINE     INDICATION:   M54 16: Radiculopathy, lumbar region  M47 816: Spondylosis without myelopathy or radiculopathy, lumbar region      COMPARISON: Prior CT December 2017, 2019     TECHNIQUE:  Contiguous axial images through the lumbar spine were obtained  Sagittal and coronal reconstructions were performed        Radiation dose length product (DLP) for this visit:  591 9 mGy-cm   This examination, like all CT scans performed in the [facility], was performed utilizing techniques to minimize radiation dose exposure, including the use of iterative   reconstruction and automated exposure control        IMAGE QUALITY:  Diagnostic      FINDINGS:     ALIGNMENT:  There are 5 lumbar type vertebral bodies  Patient has undergone L1, L3, and L4 vertebral plasty  Vertebral body heights are unchanged from the prior study    There is no acute new compression fracture identified  Rightward convex scoliosis   noted at the thoracolumbar junction      VERTEBRAL BODIES:  Postoperative changes of multilevel vertebroplasty as described  Slight retropulsion of the posterior vertebral body of L1 noted unchanged from prior      DEGENERATIVE CHANGES:     Lower Thoracic spine:  Mild central stenosis related to retropulsion of the L1 vertebral body      L1-2:  No significant central or foraminal narrowing      L2-3:  Moderate facet hypertrophy  There is mild annular bulge with marginal osteophytes which result in mild central stenosis      L3-4:  Degenerative disc osteophyte complex with marginal osteophytes eccentric to the right results in mild right foraminal narrowing and mild central stenosis      L4-5:  Moderate facet hypertrophy  There is mild annular bulge with minimal right foraminal narrowing and mild to moderate central stenosis noted      L5-S1:  Calcified posterior disc margin with annular bulging  Minimal central stenosis  Foramina are patent      PARASPINAL SOFT TISSUES:   Normal      IMPRESSION:     Stable lumbar CT status post L1, L3, and L4 kyphoplasty  Stable vertebral heights are noted  Persistent slight retropulsion of the L1 posterior vertebral body contributes to mild central stenosis      Spondylotic degenerative changes result in mild to moderate central stenosis at L4-5 and no greater than mild central or foraminal narrowing at remaining levels

## 2022-11-30 NOTE — PATIENT INSTRUCTIONS
Core Strengthening Exercises   WHAT YOU NEED TO KNOW:   Your core includes the muscles of your lower back, hip, pelvis, and abdomen  Core strengthening exercises help heal and strengthen these muscles  This helps prevent another injury, and keeps your pelvis, spine, and hips in the correct position  DISCHARGE INSTRUCTIONS:   Contact your healthcare provider if:   You have sharp or worsening pain during exercise or at rest     You have questions or concerns about your shoulder exercises  Safety tips:  Talk to your healthcare provider before you start an exercise program  A physical therapist can teach you how to do core strengthening exercises safely  Do the exercises on a mat or firm surface  A firm surface will support your spine and prevent low back pain  Do not do these exercises on a bed  Move slowly and smoothly  Avoid fast or jerky motions  Stop if you feel pain  Core exercises should not be painful  Stop if you feel pain  Breathe normally during core exercises  Do not hold your breath  This may cause an increase in blood pressure and prevent muscle strengthening  Your healthcare provider will tell you when to inhale and exhale during the exercise  Begin all of your exercises with abdominal bracing  Abdominal bracing helps warm up your core muscles  You can also practice abdominal bracing throughout the day  Lie on your back with your knees bent and feet flat on the floor  Place your arms in a relaxed position beside your body  Tighten your abdominal muscles  Pull your belly button in and up toward your spine  Hold for 5 seconds  Relax your muscles  Repeat 10 times  Core strengthening exercises: Your healthcare provider will tell you how often to do these exercises  The provider will also tell you how many repetitions of each exercise you should do  Hold each exercise for 5 seconds or as directed  As you get stronger, increase your hold to 10 to 15 seconds   You can do some of these exercises on a stability ball, or with a weight  Ask your healthcare provider how to use a stability ball or weight for these exercises:  Bridging:  Lie on your back with your knees bent and feet flat on the floor  Rest your arms at your side  Tighten your buttocks, and then lift your hips 1 inch off the floor  Hold for 5 seconds  When you can do this exercise without pain for 10 seconds, increase the distance you lift your hips  A good goal is to be able to lift your hips so that your shoulders, hips, and knees are in a straight line  Dead bug:  Lie on your back with your knees bent and feet flat on the floor  Place your arms in a relaxed position beside your body  Begin with abdominal bracing  Next, raise one leg, keeping your knee bent  Hold for 5 seconds  Repeat with the other leg  When you can do this exercise without pain for 10 to 15 seconds, you may raise one straight leg and hold  Repeat with the other leg  Quadruped:  Place your hands and knees on the floor  Keep your wrists directly below your shoulders and your knees directly below your hips  Pull your belly button in toward your spine  Do not flatten or arch your back  Tighten your abdominal muscles below your belly button  Hold for 5 seconds  When you can do this exercise without pain for 10 to 15 seconds, you may extend one arm and hold  Repeat on the other side  Side bridge exercises:      Standing side bridge:  Stand next to a wall and extend one arm toward the wall  Place your palm flat on the wall with your fingers pointing upward  Begin with abdominal bracing  Next, without moving your feet, slowly bend your arm to 90 degrees  Hold for 5 seconds  Repeat on the other side  When you can do this exercise without pain for 10 to 15 seconds, you may do the bent leg side bridge on the floor  Bent leg side bridge:  Lie on one side with your legs, hips, and shoulders in a straight line   Prop yourself up onto your forearm so your elbow is directly below your shoulder  Bend your knees back to 90 degrees  Begin with abdominal bracing  Next, lift your hips and balance yourself on your forearm and knees  Hold for 5 seconds  Repeat on the other side  When you can do this exercise without pain for 10 to 15 seconds, you may do the straight leg side bridge on the floor  Straight leg side bridge:  Lie on one side with your legs, hips, and shoulders in a straight line  Prop yourself up onto your forearm so your elbow is directly below your shoulder  Begin with abdominal bracing  Lift your hips off the floor and balance yourself on your forearm and the outside of your flexed foot  Do not let your ankle bend sideways  Hold for 5 seconds  Repeat on the other side  When you can do this exercise without pain for 10 to 15 seconds, ask your healthcare provider for more advanced exercises  Superman:  Lie on your stomach  Extend your arms forward on the floor  Tighten your abdominal muscles and lift your right hand and left leg off the floor  Hold this position  Slowly return to the starting position  Tighten your abdominal muscles and lift your left hand and right leg off the floor  Hold this position  Slowly return to the starting position  Clam:  Lie on your side with your knees bent  Put your bottom arm under your head to keep your neck in line  Put your top hand on your hip to keep your pelvis from moving  Put your heels together, and keep them together during this exercise  Slowly raise your top knee toward the ceiling  Then lower your leg so your knees are together  Repeat this exercise 10 times  Then switch sides and do the exercise 10 times with the other leg  Curl up:  Lie on your back with your knees bent and feet flat on the floor  Place your hands, palms down, underneath your lower back  Next, with your elbows on the floor, lift your shoulders and chest 2 to 3 inches off the floor   Keep your head in line with your shoulders  Hold this position  Slowly return to the starting position  Straight leg raises:  Lie on your back with one leg straight  Bend the other knee and place your foot flat on the floor  Tighten your abdominal muscles  Keep your leg straight and slowly lift it straight up 6 to 12 inches off the floor  Hold this position  Lower your leg slowly  Do as many repetitions as directed on this side  Repeat with the other leg  Plank:  Lie on your stomach  Bend your elbows and place your forearms flat on the floor  Lift your chest, stomach, and knees off the floor  Make sure your elbows are below your shoulders  Your body should be in a straight line  Do not let your hips or lower back sink to the ground  Squeeze your abdominal muscles together and hold for 15 seconds  To make this exercise harder, hold for 30 seconds or lift 1 leg at a time  Bicycles:  Lie on your back  Bend both knees and bring them toward your chest  Your calves should be parallel to the floor  Place the palms of your hands on the back of your head  Straighten your right leg and keep it lifted 2 inches off the floor  Raise your head and shoulders off the floor and twist towards your left  Keep your head and shoulders lifted  Bend your right knee while you straighten your left leg  Keep your left leg 2 inches off the floor  Twist your head and chest towards the left leg  Continue to straighten 1 leg at a time and twist        Follow up with your doctor as directed:  Write down your questions so you remember to ask them during your visits  © Copyright [employer] 2022 Information is for End User's use only and may not be sold, redistributed or otherwise used for commercial purposes  All illustrations and images included in CareNotes® are the copyrighted property of [employer]  or [facility] Erick Wallace   The above information is an  only   It is not intended as medical advice for individual conditions or treatments  Talk to your doctor, nurse or pharmacist before following any medical regimen to see if it is safe and effective for you

## 2022-12-02 ENCOUNTER — PREP FOR PROCEDURE (OUTPATIENT)
Dept: PAIN MEDICINE | Facility: CLINIC | Age: 87
End: 2022-12-02

## 2022-12-02 ENCOUNTER — OFFICE VISIT (OUTPATIENT)
Dept: OBGYN CLINIC | Facility: CLINIC | Age: 87
End: 2022-12-02

## 2022-12-02 VITALS
WEIGHT: 125 LBS | DIASTOLIC BLOOD PRESSURE: 64 MMHG | BODY MASS INDEX: 22.15 KG/M2 | SYSTOLIC BLOOD PRESSURE: 116 MMHG | HEIGHT: 63 IN

## 2022-12-02 DIAGNOSIS — M25.551 GREATER TROCHANTERIC PAIN SYNDROME OF RIGHT LOWER EXTREMITY: Primary | ICD-10-CM

## 2022-12-02 DIAGNOSIS — M25.551 ACUTE RIGHT HIP PAIN: ICD-10-CM

## 2022-12-03 NOTE — PROGRESS NOTES
1  Greater trochanteric pain syndrome of right lower extremity  Steroid Injection      2  Acute right hip pain  Steroid Injection        Orders Placed This Encounter   Procedures   • Steroid Injection        Imaging Studies (I personally reviewed images in PACS and report):    · X-ray right hip 06/20/2022: There is evidence of lumbar kyphoplasty  Otherwise in regards to her right hip there is no acute osseous abnormalities or significant degenerative changes  · MRI lumbar spine w/o contrast 3/19/2019  1  Acute compression fractures at L1, L3, and L4  2  Moderate central canal stenosis at L3-4 and L4-5  Multilevel bilateral neural foraminal stenosis as described  IMPRESSION:  • Acute right hip pain - ongoing >4 weeks w/o injury  • 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  • Previously had significant relief with GTB cortisone injection in June of 2022 until recently    Other factors:  Patient with chronic low back pain spondylosis which she has been seeing Pain Management for RFAs    PLAN:    • Clinical exam and radiographic imaging reviewed with patient today, with impression as per above  I have discussed with the patient the pathophysiology of this diagnosis and reviewed how the examination correlates with this diagnosis  • Given her clinical exam still seems to be consistent greater trochanteric pain syndrome, I have referred her for a cortisone injection under fluoroscopic guidance with pain management at this time  I reached out to Dr Lieutenant Barakat who has agreed to perform this procedure in order was placed  • Beyond this, I counseled that some degree of physical therapy would likely benefit her given her sense of weakness and giving out  I counseled that cortisone injections and medications would only dull the pain but would not fix her gait dysfunction  Patient states she will consider it after her guided injection      Return for Follow up 2 weeks after guided injection of right hip bursa with pain management  Portions of the record may have been created with voice recognition software  Occasional wrong word or "sound a like" substitutions may have occurred due to the inherent limitations of voice recognition software  Read the chart carefully and recognize, using context, where substitutions have occurred  CHIEF COMPLAINT:  Chief Complaint   Patient presents with   • Right Hip - Pain         HPI:  Khadra Ji is a 80 y o  female  who presents for     Visit 12/03/2022: Follow-up right hip pain:  Patient was last seen on 10/20/2022 when she was diagnosed greater trochanteric pain syndrome  She was given a palpation guided cortisone injection patient states leaflet last for few days before progressively return again  She states became so severe over the lateral aspect of her right hip that she went to the ER when she was prescribed Percocet  She states with the Percocet her pain has been well controlled but is not completely resolved  Again she states it is mainly over the lateral posterior aspect of her hip  She states it can sometimes radiate down the lateral aspect of her right thigh  Denies any hip swelling, discoloration  As noted in the past patient has not attended physical therapy for this issue as she states recently having to move her  to an assisted living doing dementia and not having that time to attend PT  Patient reports the pain of her lateral hip also affects her overall gait function she was feels that she is going to give out laterally due to a sense of weakness          Medical, Surgical, Family, and Social History    Past Medical History:   Diagnosis Date   • A-fib [facility])    • Anxiety    • Arthritis     R knee   • Carotid atherosclerosis, bilateral    • GERD (gastroesophageal reflux disease)    • Hyperlipidemia    • Hypertension    • Muscle spasms of neck    • Osteoporosis    • Spinal stenosis      Past Surgical History:   Procedure Laterality Date   • BACK SURGERY     • CARDIAC ELECTROPHYSIOLOGY PROCEDURE N/A 2/22/2022    Procedure: Cardiac pacer implant;  Surgeon: Jordan Hernandez MD;  [facility]; Service: Cardiology   • EYE SURGERY      cataracts   • FL GUIDED NEEDLE PLAC BX/ASP/INJ  8/2/2022   • FL GUIDED NEEDLE PLAC BX/ASP/INJ  9/1/2022   • NERVE BLOCK Bilateral 8/2/2022    Procedure: BLOCK MEDIAL BRANCH L3, L4, L5 MBB #1;  Surgeon: Olvin Gee MD;  [facility];  Service: Pain Management    • NERVE BLOCK Bilateral 9/1/2022    Procedure: BLOCK MEDIAL BRANCH L3, L4, L5;  Surgeon: Olvin Gee MD;  [facility];  Service: Pain Management    • RHIZOTOMY Bilateral 11/17/2022    Procedure: RHIZOTOMY LUMBAR L3, L4, L5 medial branch nerves;  Surgeon: Olvin Gee MD;  [facility];  Service: Pain Management      Social History   Social History     Substance and Sexual Activity   Alcohol Use Not Currently     Social History     Substance and Sexual Activity   Drug Use Never     Social History     Tobacco Use   Smoking Status Never   Smokeless Tobacco Never     Family History   Problem Relation Age of Onset   • Cancer Father    • Cancer Brother    • Heart disease Mother      Allergies   Allergen Reactions   • Ciprofloxacin    • Epinephrine      Irregular heart ryhthm (afib) per pt  • Statins Myalgia     Patient has tried 3-4 different statins and developed muscle pain with all   • Bactrim [Sulfamethoxazole-Trimethoprim] Rash   • Medical Tape Rash          Physical Exam  /64 (BP Location: Left arm, Patient Position: Sitting, Cuff Size: Standard)   Ht 5' 3" (1 6 m)   Wt 56 7 kg (125 lb)   BMI 22 14 kg/m²     Constitutional:  see vital signs  Gen: well-developed, normocephalic/atraumatic, well-groomed  Eyes: No inflammation or discharge of conjunctiva or lids; sclera clear   Pulmonary/Chest: Effort normal  No respiratory distress         Right Hip Exam     Tenderness   The patient is experiencing tenderness in the greater trochanter and lateral     Range of Motion   Flexion: 110   External rotation: 50   Internal rotation: 25     Muscle Strength   Abduction: 4/5   Adduction: 4/5   Flexion: 4/5     Tests   WILVER: negative    Other   Erythema: absent              Procedures

## 2022-12-12 ENCOUNTER — TELEPHONE (OUTPATIENT)
Dept: PAIN MEDICINE | Facility: CLINIC | Age: 87
End: 2022-12-12

## 2022-12-21 NOTE — DISCHARGE INSTR - AVS FIRST PAGE
YOUR 2 WEEK FOLLOW UP HAS BEEN SCHEDULED; IF YOU WISH TO CHANGE THE FOLLOW UP, PLEASE CALL THE [facility] AT [place]: [phone]    Steroid Joint Injection   WHAT YOU NEED TO KNOW:   A steroid joint injection is a procedure to inject steroid medicine into a joint  Steroid medicine decreases pain and inflammation  The injection may also contain an anesthetic (numbing medicine) to decrease pain  It may be done to treat conditions such as arthritis, gout, or carpal tunnel syndrome  The injections may be given in your knee, ankle, shoulder, elbow, or wrist  Injections may also be given in your hip, toe, thumb, or finger  DISCHARGE INSTRUCTIONS:   Contact your healthcare provider if:   You have fever or chills  You have redness or swelling at the injection site  You have more pain than usual in your joint for more than 72 hours  You have questions or concerns about your condition or care  Medicines:   Pain medicine  may be given  Ask how to take this medicine safely  Take your medicine as directed  Contact your healthcare provider if you think your medicine is not helping or if you have side effects  Tell him or her if you are allergic to any medicine  Keep a list of the medicines, vitamins, and herbs you take  Include the amounts, and when and why you take them  Bring the list or the pill bottles to follow-up visits  Carry your medicine list with you in case of an emergency  Self-care:   Leave the bandage on for 8 to 12 hours  Care for your wound as directed  Rest the area  as directed  You may need to decrease weight on certain joints, such as the knee, for a period of time  Ask when you can return to your daily activities  Elevate  your limb where the steroid injection was given  Elevate the limb above the level of your heart as often as you can  This will help decrease swelling and pain  Prop your limb on pillows or blankets to keep it elevated comfortably  Apply ice  on your joint for 15 to 20 minutes every hour or as directed  Use an ice pack, or put crushed ice in a plastic bag  Cover it with a towel  Ice helps prevent tissue damage and decreases swelling and pain  © Copyright [employer] 2022 Information is for End User's use only and may not be sold, redistributed or otherwise used for commercial purposes  All illustrations and images included in CareNotes® are the copyrighted property of [employer]  or Alonso Tucker  The above information is an  only  It is not intended as medical advice for individual conditions or treatments  Talk to your doctor, nurse or pharmacist before following any medical regimen to see if it is safe and effective for you

## 2022-12-21 NOTE — DISCHARGE INSTRUCTIONS
YOUR 2 WEEK FOLLOW UP HAS BEEN SCHEDULED; IF YOU WISH TO CHANGE THE FOLLOW UP, PLEASE CALL THE [facility] AT [facility]: [phone]    Steroid Joint Injection   WHAT YOU NEED TO KNOW:   A steroid joint injection is a procedure to inject steroid medicine into a joint  Steroid medicine decreases pain and inflammation  The injection may also contain an anesthetic (numbing medicine) to decrease pain  It may be done to treat conditions such as arthritis, gout, or carpal tunnel syndrome  The injections may be given in your knee, ankle, shoulder, elbow, or wrist  Injections may also be given in your hip, toe, thumb, or finger  DISCHARGE INSTRUCTIONS:   Contact your healthcare provider if:   You have fever or chills  You have redness or swelling at the injection site  You have more pain than usual in your joint for more than 72 hours  You have questions or concerns about your condition or care  Medicines:   Pain medicine  may be given  Ask how to take this medicine safely  Take your medicine as directed  Contact your healthcare provider if you think your medicine is not helping or if you have side effects  Tell him or her if you are allergic to any medicine  Keep a list of the medicines, vitamins, and herbs you take  Include the amounts, and when and why you take them  Bring the list or the pill bottles to follow-up visits  Carry your medicine list with you in case of an emergency  Self-care:   Leave the bandage on for 8 to 12 hours  Care for your wound as directed  Rest the area  as directed  You may need to decrease weight on certain joints, such as the knee, for a period of time  Ask when you can return to your daily activities  Elevate  your limb where the steroid injection was given  Elevate the limb above the level of your heart as often as you can  This will help decrease swelling and pain   Prop your limb on pillows or blankets to keep it elevated comfortably  Apply ice  on your joint for 15 to 20 minutes every hour or as directed  Use an ice pack, or put crushed ice in a plastic bag  Cover it with a towel  Ice helps prevent tissue damage and decreases swelling and pain  © Copyright [employer] 2022 Information is for End User's use only and may not be sold, redistributed or otherwise used for commercial purposes  All illustrations and images included in CareNotes® are the copyrighted property of A [employer]  or Alonso Tucker  The above information is an  only  It is not intended as medical advice for individual conditions or treatments  Talk to your doctor, nurse or pharmacist before following any medical regimen to see if it is safe and effective for you

## 2022-12-22 ENCOUNTER — HOSPITAL ENCOUNTER (OUTPATIENT)
Facility: HOSPITAL | Age: 87
Setting detail: OUTPATIENT SURGERY
Discharge: HOME | End: 2022-12-22
Attending: ANESTHESIOLOGY | Admitting: ANESTHESIOLOGY

## 2022-12-22 ENCOUNTER — APPOINTMENT (OUTPATIENT)
Dept: RADIOLOGY | Facility: HOSPITAL | Age: 87
End: 2022-12-22

## 2022-12-22 VITALS
TEMPERATURE: 98.1 F | RESPIRATION RATE: 16 BRPM | OXYGEN SATURATION: 98 % | HEIGHT: 63 IN | DIASTOLIC BLOOD PRESSURE: 75 MMHG | HEART RATE: 72 BPM | WEIGHT: 125 LBS | BODY MASS INDEX: 22.15 KG/M2 | SYSTOLIC BLOOD PRESSURE: 170 MMHG

## 2022-12-22 RX ORDER — METHYLPREDNISOLONE ACETATE 80 MG/ML
INJECTION, SUSPENSION INTRA-ARTICULAR; INTRALESIONAL; INTRAMUSCULAR; SOFT TISSUE AS NEEDED
Status: DISCONTINUED | OUTPATIENT
Start: 2022-12-22 | End: 2022-12-22 | Stop reason: HOSPADM

## 2022-12-22 RX ORDER — LIDOCAINE HYDROCHLORIDE 10 MG/ML
INJECTION, SOLUTION EPIDURAL; INFILTRATION; INTRACAUDAL; PERINEURAL AS NEEDED
Status: DISCONTINUED | OUTPATIENT
Start: 2022-12-22 | End: 2022-12-22 | Stop reason: HOSPADM

## 2022-12-22 RX ORDER — BUPIVACAINE HYDROCHLORIDE 2.5 MG/ML
INJECTION, SOLUTION EPIDURAL; INFILTRATION; INTRACAUDAL AS NEEDED
Status: DISCONTINUED | OUTPATIENT
Start: 2022-12-22 | End: 2022-12-22 | Stop reason: HOSPADM

## 2022-12-22 NOTE — OP NOTE
OPERATIVE REPORT  PATIENT NAME: Ambrocio Wu    :  1935  MRN: [id number]  Pt Location:  GI ROOM 01    SURGERY DATE: 2022    Surgeon(s) and Role:      Cris Oreilly MD - Primary    Preop Diagnosis:  Greater trochanteric pain syndrome of right lower extremity [M25 551]    Post-Op Diagnosis Codes:     * Greater trochanteric pain syndrome of right lower extremity [M25 551]    Procedure(s) (LRB):  INJECTION JOINT HIP (Right)    Specimen(s):  * No specimens in log *    Estimated Blood Loss:   Minimal    Drains:  * No LDAs found *    Anesthesia Type:   Local    Operative Indications:  Greater trochanteric pain syndrome of right lower extremity [M25 551]    Operative Findings:  Visualization under fluoroscopic guidance of needle entry in region of right  peritrochanteric bursa     Complications:   None    Procedure and Technique:  Please see detailed procedure note     I was present for the entire procedure    Patient Disposition:  PACU     SIGNATURE: Susan Omer MD  DATE: 2022  TIME: 2:02 PM

## 2022-12-22 NOTE — INTERVAL H&P NOTE
H&P reviewed  After examining the patient discussed elevated BP and all cause mortality, risk of stroke, MI with steroid in epidural injection administration; will work with pcp to lower in coming weeks        Vitals:    12/22/22 1318   BP: (!) 180/77   Pulse: 88   Resp: 18   Temp: 97 9 °F (36 6 °C)   SpO2: 98%

## 2022-12-22 NOTE — PROCEDURES
Pre-procedure Diagnosis: Right Trochanteric Bursitis  Post-procedure Diagnosis: Right Trochanteric Bursitis  Operation Title(s):  [RIGHT] trochanteric bursa injection under fluoroscopic guidance  Attending Surgeon:   Armaan Bolanos MD  Anesthesia:   Local    Indications: The patient is a 80y o  year-old female with a diagnosis of trochanteric bursitis  The patient's history and physical exam were reviewed  The risks, benefits and alternatives to the procedure were discussed, and all questions were answered to the patient's satisfaction  The patient agreed to proceed, and written informed consent was obtained  Procedure in Detail: The patient was brought into the exam room and placed in the supine position on the exam room table  The area of the [RIGHT] trochanteric bursa was prepped with chloraprep and draped in a sterile manner  AP fluoroscopy was used to provide imaging over the right lateral thigh to visualize the peritrochanteric bursal region  Optimal needle path was determined and the skin and subcutaneous tissues in the area was anesthetized with 1% lidocaine  Then, under fluoroscopic guidance, a 22g 3 5 inch ultrasound needle was advanced until the needle entered the peritrochanteric bursa region  After visualization of the tip in the target area and negative aspiration for blood, a solution consisting of 4mL 0 25% bupivacaine and 1mL Depo-Medrol (40mg/ml) was easily injected  The patient's right lateral hip was cleaned and a bandage was placed over the sites of needle insertion  Disposition: The patient tolerated the procedure well and there were no apparent complications  The patient was given written discharge instructions for the procedure

## 2022-12-23 DIAGNOSIS — I10 PRIMARY HYPERTENSION: ICD-10-CM

## 2022-12-23 RX ORDER — LOSARTAN POTASSIUM 25 MG/1
TABLET ORAL
Qty: 90 TABLET | Refills: 3 | Status: SHIPPED | OUTPATIENT
Start: 2022-12-23

## 2022-12-29 ENCOUNTER — OFFICE VISIT (OUTPATIENT)
Dept: OBGYN CLINIC | Facility: CLINIC | Age: 87
End: 2022-12-29

## 2022-12-29 ENCOUNTER — TELEPHONE (OUTPATIENT)
Dept: PAIN MEDICINE | Facility: CLINIC | Age: 87
End: 2022-12-29

## 2022-12-29 VITALS
SYSTOLIC BLOOD PRESSURE: 150 MMHG | DIASTOLIC BLOOD PRESSURE: 80 MMHG | TEMPERATURE: 97.2 F | HEART RATE: 76 BPM | HEIGHT: 63 IN | WEIGHT: 125 LBS | BODY MASS INDEX: 22.15 KG/M2

## 2022-12-29 DIAGNOSIS — G89.29 CHRONIC PAIN OF RIGHT KNEE: Primary | ICD-10-CM

## 2022-12-29 DIAGNOSIS — M17.11 PRIMARY OSTEOARTHRITIS OF RIGHT KNEE: ICD-10-CM

## 2022-12-29 DIAGNOSIS — M25.561 CHRONIC PAIN OF RIGHT KNEE: Primary | ICD-10-CM

## 2022-12-29 NOTE — PROGRESS NOTES
1  Chronic pain of right knee  Ambulatory referral to Pain Management      2  Primary osteoarthritis of right knee  Ambulatory referral to Pain Management        Orders Placed This Encounter   Procedures   • Ambulatory referral to Pain Management        Imaging Studies (I personally reviewed images in PACS and report):    · X-ray right hip 06/20/2022: There is evidence of lumbar kyphoplasty  Otherwise in regards to her right hip there is no acute osseous abnormalities or significant degenerative changes  · MRI lumbar spine w/o contrast 3/19/2019  1  Acute compression fractures at L1, L3, and L4  2  Moderate central canal stenosis at L3-4 and L4-5  Multilevel bilateral neural foraminal stenosis as described  • Prior imaging of right knee from [facility] previously reviewed  as I do not have access to their imaging system  Interpreted as tricompartmental OA, severe in lateral tibiofemoral compartment  Otherwise, no acute osseous abnormalities    IMPRESSION:  • Subacute right hip pain secondary to greater trochanteric pain syndrome/bursitis-improved from guided greater trochanteric bursa cortisone injection since last visit  • Separate issue addressed today-chronic right knee pain secondary to severe osteoarthritis; only brief response/relief from cortisone/Visco injections    Other factors:  Patient with chronic low back pain spondylosis which she has been seeing Pain Management for RFAs    PLAN:    • Clinical exam and radiographic imaging reviewed with patient today, with impression as per above  I have discussed with the patient the pathophysiology of this diagnosis and reviewed how the examination correlates with this diagnosis  • Reassured patient that she is progressively improving since the guided procedure for her right hip in regards to greater trochanteric bursitis    May need to consider sending patient for guided injections in the future as palpation guided cortisone injection did not provide sufficient relief  • Separately, patient is also discussing about chronic right knee pain from her primary osteoarthritis is not responding to cortisone and less effective viscosupplementation injections  She has severe degenerative changes of her knee and at this time I feel she may benefit from a geniculate nerve ablation from pain management as well  I will refer her back to pain management to discuss this procedure  Return for Please follow up with Dr Ollie John to discuss potential right knee procedure  Portions of the record may have been created with voice recognition software  Occasional wrong word or "sound a like" substitutions may have occurred due to the inherent limitations of voice recognition software  Read the chart carefully and recognize, using context, where substitutions have occurred  I have spent 30 minutes with Patient  today in which greater than 50% of this time was spent in counseling/coordination of care regarding Diagnostic results, Prognosis, Risks and benefits of tx options, Intructions for management, Patient and family education, Importance of tx compliance, Risk factor reductions and Impressions  CHIEF COMPLAINT:  Chief Complaint   Patient presents with   • Right Hip - Follow-up   • Follow-up         HPI:  Brooke Monique is a 80 y o  female  who presents for     Visit 12/29/2022: Follow-up evaluation right hip pain:  Patient was last seen when she was referred to pain management for a guided right greater trochanteric bursa cortisone injection as she did not respond to a palpation guided injection  She states that she did sustain significant relief from this injection and for the most part she has minimal to tolerable pain that is infrequent  Patient still does not feel she has time to attend physical therapy and I had given her home exercise program sustained    For now, patient feels that she is satisfied with her progress in regards to her right hip     Separately, she also wants to address her chronic right knee pain secondary to osteoarthritis  I had seen her in the past for this issue as well which I gave her a cortisone injection as well as a viscosupplementation injection  She reports a viscosupplementation injection that was last given only lasted about a few months before the pain progressively returned  She denies any new injury since last visit  She states the pain is diffusely through her knee over the anterior medial aspect  She describes a sharp/aching pain that is worse with walking and transitioning from sitting to standing  She reports crepitus of her knee  Medical, Surgical, Family, and Social History    Past Medical History:   Diagnosis Date   • A-fib [facility])    • Anxiety    • Arthritis     R knee   • Carotid atherosclerosis, bilateral    • GERD (gastroesophageal reflux disease)    • Hyperlipidemia    • Hypertension    • Muscle spasms of neck    • Osteoporosis    • Spinal stenosis      Past Surgical History:   Procedure Laterality Date   • BACK SURGERY     • CARDIAC ELECTROPHYSIOLOGY PROCEDURE N/A 2/22/2022    Procedure: Cardiac pacer implant;  Surgeon: Fredrick Acuña MD;  [facility];   Service: Cardiology   • EYE SURGERY      cataracts   • FL GUIDED NEEDLE PLAC BX/ASP/INJ  8/2/2022   • FL GUIDED NEEDLE PLAC BX/ASP/INJ  9/1/2022   • NERVE BLOCK Bilateral 8/2/2022    Procedure: BLOCK MEDIAL BRANCH L3, L4, L5 MBB #1;  Surgeon: Primo Reza MD;  [facility];  Service: Pain Management    • NERVE BLOCK Bilateral 9/1/2022    Procedure: BLOCK MEDIAL BRANCH L3, L4, L5;  Surgeon: Primo Reza MD;  [facility];  Service: Pain Management    • CO ARTHROCENTESIS ASPIR&/INJ MAJOR JT/BURSA W/O US Right 12/22/2022    Procedure: INJECTION JOINT HIP;  Surgeon: Primo Reza MD;  [facility];  Service: Pain Management    • RHIZOTOMY Bilateral 11/17/2022    Procedure: RHIZOTOMY LUMBAR L3, L4, L5 medial branch nerves;  Surgeon: Marcie Dorsey MD;  [facility];  Service: Pain Management      Social History   Social History     Substance and Sexual Activity   Alcohol Use Not Currently     Social History     Substance and Sexual Activity   Drug Use Never     Social History     Tobacco Use   Smoking Status Never   Smokeless Tobacco Never     Family History   Problem Relation Age of Onset   • Cancer Father    • Cancer Brother    • Heart disease Mother      Allergies   Allergen Reactions   • Ciprofloxacin    • Epinephrine      Irregular heart ryhthm (afib) per pt  • Statins Myalgia     Patient has tried 3-4 different statins and developed muscle pain with all   • Bactrim [Sulfamethoxazole-Trimethoprim] Rash   • Medical Tape Rash          Physical Exam  /80 (BP Location: Left arm)   Pulse 76   Temp (!) 97 2 °F (36 2 °C) (Temporal)   Ht 5' 3" (1 6 m)   Wt 56 7 kg (125 lb)   BMI 22 14 kg/m²     Constitutional:  see vital signs  Gen: well-developed, normocephalic/atraumatic, well-groomed  Eyes: No inflammation or discharge of conjunctiva or lids; sclera clear   Pulmonary/Chest: Effort normal  No respiratory distress  Right Hip Exam     Tenderness   The patient is experiencing no tenderness       Range of Motion   Flexion: 110   External rotation: 50   Internal rotation: 25     Muscle Strength   Abduction: 4/5   Adduction: 4/5   Flexion: 4/5     Tests   WILVER: negative    Other   Erythema: absent        Right Knee Exam:  Erythema: no  Swelling: no  Increased Warmth: no  Tenderness: +MJL  ROM: 0-130  Patellar Apprehension: negative  Patellar Grind: +  Lachman's: negative  Anterior Drawer: negative  Varus laxity: negative  Valgus laxity: negative           Procedures

## 2023-01-13 ENCOUNTER — OFFICE VISIT (OUTPATIENT)
Dept: PAIN MEDICINE | Facility: CLINIC | Age: 88
End: 2023-01-13

## 2023-01-13 VITALS
SYSTOLIC BLOOD PRESSURE: 118 MMHG | DIASTOLIC BLOOD PRESSURE: 66 MMHG | BODY MASS INDEX: 22.22 KG/M2 | WEIGHT: 125.4 LBS | TEMPERATURE: 96.3 F | HEIGHT: 63 IN | RESPIRATION RATE: 20 BRPM

## 2023-01-13 DIAGNOSIS — M25.561 CHRONIC PAIN OF RIGHT KNEE: ICD-10-CM

## 2023-01-13 DIAGNOSIS — M17.11 PRIMARY OSTEOARTHRITIS OF RIGHT KNEE: ICD-10-CM

## 2023-01-13 DIAGNOSIS — G89.29 CHRONIC PAIN OF RIGHT KNEE: ICD-10-CM

## 2023-01-13 NOTE — PATIENT INSTRUCTIONS
Knee Exercises   AMBULATORY CARE:   What you need to know about knee exercises:  Knee exercises help strengthen the muscles around your knee  Strong muscles can help reduce pain and decrease your risk of future injury  Knee exercises also help you heal after an injury or surgery  Start slow  These are beginning exercises  Ask your healthcare provider if you need to see a physical therapist for more advanced exercises  As you get stronger, you may be able to do more sets of each exercise or add weights  Stop if you feel pain  It is normal to feel some discomfort at first  Regular exercise will help decrease your discomfort over time  Do the exercises on both legs  Do this so both knees remain strong  Warm up before you do knee exercises  Walk or ride a stationary bike for 5 or 10 minutes to warm your muscles  How to perform knee stretches safely:  Always stretch before you do strengthening exercises  Do these stretching exercises again after you do the strengthening exercises  Do these stretches 4 or 5 days a week, or as directed  Standing calf stretch: Face a wall and place both palms flat on the wall, or hold the back of a chair for balance  Keep a slight bend in your knees  Take a big step backward with one leg  Keep your other leg directly under you  Keep both heels flat and press your hips forward  Hold the stretch for 30 seconds, and then relax for 30 seconds  Switch legs  Repeat 2 or 3 times on each leg  Standing quadriceps stretch:  Stand and place one hand against a wall or hold the back of a chair for balance  With your weight on one leg, bend your other leg and grab your ankle  Bring your heel toward your buttocks  Hold the stretch for 30 to 60 seconds  Switch legs  Repeat 2 or 3 times on each leg  Sitting hamstring stretch:  Sit with both legs straight in front of you  Do not point or flex your toes   Place your palms on the floor and slide your hands forward until you feel the stretch  Do not round your back  Hold the stretch for 30 seconds  Repeat 2 or 3 times  How to perform knee strengthening exercises safely:  Do these exercises 4 or 5 days a week, or as directed  Standing half squats:  Stand with your feet shoulder-width apart  Lean your back against a wall or hold the back of a chair for balance, if needed  Slowly sit down about 10 inches, as if you are going to sit in a chair  Your body weight should be mostly over your heels  Hold the squat for 5 seconds, then rise to a standing position  Do 3 sets of 10 squats to strengthen your buttocks and thighs  Standing hamstring curls: Face a wall and place both palms flat on the wall, or hold the back of a chair for balance  With your weight on one leg, lift your other foot as close to your buttocks as you can  Hold for 5 seconds and then lower your leg  Do 2 sets of 10 curls on each leg  This exercise strengthens the muscles in the back of your thigh  Standing calf raises:  Face a wall and place both palms flat on the wall, or hold the back of a chair for balance  Stand up straight, and do not lean  Place all your weight on one leg by lifting the other foot off the floor  Raise the heel of the foot that is on the floor as high as you can and then lower it  Do 2 sets of 10 calf raises on each leg to strengthen your calf muscles  Straight leg lifts:  Lie on your stomach with straight legs  Fold your arms in front of you and rest your head in your arms  Tighten your leg muscles and raise one leg as high as you can  Hold for 5 seconds, then lower your leg  Do 2 sets of 10 lifts on each leg to strengthen your buttocks  Sitting leg lifts:  Sit in a chair  Slowly straighten and raise one leg  Squeeze your thigh muscles and hold for 5 seconds  Relax and return your foot to the floor  Do 2 sets of 10 lifts on each leg  This helps strengthen the muscles in the front of your thigh         Contact your healthcare provider if:   You have new pain or your pain becomes worse  You have questions or concerns about your condition or care  © Copyright [employer] 2022 Information is for End User's use only and may not be sold, redistributed or otherwise used for commercial purposes  All illustrations and images included in CareNotes® are the copyrighted property of A D [employer] M [employer]  or Alonso Wallace   The above information is an  only  It is not intended as medical advice for individual conditions or treatments  Talk to your doctor, nurse or pharmacist before following any medical regimen to see if it is safe and effective for you  Hip Bursitis Exercises   AMBULATORY CARE:   Hip bursitis exercises  help strengthen the muscles in your hip and keep the joint flexible  Strong muscles can help reduce pain, prevent injury, and keep the joint stable  The exercises can also help increase the range of motion in your hip joint  What you need to know about exercise safety:   Move slowly and smoothly  Avoid fast or jerky motions  This will help prevent an injury  Breathe normally  Do not hold your breath  It is important to breathe in and out so you do not tense up during exercise  Tension could prevent you from moving your joint in a full range of motion  Do the exercises and stretches on both legs  Do this so both hips remain strong and flexible  Stop if you feel sharp pain or an increase in pain  Contact your healthcare provider or physical therapist  It is normal to feel some discomfort during exercise  Regular exercise will help decrease your discomfort over time  Warm up before you stretch and exercise  Walk or ride a stationary bike for 5 to 10 minutes  How to do hip stretches: Your healthcare provider or physical therapist will tell you how many times to do each stretch  Do the stretch on both sides before you move to the next stretch    Standing iliotibial band stretch: Stand with the leg on your injured side behind your other leg  Bend sideways toward the side that is not injured  Stop when you feel a stretch in your outer hip  Hold for 5 to 10 seconds  Then return to the starting position  Lying iliotibial band stretch:  Lie on your back  Bend the knee on your injured side toward your chest  Place your hands on the outside of your knee and thigh  Slowly pull the knee across your body  Stop when you feel a stretch in your hip and outer thigh  Hold for 5 to 10 seconds  Return your leg to the starting position  Hip stretch:  Lie on your back with both legs straight and on the ground  Bend the knee on your injured side toward your chest until you can reach your lower leg  Place both hands on your shin and pull your knee toward your chest  Hold for 5 to 10 seconds  Return your leg to the starting position  Knee to chest:  Lie on your back with both knees bent and feet flat on the floor  Bend the knee on your injured side toward your chest until you can reach your lower leg  Place both hands on your shin and pull your knee toward your chest  Hold for 5 to 10 seconds  Return your leg to the starting position  Internal hip rotator stretch:  You will do this exercise on a table  Lie on your side with the injured hip on top  You may be told to keep a pillow between your thighs  Move the top leg so the foot hangs below the edge of the table  Rotate your hip to raise your foot in the opposite direction of the bottom shoulder  Raise your foot as high as you can so you feel a stretch in the back of your thigh  Hold for 5 seconds  Then slowly lower your foot to the starting position  External hip rotator stretch:  You will do this exercise on a table  Lie on your side with the injured hip on the bottom  You do not need a pillow between your thighs for this exercise  Move the bottom leg so the foot is off the edge of the table   Rotate your hip to lift the foot in the opposite direction of the bottom shoulder  Raise your foot as high as you can so you feel a stretch in your buttock  Hold for 5 seconds  Then slowly lower your foot to the starting position  Kneeling hip flexor stretch:  Kneel on your knee on the injured side  Place the foot of your other leg on the floor so the knee is bent  Put both hands on top of your thigh  Keep your back straight and abdominal muscles tight  Lean forward until you feel a stretch in your other thigh  Hold the stretch for 10 seconds  Return to the starting position  How to do hip strengthening exercises: Your healthcare provider or physical therapist will tell you how many times to do each exercise  Do the exercise on both sides before you move to the next exercise  Straight leg lift to the side: This may also be called hip abduction  Lie on your side with straight legs, with the injured hip on top  Slowly raise your top leg toward the ceiling as high as you can  Keep your foot pointed  Hold for 5 seconds  Then slowly lower your leg to the starting position  Inner thigh lift: This may also be called hip adduction  Lie on your side with straight legs, with the injured hip on the bottom  Cross your top leg over your bottom leg  Put the foot of your top leg on the floor in front of you  Raise your bottom leg until it touches the top leg  Hold for 5 seconds  Then slowly lower the leg to the floor  Clam exercise:  Lie on your side so your injured side is on top  Bend your knees  Keep your heels together during this exercise  Slowly raise your top knee toward the ceiling  Then lower your leg so your knees are together  Call your doctor if:   You have sharp pain during exercise or at rest     You have questions or concerns about the stretches or exercises  © Copyright [employer] 2022 Information is for End User's use only and may not be sold, redistributed or otherwise used for commercial purposes   All illustrations and images included in CareNotes® are the copyrighted property of A [employer] M , Inc  or Alonso Tucker  The above information is an  only  It is not intended as medical advice for individual conditions or treatments  Talk to your doctor, nurse or pharmacist before following any medical regimen to see if it is safe and effective for you

## 2023-01-13 NOTE — PROGRESS NOTES
Assessment  1  Chronic pain of right knee  -     Ambulatory referral to Pain Management    2  Primary osteoarthritis of right knee  -     Ambulatory referral to Pain Management    Greater than 90% relief of pain with improved ability to participate with IADLs after bilateral L3, L4, L5 medial branch blocks #1 and #2 as well as with subsequent RF lesioning of medial branch nerves performed 11/17/22  Recently performed right hip GTB injection which resulted in >80-90% pain relief for patient  Previously reported the following symptomatology:     Axial low back pain described primarily by arthritic features  Aching, nagging, indolent, stabbing, throbbing features in axial low back without radicular components  5/5 strength bilaterally, negative SLR  Positive facet loading maneuvers in lumbar spine elicited pain, positive tenderness to palpation over lumbar paraspinal muscles  Positive reynaldo's test, gaenslen's, positive SIJ loading bilaterally  Pain with internal/external rotation of right hip; ttp over right gtb  Recently had hip injection with Dr Damien Hollis  Findings correlate with prominent lumbar facet arthropathy seen throughout axial low back on MRI from 2019 and recent CT scan; only mild to moderate central canal stenosis L4-L5  Additionally describes radicular pain numbness and weakness to a lesser degree in bilateral L4 dermatomes  Currently she is neurologically intact without gait instability, saddle anesthesia or bowel/bladder abnormality  Risks, benefits alternative to medial branch blocks and subsequent radiofrequency ablation of successful thoroughly discussed with patient  Handouts provided questions answered to patient satisfaction  Has been participant with PT but fell twice thereafter and has since discontinued        Plan  -rtc prn  -to follow up with dr Damien Hollis regarding right knee/OA; information regarding genicular nerve blocks/rfa provided  -continue HEP- physician directed home exercise plan as per [facility] demonstrated and handouts provided that patient plans to participate with for 1 hour, twice a week for the next 6 weeks  There are risks associated with opioid medications, including dependence, addiction and tolerance  The patient understands and agrees to use these medications only as prescribed  Potential side effects of the medications include, but are not limited to, constipation, drowsiness, addiction, impaired judgment and risk of fatal overdose if not taken as prescribed  The patient was warned against driving while taking sedation medications or operating heavy machinery  The patient voiced understanding  Sharing medications is a felony  At this point in time, the patient is showing no signs of addiction, abuse, diversion or suicidal ideation  [place] Prescription Drug Monitoring Program report was reviewed and was appropriate      Complete risks and benefits including bleeding, infection, tissue reaction, nerve injury and allergic reaction were discussed  The approach was demonstrated using models and literature was provided  Verbal and written consent was obtained  My impressions and treatment recommendations were discussed in detail with the patient who verbalized understanding and had no further questions  Discharge instructions were provided  I personally saw and examined the patient and I agree with the above discussed plan of care  No orders of the defined types were placed in this encounter  History of Present Illness    Greater than 90% relief of pain with improved ability to participate with IADLs after bilateral L3, L4, L5 medial branch blocks #1 and #2 as well as with subsequent RF lesioning of medial branch nerves performed 11/17/22  Recently performed right hip GTB injection which resulted in >80-90% pain relief for patient   Previously reported the following symptomatology:     Marcela Kaba is a 80 y o  female with a past medical history of Afib on xarelto with pacemaker presenting with chronic low back pain described primarily as arthritic in nature  She describes 8/10 low back pain that is worse in the mornings and worse at the end of the day  The pain is characterized by achy, nagging, indolent, crampy, stabbing pain in her axial low back  The patient describes that the pain is worse with standing for long periods of time on hard surfaces as well as with walking  She ambulates with a walker  The patient is a very active individual and feels as though this pain compromises his/her participation with independent activities of daily living  The pain can be debilitating at times and contribute to significant disability, compromising overall activity and independent activities of daily living  She has tried physical therapy with limited relief of symptoms and discontinued after falling twice  Medications the patient has tried in the past include   tylenol  She describes minor degree radicular symptoms in bilateral L4 dermatome but otherwise has good strength  She denies any weakness numbness or paresthesias that are particularly problematic  The patient denies any bowel or bladder dysfunction as well, gait instability or saddle anesthesia  I have personally reviewed and/or updated the patient's past medical history, past surgical history, family history, social history, current medications, allergies, and vital signs today  Review of Systems   Constitutional: Positive for activity change  HENT: Negative  Eyes: Negative  Respiratory: Negative  Cardiovascular: Negative  Gastrointestinal: Negative  Endocrine: Negative  Genitourinary: Negative  Musculoskeletal: Positive for arthralgias, back pain, gait problem and myalgias  Skin: Negative  Allergic/Immunologic: Negative  Neurological: Positive for weakness and numbness  Hematological: Negative  Psychiatric/Behavioral: Negative      All other systems reviewed and are negative  Patient Active Problem List   Diagnosis   • Paroxysmal atrial fibrillation (HCC)   • Syncope   • Constipation   • Leukocytosis   • Hypokalemia   • Spinal stenosis of lumbar region with neurogenic claudication   • Age-related osteoporosis without current pathological fracture   • Primary osteoarthritis of both hips   • Hypertension   • Hyperlipidemia   • Fracture of multiple ribs of right side   • Acute pain of right shoulder   • Closed displaced fracture of lateral end of right clavicle   • Pacemaker   • Carotid atherosclerosis, bilateral   • Mesenteric artery stenosis (HCC)   • Stage 3 chronic kidney disease, unspecified whether stage 3a or 3b CKD ([facility] [address] )   • Lumbar spondylosis       Past Medical History:   Diagnosis Date   • A-fib [facility])    • Anxiety    • Arthritis     R knee   • Carotid atherosclerosis, bilateral    • GERD (gastroesophageal reflux disease)    • Hyperlipidemia    • Hypertension    • Muscle spasms of neck    • Osteoporosis    • Spinal stenosis        Past Surgical History:   Procedure Laterality Date   • BACK SURGERY     • CARDIAC ELECTROPHYSIOLOGY PROCEDURE N/A 2/22/2022    Procedure: Cardiac pacer implant;  Surgeon: Lucinao Aquino MD;  [facility]: [facility];   Service: Cardiology   • EYE SURGERY      cataracts   • FL GUIDED NEEDLE PLAC BX/ASP/INJ  8/2/2022   • FL GUIDED NEEDLE PLAC BX/ASP/INJ  9/1/2022   • NERVE BLOCK Bilateral 8/2/2022    Procedure: BLOCK MEDIAL BRANCH L3, L4, L5 MBB #1;  Surgeon: Luisa Alberto MD;  Location: [facility];  Service: Pain Management    • NERVE BLOCK Bilateral 9/1/2022    Procedure: BLOCK MEDIAL BRANCH L3, L4, L5;  Surgeon: Luisa Alberto MD;  Location: [facility];  Service: Pain Management    • MA ARTHROCENTESIS ASPIR&/INJ MAJOR JT/BURSA W/O US Right 12/22/2022    Procedure: INJECTION JOINT HIP;  Surgeon: Luisa Alberto MD;  Location: [facility];  Service: Pain Management    • RHIZOTOMY Bilateral 11/17/2022    Procedure: RHIZOTOMY LUMBAR L3, L4, L5 medial branch nerves;  Surgeon: Jennie Cai MD;  Location: [facility];  Service: Pain Management        Family History   Problem Relation Age of Onset   • Cancer Father    • Cancer Brother    • Heart disease Mother        Social History     Occupational History   • Not on file   Tobacco Use   • Smoking status: Never   • Smokeless tobacco: Never   Vaping Use   • Vaping Use: Never used   Substance and Sexual Activity   • Alcohol use: Not Currently   • Drug use: Never   • Sexual activity: Not Currently       Current Outpatient Medications on File Prior to Visit   Medication Sig   • amLODIPine (NORVASC) 10 mg tablet Take 1 tablet (10 mg total) by mouth daily   • calcium citrate-vitamin D (CITRACAL+D) 315-200 MG-UNIT per tablet Take by mouth   • docusate sodium (COLACE) 100 mg capsule Take 100 mg by mouth daily   • lansoprazole (PREVACID) 30 mg capsule    • LORazepam (ATIVAN) 2 mg tablet 3 (three) times a day prn   • losartan (COZAAR) 25 mg tablet TAKE 1 TABLET BY MOUTH  DAILY   • magnesium oxide (MAG-OX) 400 mg Take 400 mg by mouth once   • metoprolol succinate (TOPROL-XL) 25 mg 24 hr tablet Please take 2 tablets in the morning and 1 tablet in the evening   • Multiple Vitamins-Minerals (ICAPS AREDS 2 PO) Take 1 tablet by mouth daily    • polyethylene glycol (MIRALAX) 17 g packet Take 17 g by mouth daily   • Xarelto 15 MG tablet TAKE 1 TABLET BY MOUTH  DAILY WITH DINNER   • acetaminophen (TYLENOL) 500 mg tablet Take 2 tablets (1,000 mg total) by mouth every 6 (six) hours as needed for mild pain or moderate pain     No current facility-administered medications on file prior to visit  Allergies   Allergen Reactions   • Ciprofloxacin    • Epinephrine      Irregular heart ryhthm (afib) per pt     • Statins Myalgia     Patient has tried 3-4 different statins and developed muscle pain with all   • Bactrim [Sulfamethoxazole-Trimethoprim] Rash   • Medical Tape Rash         Physical Exam    /66   Temp (!) 96 3 °F (35 7 °C)   Resp 20   Ht 5' 3" (1 6 m)   Wt 56 9 kg (125 lb 6 4 oz)   BMI 22 21 kg/m²     Constitutional: normal, well developed, well nourished, alert, in no distress and non-toxic and no overt pain behavior  Eyes: anicteric  HEENT: grossly intact  Neck: supple, symmetric, trachea midline and no masses   Pulmonary:even and unlabored  Cardiovascular:No edema or pitting edema present  Skin:Normal without rashes or lesions and well hydrated  Psychiatric:Mood and affect appropriate  Neurologic:Cranial Nerves II-XII grossly intact Sensation grossly intact; no clonus negative phoenix's  Reflexes 2+ and brisk  SLR negative bilaterally  Musculoskeletal:normal gait  5/5 strength bilaterally with AROM in all extremities  ambulates with walker; unable to perform normal heel toe and tip toe walking  significant pain with lumbar facet loading bilaterally and with lateral spine rotation  ttp over lumbar paraspinal muscles  Positive reynaldo's test, gaenslen's, positive SIJ loading bilaterally  Pain with internal/external rotation of right hip; ttp over right gtb    Imaging  CT LUMBAR SPINE     INDICATION:   M54 16: Radiculopathy, lumbar region  M47 816: Spondylosis without myelopathy or radiculopathy, lumbar region      COMPARISON: Prior CT December 2017, 2019     TECHNIQUE:  Contiguous axial images through the lumbar spine were obtained  Sagittal and coronal reconstructions were performed        Radiation dose length product (DLP) for this visit:  591 9 mGy-cm   This examination, like all CT scans performed in the [facility], was performed utilizing techniques to minimize radiation dose exposure, including the use of iterative   reconstruction and automated exposure control        IMAGE QUALITY:  Diagnostic      FINDINGS:     ALIGNMENT:  There are 5 lumbar type vertebral bodies  Patient has undergone L1, L3, and L4 vertebral plasty  Vertebral body heights are unchanged from the prior study  There is no acute new compression fracture identified  Rightward convex scoliosis   noted at the thoracolumbar junction      VERTEBRAL BODIES:  Postoperative changes of multilevel vertebroplasty as described  Slight retropulsion of the posterior vertebral body of L1 noted unchanged from prior      DEGENERATIVE CHANGES:     Lower Thoracic spine:  Mild central stenosis related to retropulsion of the L1 vertebral body      L1-2:  No significant central or foraminal narrowing      L2-3:  Moderate facet hypertrophy  There is mild annular bulge with marginal osteophytes which result in mild central stenosis      L3-4:  Degenerative disc osteophyte complex with marginal osteophytes eccentric to the right results in mild right foraminal narrowing and mild central stenosis      L4-5:  Moderate facet hypertrophy  There is mild annular bulge with minimal right foraminal narrowing and mild to moderate central stenosis noted      L5-S1:  Calcified posterior disc margin with annular bulging  Minimal central stenosis  Foramina are patent      PARASPINAL SOFT TISSUES:   Normal      IMPRESSION:     Stable lumbar CT status post L1, L3, and L4 kyphoplasty  Stable vertebral heights are noted  Persistent slight retropulsion of the L1 posterior vertebral body contributes to mild central stenosis      Spondylotic degenerative changes result in mild to moderate central stenosis at L4-5 and no greater than mild central or foraminal narrowing at remaining levels

## 2023-01-16 ENCOUNTER — REMOTE DEVICE CLINIC VISIT (OUTPATIENT)
Dept: CARDIOLOGY CLINIC | Facility: CLINIC | Age: 88
End: 2023-01-16

## 2023-01-16 DIAGNOSIS — Z95.0 CARDIAC PACEMAKER IN SITU: Primary | ICD-10-CM

## 2023-01-16 NOTE — PROGRESS NOTES
Results for orders placed or performed in visit on 01/16/23   Cardiac EP device report    Narrative    MDT DUAL PM/ACTIVE SYSTEM IS MRI CONDITIONAL  CARELINK TRANSMISSION: BATTERY STATUS "13 YRS " AP 70%  12%  ALL AVAILABLE LEAD PARAMETERS WITHIN NORMAL LIMITS  1 AT/AF NOTED; rATP GIVEN; 21% BURDEN  PT ON [facility] [employer]  EGRAMS SHOWING AFIB  NORMAL DEVICE FUNCTION   NC

## 2023-02-07 NOTE — Clinical Note
Irrigated with antibiotic solution [facility] Pregnancy And Lactation Text: There is insufficient data to evaluate drug-associated risk for major birth defects, miscarriage, or other adverse maternal or fetal outcomes.  There is a pregnancy registry that monitors pregnancy outcomes in pregnant persons exposed to the medication during pregnancy.  It is unknown if this medication is excreted in breast milk.  Do not breastfeed during treatment and for about 4 weeks after the last dose.

## 2023-02-27 DIAGNOSIS — I48.0 PAROXYSMAL ATRIAL FIBRILLATION (HCC): ICD-10-CM

## 2023-02-27 RX ORDER — METOPROLOL SUCCINATE 25 MG/1
TABLET, EXTENDED RELEASE ORAL
Qty: 270 TABLET | Refills: 3 | Status: SHIPPED | OUTPATIENT
Start: 2023-02-27

## 2023-03-01 ENCOUNTER — APPOINTMENT (EMERGENCY)
Dept: CT IMAGING | Facility: HOSPITAL | Age: 88
End: 2023-03-01

## 2023-03-01 ENCOUNTER — APPOINTMENT (EMERGENCY)
Dept: NON INVASIVE DIAGNOSTICS | Facility: HOSPITAL | Age: 88
End: 2023-03-01

## 2023-03-01 ENCOUNTER — HOSPITAL ENCOUNTER (EMERGENCY)
Facility: HOSPITAL | Age: 88
Discharge: HOME | End: 2023-03-01
Attending: EMERGENCY MEDICINE

## 2023-03-01 ENCOUNTER — TELEPHONE (OUTPATIENT)
Dept: CARDIOLOGY CLINIC | Facility: CLINIC | Age: 88
End: 2023-03-01

## 2023-03-01 VITALS
HEIGHT: 63 IN | HEART RATE: 69 BPM | BODY MASS INDEX: 23.55 KG/M2 | TEMPERATURE: 97.9 F | OXYGEN SATURATION: 97 % | RESPIRATION RATE: 17 BRPM | WEIGHT: 132.94 LBS | DIASTOLIC BLOOD PRESSURE: 70 MMHG | SYSTOLIC BLOOD PRESSURE: 160 MMHG

## 2023-03-01 DIAGNOSIS — R07.9 CHEST PAIN: Primary | ICD-10-CM

## 2023-03-01 DIAGNOSIS — I10 HTN (HYPERTENSION): ICD-10-CM

## 2023-03-01 DIAGNOSIS — M79.602 LEFT ARM PAIN: ICD-10-CM

## 2023-03-01 LAB
2HR DELTA HS TROPONIN: 0 NG/L
ALBUMIN SERPL BCP-MCNC: 4.1 G/DL (ref 3.5–5)
ALP SERPL-CCNC: 57 U/L (ref 34–104)
ALT SERPL W P-5'-P-CCNC: 17 U/L (ref 7–52)
ANION GAP SERPL CALCULATED.3IONS-SCNC: 3 MMOL/L (ref 4–13)
APTT PPP: 36 SECONDS (ref 23–37)
AST SERPL W P-5'-P-CCNC: 25 U/L (ref 13–39)
BASOPHILS # BLD AUTO: 0.06 THOUSANDS/ÂΜL (ref 0–0.1)
BASOPHILS NFR BLD AUTO: 1 % (ref 0–1)
BILIRUB SERPL-MCNC: 0.46 MG/DL (ref 0.2–1)
BNP SERPL-MCNC: 380 PG/ML (ref 0–100)
BUN SERPL-MCNC: 24 MG/DL (ref 5–25)
CALCIUM SERPL-MCNC: 10.2 MG/DL (ref 8.4–10.2)
CARDIAC TROPONIN I PNL SERPL HS: 7 NG/L
CARDIAC TROPONIN I PNL SERPL HS: 7 NG/L
CHLORIDE SERPL-SCNC: 104 MMOL/L (ref 96–108)
CO2 SERPL-SCNC: 34 MMOL/L (ref 21–32)
CREAT SERPL-MCNC: 0.96 MG/DL (ref 0.6–1.3)
EOSINOPHIL # BLD AUTO: 0.09 THOUSAND/ÂΜL (ref 0–0.61)
EOSINOPHIL NFR BLD AUTO: 2 % (ref 0–6)
ERYTHROCYTE [DISTWIDTH] IN BLOOD BY AUTOMATED COUNT: 13.9 % (ref 11.6–15.1)
GFR SERPL CREATININE-BSD FRML MDRD: 53 ML/MIN/1.73SQ M
GLUCOSE SERPL-MCNC: 111 MG/DL (ref 65–140)
HCT VFR BLD AUTO: 46.8 % (ref 34.8–46.1)
HGB BLD-MCNC: 14.5 G/DL (ref 11.5–15.4)
IMM GRANULOCYTES # BLD AUTO: 0.05 THOUSAND/UL (ref 0–0.2)
IMM GRANULOCYTES NFR BLD AUTO: 1 % (ref 0–2)
INR PPP: 1.19 (ref 0.84–1.19)
LIPASE SERPL-CCNC: 106 U/L (ref 11–82)
LYMPHOCYTES # BLD AUTO: 1.42 THOUSANDS/ÂΜL (ref 0.6–4.47)
LYMPHOCYTES NFR BLD AUTO: 25 % (ref 14–44)
MCH RBC QN AUTO: 28.5 PG (ref 26.8–34.3)
MCHC RBC AUTO-ENTMCNC: 31 G/DL (ref 31.4–37.4)
MCV RBC AUTO: 92 FL (ref 82–98)
MONOCYTES # BLD AUTO: 0.57 THOUSAND/ÂΜL (ref 0.17–1.22)
MONOCYTES NFR BLD AUTO: 10 % (ref 4–12)
NEUTROPHILS # BLD AUTO: 3.41 THOUSANDS/ÂΜL (ref 1.85–7.62)
NEUTS SEG NFR BLD AUTO: 61 % (ref 43–75)
NRBC BLD AUTO-RTO: 0 /100 WBCS
PLATELET # BLD AUTO: 274 THOUSANDS/UL (ref 149–390)
PMV BLD AUTO: 9.8 FL (ref 8.9–12.7)
POTASSIUM SERPL-SCNC: 3.9 MMOL/L (ref 3.5–5.3)
PROT SERPL-MCNC: 6.7 G/DL (ref 6.4–8.4)
PROTHROMBIN TIME: 15.3 SECONDS (ref 11.6–14.5)
RBC # BLD AUTO: 5.08 MILLION/UL (ref 3.81–5.12)
SODIUM SERPL-SCNC: 141 MMOL/L (ref 135–147)
WBC # BLD AUTO: 5.6 THOUSAND/UL (ref 4.31–10.16)

## 2023-03-01 RX ADMIN — SODIUM CHLORIDE 1000 ML: 0.9 INJECTION, SOLUTION INTRAVENOUS at 10:03

## 2023-03-01 RX ADMIN — IOHEXOL 85 ML: 350 INJECTION, SOLUTION INTRAVENOUS at 10:40

## 2023-03-01 NOTE — TELEPHONE ENCOUNTER
Pt was seen in ER, She called and wanted to schedule a follow up  She has one in April  Should she be seen sooner?

## 2023-03-01 NOTE — ED PROVIDER NOTES
History  Chief Complaint   Patient presents with   • Arm Pain     And left chest pain  EMS reports she has history of anxiety      Patient is an 26-year-old female presents emergency department complaining of chest pain under the left breast described as a sharp stabbing pain also pain in the left arm since getting a COVID shot several months ago  History provided by:  Patient  Chest Pain  Pain location:  L chest and L lateral chest  Pain quality: sharp and stabbing    Pain radiates to:  L arm  Pain radiates to the back: no    Pain severity:  Moderate  Onset quality:  Gradual  Duration:  1 day  Timing:  Constant  Progression:  Worsening  Chronicity:  New  Associated symptoms: no abdominal pain, no cough, no dizziness, no fatigue, no fever, no headache, no nausea, no numbness, no palpitations, no shortness of breath, not vomiting and no weakness        Prior to Admission Medications   Prescriptions Last Dose Informant Patient Reported? Taking?    LORazepam (ATIVAN) 2 mg tablet   Yes No   Sig: 3 (three) times a day prn   Multiple Vitamins-Minerals (ICAPS AREDS 2 PO)   Yes No   Sig: Take 1 tablet by mouth daily    Xarelto 15 MG tablet   No No   Sig: TAKE 1 TABLET BY MOUTH  DAILY WITH DINNER   acetaminophen (TYLENOL) 500 mg tablet   No No   Sig: Take 2 tablets (1,000 mg total) by mouth every 6 (six) hours as needed for mild pain or moderate pain   amLODIPine (NORVASC) 10 mg tablet   No No   Sig: Take 1 tablet (10 mg total) by mouth daily   calcium citrate-vitamin D (CITRACAL+D) 315-200 MG-UNIT per tablet   Yes No   Sig: Take by mouth   docusate sodium (COLACE) 100 mg capsule   Yes No   Sig: Take 100 mg by mouth daily   lansoprazole (PREVACID) 30 mg capsule   Yes No   losartan (COZAAR) 25 mg tablet   No No   Sig: TAKE 1 TABLET BY MOUTH  DAILY   magnesium oxide (MAG-OX) 400 mg   Yes No   Sig: Take 400 mg by mouth once   metoprolol succinate (TOPROL-XL) 25 mg 24 hr tablet   No No   Sig: TAKE 2 TABLETS BY MOUTH IN THE MORNING AND 1 TAB IN  THE EVENING   polyethylene glycol (MIRALAX) 17 g packet   Yes No   Sig: Take 17 g by mouth daily      Facility-Administered Medications: None       Past Medical History:   Diagnosis Date   • A-fib [facility])    • Anxiety    • Arthritis     R knee   • Carotid atherosclerosis, bilateral    • GERD (gastroesophageal reflux disease)    • Hyperlipidemia    • Hypertension    • Muscle spasms of neck    • Osteoporosis    • Spinal stenosis        Past Surgical History:   Procedure Laterality Date   • BACK SURGERY     • CARDIAC ELECTROPHYSIOLOGY PROCEDURE N/A 2/22/2022    Procedure: Cardiac pacer implant;  Surgeon: Priti Pearl MD;  Location: [facility]; Service: Cardiology   • EYE SURGERY      cataracts   • FL GUIDED NEEDLE PLAC BX/ASP/INJ  8/2/2022   • FL GUIDED NEEDLE PLAC BX/ASP/INJ  9/1/2022   • NERVE BLOCK Bilateral 8/2/2022    Procedure: BLOCK MEDIAL BRANCH L3, L4, L5 MBB #1;  Surgeon: Tara Lofton MD;  Location: [facility];  Service: Pain Management    • NERVE BLOCK Bilateral 9/1/2022    Procedure: BLOCK MEDIAL BRANCH L3, L4, L5;  Surgeon: Tara Lofton MD;  Location: [facility];  Service: Pain Management    • PA ARTHROCENTESIS ASPIR&/INJ MAJOR JT/BURSA W/O US Right 12/22/2022    Procedure: INJECTION JOINT HIP;  Surgeon: Tara Lofton MD;  Location: [facility];  Service: Pain Management    • RHIZOTOMY Bilateral 11/17/2022    Procedure: RHIZOTOMY LUMBAR L3, L4, L5 medial branch nerves;  Surgeon: Tara Lofton MD;  Location: [facility];  Service: Pain Management        Family History   Problem Relation Age of Onset   • Cancer Father    • Cancer Brother    • Heart disease Mother      I have reviewed and agree with the history as documented      E-Cigarette/Vaping   • E-Cigarette Use Never User      E-Cigarette/Vaping Substances     Social History     Tobacco Use   • Smoking status: Never   • Smokeless tobacco: Never   Vaping Use   • Vaping Use: Never used   Substance Use Topics   • Alcohol use: Not Currently   • Drug use: Never       Review of Systems   Constitutional: Negative for activity change, appetite change, chills, fatigue and fever  HENT: Negative for congestion, ear pain, rhinorrhea and sore throat  Eyes: Negative for discharge, redness and visual disturbance  Respiratory: Negative for cough, chest tightness, shortness of breath and wheezing  Cardiovascular: Positive for chest pain  Negative for palpitations  Gastrointestinal: Negative for abdominal pain, constipation, diarrhea, nausea and vomiting  Endocrine: Negative for polydipsia and polyuria  Genitourinary: Negative for difficulty urinating, dysuria, frequency, hematuria and urgency  Musculoskeletal: Negative for arthralgias and myalgias  Left arm pain   Skin: Negative for color change, pallor and rash  Neurological: Negative for dizziness, weakness, light-headedness, numbness and headaches  Hematological: Negative for adenopathy  Does not bruise/bleed easily  All other systems reviewed and are negative  Physical Exam  Physical Exam  Vitals and nursing note reviewed  Constitutional:       Appearance: She is well-developed  HENT:      Head: Normocephalic and atraumatic  Right Ear: External ear normal       Left Ear: External ear normal       Nose: Nose normal    Eyes:      Conjunctiva/sclera: Conjunctivae normal       Pupils: Pupils are equal, round, and reactive to light  Cardiovascular:      Rate and Rhythm: Normal rate and regular rhythm  Heart sounds: Normal heart sounds  Pulmonary:      Effort: Pulmonary effort is normal  No respiratory distress  Breath sounds: Normal breath sounds  No wheezing or rales  Chest:      Chest wall: Tenderness present  Abdominal:      General: Bowel sounds are normal  There is no distension  Palpations: Abdomen is soft  Tenderness: There is no abdominal tenderness  There is no guarding     Musculoskeletal:         General: Normal range of motion  Left upper arm: Tenderness present  Cervical back: Normal range of motion and neck supple  Skin:     General: Skin is warm and dry  Neurological:      Mental Status: She is alert and oriented to person, place, and time  Cranial Nerves: No cranial nerve deficit  Sensory: No sensory deficit           Vital Signs  ED Triage Vitals   Temperature Pulse Respirations Blood Pressure SpO2   03/01/23 0944 03/01/23 0944 03/01/23 0945 03/01/23 0944 03/01/23 0944   97 9 °F (36 6 °C) 70 16 (!) 191/81 100 %      Temp Source Heart Rate Source Patient Position - Orthostatic VS BP Location FiO2 (%)   03/01/23 0944 03/01/23 0944 03/01/23 0944 03/01/23 0944 --   Oral Monitor Lying Right arm       Pain Score       03/01/23 0944       7           Vitals:    03/01/23 1004 03/01/23 1045 03/01/23 1203 03/01/23 1300   BP: (!) 174/75 166/73 170/73 160/70   Pulse: 69 72 71 69   Patient Position - Orthostatic VS:   Lying          Visual Acuity      ED Medications  Medications   sodium chloride 0 9 % bolus 1,000 mL (1,000 mL Intravenous New Bag 3/1/23 1003)   iohexol (OMNIPAQUE) 350 MG/ML injection (SINGLE-DOSE) 85 mL (85 mL Intravenous Given 3/1/23 1040)       Diagnostic Studies  Results Reviewed     Procedure Component Value Units Date/Time    HS Troponin I 2hr [[id number]]  (Normal) Collected: 03/01/23 1156    Lab Status: Final result Specimen: Blood from Arm, Right Updated: 03/01/23 1230     hs TnI 2hr 7 ng/L      Delta 2hr hsTnI 0 ng/L     HS Troponin I 4hr [[id number]]     Lab Status: No result Specimen: Blood     HS Troponin 0hr (reflex protocol) [[id number]]  (Normal) Collected: 03/01/23 1001    Lab Status: Final result Specimen: Blood from Arm, Right Updated: 03/01/23 1033     hs TnI 0hr 7 ng/L     B-Type Natriuretic Peptide(BNP) [[id number]]  (Abnormal) Collected: 03/01/23 1001    Lab Status: Final result Specimen: Blood from Arm, Right Updated: 03/01/23 1031      pg/mL Comprehensive metabolic panel [[id number]]  (Abnormal) Collected: 03/01/23 1001    Lab Status: Final result Specimen: Blood from Arm, Right Updated: 03/01/23 1025     Sodium 141 mmol/L      Potassium 3 9 mmol/L      Chloride 104 mmol/L      CO2 34 mmol/L      ANION GAP 3 mmol/L      BUN 24 mg/dL      Creatinine 0 96 mg/dL      Glucose 111 mg/dL      Calcium 10 2 mg/dL      AST 25 U/L      ALT 17 U/L      Alkaline Phosphatase 57 U/L      Total Protein 6 7 g/dL      Albumin 4 1 g/dL      Total Bilirubin 0 46 mg/dL      eGFR 53 ml/min/1 73sq m     Narrative:      [place] guidelines for Chronic Kidney Disease (CKD):   •  Stage 1 with normal or high GFR (GFR > 90 mL/min/1 73 square meters)  •  Stage 2 Mild CKD (GFR = 60-89 mL/min/1 73 square meters)  •  Stage 3A Moderate CKD (GFR = 45-59 mL/min/1 73 square meters)  •  Stage 3B Moderate CKD (GFR = 30-44 mL/min/1 73 square meters)  •  Stage 4 Severe CKD (GFR = 15-29 mL/min/1 73 square meters)  •  Stage 5 End Stage CKD (GFR <15 mL/min/1 73 square meters)  Note: GFR calculation is accurate only with a steady state creatinine    Lipase [[id number]]  (Abnormal) Collected: 03/01/23 1001    Lab Status: Final result Specimen: Blood from Arm, Right Updated: 03/01/23 1025     Lipase 106 u/L     Protime-INR [[id number]]  (Abnormal) Collected: 03/01/23 1001    Lab Status: Final result Specimen: Blood from Arm, Right Updated: 03/01/23 1023     Protime 15 3 seconds      INR 1 19    APTT [[id number]]  (Normal) Collected: 03/01/23 1001    Lab Status: Final result Specimen: Blood from Arm, Right Updated: 03/01/23 1023     PTT 36 seconds     CBC and differential [[id number]]  (Abnormal) Collected: 03/01/23 1001    Lab Status: Final result Specimen: Blood from Arm, Right Updated: 03/01/23 1008     WBC 5 60 Thousand/uL      RBC 5 08 Million/uL      Hemoglobin 14 5 g/dL      Hematocrit 46 8 %      MCV 92 fL      MCH 28 5 pg      MCHC 31 0 g/dL      RDW 13 9 %      MPV 9 8 fL      Platelets 830 Thousands/uL      nRBC 0 /100 WBCs      Neutrophils Relative 61 %      Immat GRANS % 1 %      Lymphocytes Relative 25 %      Monocytes Relative 10 %      Eosinophils Relative 2 %      Basophils Relative 1 %      Neutrophils Absolute 3 41 Thousands/µL      Immature Grans Absolute 0 05 Thousand/uL      Lymphocytes Absolute 1 42 Thousands/µL      Monocytes Absolute 0 57 Thousand/µL      Eosinophils Absolute 0 09 Thousand/µL      Basophils Absolute 0 06 Thousands/µL                  CTA ED chest PE study   Final Result by Iva Day MD (03/01 1107)      No evidence for pulmonary embolism  Workstation performed: [id number]         VAS upper limb venous duplex scan, unilateral/limited    (Results Pending)              Procedures  ECG 12 Lead Documentation Only    Date/Time: 3/1/2023 9:51 AM  Performed by: Pamela Cueva DO  Authorized by: Pamela Cueva DO     ECG reviewed by me, the ED Provider: yes    Patient location:  ED  Previous ECG:     Comparison to cardiac monitor: Yes    Quality:     Tracing quality:  Limited by artifact  Rate:     ECG rate:  84    ECG rate assessment: normal    Rhythm:     Rhythm: paced    Pacing:     Type of pacing:  Atrial  Ectopy:     Ectopy: PVCs    QRS:     QRS axis:  Left    QRS intervals:  Normal  Conduction:     Conduction: normal    ST segments:     ST segments:  Normal  T waves:     T waves: normal               ED Course  ED Course as of 03/01/23 1313   Wed Mar 01, 2023   1140 Vascular tech reports patient negative for acute DVT in the left upper extremity   1201 Repeat ekg no acute ischemia unchaged from prior ekg                 HEART Risk Score    Flowsheet Row Most Recent Value   Heart Score Risk Calculator    History 0 Filed at: 03/01/2023 1201   ECG 0 Filed at: 03/01/2023 1201   Age 2 Filed at: 03/01/2023 1201   Risk Factors 1 Filed at: 03/01/2023 1201   Troponin 0 Filed at: 03/01/2023 1201   HEART Score 3 Filed at: 03/01/2023 1201 Medical Decision Making  Patient remained clinically and hemodynamically stable in the emergency department she is afebrile nontoxic well-appearing chest pain and arm pain entirely resolved after rest and blood pressure also improved spontaneously no evidence of acute cardiopulmonary pathology on work-up in the ED heart score is 3 patient low risk for major adverse cardiac event with negative high-sensitivity troponin x2 in the ED recommendation based on ACS algorithm is DC with PCP and cardiology follow-up patient is agreeable for this and will follow up promptly as advised return precautions and anticipatory guidance discussed  Chest pain: acute illness or injury  HTN (hypertension): acute illness or injury  Left arm pain: acute illness or injury  Amount and/or Complexity of Data Reviewed  External Data Reviewed: labs, radiology, ECG and notes  Labs: ordered  Decision-making details documented in ED Course  Radiology: ordered and independent interpretation performed  Decision-making details documented in ED Course  ECG/medicine tests: ordered and independent interpretation performed  Decision-making details documented in ED Course  Risk  Prescription drug management  Disposition  Final diagnoses:   Chest pain   Left arm pain   HTN (hypertension)     Time reflects when diagnosis was documented in both MDM as applicable and the Disposition within this note     Time User Action Codes Description Comment    3/1/2023 12:02 PM Kayleigh Shoe Add [R07 9] Chest pain     3/1/2023 12:02 PM Kayleigh Shoe Add [F31 585] Left arm pain     3/1/2023  1:09 PM Kayleigh Shoe Add [I10] HTN (hypertension)       ED Disposition     ED Disposition   Discharge    Condition   Stable    Date/Time   Wed Mar 1, 2023  1:08 PM    Comment   Josefina Dixon discharge to home/self care                 Follow-up Information     Follow up With Specialties Details Why [address] Arely Bishop MD Family Medicine Schedule an appointment as soon as possible for a visit in 3 days  Marc Montoya [address], DO Cardiology Schedule an appointment as soon as possible for a visit in 3 days  [phone]  [phone]            Patient's Medications   Discharge Prescriptions    No medications on file       No discharge procedures on file      PDMP Review       Value Time User    PDMP Reviewed  Yes 11/20/2022 11:14 AM Kade Roberts DO          ED Provider  Electronically Signed by           Linn Craig DO  03/01/23 2728

## 2023-03-08 ENCOUNTER — OFFICE VISIT (OUTPATIENT)
Dept: CARDIOLOGY CLINIC | Facility: CLINIC | Age: 88
End: 2023-03-08

## 2023-03-08 ENCOUNTER — TELEPHONE (OUTPATIENT)
Dept: CARDIOLOGY CLINIC | Facility: CLINIC | Age: 88
End: 2023-03-08

## 2023-03-08 ENCOUNTER — APPOINTMENT (OUTPATIENT)
Dept: LAB | Facility: HOSPITAL | Age: 88
End: 2023-03-08

## 2023-03-08 VITALS
WEIGHT: 126.8 LBS | TEMPERATURE: 96.8 F | HEART RATE: 60 BPM | SYSTOLIC BLOOD PRESSURE: 140 MMHG | HEIGHT: 63 IN | DIASTOLIC BLOOD PRESSURE: 80 MMHG | BODY MASS INDEX: 22.47 KG/M2 | OXYGEN SATURATION: 100 %

## 2023-03-08 DIAGNOSIS — I10 PRIMARY HYPERTENSION: ICD-10-CM

## 2023-03-08 DIAGNOSIS — I48.0 PAROXYSMAL ATRIAL FIBRILLATION (HCC): Primary | ICD-10-CM

## 2023-03-08 DIAGNOSIS — K55.1 MESENTERIC ARTERY STENOSIS (HCC): ICD-10-CM

## 2023-03-08 DIAGNOSIS — R14.0 BLOATING: ICD-10-CM

## 2023-03-08 DIAGNOSIS — I65.23 CAROTID ATHEROSCLEROSIS, BILATERAL: ICD-10-CM

## 2023-03-08 DIAGNOSIS — E78.2 MIXED HYPERLIPIDEMIA: ICD-10-CM

## 2023-03-08 DIAGNOSIS — Z95.0 PACEMAKER: ICD-10-CM

## 2023-03-08 NOTE — PATIENT INSTRUCTIONS
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

## 2023-03-08 NOTE — TELEPHONE ENCOUNTER
MARVIN [employer]  Pt called and said that she made the appt for [facility] and that they provide transportation  She is sched for next Wed

## 2023-03-09 LAB — H PYLORI IGG SER IA-ACNC: 0.43 INDEX VALUE (ref 0–0.79)

## 2023-03-10 LAB
ATRIAL RATE: 133 BPM
ATRIAL RATE: 75 BPM
P AXIS: 98 DEGREES
PR INTERVAL: 236 MS
QRS AXIS: -17 DEGREES
QRS AXIS: 9 DEGREES
QRSD INTERVAL: 92 MS
QRSD INTERVAL: 94 MS
QT INTERVAL: 388 MS
QT INTERVAL: 408 MS
QTC INTERVAL: 455 MS
QTC INTERVAL: 458 MS
T WAVE AXIS: 38 DEGREES
T WAVE AXIS: 72 DEGREES
VENTRICULAR RATE: 75 BPM
VENTRICULAR RATE: 84 BPM

## 2023-03-12 NOTE — ASSESSMENT & PLAN NOTE
Keith Arcos has a history of paroxysmal atrial fibrillation with previous use of Tikosyn for > 10 years  She presented with complaint of increasing "a fib episodes", feeling fluttering/palpitations in her chest   ZIO monitoring from 6/10-6/17/2021 revealed no a fib, one 6-beat run of WCT/VT, 14 runs of SVT, longest 20 beats at 115 BPM, occasional PAC's (3 9%)  Echo 4/2021 with EF 55-60%  Metoprolol succinate increased in August 2021 to 25mg in am, 12 5mg in pm  Repeat 24 hour holter showed avg HR of 56bpm with 7 9% PAC burden  She sustained recurrent syncopal events  [facility] nuclear stress test 11/2/2021 without evidence of ischemia  She was transitioned to amiodarone, which she did not tolerate  Referred to EP for possible AV node ablation and PPM insertion  Dr Marcial Overton performed pacemaker insertion on 2/22/2022 and up-titrated beta blocker  Consider AV node ablation if a fib remains difficult to control  Continue Magnesium supplementation at 250-500mg daily  Continue metoprolol succinate 50 mg in am, 25 mg in pm   Continue Xarelto for anticoagulation  Will monitor rates with upcoming device interrogations  Most recent interrogation shows 20% AF burden

## 2023-03-12 NOTE — ASSESSMENT & PLAN NOTE
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

## 2023-03-12 NOTE — ASSESSMENT & PLAN NOTE
Not on statin therapy at present  Lipid panel 8/3/2021 with     HDL 74      Recommend LDL < 70 given carotid and mesenteric artery stenosis  Patient has not tolerated several statins in the past

## 2023-03-12 NOTE — ASSESSMENT & PLAN NOTE
Carotid ultrasound 5/1/2017 showed moderate bilateral carotid atherosclerosis with 20-49% carotid stenosis bilaterally  Normal vertebral artery flow  Ideally LDL < 70  She has not tolerated statins in the past    Not on aspirin in ashely of Xarelto  Plan for updated imaging this year

## 2023-03-12 NOTE — PROGRESS NOTES
Cardiology Follow Up    Nader Morales  1935  [id number]  [facility] [place]  [address]  [address] [facility] [address] [facility]  [phone]    Latisha Benson presents for close follow up of atrial fibrillation, syncope, s/p PPM, HTN, HLD      1  Paroxysmal atrial fibrillation ([address] )  Assessment & Plan:  Latisha Benson has a history of paroxysmal atrial fibrillation with previous use of Tikosyn for > 10 years  She presented with complaint of increasing "a fib episodes", feeling fluttering/palpitations in her chest   ZIO monitoring from 6/10-6/17/2021 revealed no a fib, one 6-beat run of WCT/VT, 14 runs of SVT, longest 20 beats at 115 BPM, occasional PAC's (3 9%)  Echo 4/2021 with EF 55-60%  Metoprolol succinate increased in August 2021 to 25mg in am, 12 5mg in pm  Repeat 24 hour holter showed avg HR of 56bpm with 7 9% PAC burden  She sustained recurrent syncopal events  Yuliana Aver nuclear stress test 11/2/2021 without evidence of ischemia  She was transitioned to amiodarone, which she did not tolerate  Referred to EP for possible AV node ablation and PPM insertion  Dr Yonatan Lombardi performed pacemaker insertion on 2/22/2022 and up-titrated beta blocker  Consider AV node ablation if a fib remains difficult to control  Continue Magnesium supplementation at 250-500mg daily  Continue metoprolol succinate 50 mg in am, 25 mg in pm   Continue Xarelto for anticoagulation  Will monitor rates with upcoming device interrogations  Most recent interrogation shows 20% AF burden  2  Pacemaker  Assessment & Plan:  S/P insertion of Medtronic dual chamber PPM on 2/22/2022 for indication of sick sinus syndrome  Insertion site approximated and healing well with no signs of infection  Device managed by [facility]  3  Primary hypertension  Assessment & Plan:  BP is acceptable    Continue amlodipine 10 mg daily, losartan 25 mg daily, metoprolol succinate 50 mg in am, 25 mg in pm   We reviewed benefits of 2 g sodium diet  Recent lab work reviewed  4  Mixed hyperlipidemia  Assessment & Plan:  Not on statin therapy at present  Lipid panel 8/3/2021 with     HDL 74    Recommend LDL < 70 given carotid and mesenteric artery stenosis  Patient has not tolerated several statins in the past       5  Mesenteric artery stenosis [facility])  Assessment & Plan:  U/s imaging 3/21/2022 ordered by PCP for ongoing abdominal pain reveals > 70% stenosis in the celiac and superior mesenteric arteries  Vascular surgery referral requested but not obtained  I replaced the consult and patient given info to call to schedule  Reviewed importance of lipid control  Orders:  -     Ambulatory referral to Vascular Surgery; Future    6  Carotid atherosclerosis, bilateral  Assessment & Plan:  Carotid ultrasound 5/1/2017 showed moderate bilateral carotid atherosclerosis with 20-49% carotid stenosis bilaterally  Normal vertebral artery flow  Ideally LDL < 70  She has not tolerated statins in the past    Not on aspirin in ashely of Xarelto  Plan for updated imaging this year  SHAE Peoples has a past medical history of paroxysmal atrial fibrillation, HTN, HLD, CKD, lumbar spinal stenosis, and osteoporosis      She was previously following with [facility], Dr Elias Ansari presented to Dr Tamela Webb to establish care on 6/10/2021   Niru Reyes has been treated for paroxysmal atrial fibrillation with Tikosyn for > 10 years  Her last symptomatic episode from a fib had been in 2019  She sustained a fall  from a potential syncopal event where her legs gave out and her head hit the wall while she was in the bathroom brushing her teeth in 2/2019   Her EKG at the time showed QTc of 470msec      She had met with [facility] EP clinic in 9/2020 and they had discussed switching to amiodarone due to increased risk of Tikosyn induced torsades with advanced age, however, she decided against this      She continued to note fatigue and increasing palpitations over recent months  She denied lightheadedness, chest pain, shortness of breath, recurrent syncope, or leg swelling  She denies Covid 19 infection history  She is under a lot of stress caring for her spouse with Alzheimer's disease        She followed up with me 7/21/21 to review her 7 day ZIO results which revealed 1 run of VT (6 beats) and 14 runs of SVT and occasional PAC's  She noted that her "a fib was going crazy" while wearing the patch  Her metoprolol succinate was increased to 25 mg in am, 12 5 mg in pm  Updated holter monitor was ordered      She followed up with me 8/26/2021 at which time she had tripped, fallen, and broke ribs earlier in the month  Her BP was elevated  She was under significant stress to which she attributed the elevated BP readings  She had not yet completed her holter monitor but was tolerating the increase of metoprolol      She followed up 10/8/21 where we reviewed her holter monitor results which showed 7 9% PAC burden despite the increased beta blocker dose  Her average HR was 56 bpm, limiting further titration of the beta blocker  She reported another syncopal event on 9/13/2021 when she was standing in the kitchen and suddenly woke on the floor  When she woke she was tired and sweaty but denies chest pain, shortness of breath, or palpitations  A nuclear stress test was ordered for ischemic work up due to VT on her monitor and syncopal events  We had previously reviewed risks of Tikosyn in advanced age and she was now agreeable to change  She was transitioned off Tikosyn and onto amiodarone      She met with Dr Eri Rivas 1/24/2022 at which time her BP remained elevated  Results of her negative Lexiscan stress test were reviewed  She reported dizziness, sweating, balance issues with the amiodarone  Her amiodarone was reduced to 100 mg daily down from 200 mg daily  Losartan 25 mg daily was added for BP control   She was recommended to undergo PPM insertion with possible AV node ablation and was referred to EP      She underwent dual chamber Medtronic PPM insertion at hospitals on 2/22/2022 by Dr Neil Munoz  The procedure was uneventful  She was instructed that she may discontinue her amiodarone if she wished and her metoprolol succinate was increased to 50 mg in am, 25 mg in pm  With discussion for possible AV node ablation if rates continued to be difficult to control despite titration of beta blocker      She followed up with me on 3/1/2022 for close follow up of her PPM insertion site, which was healing well  She had stopped amiodarone on 2/23 and felt much better off of it  She denied any recurrent syncopal events  She felt that she was tolerating the increased dose of metoprolol  She told me she would not be interested in AV node ablation if needed  Her pacemaker was interrogated that day remotely  At 3/29/2022 OV with me we reviewed the results of her mesenteric doppler completed 3/21/2022 which showed > 70% stenosis of celiac and superior mesenteric arteries  She was undergoing work up by GI for evaluation of abdominal pain  She was referred to vascular surgery at that time  She followed up most recently with Dr Deven Sullivan on 8/24/2022 at which time she denied chest pain or shortness of breath  Recent lipid panel showed an LDL of 171  Device check in July showed < 0 1% AF burden  3/8/2023: Ryan Ramirez presents today for routine follow up  Overall she is doing well  She continues with stress related to her spouse's health and finances as he is now in a nursing home  She has not yet met with vascular surgery  She continues with intermittent abdominal pain  No chest pain, shortness of breath, edema, or palpitations  Most recent device interrogation on 1/16/2023 showed normal device function with 21% AF burden  No high rate episodes      Medical Problems     Problem List     Constipation    Leukocytosis    Hypokalemia    Spinal stenosis of lumbar region with neurogenic claudication    Age-related osteoporosis without current pathological fracture    Primary osteoarthritis of both hips    Paroxysmal atrial fibrillation (HCC)    Syncope    Pacemaker    Hypertension    Hyperlipidemia    Carotid atherosclerosis, bilateral    Mesenteric artery stenosis (HCC)    Fracture of multiple ribs of right side    Acute pain of right shoulder    Closed displaced fracture of lateral end of right clavicle    Stage 3 chronic kidney disease, unspecified whether stage 3a or 3b CKD ([facility] [address] )    Lumbar spondylosis        Past Medical History:   Diagnosis Date   • A-fib (formerly [facility])    • Anxiety    • Arthritis     R knee   • Carotid atherosclerosis, bilateral    • GERD (gastroesophageal reflux disease)    • Hyperlipidemia    • Hypertension    • Muscle spasms of neck    • Osteoporosis    • Spinal stenosis      Social History     Socioeconomic History   • Marital status: /Civil Union     Spouse name: Not on file   • Number of children: Not on file   • Years of education: Not on file   • Highest education level: Not on file   Occupational History   • Not on file   Tobacco Use   • Smoking status: Never   • Smokeless tobacco: Never   Vaping Use   • Vaping Use: Never used   Substance and Sexual Activity   • Alcohol use: Not Currently   • Drug use: Never   • Sexual activity: Not Currently   Other Topics Concern   • Not on file   Social History Narrative   • Not on file     Social Determinants of Health     Financial Resource Strain: Not on file   Food Insecurity: Not on file   Transportation Needs: Not on file   Physical Activity: Not on file   Stress: Not on file   Social Connections: Not on file   Intimate Partner Violence: Not on file   Housing Stability: Not on file      Family History   Problem Relation Age of Onset   • Cancer Father    • Cancer Brother    • Heart disease Mother      Past Surgical History:   Procedure Laterality Date   • BACK SURGERY     • CARDIAC ELECTROPHYSIOLOGY PROCEDURE N/A 2/22/2022    Procedure: Cardiac pacer implant;  Surgeon: Raeann Mar MD;  Location: [facility];   Service: Cardiology   • EYE SURGERY      cataracts   • FL GUIDED NEEDLE PLAC BX/ASP/INJ  8/2/2022   • FL GUIDED NEEDLE PLAC BX/ASP/INJ  9/1/2022   • NERVE BLOCK Bilateral 8/2/2022    Procedure: BLOCK MEDIAL BRANCH L3, L4, L5 MBB #1;  Surgeon: Jose Segal MD;  Location: [facility];  Service: Pain Management    • NERVE BLOCK Bilateral 9/1/2022    Procedure: BLOCK MEDIAL BRANCH L3, L4, L5;  Surgeon: Jose Segal MD;  Location: [facility];  Service: Pain Management    • AZ ARTHROCENTESIS ASPIR&/INJ MAJOR JT/BURSA W/O US Right 12/22/2022    Procedure: INJECTION JOINT HIP;  Surgeon: Jose Segal MD;  Location: [facility];  Service: Pain Management    • RHIZOTOMY Bilateral 11/17/2022    Procedure: RHIZOTOMY LUMBAR L3, L4, L5 medial branch nerves;  Surgeon: Jose Segal MD;  Location: [facility];  Service: Pain Management        Current Outpatient Medications:   •  acetaminophen (TYLENOL) 500 mg tablet, Take 2 tablets (1,000 mg total) by mouth every 6 (six) hours as needed for mild pain or moderate pain, Disp: 30 tablet, Rfl: 0  •  amLODIPine (NORVASC) 10 mg tablet, Take 1 tablet (10 mg total) by mouth daily, Disp: 90 tablet, Rfl: 3  •  calcium citrate-vitamin D (CITRACAL+D) 315-200 MG-UNIT per tablet, Take by mouth, Disp: , Rfl:   •  docusate sodium (COLACE) 100 mg capsule, Take 100 mg by mouth daily, Disp: , Rfl:   •  lansoprazole (PREVACID) 30 mg capsule, , Disp: , Rfl:   •  LORazepam (ATIVAN) 2 mg tablet, 3 (three) times a day prn, Disp: , Rfl:   •  losartan (COZAAR) 25 mg tablet, TAKE 1 TABLET BY MOUTH  DAILY, Disp: 90 tablet, Rfl: 3  •  magnesium oxide (MAG-OX) 400 mg, Take 400 mg by mouth once, Disp: , Rfl:   •  metoprolol succinate (TOPROL-XL) 25 mg 24 hr tablet, TAKE 2 TABLETS BY MOUTH IN  THE MORNING AND 1 TAB IN  THE EVENING, Disp: 270 tablet, Rfl: 3  •  Multiple Vitamins-Minerals (ICAPS AREDS 2 PO), Take 1 tablet by mouth daily , Disp: , Rfl:   •  polyethylene glycol (MIRALAX) 17 g packet, Take 17 g by mouth daily, Disp: , Rfl:   •  Xarelto 15 MG tablet, TAKE 1 TABLET BY MOUTH  DAILY WITH DINNER, Disp: 90 tablet, Rfl: 3  Allergies   Allergen Reactions   • Ciprofloxacin    • Epinephrine      Irregular heart ryhthm (afib) per pt  • Statins Myalgia     Patient has tried 3-4 different statins and developed muscle pain with all   • Bactrim [Sulfamethoxazole-Trimethoprim] Rash   • Medical Tape Rash       Labs:     Chemistry        Component Value Date/Time    K 3 9 03/01/2023 1001     03/01/2023 1001    CO2 34 (H) 03/01/2023 1001    BUN 24 03/01/2023 1001    CREATININE 0 96 03/01/2023 1001        Component Value Date/Time    CALCIUM 10 2 03/01/2023 1001    ALKPHOS 57 03/01/2023 1001    AST 25 03/01/2023 1001    ALT 17 03/01/2023 1001        Imaging:   VAS celiac and/or mesenteric duplex 3/21/2022: The aorta is patent and normal in caliber  >70% stenosis in the celiac artery  >70% stenosis of the superior mesenteric artery       ECG 3/1/2022: Atrial-paced rhythm with prolonged AV conduction  Left ventricular hypertrophy  Rate 73 bpm      Lexiscan nuclear stress test 11/2/2021:  Normal stress perfusion pattern  Hyperdynamic LV function gated analysis  Low risk perfusion pattern  Stress ECG: No ECG changes meeting criteria for ischemia   Nondiagnostic ECG portion given vasodilator protocol      ECG 10/8/2021: Sinus bradycardia, rate 53, cannot rule out septal infarct, no significant change from previous ECG on 8/10/2021     24 hour holter monitor 9/7/2021:  Predominantly sinus rhythm, HR varied from 39 bpm to 103 bpm   The patient’s average heart rate was 56 bpm     17 ventricular ectopic activity  6195 (7 9%)  supraventricular ectopy    Longest R/R interval was 1 8 seconds      ZIO 6/10-6/17/2021:   Min HR of 35, max HR of 193, and avg HR of 59 bpm   Predominant underlying rhythm was Sinus Rhythm  1 run of Ventricular Tachycardia occurred lasting 6 beats with a max rate of 193 bpm (avg 177 bpm)  14 Supraventricular Tachycardia runs occurred, the run with the longest lasting 20 beats with an avg rate of 115 bpm    Occasional PAC's (3 9%, 19867)  Rare PVC's (<1 0%)     Echocardiogram 4/2/2021 ([facility]): EF 55-60%  Grade 1 diastolic dysfunction  Mild-moderate AI  Mild-moderate MR  Mild TR      ECG 3/22/2021 ([facility]): sinus rhythm with one PAC, rate 68    Review of Systems   Constitutional: Negative  HENT: Negative  Cardiovascular: Negative for chest pain, dyspnea on exertion, irregular heartbeat, leg swelling, near-syncope, orthopnea and palpitations  Respiratory: Negative for cough and snoring  Endocrine: Negative  Skin: Negative  Musculoskeletal: Negative  Gastrointestinal: Positive for abdominal pain  Genitourinary: Negative  Neurological: Negative  Psychiatric/Behavioral: Negative  Vitals:    03/08/23 1149   BP: 140/80   Pulse: 60   Temp: (!) 96 8 °F (36 °C)   SpO2: 100%     Vitals:    03/08/23 1149   Weight: 57 5 kg (126 lb 12 8 oz)     Height: 5' 3" (160 cm)   Body mass index is 22 46 kg/m²  Physical Exam  Vitals and nursing note reviewed  Constitutional:       General: She is not in acute distress  Appearance: She is well-developed  She is not diaphoretic  HENT:      Head: Normocephalic and atraumatic  Neck:      Thyroid: No thyromegaly  Vascular: No carotid bruit or JVD  Cardiovascular:      Rate and Rhythm: Normal rate  Rhythm irregular  Pulses: Intact distal pulses  Radial pulses are 2+ on the right side and 2+ on the left side  Heart sounds: Normal heart sounds, S1 normal and S2 normal  No murmur heard  Comments: No edema  Pulmonary:      Effort: Pulmonary effort is normal       Breath sounds: Normal breath sounds  Abdominal:      General: There is no distension  Palpations: Abdomen is soft        Tenderness: There is no abdominal tenderness  Musculoskeletal:         General: Normal range of motion  Cervical back: Normal range of motion and neck supple  Lymphadenopathy:      Cervical: No cervical adenopathy  Skin:     General: Skin is warm and dry  Neurological:      Mental Status: She is alert and oriented to person, place, and time  Cranial Nerves: No cranial nerve deficit  Psychiatric:         Mood and Affect: Mood and affect normal          Speech: Speech normal          Behavior: Behavior normal  Behavior is cooperative           Cognition and Memory: Cognition and memory normal

## 2023-03-12 NOTE — ASSESSMENT & PLAN NOTE
BP is acceptable  Continue amlodipine 10 mg daily, losartan 25 mg daily, metoprolol succinate 50 mg in am, 25 mg in pm   We reviewed benefits of 2 g sodium diet  Recent lab work reviewed

## 2023-03-12 NOTE — ASSESSMENT & PLAN NOTE
S/P insertion of Medtronic dual chamber PPM on 2/22/2022 for indication of sick sinus syndrome  Insertion site approximated and healing well with no signs of infection  Device managed by [facility]

## 2023-03-13 ENCOUNTER — TELEPHONE (OUTPATIENT)
Dept: VASCULAR SURGERY | Facility: CLINIC | Age: 88
End: 2023-03-13

## 2023-03-13 NOTE — TELEPHONE ENCOUNTER
----- Message from Rosemarie Vides sent at 3/13/2023  7:49 AM EDT -----  Scheduled for a 1p  thank you!  ----- Message -----  From: Wayland Boxer  Sent: 3/13/2023  12:00 AM EDT  To: [facility]    Patients Name: Tierra Schulz  : 1935  Phone Number: [phone]  Appointment Date: 3/15/23  Appointment time: 2:30 PM    Address: [address] [place] [id number]  Drop off Facility/Office Name: [id number] [facility]   Drop off  Address: [address] [place] [address] [address]: [phone]  Special notes/directions for     [facility] SIGN, PLEASE CALL PT UPON ARRIVAL         Note for scheduling team

## 2023-03-15 ENCOUNTER — CONSULT (OUTPATIENT)
Dept: VASCULAR SURGERY | Facility: HOSPITAL | Age: 88
End: 2023-03-15

## 2023-03-15 VITALS
SYSTOLIC BLOOD PRESSURE: 134 MMHG | BODY MASS INDEX: 23.04 KG/M2 | HEIGHT: 63 IN | WEIGHT: 130 LBS | HEART RATE: 77 BPM | DIASTOLIC BLOOD PRESSURE: 74 MMHG

## 2023-03-15 DIAGNOSIS — K55.1 MESENTERIC ARTERY STENOSIS (HCC): ICD-10-CM

## 2023-03-15 NOTE — ASSESSMENT & PLAN NOTE
79yo female, with PMHx of afib (xarelto), HTN, bilateral carotid artery stenosis, OA, CKD stage 3, chronic constipation, HLD, spinal stenosis, presents today for consultation regarding mesenteric artery stenosis  She had a mesenteric duplex on 3/21/22 by her PCP which demonstrates >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  -Mesenteric duplex 3/21/22: >70% celiac artery stenosis (danlio 352/46) and >70% SMA stenosis (Danilo 345/40)  -Denies PP abdominal pain, food fear  Reports unintentional weight loss that is stress related 2/2 having to put her  in a nursing home in October    -We discussed the pathophysiology of mesenteric artery stenosis, risk factors, medical mgmt, and indications for surgical intervention    -no surgical intervention recommended as patient is asymptomatic    -Continue surveillance with repeat mesenteric duplex in 6 months    -Recommend ASA 81 mg    -Statin intolerant  Most recent lipid panel elevated  Recommend starting alternative to statins to optimize cholesterol levels  Mgmt per cardiology, PCP   -Recommend low-fat, low-sodium diet and regular exercise    -Educated on s/sx of worsening condition and when to notify the office

## 2023-03-15 NOTE — PROGRESS NOTES
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 (danilo 352/46) and >70% SMA stenosis (Danilo 345/40)  -Denies PP abdominal pain, food fear  Reports unintentional weight loss that is stress related 2/2 having to put her  in a nursing home in October    -We discussed the pathophysiology of mesenteric artery stenosis, risk factors, medical mgmt, and indications for surgical intervention    -No surgical intervention recommended as patient is asymptomatic    -Continue surveillance with repeat mesenteric duplex in 6 months    -Recommend ASA 81 mg    -Statin intolerant  Most recent lipid panel elevated  Recommend starting alternative to statins to optimize cholesterol levels  Mgmt per cardiology, PCP   -Recommend low-fat, low-sodium diet and regular exercise    -Educated on s/sx of worsening condition and when to notify the office  Diagnoses and all orders for this visit:    Mesenteric artery stenosis ([facility] [address] )  -     Ambulatory referral to Vascular Surgery  -     VAS celiac and/or mesenteric duplex; complete study; Future          Subjective:      Patient ID: Bartolome Mata is a 80 y o  female  Patricia Narvaez is a 81yo female, with PMHx of afib (xarelto), HTN, bilateral carotid artery stenosis, OA, CKD stage 3, chronic constipation, HLD, spinal stenosis, presents today for consultation regarding mesenteric artery stenosis  She had a mesenteric duplex on 3/21/22 by her PCP which demonstrates >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  She denies post prandial abdominal pain, food fear   She reports unintentional weight loss 2/2 needing to put her  in a nursing home in October  She reports her appetite has improved  She reports intermittent LLQ pain and bloating  She has a hx of chronic constipation and takes colace and miralax for this which helps  She does not take an aspirin and is statin intolerant  She is on xarelto for afib  The following portions of the patient's history were reviewed and updated as appropriate: allergies, current medications, past family history, past medical history, past social history, past surgical history and problem list     Review of Systems   Constitutional: Negative  HENT: Negative  Eyes: Negative  Respiratory: Negative  Cardiovascular: Negative  Gastrointestinal: Negative  Endocrine: Negative  Genitourinary: Negative  Musculoskeletal: Negative  Skin: Negative  Allergic/Immunologic: Negative  Neurological: Negative  Hematological: Negative  Psychiatric/Behavioral: Negative  I have personally reviewed and made appropriate changes to the ROS that was input by the medical assistant         Objective:      Vitals:    03/15/23 1433   BP: 134/74   BP Location: Left arm   Patient Position: Sitting   Cuff Size: Standard   Pulse: 77   Weight: 59 kg (130 lb)   Height: 5' 3" (1 6 m)       Patient Active Problem List   Diagnosis   • Paroxysmal atrial fibrillation (HCC)   • Syncope   • Constipation   • Leukocytosis   • Hypokalemia   • Spinal stenosis of lumbar region with neurogenic claudication   • Age-related osteoporosis without current pathological fracture   • Primary osteoarthritis of both hips   • Hypertension   • Hyperlipidemia   • Fracture of multiple ribs of right side   • Acute pain of right shoulder   • Closed displaced fracture of lateral end of right clavicle   • Pacemaker   • Carotid atherosclerosis, bilateral   • Mesenteric artery stenosis (HCC)   • Stage 3 chronic kidney disease, unspecified whether stage 3a or 3b CKD (HCC)   • Lumbar spondylosis       Past Surgical History:   Procedure Laterality Date   • BACK SURGERY     • CARDIAC ELECTROPHYSIOLOGY PROCEDURE N/A 2/22/2022    Procedure: Cardiac pacer implant;  Surgeon: Verónica Rich MD;  Location: [facility];   Service: Cardiology   • EYE SURGERY      cataracts   • FL GUIDED NEEDLE PLAC BX/ASP/INJ  8/2/2022   • FL GUIDED NEEDLE PLAC BX/ASP/INJ  9/1/2022   • NERVE BLOCK Bilateral 8/2/2022    Procedure: BLOCK MEDIAL BRANCH L3, L4, L5 MBB #1;  Surgeon: Treasure Schmidt MD;  Location: [facility];  Service: Pain Management    • NERVE BLOCK Bilateral 9/1/2022    Procedure: BLOCK MEDIAL BRANCH L3, L4, L5;  Surgeon: Treasure Schmidt MD;  Location: [facility];  Service: Pain Management    • [place] ARTHROCENTESIS ASPIR&/INJ MAJOR JT/BURSA W/O US Right 12/22/2022    Procedure: INJECTION JOINT HIP;  Surgeon: Treasure Schmidt MD;  Location: [facility];  Service: Pain Management    • RHIZOTOMY Bilateral 11/17/2022    Procedure: RHIZOTOMY LUMBAR L3, L4, L5 medial branch nerves;  Surgeon: Treasure Schmidt MD;  Location: [facility];  Service: Pain Management        Family History   Problem Relation Age of Onset   • Cancer Father    • Cancer Brother    • Heart disease Mother        Social History     Socioeconomic History   • Marital status: /Civil Union     Spouse name: Not on file   • Number of children: Not on file   • Years of education: Not on file   • Highest education level: Not on file   Occupational History   • Not on file   Tobacco Use   • Smoking status: Never   • Smokeless tobacco: Never   Vaping Use   • Vaping Use: Never used   Substance and Sexual Activity   • Alcohol use: Not Currently   • Drug use: Never   • Sexual activity: Not Currently   Other Topics Concern   • Not on file   Social History Narrative   • Not on file     Social Determinants of Health     Financial Resource Strain: Not on file   Food Insecurity: Not on file   Transportation Needs: Not on file   Physical Activity: Not on file   Stress: Not on file   Social Connections: Not on file   Intimate Partner Violence: Not on file   Housing Stability: Not on file       Allergies   Allergen Reactions   • Ciprofloxacin    • Epinephrine      Irregular heart ryhthm (afib) per pt  • Statins Myalgia     Patient has tried 3-4 different statins and developed muscle pain with all   • Bactrim [Sulfamethoxazole-Trimethoprim] Rash   • Medical Tape Rash         Current Outpatient Medications:   •  acetaminophen (TYLENOL) 500 mg tablet, Take 2 tablets (1,000 mg total) by mouth every 6 (six) hours as needed for mild pain or moderate pain, Disp: 30 tablet, Rfl: 0  •  amLODIPine (NORVASC) 10 mg tablet, Take 1 tablet (10 mg total) by mouth daily, Disp: 90 tablet, Rfl: 3  •  calcium citrate-vitamin D (CITRACAL+D) 315-200 MG-UNIT per tablet, Take by mouth, Disp: , Rfl:   •  docusate sodium (COLACE) 100 mg capsule, Take 100 mg by mouth daily, Disp: , Rfl:   •  lansoprazole (PREVACID) 30 mg capsule, , Disp: , Rfl:   •  LORazepam (ATIVAN) 2 mg tablet, 3 (three) times a day prn, Disp: , Rfl:   •  losartan (COZAAR) 25 mg tablet, TAKE 1 TABLET BY MOUTH  DAILY, Disp: 90 tablet, Rfl: 3  •  magnesium oxide (MAG-OX) 400 mg, Take 400 mg by mouth once, Disp: , Rfl:   •  metoprolol succinate (TOPROL-XL) 25 mg 24 hr tablet, TAKE 2 TABLETS BY MOUTH IN  THE MORNING AND 1 TAB IN  THE EVENING, Disp: 270 tablet, Rfl: 3  •  Multiple Vitamins-Minerals (ICAPS AREDS 2 PO), Take 1 tablet by mouth daily , Disp: , Rfl:   •  polyethylene glycol (MIRALAX) 17 g packet, Take 17 g by mouth daily, Disp: , Rfl:   •  Xarelto 15 MG tablet, TAKE 1 TABLET BY MOUTH  DAILY WITH DINNER, Disp: 90 tablet, Rfl: 3      /74 (BP Location: Left arm, Patient Position: Sitting, Cuff Size: Standard)   Pulse 77   Ht 5' 3" (1 6 m)   Wt 59 kg (130 lb)   BMI 23 03 kg/m²          Physical Exam  Vitals and nursing note reviewed  Constitutional:       Appearance: Normal appearance  She is normal weight     HENT:      Head: Normocephalic and atraumatic  Neck:      Vascular: No carotid bruit  Cardiovascular:      Rate and Rhythm: Normal rate and regular rhythm  Pulses:           Carotid pulses are 2+ on the right side and 2+ on the left side  Radial pulses are 2+ on the right side and 2+ on the left side  Heart sounds: Normal heart sounds  Comments: No carotid bruit   Pulmonary:      Effort: Pulmonary effort is normal  No respiratory distress  Breath sounds: Normal breath sounds  Abdominal:      General: Bowel sounds are normal  There is no distension  Palpations: Abdomen is soft  Comments: No abdominal bruit or pulsatile masses  Musculoskeletal:         General: No swelling  Normal range of motion  Cervical back: Normal range of motion and neck supple  Skin:     General: Skin is warm  Capillary Refill: Capillary refill takes less than 2 seconds  Neurological:      General: No focal deficit present  Mental Status: She is alert and oriented to person, place, and time  Psychiatric:         Mood and Affect: Mood normal          Behavior: Behavior normal        Chelsea Galindo PA-C  [facility]  [phone]    I have spent a total time of 20 minutes on 03/15/23 in caring for this patient including Diagnostic results, Prognosis, Risks and benefits of tx options, Instructions for management, Patient and family education, Importance of tx compliance, Risk factor reductions, Impressions, Counseling / Coordination of care, Documenting in the medical record, Reviewing / ordering tests, medicine, procedures   and Obtaining or reviewing history

## 2023-03-15 NOTE — PATIENT INSTRUCTIONS
-You have evidence of a >70% narrowing in the celiac artery and superior mesenteric artery  Your symptoms are not consistent with mesenteric ischemia at this time  -recommend close surveillance with repeat ultrasound in 6 months  -recommend starting aspirin 81 mg daily   -Talk with your cardiologist or PCP about starting a cholesterol medication, alternative to a statin since you are intolerant    -If you start to experience increased abdominal pain after eating, food fear or unintentional weight loss, please call the office

## 2023-04-04 ENCOUNTER — TELEPHONE (OUTPATIENT)
Dept: CARDIOLOGY CLINIC | Facility: CLINIC | Age: 88
End: 2023-04-04

## 2023-04-04 ENCOUNTER — OFFICE VISIT (OUTPATIENT)
Dept: CARDIOLOGY CLINIC | Facility: CLINIC | Age: 88
End: 2023-04-04

## 2023-04-04 VITALS
HEART RATE: 60 BPM | DIASTOLIC BLOOD PRESSURE: 70 MMHG | HEIGHT: 63 IN | BODY MASS INDEX: 23.3 KG/M2 | SYSTOLIC BLOOD PRESSURE: 130 MMHG | OXYGEN SATURATION: 97 % | WEIGHT: 131.5 LBS

## 2023-04-04 DIAGNOSIS — I48.0 PAROXYSMAL ATRIAL FIBRILLATION (HCC): Primary | ICD-10-CM

## 2023-04-04 DIAGNOSIS — E78.2 MIXED HYPERLIPIDEMIA: Primary | ICD-10-CM

## 2023-04-04 DIAGNOSIS — I10 PRIMARY HYPERTENSION: ICD-10-CM

## 2023-04-04 DIAGNOSIS — E78.2 MIXED HYPERLIPIDEMIA: ICD-10-CM

## 2023-04-04 DIAGNOSIS — M79.602 LEFT ARM PAIN: ICD-10-CM

## 2023-04-04 DIAGNOSIS — I65.23 CAROTID ATHEROSCLEROSIS, BILATERAL: ICD-10-CM

## 2023-04-04 DIAGNOSIS — N18.30 STAGE 3 CHRONIC KIDNEY DISEASE, UNSPECIFIED WHETHER STAGE 3A OR 3B CKD (HCC): ICD-10-CM

## 2023-04-04 DIAGNOSIS — R55 SYNCOPE, UNSPECIFIED SYNCOPE TYPE: ICD-10-CM

## 2023-04-04 DIAGNOSIS — K55.1 MESENTERIC ARTERY STENOSIS (HCC): ICD-10-CM

## 2023-04-04 DIAGNOSIS — R60.0 LOCALIZED EDEMA: ICD-10-CM

## 2023-04-04 DIAGNOSIS — Z95.0 PACEMAKER: ICD-10-CM

## 2023-04-04 RX ORDER — ASPIRIN 81 MG/1
81 TABLET ORAL DAILY
Qty: 30 TABLET | Refills: 11 | Status: SHIPPED | OUTPATIENT
Start: 2023-04-04

## 2023-04-04 RX ORDER — AMLODIPINE BESYLATE 5 MG/1
5 TABLET ORAL DAILY
Qty: 30 TABLET | Refills: 1 | Status: SHIPPED | OUTPATIENT
Start: 2023-04-04

## 2023-04-04 RX ORDER — FUROSEMIDE 20 MG/1
20 TABLET ORAL DAILY
Qty: 30 TABLET | Refills: 0 | Status: SHIPPED | OUTPATIENT
Start: 2023-04-04

## 2023-04-04 NOTE — ASSESSMENT & PLAN NOTE
Not on statin therapy at present due to poor tolerance of several statins in the past   Lipid panel 8/3/2021 with     HDL 74    Recommend LDL < 70 given carotid and mesenteric artery stenosis  She believes she may have tried Zetia  I will confirm with PCP  May need to consider Repatha, which we discussed today

## 2023-04-04 NOTE — ASSESSMENT & PLAN NOTE
Bilateral lower leg edema, left arm edema  Will reduce amlodipine to 5 mg daily  Trial 1 week of furosemide 20 mg daily  She has a Bactrim allergy with rash  I advised of possible cross-allergy and she will monitor and report any development of a rash  She will call in 1 week with update  Will monitor closely

## 2023-04-04 NOTE — PROGRESS NOTES
"Cardiology Follow Up    Hector Sánchez  1935  [id number]  [facility]  [address]  [address]  [phone]    Yash Odom presents for follow up of atrial fibrillation, syncope, s/p PPM, HTN, HLD      1  Paroxysmal atrial fibrillation ([address] )  Assessment & Plan:  History of paroxysmal atrial fibrillation with previous use of Tikosyn for > 10 years  She presented with complaint of increasing \"a fib episodes\", feeling fluttering/palpitations in her chest   ZIO monitoring from 6/10-6/17/2021 revealed no a fib, one 6-beat run of WCT/VT, 14 runs of SVT, longest 20 beats at 115 BPM, occasional PAC's (3 9%)  Echo 4/2021 with EF 55-60%  Metoprolol succinate increased in August 2021 to 25mg in am, 12 5mg in pm  Repeat 24 hour holter showed avg HR of 56bpm with 7 9% PAC burden  She sustained recurrent syncopal events  Onita Rotunda nuclear stress test 11/2/2021 without evidence of ischemia  She was transitioned to amiodarone, which she did not tolerate  Referred to EP for possible AV node ablation and PPM insertion  Dr Michel Baugh performed pacemaker insertion on 2/22/2022 and up-titrated beta blocker  Consider AV node ablation if a fib remains difficult to control  Continue Magnesium supplementation at 250-500mg daily  Continue metoprolol succinate 50 mg in am, 25 mg in pm   Continue Xarelto for anticoagulation  Will monitor rates with upcoming device interrogations  Most recent interrogation shows 21% AF burden  2  Syncope, unspecified syncope type  Assessment & Plan:  Sustained a fall in 2/2019 when she was brushing her teeth in the bathroom and fell, hitting her head against the wall  EKG at that time with QTc of 470msec  She reports today another syncopal event while standing in her kitchen  She had received a steroid injection in her knee and her flu vaccine earlier that same day    We have reviewed the risk of Torsades in " Esther with advancing age  24 hour holter monitor results above (see atrial fibrillation)  Labs have remained stable  Lexiscan 11/2/21 negative for ischemia  S/P PPM insertion 2/22/22 for sick sinus syndrome  Off amiodarone since that time  No recurrent syncopal events  3  Pacemaker  Assessment & Plan:  S/P insertion of Medtronic dual chamber PPM on 2/22/2022 for indication of sick sinus syndrome  Device managed by [facility]  4  Carotid atherosclerosis, bilateral  Assessment & Plan:  Carotid ultrasound 5/1/2017 showed moderate bilateral carotid atherosclerosis with 20-49% carotid stenosis bilaterally  Normal vertebral artery flow  Will repeat imaging now  Ideally LDL < 70  She has not tolerated statins in the past    Start aspirin 81 mg daily  Will inquire with PCP regarding prior use of Zetia  Consider Repatha  Orders:  -     VAS carotid complete study; Future; Expected date: 04/04/2023    5  Mesenteric artery stenosis [facility])  Assessment & Plan:  U/s imaging 3/21/2022 ordered by PCP for ongoing abdominal pain reveals > 70% stenosis in the celiac and superior mesenteric arteries  She is now following with [facility] vascular surgery with surveillance imaging scheduled for 9/2023  OK to start aspirin 81 mg daily, EC, always with food  Reviewed urgent s/s to report  Will monitor CBC in 1 month with this addition  Discussed addition of Zetia given statin intolerance  She believes she may have tried this before  I will reach out to PCP to inquire  Consider Repatha, which we discussed today  Orders:  -     aspirin (ECOTRIN LOW STRENGTH) 81 mg EC tablet; Take 1 tablet (81 mg total) by mouth daily    6  Primary hypertension  Assessment & Plan:  BP is excellent today  Now with increased lower leg edema  Reduced amlodipine to 5 mg daily and trial 1 week of furosemide 20 mg daily  BMP in 1-2 weeks    Continue losartan 25 mg daily and metoprolol succinate 50 mg in am, 24 mg in pm   We reviewed benefits of 2 g sodium diet  Orders:  -     amLODIPine (NORVASC) 5 mg tablet; Take 1 tablet (5 mg total) by mouth daily  -     Basic metabolic panel; Future; Expected date: 04/18/2023    7  Mixed hyperlipidemia  Assessment & Plan:  Not on statin therapy at present due to poor tolerance of several statins in the past   Lipid panel 8/3/2021 with     HDL 74    Recommend LDL < 70 given carotid and mesenteric artery stenosis  She believes she may have tried Zetia  I will confirm with PCP  May need to consider Repatha, which we discussed today  8  Localized edema  Assessment & Plan:  Bilateral lower leg edema, left arm edema  Will reduce amlodipine to 5 mg daily  Trial 1 week of furosemide 20 mg daily  She has a Bactrim allergy with rash  I advised of possible cross-allergy and she will monitor and report any development of a rash  She will call in 1 week with update  Will monitor closely  Orders:  -     furosemide (LASIX) 20 mg tablet; Take 1 tablet (20 mg total) by mouth daily    9  Stage 3 chronic kidney disease, unspecified whether stage 3a or 3b CKD [facility])  Assessment & Plan:  Lab Results   Component Value Date    EGFR 53 03/01/2023    EGFR 51 02/22/2022    EGFR 55 02/08/2022    CREATININE 0 96 03/01/2023    CREATININE 0 99 02/22/2022    CREATININE 0 93 02/08/2022     Will monitor with trial of furosemide  BMP in 1-2 weeks  Orders:  -     Basic metabolic panel; Future; Expected date: 04/18/2023    10  Left arm pain  Assessment & Plan:  Left upper arm pain and swelling since Covid vaccination  Left UE Duplex 3/1/23 negative for DVT  I will reach out to PCP for recommendations for further imaging/evaluation           HPI  Phil Pa has a past medical history of paroxysmal atrial fibrillation, HTN, HLD, CKD, lumbar spinal stenosis, and osteoporosis      She was previously following with [facility], Dr Chen Salinas presented to Dr Niall Jaime to establish care on "6/10/2021      Brigitte Mendez has been treated for paroxysmal atrial fibrillation with Tikosyn for > 10 years  Her last symptomatic episode from a fib had been in 2019  She sustained a fall  from a potential syncopal event where her legs gave out and her head hit the wall while she was in the bathroom brushing her teeth in 2/2019  Her EKG at the time showed QTc of 470msec      She had met with [facility] EP clinic in 9/2020 and they had discussed switching to amiodarone due to increased risk of Tikosyn induced torsades with advanced age, however, she decided against this      She continued to note fatigue and increasing palpitations over recent months  She denied lightheadedness, chest pain, shortness of breath, recurrent syncope, or leg swelling  She denies Covid 19 infection history  She is under a lot of stress caring for her spouse with Alzheimer's disease        She followed up with me 7/21/21 to review her 7 day ZIO results which revealed 1 run of VT (6 beats) and 14 runs of SVT and occasional PAC's  She noted that her [place] fib was going crazy\" while wearing the patch  Her metoprolol succinate was increased to 25 mg in am, 12 5 mg in pm  Updated holter monitor was ordered      She followed up with me 8/26/2021 at which time she had tripped, fallen, and broke ribs earlier in the month  Her BP was elevated  She was under significant stress to which she attributed the elevated BP readings  She had not yet completed her holter monitor but was tolerating the increase of metoprolol      She followed up 10/8/21 where we reviewed her holter monitor results which showed 7 9% PAC burden despite the increased beta blocker dose  Her average HR was 56 bpm, limiting further titration of the beta blocker  She reported another syncopal event on 9/13/2021 when she was standing in the kitchen and suddenly woke on the floor  When she woke she was tired and sweaty but denies chest pain, shortness of breath, or palpitations    A nuclear stress test " was ordered for ischemic work up due to VT on her monitor and syncopal events  We had previously reviewed risks of Tikosyn in advanced age and she was now agreeable to change  She was transitioned off Tikosyn and onto amiodarone      She met with Dr Eileen Lara 1/24/2022 at which time her BP remained elevated  Results of her negative Lexiscan stress test were reviewed  She reported dizziness, sweating, balance issues with the amiodarone  Her amiodarone was reduced to 100 mg daily down from 200 mg daily  Losartan 25 mg daily was added for BP control  She was recommended to undergo PPM insertion with possible AV node ablation and was referred to EP      She underwent dual chamber Medtronic PPM insertion at [facility] on 2/22/2022 by Dr Hernandez Rosemarie  The procedure was uneventful  She was instructed that she may discontinue her amiodarone if she wished and her metoprolol succinate was increased to 50 mg in am, 25 mg in pm  With discussion for possible AV node ablation if rates continued to be difficult to control despite titration of beta blocker      She followed up with me on 3/1/2022 for close follow up of her PPM insertion site, which was healing well  She had stopped amiodarone on 2/23 and felt much better off of it  She denied any recurrent syncopal events  She felt that she was tolerating the increased dose of metoprolol  She told me she would not be interested in AV node ablation if needed  Her pacemaker was interrogated that day remotely      At 3/29/2022 OV with me we reviewed the results of her mesenteric doppler completed 3/21/2022 which showed > 70% stenosis of celiac and superior mesenteric arteries  She was undergoing work up by GI for evaluation of abdominal pain  She was referred to vascular surgery at that time      She followed up most recently with Dr Eileen Lara on 8/24/2022 at which time she denied chest pain or shortness of breath  Recent lipid panel showed an LDL of 171   Device check in July showed < 0 1% AF "burden  She met with vascular surgery on 3/15/2023  They reviewed her mesenteric stenosis with plan for serial monitoring  She was instructed to start an 81 mg aspirin and to discuss cholesterol treatment with our office  She has follow up imaging scheduled for September 2023 4/4/2023: Wyatt Phillips presents today for routine follow up  She tells me that she has not yet started the aspirin as she wished to discuss with our office first  She denies chest pain or shortness of breath  No lightheadedness or dizziness  She has noticed swelling in her legs for about 1 5 weeks  She admits that her diet is currently high in salt as she is eating prepackaged foods out of convenience  She complains today of continued pain in her left arm since her Covid vaccine  She was seen at the [address] [place] [address] for this on 3/1 at which time a venous duplex ruled out a DVT  The arm continues to be mildly swollen with pain radiating from the shoulder down the arm and up into the neck  She reports rare palpitations, typically only noted when resting at bedtime  She describes this as a brief \"racing\" which lasts only seconds and is not associated with any other symptoms  Her PPM interrogation 1/16/23 showed 21% AF burden  She is scheduled for another interrogation on 4/17  She has been monitoring her BP at home, which runs 402-527'Y systolic       Medical Problems     Problem List     Constipation    Leukocytosis    Hypokalemia    Spinal stenosis of lumbar region with neurogenic claudication    Age-related osteoporosis without current pathological fracture    Primary osteoarthritis of both hips    Paroxysmal atrial fibrillation (HCC)    Syncope    Pacemaker    Hypertension    Hyperlipidemia    Carotid atherosclerosis, bilateral    Mesenteric artery stenosis (HCC)    Fracture of multiple ribs of right side    Acute pain of right shoulder    Closed displaced fracture of lateral end of right clavicle    Stage 3 chronic kidney disease, unspecified whether " stage 3a or 3b CKD (HCC)    Lumbar spondylosis        Past Medical History:   Diagnosis Date   • A-fib ([facility])    • Anxiety    • Arthritis     R knee   • Carotid atherosclerosis, bilateral    • GERD (gastroesophageal reflux disease)    • Hyperlipidemia    • Hypertension    • Muscle spasms of neck    • Osteoporosis    • Spinal stenosis      Social History     Socioeconomic History   • Marital status: /Civil Union     Spouse name: Not on file   • Number of children: Not on file   • Years of education: Not on file   • Highest education level: Not on file   Occupational History   • Not on file   Tobacco Use   • Smoking status: Never   • Smokeless tobacco: Never   Vaping Use   • Vaping Use: Never used   Substance and Sexual Activity   • Alcohol use: Not Currently   • Drug use: Never   • Sexual activity: Not Currently   Other Topics Concern   • Not on file   Social History Narrative   • Not on file     Social Determinants of Health     Financial Resource Strain: Not on file   Food Insecurity: Not on file   Transportation Needs: Not on file   Physical Activity: Not on file   Stress: Not on file   Social Connections: Not on file   Intimate Partner Violence: Not on file   Housing Stability: Not on file      Family History   Problem Relation Age of Onset   • Cancer Father    • Cancer Brother    • Heart disease Mother      Past Surgical History:   Procedure Laterality Date   • BACK SURGERY     • CARDIAC ELECTROPHYSIOLOGY PROCEDURE N/A 2/22/2022    Procedure: Cardiac pacer implant;  Surgeon: Rhianna Mendoza MD;  Location: [facility];   Service: Cardiology   • EYE SURGERY      cataracts   • FL GUIDED NEEDLE PLAC BX/ASP/INJ  8/2/2022   • FL GUIDED NEEDLE PLAC BX/ASP/INJ  9/1/2022   • NERVE BLOCK Bilateral 8/2/2022    Procedure: BLOCK MEDIAL BRANCH L3, L4, L5 MBB #1;  Surgeon: Kiara Shabazz MD;  Location:  END[facility];  Service: Pain Management    • NERVE BLOCK Bilateral 9/1/2022    Procedure: BLOCK MEDIAL BRANCH L3, L4, L5;  Surgeon: Bree Arroyo MD;  Location: [facility];  Service: Pain Management    • ME ARTHROCENTESIS ASPIR&/INJ MAJOR JT/BURSA W/O US Right 12/22/2022    Procedure: INJECTION JOINT HIP;  Surgeon: Bree Arroyo MD;  Location: [facility];  Service: Pain Management    • RHIZOTOMY Bilateral 11/17/2022    Procedure: RHIZOTOMY LUMBAR L3, L4, L5 medial branch nerves;  Surgeon: Bree Arroyo MD;  Location: [facility];  Service: Pain Management        Current Outpatient Medications:   •  acetaminophen (TYLENOL) 500 mg tablet, Take 2 tablets (1,000 mg total) by mouth every 6 (six) hours as needed for mild pain or moderate pain, Disp: 30 tablet, Rfl: 0  •  amLODIPine (NORVASC) 5 mg tablet, Take 1 tablet (5 mg total) by mouth daily, Disp: 30 tablet, Rfl: 1  •  aspirin (ECOTRIN LOW STRENGTH) 81 mg EC tablet, Take 1 tablet (81 mg total) by mouth daily, Disp: 30 tablet, Rfl: 11  •  calcium citrate-vitamin D (CITRACAL+D) 315-200 MG-UNIT per tablet, Take by mouth, Disp: , Rfl:   •  docusate sodium (COLACE) 100 mg capsule, Take 100 mg by mouth daily, Disp: , Rfl:   •  furosemide (LASIX) 20 mg tablet, Take 1 tablet (20 mg total) by mouth daily, Disp: 30 tablet, Rfl: 0  •  LORazepam (ATIVAN) 2 mg tablet, 3 (three) times a day prn, Disp: , Rfl:   •  losartan (COZAAR) 25 mg tablet, TAKE 1 TABLET BY MOUTH  DAILY, Disp: 90 tablet, Rfl: 3  •  magnesium oxide (MAG-OX) 400 mg, Take 400 mg by mouth once, Disp: , Rfl:   •  metoprolol succinate (TOPROL-XL) 25 mg 24 hr tablet, TAKE 2 TABLETS BY MOUTH IN  THE MORNING AND 1 TAB IN  THE EVENING, Disp: 270 tablet, Rfl: 3  •  Multiple Vitamins-Minerals (ICAPS AREDS 2 PO), Take 1 tablet by mouth daily , Disp: , Rfl:   •  polyethylene glycol (MIRALAX) 17 g packet, Take 17 g by mouth daily, Disp: , Rfl:   •  Xarelto 15 MG tablet, TAKE 1 TABLET BY MOUTH  DAILY WITH DINNER, Disp: 90 tablet, Rfl: 3  •  lansoprazole (PREVACID) 30 mg capsule, , Disp: , Rfl:   Allergies   Allergen Reactions   • Ciprofloxacin    • Epinephrine      Irregular heart ryhthm (afib) per pt  • Statins Myalgia     Patient has tried 3-4 different statins and developed muscle pain with all   • Bactrim [Sulfamethoxazole-Trimethoprim] Rash   • Medical Tape Rash       Labs:     Chemistry        Component Value Date/Time    K 3 9 03/01/2023 1001     03/01/2023 1001    CO2 34 (H) 03/01/2023 1001    BUN 24 03/01/2023 1001    CREATININE 0 96 03/01/2023 1001        Component Value Date/Time    CALCIUM 10 2 03/01/2023 1001    ALKPHOS 57 03/01/2023 1001    AST 25 03/01/2023 1001    ALT 17 03/01/2023 1001        Imaging:   VAS celiac and/or mesenteric duplex 3/21/2022: The aorta is patent and normal in caliber  >70% stenosis in the celiac artery  >70% stenosis of the superior mesenteric artery       ECG 3/1/2022: Atrial-paced rhythm with prolonged AV conduction  Left ventricular hypertrophy  Rate 73 bpm      Lexiscan nuclear stress test 11/2/2021:  Normal stress perfusion pattern  Hyperdynamic LV function gated analysis  Low risk perfusion pattern  Stress ECG: No ECG changes meeting criteria for ischemia   Nondiagnostic ECG portion given vasodilator protocol      ECG 10/8/2021: Sinus bradycardia, rate 53, cannot rule out septal infarct, no significant change from previous ECG on 8/10/2021     24 hour holter monitor 9/7/2021:  Predominantly sinus rhythm, HR varied from 39 bpm to 103 bpm   The patient’s average heart rate was 56 bpm     17 ventricular ectopic activity  6195 (7 9%)  supraventricular ectopy  Longest R/R interval was 1 8 seconds      ZIO 6/10-6/17/2021:   Min HR of 35, max HR of 193, and avg HR of 59 bpm   Predominant underlying rhythm was Sinus Rhythm  1 run of Ventricular Tachycardia occurred lasting 6 beats with a max rate of 193 bpm (avg 177 bpm)  14 Supraventricular Tachycardia runs occurred, the run with the longest lasting 20 beats with an avg rate of 115 bpm    Occasional PAC's (3 9%, 19652)   Rare "PVC's (<1 0%)     Echocardiogram 4/2/2021 ([facility]): EF 55-60%  Grade 1 diastolic dysfunction  Mild-moderate AI  Mild-moderate MR  Mild TR      ECG 3/22/2021 ([facility]): sinus rhythm with one PAC, rate 68    Review of Systems   Constitutional: Negative  HENT: Negative  Cardiovascular: Positive for leg swelling  Negative for chest pain, dyspnea on exertion, irregular heartbeat, near-syncope, orthopnea and palpitations  Respiratory: Negative for cough, shortness of breath and snoring  Endocrine: Negative  Skin: Negative  Musculoskeletal:        Left arm pain and swelling since Covid vaccination   Gastrointestinal: Negative  Genitourinary: Negative  Neurological: Negative  Psychiatric/Behavioral: Negative  Vitals:    04/04/23 1041   BP: 130/70   Pulse:    SpO2:      Vitals:    04/04/23 1015   Weight: 59 6 kg (131 lb 8 oz)     Height: 5' 3\" (160 cm)   Body mass index is 23 29 kg/m²  Physical Exam  Vitals and nursing note reviewed  Constitutional:       General: She is not in acute distress  Appearance: She is well-developed  She is not diaphoretic  HENT:      Head: Normocephalic and atraumatic  Neck:      Thyroid: No thyromegaly  Vascular: No carotid bruit or JVD  Cardiovascular:      Rate and Rhythm: Normal rate and regular rhythm  Occasional extrasystoles are present  Pulses: Intact distal pulses  Radial pulses are 2+ on the right side and 2+ on the left side  Heart sounds: Normal heart sounds, S1 normal and S2 normal  No murmur heard  Comments: Bilateral lower leg edema, mild left arm edema compared to right  Pulmonary:      Effort: Pulmonary effort is normal       Breath sounds: Normal breath sounds  Abdominal:      General: There is no distension  Palpations: Abdomen is soft  Tenderness: There is no abdominal tenderness  Musculoskeletal:         General: Normal range of motion        Cervical back: Normal range of motion and neck " supple  Lymphadenopathy:      Cervical: No cervical adenopathy  Skin:     General: Skin is warm and dry  Neurological:      Mental Status: She is alert and oriented to person, place, and time  Cranial Nerves: No cranial nerve deficit  Psychiatric:         Mood and Affect: Mood and affect normal          Speech: Speech normal          Behavior: Behavior normal  Behavior is cooperative           Cognition and Memory: Cognition and memory normal

## 2023-04-04 NOTE — ASSESSMENT & PLAN NOTE
Lab Results   Component Value Date    EGFR 53 03/01/2023    EGFR 51 02/22/2022    EGFR 55 02/08/2022    CREATININE 0 96 03/01/2023    CREATININE 0 99 02/22/2022    CREATININE 0 93 02/08/2022     Will monitor with trial of furosemide  BMP in 1-2 weeks

## 2023-04-04 NOTE — ASSESSMENT & PLAN NOTE
Left upper arm pain and swelling since Covid vaccination  Left UE Duplex 3/1/23 negative for DVT  I will reach out to PCP for recommendations for further imaging/evaluation

## 2023-04-04 NOTE — ASSESSMENT & PLAN NOTE
U/s imaging 3/21/2022 ordered by PCP for ongoing abdominal pain reveals > 70% stenosis in the celiac and superior mesenteric arteries  She is now following with [facility]'s vascular surgery with surveillance imaging scheduled for 9/2023  OK to start aspirin 81 mg daily, EC, always with food  Reviewed urgent s/s to report  Will monitor CBC in 1 month with this addition  Discussed addition of Zetia given statin intolerance  She believes she may have tried this before  I will reach out to PCP to inquire  Consider Repatha, which we discussed today

## 2023-04-04 NOTE — ASSESSMENT & PLAN NOTE
"History of paroxysmal atrial fibrillation with previous use of Tikosyn for > 10 years  She presented with complaint of increasing \"a fib episodes\", feeling fluttering/palpitations in her chest   ZIO monitoring from 6/10-6/17/2021 revealed no a fib, one 6-beat run of WCT/VT, 14 runs of SVT, longest 20 beats at 115 BPM, occasional PAC's (3 9%)  Echo 4/2021 with EF 55-60%  Metoprolol succinate increased in August 2021 to 25mg in am, 12 5mg in pm  Repeat 24 hour holter showed avg HR of 56bpm with 7 9% PAC burden  She sustained recurrent syncopal events  Chery Taryn nuclear stress test 11/2/2021 without evidence of ischemia  She was transitioned to amiodarone, which she did not tolerate  Referred to EP for possible AV node ablation and PPM insertion  Dr Ananth Damon performed pacemaker insertion on 2/22/2022 and up-titrated beta blocker  Consider AV node ablation if a fib remains difficult to control  Continue Magnesium supplementation at 250-500mg daily  Continue metoprolol succinate 50 mg in am, 25 mg in pm   Continue Xarelto for anticoagulation  Will monitor rates with upcoming device interrogations  Most recent interrogation shows 21% AF burden    "

## 2023-04-04 NOTE — PATIENT INSTRUCTIONS
For your edema - we will reduce your amlodipine to 5 mg daily instead of 10 mg  Add furosemide 20 mg daily for 7 days only  This will make you urinate a lot for about 6 hours after you take  Then call with an update  There is a chance of cross-allergy with Bactrim so stop it immediately if you develop a rash  Stop and report if any lightheadedness, dizziness, feeling unwell  For the mesenteric stenosis - I agree with adding the aspirin 81 mg enteric coated, always with food once daily  OK to start about a week after the above changes  Report immediately any tarry stools or abnormal bleeding  We will check a blood count in about 1 month  I will reach out to Dr Polo Blackwell about your previous use of Zetia  If you have not tried this medication I will recommend we start for cholesterol management  I ordered an updated carotid u/s  For your arm pain - I will reach out to Dr Polo Blackwell for his recommendations on further imaging or evaluation  You can try a compression sleeve (ok to use the Tubigrip that you have at home)  I recommend compression socks also  We will watch your upcoming device interrogation to monitor a fib

## 2023-04-04 NOTE — ASSESSMENT & PLAN NOTE
Sustained a fall in 2/2019 when she was brushing her teeth in the bathroom and fell, hitting her head against the wall  EKG at that time with QTc of 470msec  She reports today another syncopal event while standing in her kitchen  She had received a steroid injection in her knee and her flu vaccine earlier that same day  We have reviewed the risk of Torsades in [place] with advancing age  24 hour holter monitor results above (see atrial fibrillation)  Labs have remained stable  Lexiscan 11/2/21 negative for ischemia  S/P PPM insertion 2/22/22 for sick sinus syndrome  Off amiodarone since that time  No recurrent syncopal events

## 2023-04-04 NOTE — TELEPHONE ENCOUNTER
Please see if Hussain Conner is willing to try the Zetia again? If so I will order to pharmacy of her choice with repeat labs 8 weeks after starting

## 2023-04-04 NOTE — ASSESSMENT & PLAN NOTE
Carotid ultrasound 5/1/2017 showed moderate bilateral carotid atherosclerosis with 20-49% carotid stenosis bilaterally  Normal vertebral artery flow  Will repeat imaging now  Ideally LDL < 70  She has not tolerated statins in the past    Start aspirin 81 mg daily  Will inquire with PCP regarding prior use of Zetia  Consider Repatha

## 2023-04-04 NOTE — ASSESSMENT & PLAN NOTE
BP is excellent today  Now with increased lower leg edema  Reduced amlodipine to 5 mg daily and trial 1 week of furosemide 20 mg daily  BMP in 1-2 weeks  Continue losartan 25 mg daily and metoprolol succinate 50 mg in am, 24 mg in pm   We reviewed benefits of 2 g sodium diet

## 2023-04-04 NOTE — TELEPHONE ENCOUNTER
Can you please reach out to PCP office regarding 2 things:  1  Radhika Melgar has been experiencing left upper arm pain and swelling since a Covid vaccine several months ago  We ruled out DVT with a doppler  Would they like to evaluate her or have any other imaging recommendations? 2  I cannot see [facility] records in care everywhere  I would like to start Zetia but Radhika Melgar thinks she tried this before  Do they have record of any issues with Zetia?     Thank you!!

## 2023-04-04 NOTE — ASSESSMENT & PLAN NOTE
S/P insertion of Medtronic dual chamber PPM on 2/22/2022 for indication of sick sinus syndrome  Device managed by [facility]

## 2023-04-04 NOTE — TELEPHONE ENCOUNTER
They are going to pass the first message along  As far as Sadie, she was on it in 2006 and came off of it to try something else

## 2023-04-05 RX ORDER — EZETIMIBE 10 MG/1
10 TABLET ORAL DAILY
Qty: 30 TABLET | Refills: 11 | Status: SHIPPED | OUTPATIENT
Start: 2023-04-05

## 2023-04-05 NOTE — TELEPHONE ENCOUNTER
Pt called back she is willing to start zetia again please send to [facility] in [place] for #[phone] and she would like labwork mailed to her to remind her to get done in 8 weeks

## 2023-04-12 NOTE — TELEPHONE ENCOUNTER
Please find out how blood pressures are  Recommend using compression socks  OK to stop furosemide    Thank you

## 2023-04-12 NOTE — TELEPHONE ENCOUNTER
1  Patient called backed to provide and update on her lasix - she is using the restroom a lot but the swelling is not going down and she still have the edema  2  She started the Zetia two days ago so she does not see a change with that yet and can call back with an update

## 2023-04-12 NOTE — TELEPHONE ENCOUNTER
She states that her BP has been around 120s/70s and she has been using compression stockings since seen  She is aware

## 2023-05-01 ENCOUNTER — TELEPHONE (OUTPATIENT)
Dept: CARDIOLOGY CLINIC | Facility: CLINIC | Age: 88
End: 2023-05-01

## 2023-05-01 DIAGNOSIS — I48.0 PAROXYSMAL ATRIAL FIBRILLATION (HCC): ICD-10-CM

## 2023-05-01 RX ORDER — METOPROLOL SUCCINATE 25 MG/1
TABLET, EXTENDED RELEASE ORAL
Qty: 270 TABLET | Refills: 3 | Status: SHIPPED | OUTPATIENT
Start: 2023-05-01

## 2023-06-05 ENCOUNTER — OFFICE VISIT (OUTPATIENT)
Dept: PAIN MEDICINE | Facility: CLINIC | Age: 88
End: 2023-06-05
Payer: MEDICARE

## 2023-06-05 VITALS
SYSTOLIC BLOOD PRESSURE: 120 MMHG | RESPIRATION RATE: 20 BRPM | HEART RATE: 71 BPM | WEIGHT: 133 LBS | HEIGHT: 63 IN | TEMPERATURE: 97.4 F | DIASTOLIC BLOOD PRESSURE: 64 MMHG | BODY MASS INDEX: 23.57 KG/M2

## 2023-06-05 DIAGNOSIS — M70.61 TROCHANTERIC BURSITIS OF RIGHT HIP: ICD-10-CM

## 2023-06-05 DIAGNOSIS — M16.0 PRIMARY OSTEOARTHRITIS OF BOTH HIPS: Primary | ICD-10-CM

## 2023-06-05 PROCEDURE — 99214 OFFICE O/P EST MOD 30 MIN: CPT | Performed by: ANESTHESIOLOGY

## 2023-06-05 RX ORDER — METHYLPREDNISOLONE ACETATE 80 MG/ML
80 INJECTION, SUSPENSION INTRA-ARTICULAR; INTRALESIONAL; INTRAMUSCULAR; PARENTERAL; SOFT TISSUE ONCE
Status: CANCELLED | OUTPATIENT
Start: 2023-06-05 | End: 2023-06-05

## 2023-06-05 RX ORDER — PREDNISONE 20 MG/1
TABLET ORAL
COMMUNITY
Start: 2023-04-14

## 2023-06-05 RX ORDER — LIDOCAINE HYDROCHLORIDE 10 MG/ML
5 INJECTION, SOLUTION EPIDURAL; INFILTRATION; INTRACAUDAL; PERINEURAL ONCE
Status: CANCELLED | OUTPATIENT
Start: 2023-06-05 | End: 2023-06-05

## 2023-06-05 RX ORDER — HYDROCORTISONE ACETATE 25 MG/1
SUPPOSITORY RECTAL
COMMUNITY
Start: 2023-05-12

## 2023-06-05 RX ORDER — BUPIVACAINE HCL/PF 2.5 MG/ML
4 VIAL (ML) INJECTION ONCE
Status: CANCELLED | OUTPATIENT
Start: 2023-06-05 | End: 2023-06-05

## 2023-06-05 NOTE — PATIENT INSTRUCTIONS
Hip Bursitis Exercises   AMBULATORY CARE:   Hip bursitis exercises  help strengthen the muscles in your hip and keep the joint flexible  Strong muscles can help reduce pain, prevent injury, and keep the joint stable  The exercises can also help increase the range of motion in your hip joint  Call your doctor or physical therapist if:   You have sharp pain during exercise or at rest     You have questions or concerns about the stretches or exercises  What you need to know about exercise safety:   Move slowly and smoothly  Avoid fast or jerky motions  This will help prevent an injury  Breathe normally  Do not hold your breath  It is important to breathe in and out so you do not tense up during exercise  Tension could prevent you from moving your joint in a full range of motion  Do the exercises and stretches on both legs  Do this so both hips remain strong and flexible  Stop if you feel sharp pain or an increase in pain  Contact your healthcare provider or physical therapist  It is normal to feel some discomfort during exercise  Regular exercise will help decrease your discomfort over time  Warm up and cool down when you exercise  Walk or ride a stationary bike for 5 to 10 minutes before you start  This warms and relaxes your muscles to help prevent an injury  When you have finished the exercises, cool down by walking in place for a few minutes  You may also need to stretch as part of your warm up or cool down routine  Your provider or physical therapist will tell you which stretches to do  How to do hip stretches: Your healthcare provider or physical therapist will tell you how many times to do each stretch  Do the stretch on both sides before you move to the next stretch  Standing iliotibial band stretch:  Stand with the leg on your injured side behind your other leg  Bend sideways toward the side that is not injured  Stop when you feel a stretch in your outer hip   Hold for 5 to 10 seconds  Then return to the starting position  Lying iliotibial band stretch:  Lie on your back  Bend the knee on your injured side toward your chest  Place your hands on the outside of your knee and thigh  Slowly pull the knee across your body  Stop when you feel a stretch in your hip and outer thigh  Hold for 5 to 10 seconds  Return your leg to the starting position  Hip stretch:  Lie on your back with both legs straight and on the ground  Bend the knee on your injured side toward your chest until you can reach your lower leg  Place both hands on your shin and pull your knee toward your chest  Hold for 5 to 10 seconds  Return your leg to the starting position  Knee to chest:  Lie on your back with both knees bent and feet flat on the floor  Bend the knee on your injured side toward your chest until you can reach your lower leg  Place both hands on your shin and pull your knee toward your chest  Hold for 5 to 10 seconds  Return your leg to the starting position  Internal hip rotator stretch:  You will do this exercise on a table  Lie on your side with the injured hip on top  You may be told to keep a pillow between your thighs  Move the top leg so the foot hangs below the edge of the table  Rotate your hip to raise your foot in the opposite direction of the bottom shoulder  Raise your foot as high as you can so you feel a stretch in the back of your thigh  Hold for 5 seconds  Then slowly lower your foot to the starting position  External hip rotator stretch:  You will do this exercise on a table  Lie on your side with the injured hip on the bottom  You do not need a pillow between your thighs for this exercise  Move the bottom leg so the foot is off the edge of the table  Rotate your hip to lift the foot in the opposite direction of the bottom shoulder  Raise your foot as high as you can so you feel a stretch in your buttock  Hold for 5 seconds   Then slowly lower your foot to the starting position  Kneeling hip flexor stretch:  Kneel on your knee on the injured side  Place the foot of your other leg on the floor so the knee is bent  Put both hands on top of your thigh  Keep your back straight and abdominal muscles tight  Lean forward until you feel a stretch in your other thigh  Hold the stretch for 10 seconds  Return to the starting position  How to do hip strengthening exercises: Your healthcare provider or physical therapist will tell you how many times to do each exercise  Do the exercise on both sides before you move to the next exercise  Straight leg lift to the side: This may also be called hip abduction  Lie on your side with straight legs, with the injured hip on top  Slowly raise your top leg toward the ceiling as high as you can  Keep your foot pointed  Hold for 5 seconds  Then slowly lower your leg to the starting position  Inner thigh lift: This may also be called hip adduction  Lie on your side with straight legs, with the injured hip on the bottom  Cross your top leg over your bottom leg  Put the foot of your top leg on the floor in front of you  Raise your bottom leg until it touches the top leg  Hold for 5 seconds  Then slowly lower the leg to the floor  Clam exercise:  Lie on your side so your injured side is on top  Bend your knees  Keep your heels together during this exercise  Slowly raise your top knee toward the ceiling  Then lower your leg so your knees are together  Follow up with your doctor or physical therapist as directed:  Write down your questions so you remember to ask them during your visits  © Copyright [employer] 2022 Information is for End User's use only and may not be sold, redistributed or otherwise used for commercial purposes  The above information is an  only  It is not intended as medical advice for individual conditions or treatments   Talk to your doctor, nurse or pharmacist before following any medical regimen to see if it is safe and effective for you

## 2023-06-05 NOTE — PROGRESS NOTES
Assessment  1  Primary osteoarthritis of both hips - Right    2  Trochanteric bursitis of right hip - Right  -     Case request operating room: INJECTION JOINT RIGHT GTB INJECTION; Standing  -     Case request operating room: INJECTION JOINT RIGHT GTB INJECTION    Greater than 90% relief of pain with improved ability to participate with IADLs after bilateral L3, L4, L5 medial branch blocks #1 and #2 as well as with subsequent RF lesioning of medial branch nerves performed 11/17/22  Right hip GTB injection which resulted in >80-90% pain relief for patient for more than 6 months; this pain has subsequently returned  Previously reported the following symptomatology:     Axial low back pain described primarily by arthritic features  Aching, nagging, indolent, stabbing, throbbing features in axial low back without radicular components  5/5 strength bilaterally, negative SLR  Positive facet loading maneuvers in lumbar spine elicited pain, positive tenderness to palpation over lumbar paraspinal muscles  Positive reynaldo's test, gaenslen's, positive SIJ loading bilaterally  Pain with internal/external rotation of right hip; ttp over right gtb  Recently had hip injection with Dr Rose Lazcano  Findings correlate with prominent lumbar facet arthropathy seen throughout axial low back on MRI from 2019 and recent CT scan; only mild to moderate central canal stenosis L4-L5  Additionally describes radicular pain numbness and weakness to a lesser degree in bilateral L4 dermatomes  Currently she is neurologically intact without gait instability, saddle anesthesia or bowel/bladder abnormality  Risks, benefits alternative to medial branch blocks and subsequent radiofrequency ablation of successful thoroughly discussed with patient  Handouts provided questions answered to patient satisfaction  Has been participant with PT but fell twice thereafter and has since discontinued        Plan  -right GTB injection; f/u 2 weeks post procedure  -to follow up with dr Nimco Amaya regarding right knee/OA; information regarding genicular nerve blocks/rfa provided  -continue HEP- physician directed home exercise plan as per [facility] demonstrated and handouts provided that patient plans to participate with for 1 hour, twice a week for the next 6 weeks  There are risks associated with opioid medications, including dependence, addiction and tolerance  The patient understands and agrees to use these medications only as prescribed  Potential side effects of the medications include, but are not limited to, constipation, drowsiness, addiction, impaired judgment and risk of fatal overdose if not taken as prescribed  The patient was warned against driving while taking sedation medications or operating heavy machinery  The patient voiced understanding  Sharing medications is a felony  At this point in time, the patient is showing no signs of addiction, abuse, diversion or suicidal ideation  [place] Prescription Drug Monitoring Program report was reviewed and was appropriate      Complete risks and benefits including bleeding, infection, tissue reaction, nerve injury and allergic reaction were discussed  The approach was demonstrated using models and literature was provided  Verbal and written consent was obtained  My impressions and treatment recommendations were discussed in detail with the patient who verbalized understanding and had no further questions  Discharge instructions were provided  I personally saw and examined the patient and I agree with the above discussed plan of care      New Medications Ordered This Visit   Medications   • predniSONE 20 mg tablet   • hydrocortisone (ANUSOL-HC) 25 mg suppository       History of Present Illness    Greater than 90% relief of pain with improved ability to participate with IADLs after bilateral L3, L4, L5 medial branch blocks #1 and #2 as well as with subsequent RF lesioning of medial branch nerves performed 11/17/22  Right hip GTB injection which resulted in >80-90% pain relief for patient for more than 6 months; this pain has subsequently returned  Previously reported the following symptomatology:     Danae Childs is a 80 y o  female with a past medical history of Afib on xarelto with pacemaker presenting with chronic low back pain described primarily as arthritic in nature  She describes 8/10 low back pain that is worse in the mornings and worse at the end of the day  The pain is characterized by achy, nagging, indolent, crampy, stabbing pain in her axial low back  The patient describes that the pain is worse with standing for long periods of time on hard surfaces as well as with walking  She ambulates with a walker  The patient is a very active individual and feels as though this pain compromises his/her participation with independent activities of daily living  The pain can be debilitating at times and contribute to significant disability, compromising overall activity and independent activities of daily living  She has tried physical therapy with limited relief of symptoms and discontinued after falling twice  Medications the patient has tried in the past include   tylenol  She describes minor degree radicular symptoms in bilateral L4 dermatome but otherwise has good strength  She denies any weakness numbness or paresthesias that are particularly problematic  The patient denies any bowel or bladder dysfunction as well, gait instability or saddle anesthesia  I have personally reviewed and/or updated the patient's past medical history, past surgical history, family history, social history, current medications, allergies, and vital signs today  Review of Systems   Constitutional: Positive for activity change  HENT: Negative  Eyes: Negative  Respiratory: Negative  Cardiovascular: Negative  Gastrointestinal: Negative  Endocrine: Negative  Genitourinary: Negative  Musculoskeletal: Positive for arthralgias, back pain, gait problem and myalgias  Skin: Negative  Allergic/Immunologic: Negative  Neurological: Positive for weakness and numbness  Hematological: Negative  Psychiatric/Behavioral: Negative  All other systems reviewed and are negative  Patient Active Problem List   Diagnosis   • Paroxysmal atrial fibrillation (HCC)   • Syncope   • Constipation   • Leukocytosis   • Hypokalemia   • Spinal stenosis of lumbar region with neurogenic claudication   • Age-related osteoporosis without current pathological fracture   • Primary osteoarthritis of both hips   • Hypertension   • Hyperlipidemia   • Fracture of multiple ribs of right side   • Acute pain of right shoulder   • Closed displaced fracture of lateral end of right clavicle   • Pacemaker   • Carotid atherosclerosis, bilateral   • Mesenteric artery stenosis (HCC)   • Stage 3 chronic kidney disease, unspecified whether stage 3a or 3b CKD ([facility])   • Lumbar spondylosis   • Localized edema   • Left arm pain   • Trochanteric bursitis of right hip       Past Medical History:   Diagnosis Date   • A-fib [facility])    • Anxiety    • Arthritis     R knee   • Carotid atherosclerosis, bilateral    • GERD (gastroesophageal reflux disease)    • Hyperlipidemia    • Hypertension    • Muscle spasms of neck    • Osteoporosis    • Spinal stenosis        Past Surgical History:   Procedure Laterality Date   • BACK SURGERY     • CARDIAC ELECTROPHYSIOLOGY PROCEDURE N/A 2/22/2022    Procedure: Cardiac pacer implant;  Surgeon: Harlan Love MD;  Location: [facility];   Service: Cardiology   • EYE SURGERY      cataracts   • FL GUIDED NEEDLE PLAC BX/ASP/INJ  8/2/2022   • FL GUIDED NEEDLE PLAC BX/ASP/INJ  9/1/2022   • IR SACROPLASTY  3/15/2019   • NERVE BLOCK Bilateral 8/2/2022    Procedure: BLOCK MEDIAL BRANCH L3, L4, L5 MBB #1;  Surgeon: Dhruv Vanegas MD;  Location:  ENDO;  Service: Pain Management    • NERVE BLOCK Bilateral 9/1/2022    Procedure: BLOCK MEDIAL BRANCH L3, L4, L5;  Surgeon: Stan Smith MD;  Location: [facility];  Service: Pain Management    • WA ARTHROCENTESIS ASPIR&/INJ MAJOR JT/BURSA W/O US Right 12/22/2022    Procedure: INJECTION JOINT HIP;  Surgeon: Stan Smith MD;  Location: [facility];  Service: Pain Management    • RHIZOTOMY Bilateral 11/17/2022    Procedure: RHIZOTOMY LUMBAR L3, L4, L5 medial branch nerves;  Surgeon: Stan Smith MD;  Location: [facility];  Service: Pain Management        Family History   Problem Relation Age of Onset   • Cancer Father    • Cancer Brother    • Heart disease Mother        Social History     Occupational History   • Not on file   Tobacco Use   • Smoking status: Never   • Smokeless tobacco: Never   Vaping Use   • Vaping Use: Never used   Substance and Sexual Activity   • Alcohol use: Not Currently   • Drug use: Never   • Sexual activity: Not Currently       Current Outpatient Medications on File Prior to Visit   Medication Sig   • acetaminophen (TYLENOL) 500 mg tablet Take 2 tablets (1,000 mg total) by mouth every 6 (six) hours as needed for mild pain or moderate pain   • amLODIPine (NORVASC) 5 mg tablet Take 1 tablet (5 mg total) by mouth daily   • aspirin (ECOTRIN LOW STRENGTH) 81 mg EC tablet Take 1 tablet (81 mg total) by mouth daily   • calcium citrate-vitamin D (CITRACAL+D) 315-200 MG-UNIT per tablet Take by mouth   • docusate sodium (COLACE) 100 mg capsule Take 100 mg by mouth daily   • hydrocortisone (ANUSOL-HC) 25 mg suppository    • LORazepam (ATIVAN) 2 mg tablet 3 (three) times a day prn   • losartan (COZAAR) 25 mg tablet TAKE 1 TABLET BY MOUTH  DAILY   • magnesium oxide (MAG-OX) 400 mg Take 400 mg by mouth once   • metoprolol succinate (TOPROL-XL) 25 mg 24 hr tablet TAKE 2 TABLETS BY MOUTH IN  THE MORNING AND 1 TAB IN  THE EVENING   • Multiple Vitamins-Minerals (ICAPS AREDS 2 PO) Take 1 tablet by mouth daily    • polyethylene glycol (MIRALAX) 17 g packet "Take 17 g by mouth daily   • Xarelto 15 MG tablet TAKE 1 TABLET BY MOUTH  DAILY WITH DINNER   • ezetimibe (ZETIA) 10 mg tablet Take 1 tablet (10 mg total) by mouth daily (Patient not taking: Reported on 6/5/2023)   • lansoprazole (PREVACID) 30 mg capsule  (Patient not taking: Reported on 4/4/2023)   • predniSONE 20 mg tablet  (Patient not taking: Reported on 6/5/2023)     No current facility-administered medications on file prior to visit  Allergies   Allergen Reactions   • Ciprofloxacin    • Covid-19 (Mrna) Vaccine Edema     Arm and hand swelling    • Epinephrine      Irregular heart ryhthm (afib) per pt  • Statins Myalgia     Patient has tried 3-4 different statins and developed muscle pain with all   • Bactrim [Sulfamethoxazole-Trimethoprim] Rash   • Medical Tape Rash         Physical Exam    /64   Pulse 71   Temp (!) 97 4 °F (36 3 °C)   Resp 20   Ht 5' 3\" (1 6 m)   Wt 60 3 kg (133 lb)   BMI 23 56 kg/m²     Constitutional: normal, well developed, well nourished, alert, in no distress and non-toxic and no overt pain behavior  Eyes: anicteric  HEENT: grossly intact  Neck: supple, symmetric, trachea midline and no masses   Pulmonary:even and unlabored  Cardiovascular:No edema or pitting edema present  Skin:Normal without rashes or lesions and well hydrated  Psychiatric:Mood and affect appropriate  Neurologic:Cranial Nerves II-XII grossly intact Sensation grossly intact; no clonus negative phoenix's  Reflexes 2+ and brisk  SLR negative bilaterally  Musculoskeletal:normal gait  5/5 strength bilaterally with AROM in all extremities  ambulates with walker; unable to perform normal heel toe and tip toe walking  significant pain with lumbar facet loading bilaterally and with lateral spine rotation  ttp over lumbar paraspinal muscles  Positive reynaldo's test, gaenslen's, positive SIJ loading bilaterally   Pain with internal/external rotation of right hip; ttp over right gtb    Imaging  CT LUMBAR " SPINE     INDICATION:   M54 16: Radiculopathy, lumbar region  M47 816: Spondylosis without myelopathy or radiculopathy, lumbar region      COMPARISON: Prior CT December 2017, 2019     TECHNIQUE:  Contiguous axial images through the lumbar spine were obtained  Sagittal and coronal reconstructions were performed        Radiation dose length product (DLP) for this visit:  591 9 mGy-cm   This examination, like all CT scans performed in the [facility], was performed utilizing techniques to minimize radiation dose exposure, including the use of iterative   reconstruction and automated exposure control        IMAGE QUALITY:  Diagnostic      FINDINGS:     ALIGNMENT:  There are 5 lumbar type vertebral bodies  Patient has undergone L1, L3, and L4 vertebral plasty  Vertebral body heights are unchanged from the prior study  There is no acute new compression fracture identified  Rightward convex scoliosis   noted at the thoracolumbar junction      VERTEBRAL BODIES:  Postoperative changes of multilevel vertebroplasty as described  Slight retropulsion of the posterior vertebral body of L1 noted unchanged from prior      DEGENERATIVE CHANGES:     Lower Thoracic spine:  Mild central stenosis related to retropulsion of the L1 vertebral body      L1-2:  No significant central or foraminal narrowing      L2-3:  Moderate facet hypertrophy  There is mild annular bulge with marginal osteophytes which result in mild central stenosis      L3-4:  Degenerative disc osteophyte complex with marginal osteophytes eccentric to the right results in mild right foraminal narrowing and mild central stenosis      L4-5:  Moderate facet hypertrophy  There is mild annular bulge with minimal right foraminal narrowing and mild to moderate central stenosis noted      L5-S1:  Calcified posterior disc margin with annular bulging  Minimal central stenosis    Foramina are patent      PARASPINAL SOFT TISSUES: Normal      IMPRESSION:     Stable lumbar CT status post L1, L3, and L4 kyphoplasty  Stable vertebral heights are noted  Persistent slight retropulsion of the L1 posterior vertebral body contributes to mild central stenosis      Spondylotic degenerative changes result in mild to moderate central stenosis at L4-5 and no greater than mild central or foraminal narrowing at remaining levels

## 2023-06-05 NOTE — DISCHARGE INSTR - AVS FIRST PAGE
YOUR 2 WEEK FOLLOW UP HAS BEEN SCHEDULED; IF YOU WISH TO CHANGE THE FOLLOW UP, PLEASE CALL THE [facility] AT [facility]: [phone]    Steroid Joint Injection   WHAT YOU NEED TO KNOW:   A steroid joint injection is a procedure to inject steroid medicine into a joint  Steroid medicine decreases pain and inflammation  The injection may also contain an anesthetic (numbing medicine) to decrease pain  It may be done to treat conditions such as arthritis, gout, or carpal tunnel syndrome  The injections may be given in your knee, ankle, shoulder, elbow, or wrist  Injections may also be given in your hip, toe, thumb, or finger  DISCHARGE INSTRUCTIONS:   Contact your healthcare provider if:   You have fever or chills  You have redness or swelling at the injection site  You have more pain than usual in your joint for more than 72 hours  You have questions or concerns about your condition or care  Medicines:   Pain medicine  may be given  Ask how to take this medicine safely  Take your medicine as directed  Contact your healthcare provider if you think your medicine is not helping or if you have side effects  Tell your provider if you are allergic to any medicine  Keep a list of the medicines, vitamins, and herbs you take  Include the amounts, and when and why you take them  Bring the list or the pill bottles to follow-up visits  Carry your medicine list with you in case of an emergency  Self-care:   Leave the bandage on for 8 to 12 hours  Care for your wound as directed  Rest the area  as directed  You may need to decrease weight on certain joints, such as the knee, for a period of time  Ask when you can return to your daily activities  Elevate  your limb where the steroid injection was given  Elevate the limb above the level of your heart as often as you can  This will help decrease swelling and pain  Prop your limb on pillows or blankets to keep it elevated comfortably  Apply ice  on your joint for 15 to 20 minutes every hour or as directed  Use an ice pack, or put crushed ice in a plastic bag  Cover it with a towel  Ice helps prevent tissue damage and decreases swelling and pain  © Copyright Kasandra Guzmán 2022 Information is for End User's use only and may not be sold, redistributed or otherwise used for commercial purposes  The above information is an  only  It is not intended as medical advice for individual conditions or treatments  Talk to your doctor, nurse or pharmacist before following any medical regimen to see if it is safe and effective for you

## 2023-06-05 NOTE — H&P (VIEW-ONLY)
Assessment  1  Primary osteoarthritis of both hips - Right    2  Trochanteric bursitis of right hip - Right  -     Case request operating room: INJECTION JOINT RIGHT GTB INJECTION; Standing  -     Case request operating room: INJECTION JOINT RIGHT GTB INJECTION    Greater than 90% relief of pain with improved ability to participate with IADLs after bilateral L3, L4, L5 medial branch blocks #1 and #2 as well as with subsequent RF lesioning of medial branch nerves performed 11/17/22  Right hip GTB injection which resulted in >80-90% pain relief for patient for more than 6 months; this pain has subsequently returned  Previously reported the following symptomatology:     Axial low back pain described primarily by arthritic features  Aching, nagging, indolent, stabbing, throbbing features in axial low back without radicular components  5/5 strength bilaterally, negative SLR  Positive facet loading maneuvers in lumbar spine elicited pain, positive tenderness to palpation over lumbar paraspinal muscles  Positive reynaldo's test, gaenslen's, positive SIJ loading bilaterally  Pain with internal/external rotation of right hip; ttp over right gtb  Recently had hip injection with Dr Valerio Ellison  Findings correlate with prominent lumbar facet arthropathy seen throughout axial low back on MRI from 2019 and recent CT scan; only mild to moderate central canal stenosis L4-L5  Additionally describes radicular pain numbness and weakness to a lesser degree in bilateral L4 dermatomes  Currently she is neurologically intact without gait instability, saddle anesthesia or bowel/bladder abnormality  Risks, benefits alternative to medial branch blocks and subsequent radiofrequency ablation of successful thoroughly discussed with patient  Handouts provided questions answered to patient satisfaction  Has been participant with PT but fell twice thereafter and has since discontinued        Plan  -right GTB injection; f/u 2 weeks post procedure  -to follow up with dr Ranjeet Montoya regarding right knee/OA; information regarding genicular nerve blocks/rfa provided  -continue HEP- physician directed home exercise plan as per [facility] demonstrated and handouts provided that patient plans to participate with for 1 hour, twice a week for the next 6 weeks  There are risks associated with opioid medications, including dependence, addiction and tolerance  The patient understands and agrees to use these medications only as prescribed  Potential side effects of the medications include, but are not limited to, constipation, drowsiness, addiction, impaired judgment and risk of fatal overdose if not taken as prescribed  The patient was warned against driving while taking sedation medications or operating heavy machinery  The patient voiced understanding  Sharing medications is a felony  At this point in time, the patient is showing no signs of addiction, abuse, diversion or suicidal ideation  [place] Prescription Drug Monitoring Program report was reviewed and was appropriate      Complete risks and benefits including bleeding, infection, tissue reaction, nerve injury and allergic reaction were discussed  The approach was demonstrated using models and literature was provided  Verbal and written consent was obtained  My impressions and treatment recommendations were discussed in detail with the patient who verbalized understanding and had no further questions  Discharge instructions were provided  I personally saw and examined the patient and I agree with the above discussed plan of care      New Medications Ordered This Visit   Medications   • predniSONE 20 mg tablet   • hydrocortisone (ANUSOL-HC) 25 mg suppository       History of Present Illness    Greater than 90% relief of pain with improved ability to participate with IADLs after bilateral L3, L4, L5 medial branch blocks #1 and #2 as well as with subsequent RF lesioning of medial branch nerves performed 11/17/22  Right hip GTB injection which resulted in >80-90% pain relief for patient for more than 6 months; this pain has subsequently returned  Previously reported the following symptomatology:     Esthela Salvador is a 80 y o  female with a past medical history of Afib on xarelto with pacemaker presenting with chronic low back pain described primarily as arthritic in nature  She describes 8/10 low back pain that is worse in the mornings and worse at the end of the day  The pain is characterized by achy, nagging, indolent, crampy, stabbing pain in her axial low back  The patient describes that the pain is worse with standing for long periods of time on hard surfaces as well as with walking  She ambulates with a walker  The patient is a very active individual and feels as though this pain compromises his/her participation with independent activities of daily living  The pain can be debilitating at times and contribute to significant disability, compromising overall activity and independent activities of daily living  She has tried physical therapy with limited relief of symptoms and discontinued after falling twice  Medications the patient has tried in the past include   tylenol  She describes minor degree radicular symptoms in bilateral L4 dermatome but otherwise has good strength  She denies any weakness numbness or paresthesias that are particularly problematic  The patient denies any bowel or bladder dysfunction as well, gait instability or saddle anesthesia  I have personally reviewed and/or updated the patient's past medical history, past surgical history, family history, social history, current medications, allergies, and vital signs today  Review of Systems   Constitutional: Positive for activity change  HENT: Negative  Eyes: Negative  Respiratory: Negative  Cardiovascular: Negative  Gastrointestinal: Negative  Endocrine: Negative  Genitourinary: Negative  Musculoskeletal: Positive for arthralgias, back pain, gait problem and myalgias  Skin: Negative  Allergic/Immunologic: Negative  Neurological: Positive for weakness and numbness  Hematological: Negative  Psychiatric/Behavioral: Negative  All other systems reviewed and are negative  Patient Active Problem List   Diagnosis   • Paroxysmal atrial fibrillation (HCC)   • Syncope   • Constipation   • Leukocytosis   • Hypokalemia   • Spinal stenosis of lumbar region with neurogenic claudication   • Age-related osteoporosis without current pathological fracture   • Primary osteoarthritis of both hips   • Hypertension   • Hyperlipidemia   • Fracture of multiple ribs of right side   • Acute pain of right shoulder   • Closed displaced fracture of lateral end of right clavicle   • Pacemaker   • Carotid atherosclerosis, bilateral   • Mesenteric artery stenosis (HCC)   • Stage 3 chronic kidney disease, unspecified whether stage 3a or 3b CKD ([facility])   • Lumbar spondylosis   • Localized edema   • Left arm pain   • Trochanteric bursitis of right hip       Past Medical History:   Diagnosis Date   • A-fib [facility])    • Anxiety    • Arthritis     R knee   • Carotid atherosclerosis, bilateral    • GERD (gastroesophageal reflux disease)    • Hyperlipidemia    • Hypertension    • Muscle spasms of neck    • Osteoporosis    • Spinal stenosis        Past Surgical History:   Procedure Laterality Date   • BACK SURGERY     • CARDIAC ELECTROPHYSIOLOGY PROCEDURE N/A 2/22/2022    Procedure: Cardiac pacer implant;  Surgeon: Alexis Taylor MD;  [facility]: [facility];   Service: Cardiology   • EYE SURGERY      cataracts   • FL GUIDED NEEDLE PLAC BX/ASP/INJ  8/2/2022   • FL GUIDED NEEDLE PLAC BX/ASP/INJ  9/1/2022   • IR SACROPLASTY  3/15/2019   • NERVE BLOCK Bilateral 8/2/2022    Procedure: BLOCK MEDIAL BRANCH L3, L4, L5 MBB #1;  Surgeon: Annette Lee MD;  Location: [facility];  Service: Pain Management    • NERVE BLOCK Bilateral 9/1/2022    Procedure: BLOCK MEDIAL BRANCH L3, L4, L5;  Surgeon: Sarah Leblanc MD;  Location: [facility];  Service: Pain Management    • RI ARTHROCENTESIS ASPIR&/INJ MAJOR JT/BURSA W/O US Right 12/22/2022    Procedure: INJECTION JOINT HIP;  Surgeon: Sarah Leblanc MD;  Location: [facility];  Service: Pain Management    • RHIZOTOMY Bilateral 11/17/2022    Procedure: RHIZOTOMY LUMBAR L3, L4, L5 medial branch nerves;  Surgeon: Sarah Leblanc MD;  Location: [facility];  Service: Pain Management        Family History   Problem Relation Age of Onset   • Cancer Father    • Cancer Brother    • Heart disease Mother        Social History     Occupational History   • Not on file   Tobacco Use   • Smoking status: Never   • Smokeless tobacco: Never   Vaping Use   • Vaping Use: Never used   Substance and Sexual Activity   • Alcohol use: Not Currently   • Drug use: Never   • Sexual activity: Not Currently       Current Outpatient Medications on File Prior to Visit   Medication Sig   • acetaminophen (TYLENOL) 500 mg tablet Take 2 tablets (1,000 mg total) by mouth every 6 (six) hours as needed for mild pain or moderate pain   • amLODIPine (NORVASC) 5 mg tablet Take 1 tablet (5 mg total) by mouth daily   • aspirin (ECOTRIN LOW STRENGTH) 81 mg EC tablet Take 1 tablet (81 mg total) by mouth daily   • calcium citrate-vitamin D (CITRACAL+D) 315-200 MG-UNIT per tablet Take by mouth   • docusate sodium (COLACE) 100 mg capsule Take 100 mg by mouth daily   • hydrocortisone (ANUSOL-HC) 25 mg suppository    • LORazepam (ATIVAN) 2 mg tablet 3 (three) times a day prn   • losartan (COZAAR) 25 mg tablet TAKE 1 TABLET BY MOUTH  DAILY   • magnesium oxide (MAG-OX) 400 mg Take 400 mg by mouth once   • metoprolol succinate (TOPROL-XL) 25 mg 24 hr tablet TAKE 2 TABLETS BY MOUTH IN  THE MORNING AND 1 TAB IN  THE EVENING   • Multiple Vitamins-Minerals (ICAPS AREDS 2 PO) Take 1 tablet by mouth daily    • polyethylene glycol (MIRALAX) 17 g packet "Take 17 g by mouth daily   • Xarelto 15 MG tablet TAKE 1 TABLET BY MOUTH  DAILY WITH DINNER   • ezetimibe (ZETIA) 10 mg tablet Take 1 tablet (10 mg total) by mouth daily (Patient not taking: Reported on 6/5/2023)   • lansoprazole (PREVACID) 30 mg capsule  (Patient not taking: Reported on 4/4/2023)   • predniSONE 20 mg tablet  (Patient not taking: Reported on 6/5/2023)     No current facility-administered medications on file prior to visit  Allergies   Allergen Reactions   • Ciprofloxacin    • Covid-19 (Mrna) Vaccine Edema     Arm and hand swelling    • Epinephrine      Irregular heart ryhthm (afib) per pt  • Statins Myalgia     Patient has tried 3-4 different statins and developed muscle pain with all   • Bactrim [Sulfamethoxazole-Trimethoprim] Rash   • Medical Tape Rash         Physical Exam    /64   Pulse 71   Temp (!) 97 4 °F (36 3 °C)   Resp 20   Ht 5' 3\" (1 6 m)   Wt 60 3 kg (133 lb)   BMI 23 56 kg/m²     Constitutional: normal, well developed, well nourished, alert, in no distress and non-toxic and no overt pain behavior  Eyes: anicteric  HEENT: grossly intact  Neck: supple, symmetric, trachea midline and no masses   Pulmonary:even and unlabored  Cardiovascular:No edema or pitting edema present  Skin:Normal without rashes or lesions and well hydrated  Psychiatric:Mood and affect appropriate  Neurologic:Cranial Nerves II-XII grossly intact Sensation grossly intact; no clonus negative phoenix's  Reflexes 2+ and brisk  SLR negative bilaterally  Musculoskeletal:normal gait  5/5 strength bilaterally with AROM in all extremities  ambulates with walker; unable to perform normal heel toe and tip toe walking  significant pain with lumbar facet loading bilaterally and with lateral spine rotation  ttp over lumbar paraspinal muscles  Positive reynaldo's test, gaenslen's, positive SIJ loading bilaterally   Pain with internal/external rotation of right hip; ttp over right gtb    Imaging  CT LUMBAR " SPINE     INDICATION:   M54 16: Radiculopathy, lumbar region  M47 816: Spondylosis without myelopathy or radiculopathy, lumbar region      COMPARISON: Prior CT December 2017, 2019     TECHNIQUE:  Contiguous axial images through the lumbar spine were obtained  Sagittal and coronal reconstructions were performed        Radiation dose length product (DLP) for this visit:  591 9 mGy-cm   This examination, like all CT scans performed in the [facility], was performed utilizing techniques to minimize radiation dose exposure, including the use of iterative   reconstruction and automated exposure control        IMAGE QUALITY:  Diagnostic      FINDINGS:     ALIGNMENT:  There are 5 lumbar type vertebral bodies  Patient has undergone L1, L3, and L4 vertebral plasty  Vertebral body heights are unchanged from the prior study  There is no acute new compression fracture identified  Rightward convex scoliosis   noted at the thoracolumbar junction      VERTEBRAL BODIES:  Postoperative changes of multilevel vertebroplasty as described  Slight retropulsion of the posterior vertebral body of L1 noted unchanged from prior      DEGENERATIVE CHANGES:     Lower Thoracic spine:  Mild central stenosis related to retropulsion of the L1 vertebral body      L1-2:  No significant central or foraminal narrowing      L2-3:  Moderate facet hypertrophy  There is mild annular bulge with marginal osteophytes which result in mild central stenosis      L3-4:  Degenerative disc osteophyte complex with marginal osteophytes eccentric to the right results in mild right foraminal narrowing and mild central stenosis      L4-5:  Moderate facet hypertrophy  There is mild annular bulge with minimal right foraminal narrowing and mild to moderate central stenosis noted      L5-S1:  Calcified posterior disc margin with annular bulging  Minimal central stenosis    Foramina are patent      PARASPINAL SOFT TISSUES: Normal      IMPRESSION:     Stable lumbar CT status post L1, L3, and L4 kyphoplasty  Stable vertebral heights are noted  Persistent slight retropulsion of the L1 posterior vertebral body contributes to mild central stenosis      Spondylotic degenerative changes result in mild to moderate central stenosis at L4-5 and no greater than mild central or foraminal narrowing at remaining levels

## 2023-06-06 ENCOUNTER — HOSPITAL ENCOUNTER (OUTPATIENT)
Facility: HOSPITAL | Age: 88
Setting detail: OUTPATIENT SURGERY
Discharge: HOME | End: 2023-06-06
Attending: ANESTHESIOLOGY | Admitting: ANESTHESIOLOGY
Payer: MEDICARE

## 2023-06-06 ENCOUNTER — APPOINTMENT (OUTPATIENT)
Dept: RADIOLOGY | Facility: HOSPITAL | Age: 88
End: 2023-06-06
Payer: MEDICARE

## 2023-06-06 VITALS
BODY MASS INDEX: 23.57 KG/M2 | HEART RATE: 75 BPM | OXYGEN SATURATION: 97 % | SYSTOLIC BLOOD PRESSURE: 178 MMHG | HEIGHT: 63 IN | RESPIRATION RATE: 18 BRPM | WEIGHT: 133 LBS | DIASTOLIC BLOOD PRESSURE: 76 MMHG | TEMPERATURE: 97.7 F

## 2023-06-06 PROCEDURE — 77002 NEEDLE LOCALIZATION BY XRAY: CPT | Performed by: ANESTHESIOLOGY

## 2023-06-06 PROCEDURE — 20610 DRAIN/INJ JOINT/BURSA W/O US: CPT | Performed by: ANESTHESIOLOGY

## 2023-06-06 PROCEDURE — 77002 NEEDLE LOCALIZATION BY XRAY: CPT

## 2023-06-06 RX ORDER — METHYLPREDNISOLONE ACETATE 80 MG/ML
INJECTION, SUSPENSION INTRA-ARTICULAR; INTRALESIONAL; INTRAMUSCULAR; SOFT TISSUE AS NEEDED
Status: DISCONTINUED | OUTPATIENT
Start: 2023-06-06 | End: 2023-06-06 | Stop reason: HOSPADM

## 2023-06-06 RX ORDER — BUPIVACAINE HYDROCHLORIDE 2.5 MG/ML
INJECTION, SOLUTION EPIDURAL; INFILTRATION; INTRACAUDAL AS NEEDED
Status: DISCONTINUED | OUTPATIENT
Start: 2023-06-06 | End: 2023-06-06 | Stop reason: HOSPADM

## 2023-06-06 RX ORDER — LIDOCAINE HYDROCHLORIDE 10 MG/ML
INJECTION, SOLUTION EPIDURAL; INFILTRATION; INTRACAUDAL; PERINEURAL AS NEEDED
Status: DISCONTINUED | OUTPATIENT
Start: 2023-06-06 | End: 2023-06-06 | Stop reason: HOSPADM

## 2023-06-06 NOTE — PROCEDURES
Pre-procedure Diagnosis:       Right Trochanteric Bursitis  Post-procedure Diagnosis:     Right Trochanteric Bursitis  Operation Title(s):                   [RIGHT] trochanteric bursa injection under fluoroscopic guidance  Attending Surgeon:                 Addison Edwards MD  Anesthesia:                             Local     Indications: The patient is a 80y o  year-old female with a diagnosis of trochanteric bursitis  The patient's history and physical exam were reviewed  The risks, benefits and alternatives to the procedure were discussed, and all questions were answered to the patient's satisfaction  The patient agreed to proceed, and written informed consent was obtained      Procedure in Detail: The patient was brought into the exam room and placed in the supine position on the exam room table  The area of the [RIGHT] trochanteric bursa was prepped with chloraprep and draped in a sterile manner       AP fluoroscopy was used to provide imaging over the right lateral thigh to visualize the peritrochanteric bursal region  Optimal needle path was determined and the skin and subcutaneous tissues in the area was anesthetized with 1% lidocaine  Then, under fluoroscopic guidance, a 22g 3 5 inch ultrasound needle was advanced until the needle entered the peritrochanteric bursa region  After visualization of the tip in the target area and negative aspiration for blood, a solution consisting of 4mL 0 25% bupivacaine and 1mL Depo-Medrol (40mg/ml) was easily injected      The patient's right lateral hip was cleaned and a bandage was placed over the sites of needle insertion      Disposition: The patient tolerated the procedure well and there were no apparent complications  The patient was given written discharge instructions for the procedure

## 2023-06-06 NOTE — INTERVAL H&P NOTE
H&P reviewed  After examining the patient I find no changes in the patients condition since the H&P had been written      Vitals:    06/06/23 1255   BP: 153/69   Pulse: 88   Resp: 18   Temp: 97 7 °F (36 5 °C)   SpO2: 98%

## 2023-06-06 NOTE — OP NOTE
OPERATIVE REPORT  PATIENT NAME: Campbell Solano    :  1935  MRN: [id number]  Pt Location:  GI ROOM 01    SURGERY DATE: 2023    Surgeon(s) and Role: Tyler Ray MD - Primary    Preop Diagnosis:  Trochanteric bursitis of right hip [M70 61]    Post-Op Diagnosis Codes:     * Trochanteric bursitis of right hip [M70 61]    Procedure(s):  Right - INJECTION JOINT RIGHT GTB INJECTION    Specimen(s):  * No specimens in log *    Estimated Blood Loss:   Minimal    Drains:  * No LDAs found *    Anesthesia Type:   Local    Operative Indications:  Trochanteric bursitis of right hip [M70 61]    Operative Findings:  Visualization under fluoroscopic guidance of needle entry in region of right  peritrochanteric bursa region    Complications:   None    Procedure and Technique:  Please see detailed procedure note    I was present for the entire procedure      Patient Disposition:  PACU     SIGNATURE: Nia Flowers MD  DATE: 2023  TIME: 1:11 PM

## 2023-06-09 ENCOUNTER — APPOINTMENT (OUTPATIENT)
Dept: LAB | Facility: HOSPITAL | Age: 88
End: 2023-06-09
Payer: MEDICARE

## 2023-06-09 DIAGNOSIS — E78.2 MIXED HYPERLIPIDEMIA: ICD-10-CM

## 2023-06-09 DIAGNOSIS — I48.0 PAROXYSMAL ATRIAL FIBRILLATION (HCC): ICD-10-CM

## 2023-06-09 LAB
BASOPHILS # BLD AUTO: 0.04 THOUSANDS/ÂΜL (ref 0–0.1)
BASOPHILS NFR BLD AUTO: 1 % (ref 0–1)
CHOLEST SERPL-MCNC: 266 MG/DL
EOSINOPHIL # BLD AUTO: 0.06 THOUSAND/ÂΜL (ref 0–0.61)
EOSINOPHIL NFR BLD AUTO: 1 % (ref 0–6)
ERYTHROCYTE [DISTWIDTH] IN BLOOD BY AUTOMATED COUNT: 14 % (ref 11.6–15.1)
HCT VFR BLD AUTO: 46.4 % (ref 34.8–46.1)
HDLC SERPL-MCNC: 99 MG/DL
HGB BLD-MCNC: 14.5 G/DL (ref 11.5–15.4)
IMM GRANULOCYTES # BLD AUTO: 0.06 THOUSAND/UL (ref 0–0.2)
IMM GRANULOCYTES NFR BLD AUTO: 1 % (ref 0–2)
LDLC SERPL CALC-MCNC: 152 MG/DL (ref 0–100)
LYMPHOCYTES # BLD AUTO: 1.74 THOUSANDS/ÂΜL (ref 0.6–4.47)
LYMPHOCYTES NFR BLD AUTO: 22 % (ref 14–44)
MCH RBC QN AUTO: 29.1 PG (ref 26.8–34.3)
MCHC RBC AUTO-ENTMCNC: 31.3 G/DL (ref 31.4–37.4)
MCV RBC AUTO: 93 FL (ref 82–98)
MONOCYTES # BLD AUTO: 0.53 THOUSAND/ÂΜL (ref 0.17–1.22)
MONOCYTES NFR BLD AUTO: 7 % (ref 4–12)
NEUTROPHILS # BLD AUTO: 5.47 THOUSANDS/ÂΜL (ref 1.85–7.62)
NEUTS SEG NFR BLD AUTO: 68 % (ref 43–75)
NRBC BLD AUTO-RTO: 0 /100 WBCS
PLATELET # BLD AUTO: 337 THOUSANDS/UL (ref 149–390)
PMV BLD AUTO: 10.2 FL (ref 8.9–12.7)
RBC # BLD AUTO: 4.98 MILLION/UL (ref 3.81–5.12)
TRIGL SERPL-MCNC: 73 MG/DL
WBC # BLD AUTO: 7.9 THOUSAND/UL (ref 4.31–10.16)

## 2023-06-09 PROCEDURE — 85025 COMPLETE CBC W/AUTO DIFF WBC: CPT

## 2023-06-09 PROCEDURE — 36415 COLL VENOUS BLD VENIPUNCTURE: CPT

## 2023-06-09 PROCEDURE — 80061 LIPID PANEL: CPT

## 2023-06-12 ENCOUNTER — TELEPHONE (OUTPATIENT)
Dept: CARDIOLOGY CLINIC | Facility: CLINIC | Age: 88
End: 2023-06-12

## 2023-06-12 NOTE — TELEPHONE ENCOUNTER
Lois Haider  Would she be willing to consider the injectable medication Repatha that we discussed at her visit? Her cholesterol is very high which needs to be controlled because of the mesenteric and carotid stenosis

## 2023-06-12 NOTE — TELEPHONE ENCOUNTER
Spoke with Pt, She does not want to try any injections and does not want to try any new medications  She stated she will be 80years old and had cholesterol problems for years

## 2023-06-12 NOTE — TELEPHONE ENCOUNTER
----- Message from Sharmin Coronado sent at 6/10/2023  1:32 PM EDT -----  Please let Sudhir Vargas know that her cholesterol is unchanged  Is she taking the Zetia? If so we will need to discuss alternate treatment at follow up  Her blood count is stable

## 2023-06-12 NOTE — TELEPHONE ENCOUNTER
Pt states that Dr Rukhsana Gloria told her that she was to stop the zetia due to leg pain  States that stopping it helped the pain

## 2023-06-13 ENCOUNTER — TELEPHONE (OUTPATIENT)
Dept: PAIN MEDICINE | Facility: CLINIC | Age: 88
End: 2023-06-13

## 2023-06-28 ENCOUNTER — OFFICE VISIT (OUTPATIENT)
Dept: PAIN MEDICINE | Facility: CLINIC | Age: 88
End: 2023-06-28
Payer: MEDICARE

## 2023-06-28 VITALS
BODY MASS INDEX: 23.57 KG/M2 | SYSTOLIC BLOOD PRESSURE: 118 MMHG | WEIGHT: 133 LBS | OXYGEN SATURATION: 97 % | HEIGHT: 63 IN | HEART RATE: 86 BPM | TEMPERATURE: 97.5 F | DIASTOLIC BLOOD PRESSURE: 78 MMHG

## 2023-06-28 DIAGNOSIS — M70.61 TROCHANTERIC BURSITIS OF RIGHT HIP: Primary | ICD-10-CM

## 2023-06-28 DIAGNOSIS — M16.0 PRIMARY OSTEOARTHRITIS OF BOTH HIPS: ICD-10-CM

## 2023-06-28 PROCEDURE — 99213 OFFICE O/P EST LOW 20 MIN: CPT | Performed by: ANESTHESIOLOGY

## 2023-06-28 NOTE — PATIENT INSTRUCTIONS
Hip Bursitis Exercises   AMBULATORY CARE:   Hip bursitis exercises  help strengthen the muscles in your hip and keep the joint flexible  Strong muscles can help reduce pain, prevent injury, and keep the joint stable  The exercises can also help increase the range of motion in your hip joint  Call your doctor or physical therapist if:   You have sharp pain during exercise or at rest     You have questions or concerns about the stretches or exercises  What you need to know about exercise safety:   Move slowly and smoothly  Avoid fast or jerky motions  This will help prevent an injury  Breathe normally  Do not hold your breath  It is important to breathe in and out so you do not tense up during exercise  Tension could prevent you from moving your joint in a full range of motion  Do the exercises and stretches on both legs  Do this so both hips remain strong and flexible  Stop if you feel sharp pain or an increase in pain  Contact your healthcare provider or physical therapist  It is normal to feel some discomfort during exercise  Regular exercise will help decrease your discomfort over time  Warm up and cool down when you exercise  Walk or ride a stationary bike for 5 to 10 minutes before you start  This warms and relaxes your muscles to help prevent an injury  When you have finished the exercises, cool down by walking in place for a few minutes  You may also need to stretch as part of your warm up or cool down routine  Your provider or physical therapist will tell you which stretches to do  How to do hip stretches: Your healthcare provider or physical therapist will tell you how many times to do each stretch  Do the stretch on both sides before you move to the next stretch  Standing iliotibial band stretch:  Stand with the leg on your injured side behind your other leg  Bend sideways toward the side that is not injured  Stop when you feel a stretch in your outer hip   Hold for 5 to 10 seconds  Then return to the starting position  Lying iliotibial band stretch:  Lie on your back  Bend the knee on your injured side toward your chest  Place your hands on the outside of your knee and thigh  Slowly pull the knee across your body  Stop when you feel a stretch in your hip and outer thigh  Hold for 5 to 10 seconds  Return your leg to the starting position  Hip stretch:  Lie on your back with both legs straight and on the ground  Bend the knee on your injured side toward your chest until you can reach your lower leg  Place both hands on your shin and pull your knee toward your chest  Hold for 5 to 10 seconds  Return your leg to the starting position  Knee to chest:  Lie on your back with both knees bent and feet flat on the floor  Bend the knee on your injured side toward your chest until you can reach your lower leg  Place both hands on your shin and pull your knee toward your chest  Hold for 5 to 10 seconds  Return your leg to the starting position  Internal hip rotator stretch:  You will do this exercise on a table  Lie on your side with the injured hip on top  You may be told to keep a pillow between your thighs  Move the top leg so the foot hangs below the edge of the table  Rotate your hip to raise your foot in the opposite direction of the bottom shoulder  Raise your foot as high as you can so you feel a stretch in the back of your thigh  Hold for 5 seconds  Then slowly lower your foot to the starting position  External hip rotator stretch:  You will do this exercise on a table  Lie on your side with the injured hip on the bottom  You do not need a pillow between your thighs for this exercise  Move the bottom leg so the foot is off the edge of the table  Rotate your hip to lift the foot in the opposite direction of the bottom shoulder  Raise your foot as high as you can so you feel a stretch in your buttock  Hold for 5 seconds   Then slowly lower your foot to the starting position  Kneeling hip flexor stretch:  Kneel on your knee on the injured side  Place the foot of your other leg on the floor so the knee is bent  Put both hands on top of your thigh  Keep your back straight and abdominal muscles tight  Lean forward until you feel a stretch in your other thigh  Hold the stretch for 10 seconds  Return to the starting position  How to do hip strengthening exercises: Your healthcare provider or physical therapist will tell you how many times to do each exercise  Do the exercise on both sides before you move to the next exercise  Straight leg lift to the side: This may also be called hip abduction  Lie on your side with straight legs, with the injured hip on top  Slowly raise your top leg toward the ceiling as high as you can  Keep your foot pointed  Hold for 5 seconds  Then slowly lower your leg to the starting position  Inner thigh lift: This may also be called hip adduction  Lie on your side with straight legs, with the injured hip on the bottom  Cross your top leg over your bottom leg  Put the foot of your top leg on the floor in front of you  Raise your bottom leg until it touches the top leg  Hold for 5 seconds  Then slowly lower the leg to the floor  Clam exercise:  Lie on your side so your injured side is on top  Bend your knees  Keep your heels together during this exercise  Slowly raise your top knee toward the ceiling  Then lower your leg so your knees are together  Follow up with your doctor or physical therapist as directed:  Write down your questions so you remember to ask them during your visits  © Copyright [employer] 2022 Information is for End User's use only and may not be sold, redistributed or otherwise used for commercial purposes  The above information is an  only  It is not intended as medical advice for individual conditions or treatments   Talk to your doctor, nurse or pharmacist before following any medical regimen to see if it is safe and effective for you  Core Strengthening Exercises   WHAT YOU NEED TO KNOW:   Your core includes the muscles of your lower back, hip, pelvis, and abdomen  Core strengthening exercises help heal and strengthen these muscles  This helps prevent another injury, and keeps your pelvis, spine, and hips in the correct position  DISCHARGE INSTRUCTIONS:   Call your doctor or physical therapist if:   You have sharp or worsening pain during exercise or at rest     You have questions or concerns about your shoulder exercises  Safety tips:  Talk to your healthcare provider before you start an exercise program  A physical therapist can teach you how to do core strengthening exercises safely  Do the exercises on a mat or firm surface  A firm surface will support your spine and prevent low back pain  Do not do these exercises on a bed  Move slowly and smoothly  Avoid fast or jerky motions  Stop if you feel pain  You may feel some discomfort at first, but you should not feel pain  Tell your provider or physical therapist if you have pain while you exercise  Regular exercise will help decrease your discomfort over time  Breathe normally during core exercises  Do not hold your breath  This may cause an increase in blood pressure and prevent muscle strengthening  Your healthcare provider will tell you when to inhale and exhale during the exercise  Begin all of your exercises with abdominal bracing  Abdominal bracing helps warm up your core muscles  You can also practice abdominal bracing throughout the day  Lie on your back with your knees bent and feet flat on the floor  Place your arms in a relaxed position beside your body  Tighten your abdominal muscles  Pull your belly button in and up toward your spine  Hold for 5 seconds  Relax your muscles  Repeat 10 times  Core strengthening exercises: Your healthcare provider will tell you how often to do these exercises   The provider will also tell you how many repetitions of each exercise you should do  Hold each exercise for 5 seconds or as directed  As you get stronger, increase your hold to 10 to 15 seconds  You can do some of these exercises on a stability ball, or with a weight  Ask your healthcare provider how to use a stability ball or weight for these exercises:  Bridging:  Lie on your back with your knees bent and feet flat on the floor  Rest your arms at your side  Tighten your buttocks, and then lift your hips 1 inch off the floor  Hold for 5 seconds  When you can do this exercise without pain for 10 seconds, increase the distance you lift your hips  A good goal is to be able to lift your hips so that your shoulders, hips, and knees are in a straight line  Dead bug:  Lie on your back with your knees bent and feet flat on the floor  Place your arms in a relaxed position beside your body  Begin with abdominal bracing  Next, raise one leg, keeping your knee bent  Hold for 5 seconds  Repeat with the other leg  When you can do this exercise without pain for 10 to 15 seconds, you may raise one straight leg and hold  Repeat with the other leg  Quadruped:  Place your hands and knees on the floor  Keep your wrists directly below your shoulders and your knees directly below your hips  Pull your belly button in toward your spine  Do not flatten or arch your back  Tighten your abdominal muscles below your belly button  Hold for 5 seconds  When you can do this exercise without pain for 10 to 15 seconds, you may extend one arm and hold  Repeat on the other side  Side bridge exercises:      Standing side bridge:  Stand next to a wall and extend one arm toward the wall  Place your palm flat on the wall with your fingers pointing upward  Begin with abdominal bracing  Next, without moving your feet, slowly bend your arm to 90 degrees  Hold for 5 seconds  Repeat on the other side   When you can do this exercise without pain for 10 to 15 seconds, you may do the bent leg side bridge on the floor  Bent leg side bridge:  Lie on one side with your legs, hips, and shoulders in a straight line  Prop yourself up onto your forearm so your elbow is directly below your shoulder  Bend your knees back to 90 degrees  Begin with abdominal bracing  Next, lift your hips and balance yourself on your forearm and knees  Hold for 5 seconds  Repeat on the other side  When you can do this exercise without pain for 10 to 15 seconds, you may do the straight leg side bridge on the floor  Straight leg side bridge:  Lie on one side with your legs, hips, and shoulders in a straight line  Prop yourself up onto your forearm so your elbow is directly below your shoulder  Begin with abdominal bracing  Lift your hips off the floor and balance yourself on your forearm and the outside of your flexed foot  Do not let your ankle bend sideways  Hold for 5 seconds  Repeat on the other side  When you can do this exercise without pain for 10 to 15 seconds, ask your healthcare provider for more advanced exercises  Superman:  Lie on your stomach  Extend your arms forward on the floor  Tighten your abdominal muscles and lift your right hand and left leg off the floor  Hold this position  Slowly return to the starting position  Tighten your abdominal muscles and lift your left hand and right leg off the floor  Hold this position  Slowly return to the starting position  Clam:  Lie on your side with your knees bent  Put your bottom arm under your head to keep your neck in line  Put your top hand on your hip to keep your pelvis from moving  Put your heels together, and keep them together during this exercise  Slowly raise your top knee toward the ceiling  Then lower your leg so your knees are together  Repeat this exercise 10 times  Then switch sides and do the exercise 10 times with the other leg           Curl up:  Lie on your back with your knees bent and feet flat on the floor  Place your hands, palms down, underneath your lower back  Next, with your elbows on the floor, lift your shoulders and chest 2 to 3 inches off the floor  Keep your head in line with your shoulders  Hold this position  Slowly return to the starting position  Straight leg raises:  Lie on your back with one leg straight  Bend the other knee and place your foot flat on the floor  Tighten your abdominal muscles  Keep your leg straight and slowly lift it straight up 6 to 12 inches off the floor  Hold this position  Lower your leg slowly  Do as many repetitions as directed on this side  Repeat with the other leg  Plank:  Lie on your stomach  Bend your elbows and place your forearms flat on the floor  Lift your chest, stomach, and knees off the floor  Make sure your elbows are below your shoulders  Your body should be in a straight line  Do not let your hips or lower back sink to the ground  Squeeze your abdominal muscles together and hold for 15 seconds  To make this exercise harder, hold for 30 seconds or lift 1 leg at a time  Bicycles:  Lie on your back  Bend both knees and bring them toward your chest  Your calves should be parallel to the floor  Place the palms of your hands on the back of your head  Straighten your right leg and keep it lifted 2 inches off the floor  Raise your head and shoulders off the floor and twist towards your left  Keep your head and shoulders lifted  Bend your right knee while you straighten your left leg  Keep your left leg 2 inches off the floor  Twist your head and chest towards the left leg  Continue to straighten 1 leg at a time and twist        Follow up with your doctor or physical therapist as directed:  Write down your questions so you remember to ask them during your visits  © Copyright [employer] 2022 Information is for End User's use only and may not be sold, redistributed or otherwise used for commercial purposes    The above information is an  only  It is not intended as medical advice for individual conditions or treatments  Talk to your doctor, nurse or pharmacist before following any medical regimen to see if it is safe and effective for you

## 2023-06-28 NOTE — PROGRESS NOTES
Assessment  1  Trochanteric bursitis of right hip    2  Primary osteoarthritis of both hips    Greater than 90% relief of pain with improved ability to participate with IADLs after bilateral L3, L4, L5 medial branch blocks #1 and #2 as well as with subsequent RF lesioning of medial branch nerves performed 11/17/22  Right hip GTB injection which resulted in >80-90% pain relief for patient for more than 6 months; this pain had subsequently returned and responded well to repeat injection performed 6/6/23  Previously reported the following symptomatology:     Axial low back pain described primarily by arthritic features  Aching, nagging, indolent, stabbing, throbbing features in axial low back without radicular components  5/5 strength bilaterally, negative SLR  Positive facet loading maneuvers in lumbar spine elicited pain, positive tenderness to palpation over lumbar paraspinal muscles  Positive reynaldo's test, gaenslen's, positive SIJ loading bilaterally  Pain with internal/external rotation of right hip; ttp over right gtb  Recently had hip injection with Dr Alessandra Hodgkins  Findings correlate with prominent lumbar facet arthropathy seen throughout axial low back on MRI from 2019 and recent CT scan; only mild to moderate central canal stenosis L4-L5  Additionally describes radicular pain numbness and weakness to a lesser degree in bilateral L4 dermatomes  Currently she is neurologically intact without gait instability, saddle anesthesia or bowel/bladder abnormality  Risks, benefits alternative to medial branch blocks and subsequent radiofrequency ablation of successful thoroughly discussed with patient  Handouts provided questions answered to patient satisfaction  Has been participant with PT but fell twice thereafter and has since discontinued        Plan  -f/u prn  -to follow up with dr Alessandra Hodgkins regarding right knee/OA; information regarding genicular nerve blocks/rfa provided  -continue HEP- physician directed home exercise plan as per [facility] demonstrated and handouts provided that patient plans to participate with for 1 hour, twice a week for the next 6 weeks  There are risks associated with opioid medications, including dependence, addiction and tolerance  The patient understands and agrees to use these medications only as prescribed  Potential side effects of the medications include, but are not limited to, constipation, drowsiness, addiction, impaired judgment and risk of fatal overdose if not taken as prescribed  The patient was warned against driving while taking sedation medications or operating heavy machinery  The patient voiced understanding  Sharing medications is a felony  At this point in time, the patient is showing no signs of addiction, abuse, diversion or suicidal ideation  [place] Pres[facility] Drug Monitoring Program report was reviewed and was appropriate      Complete risks and benefits including bleeding, infection, tissue reaction, nerve injury and allergic reaction were discussed  The approach was demonstrated using models and literature was provided  Verbal and written consent was obtained  My impressions and treatment recommendations were discussed in detail with the patient who verbalized understanding and had no further questions  Discharge instructions were provided  I personally saw and examined the patient and I agree with the above discussed plan of care  No orders of the defined types were placed in this encounter  History of Present Illness    Greater than 90% relief of pain with improved ability to participate with IADLs after bilateral L3, L4, L5 medial branch blocks #1 and #2 as well as with subsequent RF lesioning of medial branch nerves performed 11/17/22  Right hip GTB injection which resulted in >80-90% pain relief for patient for more than 6 months; this pain had subsequently returned and responded well to repeat injection performed 6/6/23   Previously reported the following symptomatology:     Alyssa Gramajo is a 80 y o  female with a past medical history of Afib on xarelto with pacemaker presenting with chronic low back pain described primarily as arthritic in nature  She describes 8/10 low back pain that is worse in the mornings and worse at the end of the day  The pain is characterized by achy, nagging, indolent, crampy, stabbing pain in her axial low back  The patient describes that the pain is worse with standing for long periods of time on hard surfaces as well as with walking  She ambulates with a walker  The patient is a very active individual and feels as though this pain compromises his/her participation with independent activities of daily living  The pain can be debilitating at times and contribute to significant disability, compromising overall activity and independent activities of daily living  She has tried physical therapy with limited relief of symptoms and discontinued after falling twice  Medications the patient has tried in the past include   tylenol  She describes minor degree radicular symptoms in bilateral L4 dermatome but otherwise has good strength  She denies any weakness numbness or paresthesias that are particularly problematic  The patient denies any bowel or bladder dysfunction as well, gait instability or saddle anesthesia  I have personally reviewed and/or updated the patient's past medical history, past surgical history, family history, social history, current medications, allergies, and vital signs today  Review of Systems   Constitutional: Positive for activity change  HENT: Negative  Eyes: Negative  Respiratory: Negative  Cardiovascular: Negative  Gastrointestinal: Negative  Endocrine: Negative  Genitourinary: Negative  Musculoskeletal: Positive for arthralgias, back pain, gait problem and myalgias  Skin: Negative  Allergic/Immunologic: Negative      Neurological: Positive for weakness and numbness  Hematological: Negative  Psychiatric/Behavioral: Negative  All other systems reviewed and are negative  Patient Active Problem List   Diagnosis   • Paroxysmal atrial fibrillation (HCC)   • Syncope   • Constipation   • Leukocytosis   • Hypokalemia   • Spinal stenosis of lumbar region with neurogenic claudication   • Age-related osteoporosis without current pathological fracture   • Primary osteoarthritis of both hips   • Hypertension   • Hyperlipidemia   • Fracture of multiple ribs of right side   • Acute pain of right shoulder   • Closed displaced fracture of lateral end of right clavicle   • Pacemaker   • Carotid atherosclerosis, bilateral   • Mesenteric artery stenosis (HCC)   • Stage 3 chronic kidney disease, unspecified whether stage 3a or 3b CKD (HCC)   • Lumbar spondylosis   • Localized edema   • Left arm pain   • Trochanteric bursitis of right hip       Past Medical History:   Diagnosis Date   • A-fib [facility])    • Anxiety    • Arthritis     R knee   • Carotid atherosclerosis, bilateral    • GERD (gastroesophageal reflux disease)    • Hyperlipidemia    • Hypertension    • Muscle spasms of neck    • Osteoporosis    • Spinal stenosis        Past Surgical History:   Procedure Laterality Date   • BACK SURGERY     • CARDIAC ELECTROPHYSIOLOGY PROCEDURE N/A 2/22/2022    Procedure: Cardiac pacer implant;  Surgeon: Julio Allen MD;  [facility];   Service: Cardiology   • EYE SURGERY      cataracts   • FL GUIDED NEEDLE PLAC BX/ASP/INJ  8/2/2022   • FL GUIDED NEEDLE PLAC BX/ASP/INJ  9/1/2022   • IR SACROPLASTY  3/15/2019   • NERVE BLOCK Bilateral 8/2/2022    Procedure: BLOCK MEDIAL BRANCH L3, L4, L5 MBB #1;  Surgeon: Carolina Goodpasture, MD;  Location: [facility];  Service: Pain Management    • NERVE BLOCK Bilateral 9/1/2022    Procedure: BLOCK MEDIAL BRANCH L3, L4, L5;  Surgeon: Carolina Goodpasture, MD;  Location: [facility];  Service: Pain Management    • [place] ARTHROCENTESIS ASPIR&/INJ MAJOR JT/BURSA W/O US Right 12/22/2022    Procedure: INJECTION JOINT HIP;  Surgeon: Carolina Goodpasture, MD;  Location: [facility];  Service: Pain Management    • [place] ARTHROCENTESIS ASPIR&/INJ MAJOR JT/BURSA W/O US Right 6/6/2023    Procedure: INJECTION JOINT RIGHT GTB INJECTION;  Surgeon: Carolina Goodpasture, MD;  Location: [facility];  Service: Pain Management    • RHIZOTOMY Bilateral 11/17/2022    Procedure: RHIZOTOMY LUMBAR L3, L4, L5 medial branch nerves;  Surgeon: Carolina Goodpasture, MD;  Location: [facility];  Service: Pain Management        Family History   Problem Relation Age of Onset   • Cancer Father    • Cancer Brother    • Heart disease Mother        Social History     Occupational History   • Not on file   Tobacco Use   • Smoking status: Never   • Smokeless tobacco: Never   Vaping Use   • Vaping Use: Never used   Substance and Sexual Activity   • Alcohol use: Not Currently   • Drug use: Never   • Sexual activity: Not Currently       Current Outpatient Medications on File Prior to Visit   Medication Sig   • acetaminophen (TYLENOL) 500 mg tablet Take 2 tablets (1,000 mg total) by mouth every 6 (six) hours as needed for mild pain or moderate pain   • amLODIPine (NORVASC) 5 mg tablet Take 1 tablet (5 mg total) by mouth daily   • aspirin (ECOTRIN LOW STRENGTH) 81 mg EC tablet Take 1 tablet (81 mg total) by mouth daily   • calcium citrate-vitamin D (CITRACAL+D) 315-200 MG-UNIT per tablet Take by mouth   • docusate sodium (COLACE) 100 mg capsule Take 100 mg by mouth daily   • hydrocortisone (ANUSOL-HC) 25 mg suppository    • LORazepam (ATIVAN) 2 mg tablet 3 (three) times a day prn   • losartan (COZAAR) 25 mg tablet TAKE 1 TABLET BY MOUTH  DAILY   • magnesium oxide (MAG-OX) 400 mg Take 400 mg by mouth once   • metoprolol succinate (TOPROL-XL) 25 mg 24 hr tablet TAKE 2 TABLETS BY MOUTH IN  THE MORNING AND 1 TAB IN  THE EVENING   • Multiple Vitamins-Minerals (ICAPS AREDS 2 PO) Take 1 tablet by mouth daily    • polyethylene glycol "(MIRALAX) 17 g packet Take 17 g by mouth daily   • Xarelto 15 MG tablet TAKE 1 TABLET BY MOUTH  DAILY WITH DINNER   • ezetimibe (ZETIA) 10 mg tablet Take 1 tablet (10 mg total) by mouth daily (Patient not taking: Reported on 6/5/2023)   • lansoprazole (PREVACID) 30 mg capsule  (Patient not taking: Reported on 4/4/2023)   • predniSONE 20 mg tablet  (Patient not taking: Reported on 6/5/2023)     No current facility-administered medications on file prior to visit  Allergies   Allergen Reactions   • Ciprofloxacin    • Covid-19 (Mrna) Vaccine Edema     Arm and hand swelling    • Epinephrine      Irregular heart ryhthm (afib) per pt  • Statins Myalgia     Patient has tried 3-4 different statins and developed muscle pain with all   • Bactrim [Sulfamethoxazole-Trimethoprim] Rash   • Medical Tape Rash         Physical Exam    /78   Pulse 86   Temp 97 5 °F (36 4 °C)   Ht 5' 3\" (1 6 m)   Wt 60 3 kg (133 lb)   SpO2 97%   BMI 23 56 kg/m²     Constitutional: normal, well developed, well nourished, alert, in no distress and non-toxic and no overt pain behavior  Eyes: anicteric  HEENT: grossly intact  Neck: supple, symmetric, trachea midline and no masses   Pulmonary:even and unlabored  Cardiovascular:No edema or pitting edema present  Skin:Normal without rashes or lesions and well hydrated  Psychiatric:Mood and affect appropriate  Neurologic:Cranial Nerves II-XII grossly intact Sensation grossly intact; no clonus negative phoenix's  Reflexes 2+ and brisk  SLR negative bilaterally  Musculoskeletal:normal gait  5/5 strength bilaterally with AROM in all extremities  ambulates with walker; unable to perform normal heel toe and tip toe walking  significant pain with lumbar facet loading bilaterally and with lateral spine rotation  ttp over lumbar paraspinal muscles  Positive reynaldo's test, gaenslen's, positive SIJ loading bilaterally   Pain with internal/external rotation of right hip; ttp over right " gtb    Imaging  CT LUMBAR SPINE     INDICATION:   M54 16: Radiculopathy, lumbar region  M47 816: Spondylosis without myelopathy or radiculopathy, lumbar region      COMPARISON: Prior CT December 2017, 2019     TECHNIQUE:  Contiguous axial images through the lumbar spine were obtained  Sagittal and coronal reconstructions were performed        Radiation dose length product (DLP) for this visit:  591 9 mGy-cm   This examination, like all CT scans performed in the [facility], was performed utilizing techniques to minimize radiation dose exposure, including the use of iterative   reconstruction and automated exposure control        IMAGE QUALITY:  Diagnostic      FINDINGS:     ALIGNMENT:  There are 5 lumbar type vertebral bodies  Patient has undergone L1, L3, and L4 vertebral plasty  Vertebral body heights are unchanged from the prior study  There is no acute new compression fracture identified  Rightward convex scoliosis   noted at the thoracolumbar junction      VERTEBRAL BODIES:  Postoperative changes of multilevel vertebroplasty as described  Slight retropulsion of the posterior vertebral body of L1 noted unchanged from prior      DEGENERATIVE CHANGES:     Lower Thoracic spine:  Mild central stenosis related to retropulsion of the L1 vertebral body      L1-2:  No significant central or foraminal narrowing      L2-3:  Moderate facet hypertrophy  There is mild annular bulge with marginal osteophytes which result in mild central stenosis      L3-4:  Degenerative disc osteophyte complex with marginal osteophytes eccentric to the right results in mild right foraminal narrowing and mild central stenosis      L4-5:  Moderate facet hypertrophy  There is mild annular bulge with minimal right foraminal narrowing and mild to moderate central stenosis noted      L5-S1:  Calcified posterior disc margin with annular bulging  Minimal central stenosis    Foramina are patent      PARASPINAL SOFT TISSUES: Normal      IMPRESSION:     Stable lumbar CT status post L1, L3, and L4 kyphoplasty  Stable vertebral heights are noted  Persistent slight retropulsion of the L1 posterior vertebral body contributes to mild central stenosis      Spondylotic degenerative changes result in mild to moderate central stenosis at L4-5 and no greater than mild central or foraminal narrowing at remaining levels

## 2023-07-05 DIAGNOSIS — I10 PRIMARY HYPERTENSION: ICD-10-CM

## 2023-07-05 RX ORDER — AMLODIPINE BESYLATE 5 MG/1
5 TABLET ORAL DAILY
Qty: 90 TABLET | Refills: 3 | Status: SHIPPED | OUTPATIENT
Start: 2023-07-05

## 2023-07-05 NOTE — TELEPHONE ENCOUNTER
Patient called for a medication refill on her amLOPIPine 5mg    Ordering provider: Chen Dixon, [address]: [facility] ([address]) - [address] [place], [place] [id number]   Phone:  [phone]  Fax:  [phone]   Supply: 90 day  Last OV: 4/4/23  Next OV: 7/12/23

## 2023-07-10 RX ORDER — FLUTICASONE PROPIONATE 50 MCG
SPRAY, SUSPENSION (ML) NASAL
COMMUNITY
End: 2023-07-12 | Stop reason: ALTCHOICE

## 2023-07-12 ENCOUNTER — OFFICE VISIT (OUTPATIENT)
Dept: CARDIOLOGY CLINIC | Facility: CLINIC | Age: 88
End: 2023-07-12
Payer: MEDICARE

## 2023-07-12 ENCOUNTER — APPOINTMENT (OUTPATIENT)
Dept: LAB | Facility: HOSPITAL | Age: 88
End: 2023-07-12
Payer: MEDICARE

## 2023-07-12 VITALS
WEIGHT: 132.2 LBS | BODY MASS INDEX: 23.42 KG/M2 | HEART RATE: 77 BPM | OXYGEN SATURATION: 98 % | HEIGHT: 63 IN | SYSTOLIC BLOOD PRESSURE: 130 MMHG | DIASTOLIC BLOOD PRESSURE: 70 MMHG | TEMPERATURE: 97.7 F

## 2023-07-12 DIAGNOSIS — E78.2 MIXED HYPERLIPIDEMIA: ICD-10-CM

## 2023-07-12 DIAGNOSIS — R55 SYNCOPE, UNSPECIFIED SYNCOPE TYPE: ICD-10-CM

## 2023-07-12 DIAGNOSIS — Z95.0 PACEMAKER: ICD-10-CM

## 2023-07-12 DIAGNOSIS — I10 PRIMARY HYPERTENSION: ICD-10-CM

## 2023-07-12 DIAGNOSIS — M79.10 MYALGIA: ICD-10-CM

## 2023-07-12 DIAGNOSIS — I65.23 CAROTID ATHEROSCLEROSIS, BILATERAL: ICD-10-CM

## 2023-07-12 DIAGNOSIS — I48.0 PAROXYSMAL ATRIAL FIBRILLATION (HCC): Primary | ICD-10-CM

## 2023-07-12 DIAGNOSIS — K55.1 MESENTERIC ARTERY STENOSIS (HCC): ICD-10-CM

## 2023-07-12 DIAGNOSIS — N18.30 STAGE 3 CHRONIC KIDNEY DISEASE, UNSPECIFIED WHETHER STAGE 3A OR 3B CKD (HCC): ICD-10-CM

## 2023-07-12 LAB
ALBUMIN SERPL BCP-MCNC: 3.9 G/DL (ref 3.5–5)
ALP SERPL-CCNC: 58 U/L (ref 34–104)
ALT SERPL W P-5'-P-CCNC: 11 U/L (ref 7–52)
ANION GAP SERPL CALCULATED.3IONS-SCNC: 7 MMOL/L
AST SERPL W P-5'-P-CCNC: 23 U/L (ref 13–39)
BILIRUB SERPL-MCNC: 0.43 MG/DL (ref 0.2–1)
BUN SERPL-MCNC: 26 MG/DL (ref 5–25)
CALCIUM SERPL-MCNC: 9.4 MG/DL (ref 8.4–10.2)
CHLORIDE SERPL-SCNC: 104 MMOL/L (ref 96–108)
CK SERPL-CCNC: 51 U/L (ref 26–192)
CO2 SERPL-SCNC: 30 MMOL/L (ref 21–32)
CREAT SERPL-MCNC: 1.25 MG/DL (ref 0.6–1.3)
CRP SERPL QL: <1 MG/L
ERYTHROCYTE [SEDIMENTATION RATE] IN BLOOD: 23 MM/HOUR (ref 0–29)
GFR SERPL CREATININE-BSD FRML MDRD: 38 ML/MIN/1.73SQ M
GLUCOSE P FAST SERPL-MCNC: 88 MG/DL (ref 65–99)
MAGNESIUM SERPL-MCNC: 2.3 MG/DL (ref 1.9–2.7)
POTASSIUM SERPL-SCNC: 4.4 MMOL/L (ref 3.5–5.3)
PROT SERPL-MCNC: 6.5 G/DL (ref 6.4–8.4)
SODIUM SERPL-SCNC: 141 MMOL/L (ref 135–147)
TSH SERPL DL<=0.05 MIU/L-ACNC: 2.6 UIU/ML (ref 0.45–4.5)

## 2023-07-12 PROCEDURE — 80053 COMPREHEN METABOLIC PANEL: CPT | Performed by: NURSE PRACTITIONER

## 2023-07-12 PROCEDURE — 83735 ASSAY OF MAGNESIUM: CPT | Performed by: NURSE PRACTITIONER

## 2023-07-12 PROCEDURE — 86140 C-REACTIVE PROTEIN: CPT

## 2023-07-12 PROCEDURE — 84443 ASSAY THYROID STIM HORMONE: CPT | Performed by: NURSE PRACTITIONER

## 2023-07-12 PROCEDURE — 82550 ASSAY OF CK (CPK): CPT | Performed by: NURSE PRACTITIONER

## 2023-07-12 PROCEDURE — 99214 OFFICE O/P EST MOD 30 MIN: CPT | Performed by: NURSE PRACTITIONER

## 2023-07-12 PROCEDURE — 85652 RBC SED RATE AUTOMATED: CPT

## 2023-07-12 PROCEDURE — 36415 COLL VENOUS BLD VENIPUNCTURE: CPT

## 2023-07-12 RX ORDER — ALPRAZOLAM 0.5 MG/1
TABLET ORAL
COMMUNITY
End: 2023-07-12 | Stop reason: ALTCHOICE

## 2023-07-12 RX ORDER — FAMOTIDINE 20 MG/1
TABLET, FILM COATED ORAL
COMMUNITY
End: 2023-07-12 | Stop reason: ALTCHOICE

## 2023-07-12 NOTE — PATIENT INSTRUCTIONS
Blood pressure is excellent. I will watch for your device interrogation later this month. You did have rate controlled a fib on your last check in April. Try compression socks. Additional labs ordered. Consider rheumatology consult. Notify us if chest discomfort, palpitations, shortness of breath, lightheadedness, increasing swelling, or any other concerns. I will watch for your carotid results in September. Given your carotid and mesenteric stenosis we should treat your cholesterol.  Based on how you are feeling in October discuss Repbrodericka with Dr. Abhishek Nieves

## 2023-07-12 NOTE — PROGRESS NOTES
Cardiology Follow Up    Briana Ye  1935  [id number]  [facility]  [address]  [address] [facility] [place]  [phone]    Sha Cristina presents for follow up of atrial fibrillation, syncope, s/p PPM, HTN, HLD.     1. Paroxysmal atrial fibrillation ([address] [facility])  Assessment & Plan:  History of paroxysmal atrial fibrillation with previous use of Tikosyn for > 10 years. She presented with complaint of increasing "a fib episodes", feeling fluttering/palpitations in her chest.  ZIO monitoring from 6/10-6/17/2021 revealed no a fib, one 6-beat run of WCT/VT, 14 runs of SVT, longest 20 beats at 115 BPM, occasional PAC's (3.9%). Echo 4/2021 with EF 55-60%  Metoprolol succinate increased in August 2021 to 25mg in am, 12.5mg in pm. Repeat 24 hour holter showed avg HR of 56bpm with 7.9% PAC burden. She sustained recurrent syncopal events. [place] nuclear stress test 11/2/2021 without evidence of ischemia. She was transitioned to amiodarone, which she did not tolerate. Referred to EP for possible AV node ablation and PPM insertion. Dr. Sherif Harris performed pacemaker insertion on 2/22/2022 and up-titrated beta blocker. Consider AV node ablation if a fib remains difficult to control. Continue Magnesium supplementation at 250-500mg daily. Continue metoprolol succinate 50 mg in am, 25 mg in pm.  Continue Xarelto for anticoagulation. Will monitor rates with upcoming device interrogations. Most recent interrogation shows 21% AF burden. Orders:  -     Comprehensive metabolic panel  -     Magnesium  -     TSH, 3rd generation with Free T4 reflex    2. Pacemaker  Assessment & Plan:  S/P insertion of Medtronic dual chamber PPM on 2/22/2022 for indication of sick sinus syndrome. Device managed by [facility].       3. Syncope, unspecified syncope type  Assessment & Plan:  Sustained a fall in 2/2019 when she was brushing her teeth in the bathroom and fell, hitting her head against the wall. EKG at that time with QTc of 470msec. She reports today another syncopal event while standing in her kitchen. She had received a steroid injection in her knee and her flu vaccine earlier that same day. We have reviewed the risk of Torsades in Tikosyn with advancing age. 24 hour holter monitor results above (see atrial fibrillation)  Labs have remained stable  Lexiscan 11/2/21 negative for ischemia. S/P PPM insertion 2/22/22 for sick sinus syndrome. Off amiodarone since that time. No recurrent syncopal events. 4. Primary hypertension  Assessment & Plan:  Blood pressure remains very well controlled. Currently on amlodipine 5 mg daily, losartan 25 mg daily, and metoprolol succinate 50 mg am, 25 mg pm.  We reviewed benefits of 2 g sodium diet. 5. Mixed hyperlipidemia  Assessment & Plan:  Not on statin therapy at present due to poor tolerance of several statins in the past.  She also has stopped Zetia due to diffuse muscle pain. Lipid panel 6/9/2023: C 266. T 73. H 99. L 152. Recommend LDL < 70 given carotid and mesenteric artery stenosis  We discussed Repatha again today, however given diffuse muscle pains and not feeling well will defer and discuss again at follow up. 6. Carotid atherosclerosis, bilateral  Assessment & Plan:  Carotid ultrasound 5/1/2017 showed moderate bilateral carotid atherosclerosis with 20-49% carotid stenosis bilaterally. Normal vertebral artery flow. Scheduled for repeat imaging. Ideally LDL < 70. She has not tolerated statins or Zetia in the past.   Taking daily aspirin 81 mg daily. Consider Repatha. 7. Mesenteric artery stenosis [facility])  Assessment & Plan:  U/s imaging 3/21/2022 ordered by PCP for ongoing abdominal pain reveals > 70% stenosis in the celiac and superior mesenteric arteries. She is now following with . [facility] vascular surgery with surveillance imaging scheduled for 9/2023.   Taking aspirin 81 mg daily, EC, always with food. Reviewed urgent s/s to report. Monitoring CBC given use with Xarelto. She has reported statin intolerance and also stopped Zetia given diffuse muscle pain. Consider Repatha, which we discussed today. 8. Stage 3 chronic kidney disease, unspecified whether stage 3a or 3b CKD [facility])  Assessment & Plan:  Lab Results   Component Value Date    EGFR 38 07/12/2023    EGFR 46 04/18/2023    EGFR 53 03/01/2023    CREATININE 1.25 07/12/2023    CREATININE 1.07 04/18/2023    CREATININE 0.96 03/01/2023     Stable on recent labs. Will continue to monitor. Orders:  -     Comprehensive metabolic panel    9. Myalgia  Comments:  additional labs ordered  Orders:  -     Sedimentation rate, automated; Future  -     C-reactive protein; Future  -     CK       HPI  Deneen Santana has a past medical history of paroxysmal atrial fibrillation, HTN, HLD, CKD, lumbar spinal stenosis, and osteoporosis.     She was previously following with [facility], Dr. Alva Haywood presented to Dr. Radhika Herron to establish care on 6/10/2021.  Haider Gibson has been treated for paroxysmal atrial fibrillation with Tikosyn for > 10 years. Her last symptomatic episode from a fib had been in 2019. She sustained a fall  from a potential syncopal event where her legs gave out and her head hit the wall while she was in the bathroom brushing her teeth in 2/2019. Her EKG at the time showed QTc of 470msec.     She had met with [facility] EP clinic in 9/2020 and they had discussed switching to amiodarone due to increased risk of Tikosyn induced torsades with advanced age, however, she decided against this.     7 day ZIO monitor showed 1 run of VT (6 beats) and 14 runs of SVT and occasional PAC's.  Her metoprolol succinate was increased to 25 mg in am, 12.5 mg in pm. Repeat Holter monitor following up-titration of beta blocker revealed 7.9% PAC burden with an avg HR 56 bpm.     She reported another syncopal event on 9/13/2021 when she was standing in the kitchen and suddenly woke on the floor. When she woke she was tired and sweaty but denies chest pain, shortness of breath, or palpitations. A nuclear stress test was ordered for ischemic work up due to VT on her monitor and syncopal events. She was transitioned off Tikosyn and onto amiodarone. [place] nuclear stress test was negative for ischemia. She did not tolerate amiodarone due to dizziness and balance issues and this was eventually discontinued. She was recommended to undergo PPM insertion with possible AV node ablation and was referred to EP.     She underwent dual chamber Medtronic PPM insertion at [facility] on 2/22/2022 by Dr. Zahra Wasserman. The procedure was uneventful. Metoprolol succinate was increased to 50 mg in am, 25 mg in pm. With discussion for possible AV node ablation if rates continued to be difficult to control despite titration of beta blocker.     She was referred to vascular surgery for >70% mesenteric stenosis on doppler imaging. She met with vascular surgery on 3/15/2023. They reviewed her mesenteric stenosis with plan for serial monitoring. She was instructed to start an 81 mg aspirin and to discuss cholesterol treatment with our office. She has follow up imaging scheduled for September 2023. She followed up most recently with me on 4/4/2023 at which time she had not yet started aspirin as she was waiting to discuss with me. She was instructed to start. She was experiencing lower leg edema and had been consuming a higher salt diet. She had been seen at [address] [place] [address] ER in March for arm pain and swelling since her Covid vaccine in the fall and venous duplex was negative for DVT. She reported only rare palpitations and no lightheadedness/dizziness. Zetia was added for lipid optimization.     7/12/2023: Tammi Becker presents for routine follow up. She continues to feel unwell, which she attributes to the Covid vaccine she received last fall.  She saw her PCP in May and was told to stop the Zetia as she was experiencing diffuse muscle pain and left sided swelling. She was started on a month of prednisone which she completed 6/10/23. She feels better since completing the prednisone but still "not quite right". She has left sided pain and mild edema of her left arm/leg. She denies any chest discomfort, shortness of breath or dyspnea on exertion, lightheadedness/dizziness, palpitations. Her weight is stable. No unusual bleeding/bruising. Her blood pressure has been well controlled in 485-510'E systolic. Her most recent device interrogation in April showed a 10% AF burden with rate controlled a fib. Her next interrogation is scheduled for July 27th.      Medical Problems     Problem List     Constipation    Leukocytosis    Hypokalemia    Spinal stenosis of lumbar region with neurogenic claudication    Age-related osteoporosis without current pathological fracture    Primary osteoarthritis of both hips    Paroxysmal atrial fibrillation (HCC)    Syncope    Pacemaker    Hypertension    Hyperlipidemia    Carotid atherosclerosis, bilateral    Stage 3 chronic kidney disease, unspecified whether stage 3a or 3b CKD ([address])    Mesenteric artery stenosis (HCC)    Fracture of multiple ribs of right side    Acute pain of right shoulder    Closed displaced fracture of lateral end of right clavicle    Lumbar spondylosis    Localized edema    Left arm pain    Trochanteric bursitis of right hip        Past Medical History:   Diagnosis Date   • A-fib (HCC)    • Anxiety    • Arthritis     R knee   • Carotid atherosclerosis, bilateral    • GERD (gastroesophageal reflux disease)    • Hyperlipidemia    • Hypertension    • Muscle spasms of neck    • Osteoporosis    • Spinal stenosis      Social History     Socioeconomic History   • Marital status: /Civil Union     Spouse name: Not on file   • Number of children: Not on file   • Years of education: Not on file   • Highest education level: Not on file   Occupational History   • Not on file   Tobacco Use • Smoking status: Never   • Smokeless tobacco: Never   Vaping Use   • Vaping Use: Never used   Substance and Sexual Activity   • Alcohol use: Not Currently   • Drug use: Never   • Sexual activity: Not Currently   Other Topics Concern   • Not on file   Social History Narrative   • Not on file     Social Determinants of Health     Financial Resource Strain: Not on file   Food Insecurity: Not on file   Transportation Needs: Not on file   Physical Activity: Not on file   Stress: Not on file   Social Connections: Not on file   Intimate Partner Violence: Not on file   Housing Stability: Not on file      Family History   Problem Relation Age of Onset   • Cancer Father    • Cancer Brother    • Heart disease Mother      Past Surgical History:   Procedure Laterality Date   • BACK SURGERY     • CARDIAC ELECTROPHYSIOLOGY PROCEDURE N/A 2/22/2022    Procedure: Cardiac pacer implant;  Surgeon: Lydia Aquino MD;  Location: [facility];   Service: Cardiology   • EYE SURGERY      cataracts   • FL GUIDED NEEDLE PLAC BX/ASP/INJ  8/2/2022   • FL GUIDED NEEDLE PLAC BX/ASP/INJ  9/1/2022   • IR SACROPLASTY  3/15/2019   • NERVE BLOCK Bilateral 8/2/2022    Procedure: BLOCK MEDIAL BRANCH L3, L4, L5 MBB #1;  Surgeon: Marta Treadwell MD;  Location: [facility];  Service: Pain Management    • NERVE BLOCK Bilateral 9/1/2022    Procedure: BLOCK MEDIAL BRANCH L3, L4, L5;  Surgeon: Marta Treadwell MD;  Location: [facility];  Service: Pain Management    • AL ARTHROCENTESIS ASPIR&/INJ MAJOR JT/BURSA W/O US Right 12/22/2022    Procedure: INJECTION JOINT HIP;  Surgeon: Marta Treadwell MD;  Location: [facility];  Service: Pain Management    • AL ARTHROCENTESIS ASPIR&/INJ MAJOR JT/BURSA W/O US Right 6/6/2023    Procedure: INJECTION JOINT RIGHT GTB INJECTION;  Surgeon: Marta Treadwell MD;  Location: [facility];  Service: Pain Management    • RHIZOTOMY Bilateral 11/17/2022    Procedure: RHIZOTOMY LUMBAR L3, L4, L5 medial branch nerves;  Surgeon: Stefanie Castillo Andry Cotton MD;  Location: [facility];  Service: Pain Management        Current Outpatient Medications:   •  acetaminophen (TYLENOL) 500 mg tablet, Take 2 tablets (1,000 mg total) by mouth every 6 (six) hours as needed for mild pain or moderate pain, Disp: 30 tablet, Rfl: 0  •  amLODIPine (NORVASC) 5 mg tablet, Take 1 tablet (5 mg total) by mouth daily, Disp: 90 tablet, Rfl: 3  •  aspirin (ECOTRIN LOW STRENGTH) 81 mg EC tablet, Take 1 tablet (81 mg total) by mouth daily, Disp: 30 tablet, Rfl: 11  •  calcium citrate-vitamin D (CITRACAL+D) 315-200 MG-UNIT per tablet, Take by mouth, Disp: , Rfl:   •  docusate sodium (COLACE) 100 mg capsule, Take 100 mg by mouth daily, Disp: , Rfl:   •  hydrocortisone (ANUSOL-HC) 25 mg suppository, , Disp: , Rfl:   •  LORazepam (ATIVAN) 2 mg tablet, 3 (three) times a day prn, Disp: , Rfl:   •  losartan (COZAAR) 25 mg tablet, TAKE 1 TABLET BY MOUTH  DAILY, Disp: 90 tablet, Rfl: 3  •  magnesium oxide (MAG-OX) 400 mg, Take 400 mg by mouth once, Disp: , Rfl:   •  metoprolol succinate (TOPROL-XL) 25 mg 24 hr tablet, TAKE 2 TABLETS BY MOUTH IN  THE MORNING AND 1 TAB IN  THE EVENING, Disp: 270 tablet, Rfl: 3  •  Multiple Vitamins-Minerals (ICAPS AREDS 2 PO), Take 1 tablet by mouth daily , Disp: , Rfl:   •  polyethylene glycol (MIRALAX) 17 g packet, Take 17 g by mouth daily, Disp: , Rfl:   •  Xarelto 15 MG tablet, TAKE 1 TABLET BY MOUTH  DAILY WITH DINNER, Disp: 90 tablet, Rfl: 3  Allergies   Allergen Reactions   • Ciprofloxacin    • Covid-19 (Mrna) Vaccine Edema     Arm and hand swelling    • Epinephrine      Irregular heart ryhthm (afib) per pt.    • Statins Myalgia     Patient has tried 3-4 different statins and developed muscle pain with all   • Bactrim [Sulfamethoxazole-Trimethoprim] Rash   • Medical Tape Rash       Labs:     Chemistry        Component Value Date/Time    K 4.4 07/12/2023 1307     07/12/2023 1307    CO2 30 07/12/2023 1307    BUN 26 (H) 07/12/2023 1307    CREATININE 1.25 07/12/2023 1307        Component Value Date/Time    CALCIUM 9.4 07/12/2023 1307    ALKPHOS 58 07/12/2023 1307    AST 23 07/12/2023 1307    ALT 11 07/12/2023 1307        Lipid panel 6/9/2023: C 266. T 73. H 99. L 152.     Cardiac testing:   ECG 3/1/2023: Atrial-paced rhythm with prolonged AV conduction and PAC's. VAS celiac and/or mesenteric duplex 3/21/2022: The aorta is patent and normal in caliber. >70% stenosis in the celiac artery. 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      ECG 3/1/2022: Atrial-paced rhythm with prolonged AV conduction. Left ventricular hypertrophy. Rate 73 bpm.     Lexiscan nuclear stress test 11/2/2021:  Normal stress perfusion pattern  Hyperdynamic LV function gated analysis  Low risk perfusion pattern  Stress ECG: No ECG changes meeting criteria for ischemia.  Nondiagnostic ECG portion given vasodilator protocol.     ECG 10/8/2021: Sinus bradycardia, rate 53, cannot rule out septal infarct, no significant change from previous ECG on 8/10/2021     24 hour holter monitor 9/7/2021:  Predominantly sinus rhythm, HR varied from 39 bpm to 103 bpm.  The patient’s average heart rate was 56 bpm.    17 ventricular ectopic activity. 6195 (7.9%)  supraventricular ectopy. Longest R/R interval was 1.8 seconds.     ZIO 6/10-6/17/2021:   Min HR of 35, max HR of 193, and avg HR of 59 bpm.  Predominant underlying rhythm was Sinus Rhythm. 1 run of Ventricular Tachycardia occurred lasting 6 beats with a max rate of 193 bpm (avg 177 bpm). 14 Supraventricular Tachycardia runs occurred, the run with the longest lasting 20 beats with an avg rate of 115 bpm.   Occasional PAC's (3.9%, 84967). Rare PVC's (<1.0%).    Echocardiogram 4/2/2021 ([facility]): EF 55-60%. Grade 1 diastolic dysfunction. Mild-moderate AI. Mild-moderate MR. Mild TR.     ECG 3/22/2021 ([facility]): sinus rhythm with one PAC, rate 68     Review of Systems   Constitutional: Positive for malaise/fatigue. HENT: Negative.     Cardiovascular: Positive for leg swelling. Negative for chest pain, dyspnea on exertion, irregular heartbeat, near-syncope, orthopnea and palpitations. Respiratory: Negative for cough and snoring. Endocrine: Negative. Skin: Negative. Musculoskeletal: Positive for myalgias. Gastrointestinal: Negative. Genitourinary: Negative. Neurological: Negative. Psychiatric/Behavioral: Negative. Vitals:    07/12/23 0952   BP: 130/70   Pulse:    Temp:    SpO2:      Vitals:    07/12/23 0929   Weight: 60 kg (132 lb 3.2 oz)     Height: 5' 3" (160 cm)   Body mass index is 23.42 kg/m². Physical Exam  Vitals and nursing note reviewed. Constitutional:       General: She is not in acute distress. Appearance: She is well-developed. She is not diaphoretic. HENT:      Head: Normocephalic and atraumatic. Neck:      Thyroid: No thyromegaly. Vascular: No carotid bruit or JVD. Cardiovascular:      Rate and Rhythm: Normal rate and regular rhythm. Frequent Extrasystoles are present. Pulses: Intact distal pulses. Radial pulses are 2+ on the right side and 2+ on the left side. Heart sounds: Normal heart sounds, S1 normal and S2 normal. No murmur heard. Comments: Mild edema to bilateral legs, left > right  Pulmonary:      Effort: Pulmonary effort is normal.      Breath sounds: Normal breath sounds. Abdominal:      General: There is no distension. Palpations: Abdomen is soft. Tenderness: There is no abdominal tenderness. Musculoskeletal:         General: Normal range of motion. Cervical back: Normal range of motion and neck supple. Comments: Walks with assistance of a walker   Lymphadenopathy:      Cervical: No cervical adenopathy. Skin:     General: Skin is warm and dry. Neurological:      Mental Status: She is alert and oriented to person, place, and time. Cranial Nerves: No cranial nerve deficit.    Psychiatric:         Mood and Affect: Mood and affect normal. Speech: Speech normal.         Behavior: Behavior normal. Behavior is cooperative.          Cognition and Memory: Cognition and memory normal.      Comments: Somewhat tearful while discussing symptoms

## 2023-07-14 ENCOUNTER — TELEPHONE (OUTPATIENT)
Dept: CARDIOLOGY CLINIC | Facility: CLINIC | Age: 88
End: 2023-07-14

## 2023-07-14 NOTE — TELEPHONE ENCOUNTER
----- Message from Amos Winter sent at 7/13/2023  7:03 PM EDT -----  Please let Bennett Ed know that her labs are all in acceptable range. No concerning findings. Inflammatory markers are normal, thyroid is good, electrolytes are great. Thank you!

## 2023-07-27 ENCOUNTER — REMOTE DEVICE CLINIC VISIT (OUTPATIENT)
Dept: CARDIOLOGY CLINIC | Facility: CLINIC | Age: 88
End: 2023-07-27
Payer: MEDICARE

## 2023-07-27 DIAGNOSIS — Z95.0 CARDIAC PACEMAKER IN SITU: Primary | ICD-10-CM

## 2023-07-27 PROCEDURE — 93296 REM INTERROG EVL PM/IDS: CPT | Performed by: INTERNAL MEDICINE

## 2023-07-27 PROCEDURE — 93294 REM INTERROG EVL PM/LDLS PM: CPT | Performed by: INTERNAL MEDICINE

## 2023-07-27 NOTE — PROGRESS NOTES
Results for orders placed or performed in visit on 07/27/23   Cardiac EP device report    Narrative    MDT DUAL PM/ACTIVE SYSTEM IS MRI CONDITIONAL  CARELINK TRANSMISSION: BATTERY VOLTAGE ADEQUATE (12.5 YRS). AP-52%, -22%. ALL AVAILABLE LEAD PARAMETERS WITHIN NORMAL LIMITS. 24 AF EPISODES TREATED W/ rATP (12.5% PACE TERMINATED) & 4 MONITORED AF EPISODES (ATRIAL UNDERSENSING AF @ TIMES). AF BURDEN-41.2%. PT ON XARELTO, ASA 81MG & METOPROLOL. NORMAL DEVICE FUNCTION.  GV

## 2023-08-03 ENCOUNTER — HOSPITAL ENCOUNTER (OUTPATIENT)
Dept: RADIOLOGY | Facility: CLINIC | Age: 88
End: 2023-08-03
Payer: MEDICARE

## 2023-08-03 VITALS — HEIGHT: 62 IN | WEIGHT: 134 LBS | BODY MASS INDEX: 24.66 KG/M2

## 2023-08-03 DIAGNOSIS — M85.80 LOW BONE MASS: ICD-10-CM

## 2023-08-03 PROCEDURE — 77080 DXA BONE DENSITY AXIAL: CPT

## 2023-08-05 NOTE — ASSESSMENT & PLAN NOTE
Sustained a fall in 2/2019 when she was brushing her teeth in the bathroom and fell, hitting her head against the wall. EKG at that time with QTc of 470msec. She reports today another syncopal event while standing in her kitchen. She had received a steroid injection in her knee and her flu vaccine earlier that same day. We have reviewed the risk of Torsades in Tikosyn with advancing age. 24 hour holter monitor results above (see atrial fibrillation)  Labs have remained stable  Lexiscan 11/2/21 negative for ischemia. S/P PPM insertion 2/22/22 for sick sinus syndrome. Off amiodarone since that time. No recurrent syncopal events.

## 2023-08-05 NOTE — ASSESSMENT & PLAN NOTE
Carotid ultrasound 5/1/2017 showed moderate bilateral carotid atherosclerosis with 20-49% carotid stenosis bilaterally. Normal vertebral artery flow. Scheduled for repeat imaging. Ideally LDL < 70. She has not tolerated statins or Zetia in the past.   Taking daily aspirin 81 mg daily. Consider Repatha.

## 2023-08-05 NOTE — ASSESSMENT & PLAN NOTE
Not on statin therapy at present due to poor tolerance of several statins in the past.  She also has stopped Zetia due to diffuse muscle pain. Lipid panel 6/9/2023: C 266. T 73. H 99. L 152. Recommend LDL < 70 given carotid and mesenteric artery stenosis  We discussed Repatha again today, however given diffuse muscle pains and not feeling well will defer and discuss again at follow up.

## 2023-08-05 NOTE — ASSESSMENT & PLAN NOTE
Lab Results   Component Value Date    EGFR 38 07/12/2023    EGFR 46 04/18/2023    EGFR 53 03/01/2023    CREATININE 1.25 07/12/2023    CREATININE 1.07 04/18/2023    CREATININE 0.96 03/01/2023     Stable on recent labs. Will continue to monitor.

## 2023-08-05 NOTE — ASSESSMENT & PLAN NOTE
History of paroxysmal atrial fibrillation with previous use of Tikosyn for > 10 years. She presented with complaint of increasing "a fib episodes", feeling fluttering/palpitations in her chest.  ZIO monitoring from 6/10-6/17/2021 revealed no a fib, one 6-beat run of WCT/VT, 14 runs of SVT, longest 20 beats at 115 BPM, occasional PAC's (3.9%). Echo 4/2021 with EF 55-60%  Metoprolol succinate increased in August 2021 to 25mg in am, 12.5mg in pm. Repeat 24 hour holter showed avg HR of 56bpm with 7.9% PAC burden. She sustained recurrent syncopal events. [facility] Folks nuclear stress test 11/2/2021 without evidence of ischemia. She was transitioned to amiodarone, which she did not tolerate. Referred to EP for possible AV node ablation and PPM insertion. Dr. Ligia Milian performed pacemaker insertion on 2/22/2022 and up-titrated beta blocker. Consider AV node ablation if a fib remains difficult to control. Continue Magnesium supplementation at 250-500mg daily. Continue metoprolol succinate 50 mg in am, 25 mg in pm.  Continue Xarelto for anticoagulation. Will monitor rates with upcoming device interrogations. Most recent interrogation shows 21% AF burden.

## 2023-08-05 NOTE — ASSESSMENT & PLAN NOTE
Blood pressure remains very well controlled. Currently on amlodipine 5 mg daily, losartan 25 mg daily, and metoprolol succinate 50 mg am, 25 mg pm.  We reviewed benefits of 2 g sodium diet.

## 2023-08-05 NOTE — ASSESSMENT & PLAN NOTE
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 She is now following with [facility] vascular surgery with surveillance imaging scheduled for 9/2023. Taking aspirin 81 mg daily, EC, always with food. Reviewed urgent s/s to report. Monitoring CBC given use with Xarelto. She has reported statin intolerance and also stopped Zetia given diffuse muscle pain. Consider Repatha, which we discussed today.

## 2023-08-05 NOTE — ASSESSMENT & PLAN NOTE
S/P insertion of Medtronic dual chamber PPM on 2/22/2022 for indication of sick sinus syndrome. Device managed by [facility]

## 2023-08-22 ENCOUNTER — APPOINTMENT (EMERGENCY)
Dept: RADIOLOGY | Facility: HOSPITAL | Age: 88
End: 2023-08-22
Payer: MEDICARE

## 2023-08-22 ENCOUNTER — HOSPITAL ENCOUNTER (EMERGENCY)
Facility: HOSPITAL | Age: 88
Discharge: HOME | End: 2023-08-22
Attending: EMERGENCY MEDICINE | Admitting: EMERGENCY MEDICINE
Payer: MEDICARE

## 2023-08-22 VITALS
RESPIRATION RATE: 16 BRPM | TEMPERATURE: 97.6 F | HEIGHT: 62 IN | SYSTOLIC BLOOD PRESSURE: 169 MMHG | HEART RATE: 91 BPM | BODY MASS INDEX: 25.27 KG/M2 | OXYGEN SATURATION: 98 % | DIASTOLIC BLOOD PRESSURE: 59 MMHG | WEIGHT: 137.35 LBS

## 2023-08-22 DIAGNOSIS — M54.2 NECK PAIN: ICD-10-CM

## 2023-08-22 DIAGNOSIS — M54.9 BACK PAIN: Primary | ICD-10-CM

## 2023-08-22 LAB
ALBUMIN SERPL BCP-MCNC: 3.8 G/DL (ref 3.5–5)
ALP SERPL-CCNC: 66 U/L (ref 34–104)
ALT SERPL W P-5'-P-CCNC: 11 U/L (ref 7–52)
ANION GAP SERPL CALCULATED.3IONS-SCNC: 5 MMOL/L
APTT PPP: 35 SECONDS (ref 23–37)
AST SERPL W P-5'-P-CCNC: 20 U/L (ref 13–39)
BASOPHILS # BLD AUTO: 0.05 THOUSANDS/ÂΜL (ref 0–0.1)
BASOPHILS NFR BLD AUTO: 1 % (ref 0–1)
BILIRUB SERPL-MCNC: 0.41 MG/DL (ref 0.2–1)
BNP SERPL-MCNC: 240 PG/ML (ref 0–100)
BUN SERPL-MCNC: 21 MG/DL (ref 5–25)
CALCIUM SERPL-MCNC: 9.7 MG/DL (ref 8.4–10.2)
CARDIAC TROPONIN I PNL SERPL HS: 6 NG/L
CHLORIDE SERPL-SCNC: 105 MMOL/L (ref 96–108)
CO2 SERPL-SCNC: 31 MMOL/L (ref 21–32)
CREAT SERPL-MCNC: 0.96 MG/DL (ref 0.6–1.3)
EOSINOPHIL # BLD AUTO: 0.1 THOUSAND/ÂΜL (ref 0–0.61)
EOSINOPHIL NFR BLD AUTO: 2 % (ref 0–6)
ERYTHROCYTE [DISTWIDTH] IN BLOOD BY AUTOMATED COUNT: 14.4 % (ref 11.6–15.1)
GFR SERPL CREATININE-BSD FRML MDRD: 52 ML/MIN/1.73SQ M
GLUCOSE SERPL-MCNC: 96 MG/DL (ref 65–140)
HCT VFR BLD AUTO: 42.1 % (ref 34.8–46.1)
HGB BLD-MCNC: 13.3 G/DL (ref 11.5–15.4)
IMM GRANULOCYTES # BLD AUTO: 0.02 THOUSAND/UL (ref 0–0.2)
IMM GRANULOCYTES NFR BLD AUTO: 0 % (ref 0–2)
INR PPP: 1.16 (ref 0.84–1.19)
LYMPHOCYTES # BLD AUTO: 1.25 THOUSANDS/ÂΜL (ref 0.6–4.47)
LYMPHOCYTES NFR BLD AUTO: 22 % (ref 14–44)
MCH RBC QN AUTO: 28.9 PG (ref 26.8–34.3)
MCHC RBC AUTO-ENTMCNC: 31.6 G/DL (ref 31.4–37.4)
MCV RBC AUTO: 91 FL (ref 82–98)
MONOCYTES # BLD AUTO: 0.59 THOUSAND/ÂΜL (ref 0.17–1.22)
MONOCYTES NFR BLD AUTO: 10 % (ref 4–12)
NEUTROPHILS # BLD AUTO: 3.81 THOUSANDS/ÂΜL (ref 1.85–7.62)
NEUTS SEG NFR BLD AUTO: 65 % (ref 43–75)
NRBC BLD AUTO-RTO: 0 /100 WBCS
PLATELET # BLD AUTO: 263 THOUSANDS/UL (ref 149–390)
PMV BLD AUTO: 9.7 FL (ref 8.9–12.7)
POTASSIUM SERPL-SCNC: 4.4 MMOL/L (ref 3.5–5.3)
PROT SERPL-MCNC: 6.3 G/DL (ref 6.4–8.4)
PROTHROMBIN TIME: 15.1 SECONDS (ref 11.6–14.5)
RBC # BLD AUTO: 4.61 MILLION/UL (ref 3.81–5.12)
SODIUM SERPL-SCNC: 141 MMOL/L (ref 135–147)
WBC # BLD AUTO: 5.82 THOUSAND/UL (ref 4.31–10.16)

## 2023-08-22 PROCEDURE — 93005 ELECTROCARDIOGRAM TRACING: CPT

## 2023-08-22 PROCEDURE — 85025 COMPLETE CBC W/AUTO DIFF WBC: CPT | Performed by: EMERGENCY MEDICINE

## 2023-08-22 PROCEDURE — 99283 EMERGENCY DEPT VISIT LOW MDM: CPT

## 2023-08-22 PROCEDURE — 85730 THROMBOPLASTIN TIME PARTIAL: CPT | Performed by: EMERGENCY MEDICINE

## 2023-08-22 PROCEDURE — 36415 COLL VENOUS BLD VENIPUNCTURE: CPT | Performed by: EMERGENCY MEDICINE

## 2023-08-22 PROCEDURE — 85610 PROTHROMBIN TIME: CPT | Performed by: EMERGENCY MEDICINE

## 2023-08-22 PROCEDURE — 84484 ASSAY OF TROPONIN QUANT: CPT | Performed by: EMERGENCY MEDICINE

## 2023-08-22 PROCEDURE — 83880 ASSAY OF NATRIURETIC PEPTIDE: CPT | Performed by: EMERGENCY MEDICINE

## 2023-08-22 PROCEDURE — 71045 X-RAY EXAM CHEST 1 VIEW: CPT

## 2023-08-22 PROCEDURE — 99285 EMERGENCY DEPT VISIT HI MDM: CPT | Performed by: EMERGENCY MEDICINE

## 2023-08-22 PROCEDURE — 80053 COMPREHEN METABOLIC PANEL: CPT | Performed by: EMERGENCY MEDICINE

## 2023-08-22 RX ORDER — LIDOCAINE 50 MG/G
1 PATCH TOPICAL ONCE
Status: DISCONTINUED | OUTPATIENT
Start: 2023-08-22 | End: 2023-08-22 | Stop reason: HOSPADM

## 2023-08-22 RX ORDER — LIDOCAINE 50 MG/G
1 PATCH TOPICAL DAILY
Qty: 10 PATCH | Refills: 0 | Status: SHIPPED | OUTPATIENT
Start: 2023-08-22

## 2023-08-22 RX ORDER — ACETAMINOPHEN 325 MG/1
975 TABLET ORAL ONCE
Status: COMPLETED | OUTPATIENT
Start: 2023-08-22 | End: 2023-08-22

## 2023-08-22 RX ADMIN — LIDOCAINE 5% 1 PATCH: 700 PATCH TOPICAL at 13:45

## 2023-08-22 RX ADMIN — ACETAMINOPHEN 975 MG: 325 TABLET, FILM COATED ORAL at 11:29

## 2023-08-22 NOTE — ED PROVIDER NOTES
History  Chief Complaint   Patient presents with   • Earache     Pt c/o  intermittent left ear pain radiating to neck, upper left back and down left arm with tingling at times over past month. Pt seen pcp initially for neck pain, xray done-wnl. Pt states sx worsened since. Denies travel/sob/fevers/cough/n/v/d   • Back Pain     80year-old female describes left interscapular discomfort neck and shoulder pain worse over the past month, had neck x-rays done month ago. Notes having no left lateral neck pain and left otalgia worse last night. Incompletely amenable to acetaminophen. Worse with ranging. No injury. No numbness or weakness. No chest pain or dyspnea. History provided by:  Patient  Earache  Location:  Left  Quality:  Aching and sharp  Onset quality:  Gradual  Timing:  Constant  Progression:  Worsening  Chronicity:  New  Relieved by:  Nothing  Worsened by:  Nothing  Ineffective treatments:  None tried  Associated symptoms: no abdominal pain and no fever    Back Pain  Associated symptoms: no abdominal pain and no fever        Prior to Admission Medications   Prescriptions Last Dose Informant Patient Reported? Taking?    LORazepam (ATIVAN) 2 mg tablet  Self Yes No   Sig: 3 (three) times a day prn   Multiple Vitamins-Minerals (ICAPS AREDS 2 PO)  Self Yes No   Sig: Take 1 tablet by mouth daily    Xarelto 15 MG tablet  Self No No   Sig: TAKE 1 TABLET BY MOUTH  DAILY WITH DINNER   acetaminophen (TYLENOL) 500 mg tablet  Self No No   Sig: Take 2 tablets (1,000 mg total) by mouth every 6 (six) hours as needed for mild pain or moderate pain   amLODIPine (NORVASC) 5 mg tablet   No No   Sig: Take 1 tablet (5 mg total) by mouth daily   aspirin (ECOTRIN LOW STRENGTH) 81 mg EC tablet   No No   Sig: Take 1 tablet (81 mg total) by mouth daily   calcium citrate-vitamin D (CITRACAL+D) 315-200 MG-UNIT per tablet  Self Yes No   Sig: Take by mouth   docusate sodium (COLACE) 100 mg capsule  Self Yes No   Sig: Take 100 mg by mouth daily   hydrocortisone (ANUSOL-HC) 25 mg suppository   Yes No   losartan (COZAAR) 25 mg tablet  Self No No   Sig: TAKE 1 TABLET BY MOUTH  DAILY   magnesium oxide (MAG-OX) 400 mg  Self Yes No   Sig: Take 400 mg by mouth once   metoprolol succinate (TOPROL-XL) 25 mg 24 hr tablet   No No   Sig: TAKE 2 TABLETS BY MOUTH IN  THE MORNING AND 1 TAB IN  THE EVENING   polyethylene glycol (MIRALAX) 17 g packet  Self Yes No   Sig: Take 17 g by mouth daily      Facility-Administered Medications: None       Past Medical History:   Diagnosis Date   • A-fib (HCC)    • Anxiety    • Arthritis     R knee   • Carotid atherosclerosis, bilateral    • GERD (gastroesophageal reflux disease)    • Hyperlipidemia    • Hypertension    • Muscle spasms of neck    • Osteoporosis    • Spinal stenosis        Past Surgical History:   Procedure Laterality Date   • BACK SURGERY     • CARDIAC ELECTROPHYSIOLOGY PROCEDURE N/A 2/22/2022    Procedure: Cardiac pacer implant;  Surgeon: Matthieu Kemp MD;  Location:  CARDIAC CATH LAB;   Service: Cardiology   • EYE SURGERY      cataracts   • FL GUIDED NEEDLE PLAC BX/ASP/INJ  8/2/2022   • FL GUIDED NEEDLE PLAC BX/ASP/INJ  9/1/2022   • IR SACROPLASTY  3/15/2019   • NERVE BLOCK Bilateral 8/2/2022    Procedure: BLOCK MEDIAL BRANCH L3, L4, L5 MBB #1;  Surgeon: Dean Hamilton MD;  Location: [facility];  Service: Pain Management    • NERVE BLOCK Bilateral 9/1/2022    Procedure: BLOCK MEDIAL BRANCH L3, L4, L5;  Surgeon: Dean Hamilton MD;  Location: [facility];  Service: Pain Management    • [place] ARTHROCENTESIS ASPIR&/INJ MAJOR JT/BURSA W/O US Right 12/22/2022    Procedure: INJECTION JOINT HIP;  Surgeon: Dean Hamilton MD;  Location: [facility];  Service: Pain Management    • [place] ARTHROCENTESIS ASPIR&/INJ MAJOR JT/BURSA W/O US Right 6/6/2023    Procedure: INJECTION JOINT RIGHT GTB INJECTION;  Surgeon: Dean Hamilton MD;  Location: [facility];  Service: Pain Management    • RHIZOTOMY Bilateral 11/17/2022 Procedure: RHIZOTOMY LUMBAR L3, L4, L5 medial branch nerves;  Surgeon: Aleyda Montes MD;  Location: [facility];  Service: Pain Management        Family History   Problem Relation Age of Onset   • Cancer Father    • Cancer Brother    • Heart disease Mother      I have reviewed and agree with the history as documented. E-Cigarette/Vaping   • E-Cigarette Use Never User      E-Cigarette/Vaping Substances     Social History     Tobacco Use   • Smoking status: Never   • Smokeless tobacco: Never   Vaping Use   • Vaping Use: Never used   Substance Use Topics   • Alcohol use: Not Currently   • Drug use: Never       Review of Systems   Constitutional: Negative for fever. HENT: Positive for ear pain. Gastrointestinal: Negative for abdominal pain. Musculoskeletal: Positive for back pain. All other systems reviewed and are negative. Physical Exam  Physical Exam  Vitals and nursing note reviewed. Constitutional:       Comments: Pleasant, comfortable-appearing   HENT:      Head: Normocephalic and atraumatic. Mouth/Throat:      Mouth: Mucous membranes are moist.      Pharynx: Oropharynx is clear. Eyes:      Conjunctiva/sclera: Conjunctivae normal.      Pupils: Pupils are equal, round, and reactive to light. Cardiovascular:      Rate and Rhythm: Normal rate and regular rhythm. Heart sounds: Normal heart sounds. Pulmonary:      Effort: Pulmonary effort is normal.      Breath sounds: Normal breath sounds. Abdominal:      General: Bowel sounds are normal. There is no distension. Palpations: Abdomen is soft. Tenderness: There is no abdominal tenderness. Musculoskeletal:         General: No deformity. Cervical back: Neck supple. Comments: Left interscapular and upper trapezius tenderness, no overlying skin changes or swelling, discomfort with left head rotation   Skin:     General: Skin is warm and dry. Neurological:      General: No focal deficit present.       Mental Status: She is alert and oriented to person, place, and time. Cranial Nerves: No cranial nerve deficit. Coordination: Coordination normal.   Psychiatric:         Behavior: Behavior normal.         Thought Content:  Thought content normal.         Judgment: Judgment normal.         Vital Signs  ED Triage Vitals [08/22/23 1036]   Temperature Pulse Respirations Blood Pressure SpO2   97.6 °F (36.4 °C) 73 18 (!) 221/90 98 %      Temp Source Heart Rate Source Patient Position - Orthostatic VS BP Location FiO2 (%)   Tympanic Monitor Lying Left arm --      Pain Score       8           Vitals:    08/22/23 1036 08/22/23 1215 08/22/23 1230 08/22/23 1245   BP: (!) 221/90 155/73 150/67 (!) 177/77   Pulse: 73 73 68 69   Patient Position - Orthostatic VS: Lying            Visual Acuity      ED Medications  Medications   lidocaine (LIDODERM) 5 % patch 1 patch (has no administration in time range)   acetaminophen (TYLENOL) tablet 975 mg (975 mg Oral Given 8/22/23 1129)       Diagnostic Studies  Results Reviewed     Procedure Component Value Units Date/Time    B-Type Natriuretic Peptide(BNP) [[id number]]  (Abnormal) Collected: 08/22/23 1129    Lab Status: Final result Specimen: Blood from Arm, Right Updated: 08/22/23 1254      pg/mL     HS Troponin 0hr (reflex protocol) [[id number]]  (Normal) Collected: 08/22/23 1129    Lab Status: Final result Specimen: Blood from Arm, Right Updated: 08/22/23 1202     hs TnI 0hr 6 ng/L     Protime-INR [[id number]]  (Abnormal) Collected: 08/22/23 1129    Lab Status: Final result Specimen: Blood from Arm, Right Updated: 08/22/23 1158     Protime 15.1 seconds      INR 1.16    APTT [[id number]]  (Normal) Collected: 08/22/23 1129    Lab Status: Final result Specimen: Blood from Arm, Right Updated: 08/22/23 1158     PTT 35 seconds     Comprehensive metabolic panel [[id number]]  (Abnormal) Collected: 08/22/23 1129    Lab Status: Final result Specimen: Blood from Arm, Right Updated: 08/22/23 1153 Sodium 141 mmol/L      Potassium 4.4 mmol/L      Chloride 105 mmol/L      CO2 31 mmol/L      ANION GAP 5 mmol/L      BUN 21 mg/dL      Creatinine 0.96 mg/dL      Glucose 96 mg/dL      Calcium 9.7 mg/dL      AST 20 U/L      ALT 11 U/L      Alkaline Phosphatase 66 U/L      Total Protein 6.3 g/dL      Albumin 3.8 g/dL      Total Bilirubin 0.41 mg/dL      eGFR 52 ml/min/1.73sq m     Narrative:      National Kid[employer] Foundation guidelines for Chronic Kidney Disease (CKD):   •  Stage 1 with normal or high GFR (GFR > 90 mL/min/1.73 square meters)  •  Stage 2 Mild CKD (GFR = 60-89 mL/min/1.73 square meters)  •  Stage 3A Moderate CKD (GFR = 45-59 mL/min/1.73 square meters)  •  Stage 3B Moderate CKD (GFR = 30-44 mL/min/1.73 square meters)  •  Stage 4 Severe CKD (GFR = 15-29 mL/min/1.73 square meters)  •  Stage 5 End Stage CKD (GFR <15 mL/min/1.73 square meters)  Note: GFR calculation is accurate only with a steady state creatinine    CBC and differential [[id number]] Collected: 08/22/23 1129    Lab Status: Final result Specimen: Blood from Arm, Right Updated: 08/22/23 1137     WBC 5.82 Thousand/uL      RBC 4.61 Million/uL      Hemoglobin 13.3 g/dL      Hematocrit 42.1 %      MCV 91 fL      MCH 28.9 pg      MCHC 31.6 g/dL      RDW 14.4 %      MPV 9.7 fL      Platelets 322 Thousands/uL      nRBC 0 /100 WBCs      Neutrophils Relative 65 %      Immat GRANS % 0 %      Lymphocytes Relative 22 %      Monocytes Relative 10 %      Eosinophils Relative 2 %      Basophils Relative 1 %      Neutrophils Absolute 3.81 Thousands/µL      Immature Grans Absolute 0.02 Thousand/uL      Lymphocytes Absolute 1.25 Thousands/µL      Monocytes Absolute 0.59 Thousand/µL      Eosinophils Absolute 0.10 Thousand/µL      Basophils Absolute 0.05 Thousands/µL                  XR chest portable   ED Interpretation by Haily Alberts DO (08/22 0738)   No obvious acute cardiopulmonary changes                 Procedures  Procedures         ED Course  ED Course as of 08/22/23 1335   Tue Aug 22, 2023   1146 WBC: 5.82   1215 hs TnI 0hr: 6   1222 EKG 11:41 AM intermittently paced rate 78 first-degree AV block otherwise normal intervals no ST elevation or depression interpreted by me   1306 BNP(!): 240   1326 We discussed results, probable musculoskeletal symptoms and care going forward and agreeable with close outpatient follow-up, will return immediately if worse or any new symptoms, continue Tylenol and agreeable with lidocaine patches                                             Medical Decision Making  Back pain: acute illness or injury  Neck pain: acute illness or injury  Amount and/or Complexity of Data Reviewed  Labs: ordered. Decision-making details documented in ED Course. Radiology: ordered and independent interpretation performed. Decision-making details documented in ED Course. ECG/medicine tests: ordered and independent interpretation performed. Decision-making details documented in ED Course. Risk  OTC drugs. Prescription drug management. Disposition  Final diagnoses:   Back pain   Neck pain     Time reflects when diagnosis was documented in both MDM as applicable and the Disposition within this note     Time User Action Codes Description Comment    8/22/2023  1:34 PM Martina Gillespie Add [M54.9] Back pain     8/22/2023  1:34 PM Martina Gillespie Add [M54.2] Neck pain       ED Disposition     ED Disposition   Discharge    Condition   Stable    Date/Time   Tue Aug 22, 2023  1:31 PM    Comment   Primus Done discharge to home/self care.                Follow-up Information     Follow up With Specialties Details Why Coby Ferguson MD [facility] Schedule an appointment as soon as possible for a visit in 1 week  [address]  [phone]            Patient's Medications   Discharge Prescriptions    LIDOCAINE (LIDODERM) 5 %    Apply 1 patch topically over 12 hours daily Remove & Discard patch within 12 hours or as directed by MD       Start Date: 8/22/2023 End Date: --       Order Dose: 1 patch       Quantity: 10 patch    Refills: 0       No discharge procedures on file.     PDMP Review       Value Time User    PDMP Reviewed  Yes 11/20/2022 11:14 AM Elana Pierce DO          ED Provider  Electronically Signed by           Hesham Cr DO  08/22/23 0436

## 2023-08-23 LAB
ATRIAL RATE: 70 BPM
P AXIS: 70 DEGREES
PR INTERVAL: 244 MS
QRS AXIS: -26 DEGREES
QRSD INTERVAL: 94 MS
QT INTERVAL: 398 MS
QTC INTERVAL: 453 MS
T WAVE AXIS: 51 DEGREES
VENTRICULAR RATE: 78 BPM

## 2023-09-01 DIAGNOSIS — I48.0 PAROXYSMAL ATRIAL FIBRILLATION (HCC): ICD-10-CM

## 2023-09-01 RX ORDER — RIVAROXABAN 15 MG/1
TABLET, FILM COATED ORAL
Qty: 90 TABLET | Refills: 3 | Status: SHIPPED | OUTPATIENT
Start: 2023-09-01

## 2023-09-11 ENCOUNTER — HOSPITAL ENCOUNTER (OUTPATIENT)
Dept: NON INVASIVE DIAGNOSTICS | Facility: HOSPITAL | Age: 88
Discharge: HOME | End: 2023-09-11
Payer: MEDICARE

## 2023-09-11 DIAGNOSIS — I65.23 CAROTID ATHEROSCLEROSIS, BILATERAL: ICD-10-CM

## 2023-09-11 PROCEDURE — 93880 EXTRACRANIAL BILAT STUDY: CPT | Performed by: SURGERY

## 2023-09-11 PROCEDURE — 93880 EXTRACRANIAL BILAT STUDY: CPT

## 2023-09-13 ENCOUNTER — TELEPHONE (OUTPATIENT)
Dept: CARDIOLOGY CLINIC | Facility: CLINIC | Age: 88
End: 2023-09-13

## 2023-09-13 NOTE — TELEPHONE ENCOUNTER
----- Message from Amos Winter sent at 9/12/2023  5:20 PM EDT -----  Please let Yanique Gamal know that her carotid ultrasound shows only minimal plaque similar to her prior ultrasound. We will monitor with a repeat ultrasound periodically (in 2 years). Thank you!

## 2023-09-15 ENCOUNTER — HOSPITAL ENCOUNTER (OUTPATIENT)
Dept: NON INVASIVE DIAGNOSTICS | Facility: HOSPITAL | Age: 88
Discharge: HOME | End: 2023-09-15
Payer: MEDICARE

## 2023-09-15 DIAGNOSIS — K55.1 MESENTERIC ARTERY STENOSIS (HCC): ICD-10-CM

## 2023-09-15 PROCEDURE — 93975 VASCULAR STUDY: CPT | Performed by: SURGERY

## 2023-09-15 PROCEDURE — 93975 VASCULAR STUDY: CPT

## 2023-09-18 ENCOUNTER — TRANSCRIBE ORDERS (OUTPATIENT)
Dept: ADMINISTRATIVE | Facility: HOSPITAL | Age: 88
End: 2023-09-18

## 2023-09-18 DIAGNOSIS — K55.1 MESENTERIC ARTERY STENOSIS (HCC): Primary | ICD-10-CM

## 2023-10-02 ENCOUNTER — APPOINTMENT (OUTPATIENT)
Dept: LAB | Facility: HOSPITAL | Age: 88
End: 2023-10-02
Payer: MEDICARE

## 2023-10-02 DIAGNOSIS — M81.0 OSTEOPOROSIS, UNSPECIFIED OSTEOPOROSIS TYPE, UNSPECIFIED PATHOLOGICAL FRACTURE PRESENCE: ICD-10-CM

## 2023-10-02 DIAGNOSIS — E55.9 VITAMIN D DEFICIENCY: ICD-10-CM

## 2023-10-02 LAB
25(OH)D3 SERPL-MCNC: 39.4 NG/ML (ref 30–100)
CALCIUM SERPL-MCNC: 9.5 MG/DL (ref 8.4–10.2)
MAGNESIUM SERPL-MCNC: 2.3 MG/DL (ref 1.9–2.7)
PHOSPHATE SERPL-MCNC: 3.6 MG/DL (ref 2.3–4.1)

## 2023-10-02 PROCEDURE — 82310 ASSAY OF CALCIUM: CPT

## 2023-10-02 PROCEDURE — 83735 ASSAY OF MAGNESIUM: CPT

## 2023-10-02 PROCEDURE — 84100 ASSAY OF PHOSPHORUS: CPT

## 2023-10-02 PROCEDURE — 36415 COLL VENOUS BLD VENIPUNCTURE: CPT

## 2023-10-02 PROCEDURE — 82306 VITAMIN D 25 HYDROXY: CPT

## 2023-10-26 ENCOUNTER — REMOTE DEVICE CLINIC VISIT (OUTPATIENT)
Dept: CARDIOLOGY CLINIC | Facility: CLINIC | Age: 88
End: 2023-10-26
Payer: MEDICARE

## 2023-10-26 DIAGNOSIS — Z95.0 CARDIAC PACEMAKER IN SITU: Primary | ICD-10-CM

## 2023-10-26 PROCEDURE — 93294 REM INTERROG EVL PM/LDLS PM: CPT | Performed by: INTERNAL MEDICINE

## 2023-10-26 PROCEDURE — 93296 REM INTERROG EVL PM/IDS: CPT | Performed by: INTERNAL MEDICINE

## 2023-10-26 NOTE — PROGRESS NOTES
Results for orders placed or performed in visit on 10/26/23   Cardiac EP device report    Narrative    MDT DUAL PM/ACTIVE SYSTEM IS MRI CONDITIONAL  CARELINK TRANSMISSION: BATTERY VOLTAGE ADEQUATE (12.4 YRS). AP-68%, -7%. ALL AVAILABLE LEAD PARAMETERS WITHIN NORMAL LIMITS. 9 AF EPISODES TREATED W/ rATP (11.1% PACE TERMINATED) & 2 MONITORED AF EPISODES. AF BURDEN-11.3%. PT ON XARELTO, ASA 81MG & METOPROLOL. NORMAL DEVICE FUNCTION.  GV

## 2023-11-24 RX ORDER — CYCLOBENZAPRINE HCL 5 MG
TABLET ORAL
COMMUNITY
Start: 2023-09-01 | End: 2023-11-28 | Stop reason: ALTCHOICE

## 2023-11-24 RX ORDER — DULOXETIN HYDROCHLORIDE 30 MG/1
CAPSULE, DELAYED RELEASE ORAL
COMMUNITY
Start: 2023-09-26 | End: 2023-11-28 | Stop reason: SINTOL

## 2023-11-27 NOTE — PROGRESS NOTES
Cardiology Follow Up    Trenton Brooks  1935  [phone]  [facility] [place]  [address]  [address]  [phone]    Laureen Hart presents for follow up of atrial fibrillation, syncope, s/p PPM, HTN, HLD.      1. Paroxysmal atrial fibrillation (HCC)  Assessment & Plan:  History of paroxysmal atrial fibrillation with previous use of Tikosyn for > 10 years. She presented with complaint of increasing "a fib episodes", feeling fluttering/palpitations in her chest.  ZIO monitoring from 6/10-6/17/2021 revealed no a fib, one 6-beat run of WCT/VT, 14 runs of SVT, longest 20 beats at 115 BPM, occasional PAC's (3.9%). Echo 4/2021 with EF 55-60%  Metoprolol succinate increased in August 2021 to 25mg in am, 12.5mg in pm. Repeat 24 hour holter showed avg HR of 56bpm with 7.9% PAC burden. She sustained recurrent syncopal events. [facility] nuclear stress test 11/2/2021 without evidence of ischemia. She was transitioned to amiodarone, which she did not tolerate. Referred to EP for possible AV node ablation and PPM insertion. Dr. Olga Lidia Sol performed pacemaker insertion on 2/22/2022 and up-titrated beta blocker. Consider AV node ablation if a fib remains difficult to control. Continue Magnesium supplementation at 250-500mg daily. Continue metoprolol succinate 50 mg in am, 25 mg in pm.  Continue Xarelto for anticoagulation. Will monitor rates with upcoming device interrogations. Most recent interrogation shows 11% AF burden. 2. Syncope, unspecified syncope type  Assessment & Plan:  Sustained a fall in 2/2019 when she was brushing her teeth in the bathroom and fell, hitting her head against the wall. EKG at that time with QTc of 470msec. She reports today another syncopal event while standing in her kitchen. She had received a steroid injection in her knee and her flu vaccine earlier that same day.   We have reviewed the risk of Torsades in Rastakosyn with advancing age. 24 hour holter monitor results above (see atrial fibrillation)  Labs have remained stable  Lexiscan 11/2/21 negative for ischemia. S/P PPM insertion 2/22/22 for sick sinus syndrome. Off amiodarone since that time. No recurrent syncopal events. 3. Pacemaker  Assessment & Plan:  S/P insertion of Medtronic dual chamber PPM on 2/22/2022 for indication of sick sinus syndrome. [facility]. 4. Primary hypertension  Assessment & Plan:  Blood pressure is borderline today but better controlled at home. Currently on amlodipine 10 mg daily, losartan 25 mg daily, and metoprolol succinate 50 mg am, 25 mg pm.  We reviewed benefits of 2 g sodium diet. 5. Mixed hyperlipidemia  Assessment & Plan:  Not on statin therapy at present due to poor tolerance of several statins in the past.  She also has stopped Zetia due to diffuse muscle pain. Lipid panel 6/9/2023: C 266. T 73. H 99. L 152. Recommend LDL < 70 given carotid and mesenteric artery stenosis  We discussed Repatha again today, however given diffuse muscle pains and not feeling well will defer and discuss again at follow up. 6. Carotid atherosclerosis, bilateral  Assessment & Plan:  Carotid ultrasound 5/1/2017 showed moderate bilateral carotid atherosclerosis with 20-49% carotid stenosis bilaterally. Normal vertebral artery flow. Updated duplex 9/11/2023 shows < 50% bilateral ICA stenosis. Ideally LDL < 70. She has not tolerated statins or Zetia in the past.   Taking daily aspirin 81 mg daily. Consider Repatha. 7. Mesenteric artery stenosis [facility])  Assessment & Plan:  U/s imaging 3/21/2022 ordered by PCP for ongoing abdominal pain reveals > 70% stenosis in the celiac and superior mesenteric arteries. She is now following with [facility] vascular surgery. Duplex imaging 9/15/2023 is unchanged from prior. Taking aspirin 81 mg daily, EC, always with food. Reviewed urgent s/s to report.  Monitoring CBC given use with Xarelto. She has reported statin intolerance and also stopped Zetia given diffuse muscle pain. Consider Repatha, which we discussed today. 8. Stage 3 chronic kidney disease, unspecified whether stage 3a or 3b CKD [facility])  Assessment & Plan:  Lab Results   Component Value Date    EGFR 52 08/22/2023    EGFR 38 07/12/2023    EGFR 46 04/18/2023    CREATININE 0.96 08/22/2023    CREATININE 1.25 07/12/2023    CREATININE 1.07 04/18/2023     Stable on recent labs. Will continue to monitor. SHAE Ireland has a past medical history of paroxysmal atrial fibrillation, HTN, HLD, CKD, lumbar spinal stenosis, and osteoporosis. She was previously following with [facility], Dr. Alee Jackson, and presented to Dr. Marsh Gottron to establish care on 6/10/2021. Alba Ireland has been treated for paroxysmal atrial fibrillation with Tikosyn for > 10 years. Her last symptomatic episode from a fib had been in 2019. She sustained a fall  from a potential syncopal event where her legs gave out and her head hit the wall while she was in the bathroom brushing her teeth in 2/2019. Her EKG at the time showed QTc of 470msec. She had met with [facility] EP clinic in 9/2020 and they had discussed switching to amiodarone due to increased risk of Tikosyn induced torsades with advanced age, however, she decided against this.     7 day ZIO monitor showed 1 run of VT (6 beats) and 14 runs of SVT and occasional PAC's. Her metoprolol succinate was increased to 25 mg in am, 12.5 mg in pm. Repeat Holter monitor following up-titration of beta blocker revealed 7.9% PAC burden with an avg HR 56 bpm.     She reported another syncopal event on 9/13/2021 when she was standing in the kitchen and suddenly woke on the floor. When she woke she was tired and sweaty but denies chest pain, shortness of breath, or palpitations. A nuclear stress test was ordered for ischemic work up due to VT on her monitor and syncopal events.  She was transitioned off Tikosyn and onto amiodarone. Cathern Byes nuclear stress test was negative for ischemia. She did not tolerate amiodarone due to dizziness and balance issues and this was eventually discontinued. She was recommended to undergo PPM insertion with possible AV node ablation and was referred to EP. She underwent dual chamber Medtronic PPM insertion at [facility] on 2/22/2022 by Dr. Valerio Tan. The procedure was uneventful. Metoprolol succinate was increased to 50 mg in am, 25 mg in pm. With discussion for possible AV node ablation if rates continued to be difficult to control despite titration of beta blocker. She was referred to vascular surgery for >70% mesenteric stenosis on doppler imaging. She met with vascular surgery on 3/15/2023. They reviewed her mesenteric stenosis with plan for serial monitoring. She was instructed to start an 81 mg aspirin and to discuss cholesterol treatment with our office. She has follow up imaging scheduled for September 2023. She followed up with me on 4/4/2023 at which time she had not yet started aspirin as she was waiting to discuss with me. She was instructed to start. She was experiencing lower leg edema and had been consuming a higher salt diet. She had been seen at [address] [place] [address] ER in March for arm pain and swelling since her Covid vaccine in the fall and venous duplex was negative for DVT. She reported only rare palpitations and no lightheadedness/dizziness. Zetia was added for lipid optimization. At 7/12/2023 follow up she continued to feel unwell. She had stopped Zetia per her PCP recommendation. She was started on a month of prednisone which she completed 6/10/23. This did help somewhat. BP was in the 120-130's at home. PPM interrogation showed 10% AF burden. 11/28/2023: Tawnya Aleman presents for routine follow up. She states that she has been improving since her last visit. She has some soreness in her left deltoid only.  She did meet with rheumatology and was resumed on Prolia, first dose was at the beginning of November and she tolerated well. She denies chest pain, shortness of breath, edema, lightheadedness, palpitations. Her pacemaker was interrogated on 10/26 and showed 11% AF burden. She will be having an in-person interrogation in January. We reviewed her updated carotid and mesenteric imaging. We discussed Juliana Pizarro again today. She is very reluctant to receive injectable medication but is willing to consider it. She has been significantly stressed with the health of her ailing spouse who is now in a nursing home as well as issues with her children. BP ranges from 110-140's at home.      Medical Problems       Problem List       Constipation    Leukocytosis    Hypokalemia    Spinal stenosis of lumbar region with neurogenic claudication    Age-related osteoporosis without current pathological fracture    Primary osteoarthritis of both hips    Paroxysmal atrial fibrillation (HCC)    Syncope    Pacemaker    Hypertension    Hyperlipidemia    Carotid atherosclerosis, bilateral    Stage 3 chronic kidney disease, unspecified whether stage 3a or 3b CKD ([address])    Mesenteric artery stenosis (HCC)    Fracture of multiple ribs of right side    Acute pain of right shoulder    Closed displaced fracture of lateral end of right clavicle    Lumbar spondylosis    Localized edema    Left arm pain    Trochanteric bursitis of right hip        Past Medical History:   Diagnosis Date    A-fib ([address])     Anxiety     Arthritis     R knee    Carotid atherosclerosis, bilateral     GERD (gastroesophageal reflux disease)     Hyperlipidemia     Hypertension     Muscle spasms of neck     Osteoporosis     Spinal stenosis      Social History     Socioeconomic History    Marital status: /Civil Union     Spouse name: Not on file    Number of children: Not on file    Years of education: Not on file    Highest education level: Not on file   Occupational History    Not on file   Tobacco Use    Smoking status: Never    Smokeless tobacco: Never   Vaping Use    Vaping Use: Never used   Substance and Sexual Activity    Alcohol use: Not Currently    Drug use: Never    Sexual activity: Not Currently   Other Topics Concern    Not on file   Social History Narrative    Not on file     Social Determinants of Health     Financial Resource Strain: Not on file   Food Insecurity: Not on file   Transportation Needs: Not on file   Physical Activity: Not on file   Stress: Not on file   Social Connections: Not on file   Intimate Partner Violence: Not on file   Housing Stability: Not on file      Family History   Problem Relation Age of Onset    Cancer Father     Cancer Brother     Heart disease Mother      Past Surgical History:   Procedure Laterality Date    BACK SURGERY      CARDIAC ELECTROPHYSIOLOGY PROCEDURE N/A 2/22/2022    Procedure: Cardiac pacer implant;  Surgeon: Huma Garner MD;  Location:  CARDIAC CATH LAB;   Service: Cardiology    EYE SURGERY      cataracts    FL GUIDED NEEDLE PLAC BX/ASP/INJ  8/2/2022    FL GUIDED NEEDLE PLAC BX/ASP/INJ  9/1/2022    IR SACROPLASTY  3/15/2019    NERVE BLOCK Bilateral 8/2/2022    Procedure: BLOCK MEDIAL BRANCH L3, L4, L5 MBB #1;  Surgeon: Carlos Castaneda MD;  Location: [facility];  Service: Pain Management     NERVE BLOCK Bilateral 9/1/2022    Procedure: BLOCK MEDIAL BRANCH L3, L4, L5;  Surgeon: Carlos Castaneda MD;  Location: [facility];  Service: Pain Management     OH ARTHROCENTESIS ASPIR&/INJ MAJOR JT/BURSA W/O US Right 12/22/2022    Procedure: INJECTION JOINT HIP;  Surgeon: Carlos Castaneda MD;  Location: [facility];  Service: Pain Management     OH ARTHROCENTESIS ASPIR&/INJ MAJOR JT/BURSA W/O US Right 6/6/2023    Procedure: INJECTION JOINT RIGHT GTB INJECTION;  Surgeon: Carlos Castaneda MD;  Location: [facility];  Service: Pain Management     RHIZOTOMY Bilateral 11/17/2022    Procedure: RHIZOTOMY LUMBAR L3, L4, L5 medial branch nerves;  Surgeon: Carlos Castaneda MD;  Location: [facility]; Service: Pain Management        Current Outpatient Medications:     acetaminophen (TYLENOL) 500 mg tablet, Take 2 tablets (1,000 mg total) by mouth every 6 (six) hours as needed for mild pain or moderate pain, Disp: 30 tablet, Rfl: 0    amLODIPine (NORVASC) 5 mg tablet, Take 1 tablet (5 mg total) by mouth daily, Disp: 90 tablet, Rfl: 3    aspirin (ECOTRIN LOW STRENGTH) 81 mg EC tablet, Take 1 tablet (81 mg total) by mouth daily, Disp: 30 tablet, Rfl: 11    calcium citrate-vitamin D (CITRACAL+D) 315-200 MG-UNIT per tablet, Take by mouth, Disp: , Rfl:     docusate sodium (COLACE) 100 mg capsule, Take 100 mg by mouth daily, Disp: , Rfl:     hydrocortisone (ANUSOL-HC) 25 mg suppository, , Disp: , Rfl:     LORazepam (ATIVAN) 2 mg tablet, 3 (three) times a day prn, Disp: , Rfl:     losartan (COZAAR) 25 mg tablet, TAKE 1 TABLET BY MOUTH  DAILY, Disp: 90 tablet, Rfl: 3    magnesium oxide (MAG-OX) 400 mg, Take 400 mg by mouth once, Disp: , Rfl:     metoprolol succinate (TOPROL-XL) 25 mg 24 hr tablet, TAKE 2 TABLETS BY MOUTH IN  THE MORNING AND 1 TAB IN  THE EVENING, Disp: 270 tablet, Rfl: 3    Multiple Vitamins-Minerals (ICAPS AREDS 2 PO), Take 1 tablet by mouth daily , Disp: , Rfl:     polyethylene glycol (MIRALAX) 17 g packet, Take 17 g by mouth daily, Disp: , Rfl:     Xarelto 15 MG tablet, TAKE 1 TABLET BY MOUTH  DAILY WITH DINNER, Disp: 90 tablet, Rfl: 3  Allergies   Allergen Reactions    Ciprofloxacin     Covid-19 (Mrna) Vaccine Edema     Arm and hand swelling     Epinephrine      Irregular heart ryhthm (afib) per pt.     Statins Myalgia     Patient has tried 3-4 different statins and developed muscle pain with all    Bactrim [Sulfamethoxazole-Trimethoprim] Rash    Medical Tape Rash       Labs:     Chemistry        Component Value Date/Time    K 4.4 08/22/2023 1129     08/22/2023 1129    CO2 31 08/22/2023 1129    BUN 21 08/22/2023 1129    CREATININE 0.96 08/22/2023 1129        Component Value Date/Time    CALCIUM 9.5 10/02/2023 1040    ALKPHOS 66 08/22/2023 1129    AST 20 08/22/2023 1129    ALT 11 08/22/2023 1129        Lipid panel 6/9/2023: C 266. T 73. H 99. L 152. Cardiac testing:   Mesentic duplex 9/15/2023:  The abdominal aorta is patent and normal in caliber. There is a >70% stenosis in the celiac and superior mesenteric artery. The splenic and common hepatic arteries are patent and normal in caliber. The inferior mesenteric arteries is patent and normal in fasting state. The left renal artery is patent. There is a >60% stenosis in the right renal artery. In comparison to the study on 3/21/2022, there is no significant change. Carotid duplex 9/11/2023:  < 50% bilateral ICA stenosis    ECG 3/1/2023: Atrial-paced rhythm with prolonged AV conduction and PAC's. VAS celiac and/or mesenteric duplex 3/21/2022: The aorta is patent and normal in caliber. >70% stenosis in the celiac artery. 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 ECG 3/1/2022: Atrial-paced rhythm with prolonged AV conduction. Left ventricular hypertrophy. Rate 73 bpm.     Lexiscan nuclear stress test 11/2/2021:  Normal stress perfusion pattern  Hyperdynamic LV function gated analysis  Low risk perfusion pattern  Stress ECG: No ECG changes meeting criteria for ischemia. Nondiagnostic ECG portion given vasodilator protocol. ECG 10/8/2021: Sinus bradycardia, rate 53, cannot rule out septal infarct, no significant change from previous ECG on 8/10/2021     24 hour holter monitor 9/7/2021:  Predominantly sinus rhythm, HR varied from 39 bpm to 103 bpm.  The patient’s average heart rate was 56 bpm.    17 ventricular ectopic activity. 6195 (7.9%)  supraventricular ectopy. Longest R/R interval was 1.8 seconds. ZIO 6/10-6/17/2021:   Min HR of 35, max HR of 193, and avg HR of 59 bpm.  Predominant underlying rhythm was Sinus Rhythm.    1 run of Ventricular Tachycardia occurred lasting 6 beats with a max rate of 193 bpm (avg 177 bpm).   14 Supraventricular Tachycardia runs occurred, the run with the longest lasting 20 beats with an avg rate of 115 bpm.   Occasional PAC's (3.9%, 77099). Rare PVC's (<1.0%). Echocardiogram 4/2/2021 ([facility]): EF 55-60%. Grade 1 diastolic dysfunction. Mild-moderate AI. Mild-moderate MR. Mild TR. ECG 3/22/2021 ([facility]): sinus rhythm with one PAC, rate 68    Review of Systems   Constitutional: Positive for malaise/fatigue. HENT: Negative. Cardiovascular:  Negative for chest pain, dyspnea on exertion, irregular heartbeat, leg swelling, near-syncope, orthopnea and palpitations. Respiratory:  Negative for cough and snoring. Endocrine: Negative. Skin: Negative. Musculoskeletal:  Positive for back pain. Gastrointestinal: Negative. Genitourinary: Negative. Neurological: Negative. Psychiatric/Behavioral: Negative. Vitals:    11/28/23 1422   BP: 148/80   Pulse:    Temp:    SpO2:      Vitals:    11/28/23 1349   Weight: 74.8 kg (164 lb 12.8 oz)     Height: 5' 2" (157.5 cm)   Body mass index is 30.14 kg/m². Physical Exam  Vitals and nursing note reviewed. Constitutional:       General: She is not in acute distress. Appearance: She is well-developed. She is not diaphoretic. HENT:      Head: Normocephalic and atraumatic. Neck:      Thyroid: No thyromegaly. Vascular: No carotid bruit or JVD. Cardiovascular:      Rate and Rhythm: Normal rate and regular rhythm. Frequent Extrasystoles are present. Pulses: Intact distal pulses. Radial pulses are 2+ on the right side and 2+ on the left side. Heart sounds: Normal heart sounds, S1 normal and S2 normal. No murmur heard. Comments: Trace ankle edema bilaterally  Pulmonary:      Effort: Pulmonary effort is normal.      Breath sounds: Normal breath sounds. Abdominal:      General: There is no distension. Palpations: Abdomen is soft. Tenderness: There is no abdominal tenderness.    Musculoskeletal: General: Normal range of motion. Cervical back: Normal range of motion and neck supple. Comments: Using a walker for assistance with ambulation   Lymphadenopathy:      Cervical: No cervical adenopathy. Skin:     General: Skin is warm and dry. Neurological:      Mental Status: She is alert and oriented to person, place, and time. Cranial Nerves: No cranial nerve deficit.    Psychiatric:         Behavior: Behavior normal.

## 2023-11-28 ENCOUNTER — OFFICE VISIT (OUTPATIENT)
Dept: CARDIOLOGY CLINIC | Facility: CLINIC | Age: 88
End: 2023-11-28
Payer: MEDICARE

## 2023-11-28 VITALS
DIASTOLIC BLOOD PRESSURE: 80 MMHG | HEART RATE: 80 BPM | HEIGHT: 62 IN | OXYGEN SATURATION: 100 % | BODY MASS INDEX: 30.33 KG/M2 | WEIGHT: 164.8 LBS | SYSTOLIC BLOOD PRESSURE: 148 MMHG | TEMPERATURE: 96.9 F

## 2023-11-28 DIAGNOSIS — Z95.0 PACEMAKER: ICD-10-CM

## 2023-11-28 DIAGNOSIS — R55 SYNCOPE, UNSPECIFIED SYNCOPE TYPE: ICD-10-CM

## 2023-11-28 DIAGNOSIS — N18.30 STAGE 3 CHRONIC KIDNEY DISEASE, UNSPECIFIED WHETHER STAGE 3A OR 3B CKD (HCC): ICD-10-CM

## 2023-11-28 DIAGNOSIS — I10 PRIMARY HYPERTENSION: ICD-10-CM

## 2023-11-28 DIAGNOSIS — I48.0 PAROXYSMAL ATRIAL FIBRILLATION (HCC): Primary | ICD-10-CM

## 2023-11-28 DIAGNOSIS — I65.23 CAROTID ATHEROSCLEROSIS, BILATERAL: ICD-10-CM

## 2023-11-28 DIAGNOSIS — E78.2 MIXED HYPERLIPIDEMIA: ICD-10-CM

## 2023-11-28 DIAGNOSIS — K55.1 MESENTERIC ARTERY STENOSIS (HCC): ICD-10-CM

## 2023-11-28 PROCEDURE — 99214 OFFICE O/P EST MOD 30 MIN: CPT | Performed by: NURSE PRACTITIONER

## 2023-11-28 RX ORDER — AMLODIPINE BESYLATE 10 MG/1
10 TABLET ORAL DAILY
Qty: 90 TABLET | Refills: 3 | Status: SHIPPED | OUTPATIENT
Start: 2023-11-28

## 2023-11-28 RX ORDER — LOSARTAN POTASSIUM 25 MG/1
25 TABLET ORAL DAILY
Qty: 90 TABLET | Refills: 3 | Status: SHIPPED | OUTPATIENT
Start: 2023-11-28

## 2023-11-28 NOTE — ASSESSMENT & PLAN NOTE
Blood pressure is borderline today but better controlled at home. Currently on amlodipine 10 mg daily, losartan 25 mg daily, and metoprolol succinate 50 mg am, 25 mg pm.  We reviewed benefits of 2 g sodium diet.

## 2023-11-28 NOTE — ASSESSMENT & PLAN NOTE
Lab Results   Component Value Date    EGFR 52 08/22/2023    EGFR 38 07/12/2023    EGFR 46 04/18/2023    CREATININE 0.96 08/22/2023    CREATININE 1.25 07/12/2023    CREATININE 1.07 04/18/2023     Stable on recent labs. Will continue to monitor.

## 2023-11-28 NOTE — PATIENT INSTRUCTIONS
Your carotid ultrasound continues to show mild plaque. We should repeat testing in 2 years. Please review information on Repatha which is a non-statin injectable for cholesterol control. Please stay on the 10 mg dose of amlodipine and notify me if your BP is persistently > 150/80. Bring your home BP cuff to your next appt  Contact us with any concerns.

## 2023-11-28 NOTE — ASSESSMENT & PLAN NOTE
History of paroxysmal atrial fibrillation with previous use of Tikosyn for > 10 years. She presented with complaint of increasing "a fib episodes", feeling fluttering/palpitations in her chest.  ZIO monitoring from 6/10-6/17/2021 revealed no a fib, one 6-beat run of WCT/VT, 14 runs of SVT, longest 20 beats at 115 BPM, occasional PAC's (3.9%). Echo 4/2021 with EF 55-60%  Metoprolol succinate increased in August 2021 to 25mg in am, 12.5mg in pm. Repeat 24 hour holter showed avg HR of 56bpm with 7.9% PAC burden. She sustained recurrent syncopal events. Andie Zacariass nuclear stress test 11/2/2021 without evidence of ischemia. She was transitioned to amiodarone, which she did not tolerate. Referred to EP for possible AV node ablation and PPM insertion. Dr. Damari Dodson performed pacemaker insertion on 2/22/2022 and up-titrated beta blocker. Consider AV node ablation if a fib remains difficult to control. Continue Magnesium supplementation at 250-500mg daily. Continue metoprolol succinate 50 mg in am, 25 mg in pm.  Continue Xarelto for anticoagulation. Will monitor rates with upcoming device interrogations. Most recent interrogation shows 11% AF burden.

## 2023-11-28 NOTE — ASSESSMENT & PLAN NOTE
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 She is now following with [facility] vascular surgery. Duplex imaging 9/15/2023 is unchanged from prior. Taking aspirin 81 mg daily, EC, always with food. Reviewed urgent s/s to report. Monitoring CBC given use with Xarelto. She has reported statin intolerance and also stopped Zetia given diffuse muscle pain. Consider Repatha, which we discussed today.

## 2023-11-28 NOTE — ASSESSMENT & PLAN NOTE
Carotid ultrasound 5/1/2017 showed moderate bilateral carotid atherosclerosis with 20-49% carotid stenosis bilaterally. Normal vertebral artery flow. Updated duplex 9/11/2023 shows < 50% bilateral ICA stenosis. Ideally LDL < 70. She has not tolerated statins or Zetia in the past.   Taking daily aspirin 81 mg daily. Consider Repatha.

## 2023-11-28 NOTE — ASSESSMENT & PLAN NOTE
S/P insertion of Medtronic dual chamber PPM on 2/22/2022 for indication of sick sinus syndrome. Device managed by [facility]

## 2023-12-11 DIAGNOSIS — I10 PRIMARY HYPERTENSION: ICD-10-CM

## 2023-12-11 RX ORDER — LOSARTAN POTASSIUM 25 MG/1
25 TABLET ORAL DAILY
Qty: 90 TABLET | Refills: 3 | Status: SHIPPED | OUTPATIENT
Start: 2023-12-11

## 2023-12-11 NOTE — TELEPHONE ENCOUNTER
Please let her know that it was sent on 11/28 to [facility]. I am not sure if the e-script did not go through properly but I will send again.  Thank you

## 2023-12-11 NOTE — TELEPHONE ENCOUNTER
Patient called and stated she brought medication to 11/28/23 visit and asked for refill to [facility] but it was not sent     Ordering provider: Dr. Dinora Arrieta: 90 day  Pharmacy: [facility]   Last ov: 11/28/23  Next ov: 3/7/24

## 2023-12-12 NOTE — TELEPHONE ENCOUNTER
Call to patient, advised that medication was sent to pharmacy yesterday evening. Patient appreciative.

## 2024-01-21 ENCOUNTER — TELEPHONE (OUTPATIENT)
Dept: OTHER | Facility: OTHER | Age: 89
End: 2024-01-21

## 2024-01-21 ENCOUNTER — HOSPITAL ENCOUNTER (EMERGENCY)
Facility: HOSPITAL | Age: 89
Discharge: HOME | End: 2024-01-21
Attending: EMERGENCY MEDICINE
Payer: MEDICARE

## 2024-01-21 VITALS
OXYGEN SATURATION: 96 % | DIASTOLIC BLOOD PRESSURE: 72 MMHG | TEMPERATURE: 97.7 F | RESPIRATION RATE: 18 BRPM | HEART RATE: 70 BPM | BODY MASS INDEX: 25.72 KG/M2 | HEIGHT: 62 IN | WEIGHT: 139.77 LBS | SYSTOLIC BLOOD PRESSURE: 166 MMHG

## 2024-01-21 DIAGNOSIS — J02.9 SORE THROAT: Primary | ICD-10-CM

## 2024-01-21 LAB
ALBUMIN SERPL BCP-MCNC: 4.4 G/DL (ref 3.5–5)
ALP SERPL-CCNC: 59 U/L (ref 34–104)
ALT SERPL W P-5'-P-CCNC: 15 U/L (ref 7–52)
ANION GAP SERPL CALCULATED.3IONS-SCNC: 7 MMOL/L
AST SERPL W P-5'-P-CCNC: 20 U/L (ref 13–39)
BASOPHILS # BLD AUTO: 0.05 THOUSANDS/ÂΜL (ref 0–0.1)
BASOPHILS NFR BLD AUTO: 1 % (ref 0–1)
BILIRUB SERPL-MCNC: 0.51 MG/DL (ref 0.2–1)
BUN SERPL-MCNC: 25 MG/DL (ref 5–25)
CALCIUM SERPL-MCNC: 9.6 MG/DL (ref 8.4–10.2)
CHLORIDE SERPL-SCNC: 103 MMOL/L (ref 96–108)
CO2 SERPL-SCNC: 29 MMOL/L (ref 21–32)
CREAT SERPL-MCNC: 0.96 MG/DL (ref 0.6–1.3)
EOSINOPHIL # BLD AUTO: 0.04 THOUSAND/ÂΜL (ref 0–0.61)
EOSINOPHIL NFR BLD AUTO: 0 % (ref 0–6)
ERYTHROCYTE [DISTWIDTH] IN BLOOD BY AUTOMATED COUNT: 13.6 % (ref 11.6–15.1)
FLUAV RNA RESP QL NAA+PROBE: NEGATIVE
FLUBV RNA RESP QL NAA+PROBE: NEGATIVE
GFR SERPL CREATININE-BSD FRML MDRD: 52 ML/MIN/1.73SQ M
GLUCOSE SERPL-MCNC: 122 MG/DL (ref 65–140)
HCT VFR BLD AUTO: 46.2 % (ref 34.8–46.1)
HGB BLD-MCNC: 14.9 G/DL (ref 11.5–15.4)
IMM GRANULOCYTES # BLD AUTO: 0.08 THOUSAND/UL (ref 0–0.2)
IMM GRANULOCYTES NFR BLD AUTO: 1 % (ref 0–2)
LYMPHOCYTES # BLD AUTO: 2.55 THOUSANDS/ÂΜL (ref 0.6–4.47)
LYMPHOCYTES NFR BLD AUTO: 25 % (ref 14–44)
MCH RBC QN AUTO: 29.2 PG (ref 26.8–34.3)
MCHC RBC AUTO-ENTMCNC: 32.3 G/DL (ref 31.4–37.4)
MCV RBC AUTO: 91 FL (ref 82–98)
MONOCYTES # BLD AUTO: 0.83 THOUSAND/ÂΜL (ref 0.17–1.22)
MONOCYTES NFR BLD AUTO: 8 % (ref 4–12)
NEUTROPHILS # BLD AUTO: 6.63 THOUSANDS/ÂΜL (ref 1.85–7.62)
NEUTS SEG NFR BLD AUTO: 65 % (ref 43–75)
NRBC BLD AUTO-RTO: 0 /100 WBCS
PLATELET # BLD AUTO: 423 THOUSANDS/UL (ref 149–390)
PMV BLD AUTO: 9.6 FL (ref 8.9–12.7)
POTASSIUM SERPL-SCNC: 3.7 MMOL/L (ref 3.5–5.3)
PROT SERPL-MCNC: 7 G/DL (ref 6.4–8.4)
RBC # BLD AUTO: 5.1 MILLION/UL (ref 3.81–5.12)
RSV RNA RESP QL NAA+PROBE: NEGATIVE
S PYO DNA THROAT QL NAA+PROBE: NOT DETECTED
SARS-COV-2 RNA RESP QL NAA+PROBE: NEGATIVE
SODIUM SERPL-SCNC: 139 MMOL/L (ref 135–147)
WBC # BLD AUTO: 10.18 THOUSAND/UL (ref 4.31–10.16)

## 2024-01-21 PROCEDURE — 87651 STREP A DNA AMP PROBE: CPT | Performed by: EMERGENCY MEDICINE

## 2024-01-21 PROCEDURE — 80053 COMPREHEN METABOLIC PANEL: CPT | Performed by: EMERGENCY MEDICINE

## 2024-01-21 PROCEDURE — 0241U HB NFCT DS VIR RESP RNA 4 TRGT: CPT | Performed by: EMERGENCY MEDICINE

## 2024-01-21 PROCEDURE — 85025 COMPLETE CBC W/AUTO DIFF WBC: CPT | Performed by: EMERGENCY MEDICINE

## 2024-01-21 PROCEDURE — 99284 EMERGENCY DEPT VISIT MOD MDM: CPT | Performed by: EMERGENCY MEDICINE

## 2024-01-21 PROCEDURE — 96374 THER/PROPH/DIAG INJ IV PUSH: CPT

## 2024-01-21 PROCEDURE — 99284 EMERGENCY DEPT VISIT MOD MDM: CPT

## 2024-01-21 PROCEDURE — 36415 COLL VENOUS BLD VENIPUNCTURE: CPT | Performed by: EMERGENCY MEDICINE

## 2024-01-21 RX ORDER — LORAZEPAM 2 MG/ML
1 INJECTION INTRAMUSCULAR ONCE
Status: COMPLETED | OUTPATIENT
Start: 2024-01-21 | End: 2024-01-21

## 2024-01-21 RX ADMIN — LORAZEPAM 1 MG: 2 INJECTION, SOLUTION INTRAMUSCULAR; INTRAVENOUS at 01:59

## 2024-01-21 NOTE — ED PROVIDER NOTES
History  Chief Complaint   Patient presents with    Sore Throat     Pt having sore throat since January 1st. Pt has been seen by PCP and was given medicines for this but states that as soon as she stops the meds, her throat begins hurting again. Pt also states she is also out of her Ativan but receives more on Monday. Denies any other complaints.      Sore throat since January 1.  No fevers or cough or chills or shortness of breath.  Patient has been treated with amoxicillin, Z-Rob, steroid, with no improvement.  States has been unable to get into see ENT.      History provided by:  Patient   used: No    Sore Throat  Location:  Generalized  Quality:  Aching  Severity:  Moderate  Onset quality:  Unable to specify  Duration:  3 weeks  Timing:  Constant  Progression:  Unchanged  Chronicity:  New  Relieved by:  Nothing  Worsened by:  Nothing  Ineffective treatments:  None tried  Associated symptoms: no abdominal pain, no chest pain, no chills, no cough, no drooling, no ear pain, no eye discharge, no fever, no headaches, no neck stiffness, no rash, no shortness of breath, no sinus congestion, no stridor, no trouble swallowing and no voice change        Prior to Admission Medications   Prescriptions Last Dose Informant Patient Reported? Taking?   LORazepam (ATIVAN) 2 mg tablet  Self Yes No   Sig: 3 (three) times a day prn   Multiple Vitamins-Minerals (ICAPS AREDS 2 PO)  Self Yes No   Sig: Take 1 tablet by mouth daily    Xarelto 15 MG tablet   No No   Sig: TAKE 1 TABLET BY MOUTH  DAILY WITH DINNER   acetaminophen (TYLENOL) 500 mg tablet  Self No No   Sig: Take 2 tablets (1,000 mg total) by mouth every 6 (six) hours as needed for mild pain or moderate pain   amLODIPine (NORVASC) 10 mg tablet   No No   Sig: Take 1 tablet (10 mg total) by mouth daily   aspirin (ECOTRIN LOW STRENGTH) 81 mg EC tablet   No No   Sig: Take 1 tablet (81 mg total) by mouth daily   calcium citrate-vitamin D (CITRACAL+D) 315-200  MG-UNIT per tablet  Self Yes No   Sig: Take by mouth   docusate sodium (COLACE) 100 mg capsule  Self Yes No   Sig: Take 100 mg by mouth daily   hydrocortisone (ANUSOL-HC) 25 mg suppository   Yes No   losartan (COZAAR) 25 mg tablet   No No   Sig: Take 1 tablet (25 mg total) by mouth daily   magnesium oxide (MAG-OX) 400 mg  Self Yes No   Sig: Take 400 mg by mouth once   metoprolol succinate (TOPROL-XL) 25 mg 24 hr tablet   No No   Sig: TAKE 2 TABLETS BY MOUTH IN  THE MORNING AND 1 TAB IN  THE EVENING   polyethylene glycol (MIRALAX) 17 g packet  Self Yes No   Sig: Take 17 g by mouth daily      Facility-Administered Medications: None       Past Medical History:   Diagnosis Date    A-fib (HCC)     Anxiety     Arthritis     R knee    Carotid atherosclerosis, bilateral     GERD (gastroesophageal reflux disease)     Hyperlipidemia     Hypertension     Muscle spasms of neck     Osteoporosis     Spinal stenosis        Past Surgical History:   Procedure Laterality Date    BACK SURGERY      CARDIAC ELECTROPHYSIOLOGY PROCEDURE N/A 2/22/2022    Procedure: Cardiac pacer implant;  Surgeon: Raimundo Isaac MD;  Location:  CARDIAC CATH LAB;  Service: Cardiology    EYE SURGERY      cataracts    FL GUIDED NEEDLE PLAC BX/ASP/INJ  8/2/2022    FL GUIDED NEEDLE PLAC BX/ASP/INJ  9/1/2022    IR SACROPLASTY  3/15/2019    NERVE BLOCK Bilateral 8/2/2022    Procedure: BLOCK MEDIAL BRANCH L3, L4, L5 MBB #1;  Surgeon: Vlad Palmer MD;  Location: [facility];  Service: Pain Management     NERVE BLOCK Bilateral 9/1/2022    Procedure: BLOCK MEDIAL BRANCH L3, L4, L5;  Surgeon: Vlad Palmer MD;  Location: [facility];  Service: Pain Management     MN ARTHROCENTESIS ASPIR&/INJ MAJOR JT/BURSA W/O US Right 12/22/2022    Procedure: INJECTION JOINT HIP;  Surgeon: Vlad Palmer MD;  Location: [facility];  Service: Pain Management     MN ARTHROCENTESIS ASPIR&/INJ MAJOR JT/BURSA W/O US Right 6/6/2023    Procedure: INJECTION JOINT RIGHT GTB INJECTION;   Surgeon: Vlad Palmer MD;  Location: [facility];  Service: Pain Management     RHIZOTOMY Bilateral 11/17/2022    Procedure: RHIZOTOMY LUMBAR L3, L4, L5 medial branch nerves;  Surgeon: Vlad Palmer MD;  Location: [facility];  Service: Pain Management        Family History   Problem Relation Age of Onset    Cancer Father     Cancer Brother     Heart disease Mother      I have reviewed and agree with the history as documented.    E-Cigarette/Vaping    E-Cigarette Use Never User      E-Cigarette/Vaping Substances     Social History     Tobacco Use    Smoking status: Never    Smokeless tobacco: Never   Vaping Use    Vaping status: Never Used   Substance Use Topics    Alcohol use: Not Currently    Drug use: Never       Review of Systems   Constitutional:  Negative for chills and fever.   HENT:  Positive for sore throat. Negative for drooling, ear pain, hearing loss, trouble swallowing and voice change.    Eyes:  Negative for pain and discharge.   Respiratory:  Negative for cough, shortness of breath, wheezing and stridor.    Cardiovascular:  Negative for chest pain and palpitations.   Gastrointestinal:  Negative for abdominal pain, blood in stool, constipation, diarrhea, nausea and vomiting.   Genitourinary:  Negative for dysuria, flank pain, frequency and hematuria.   Musculoskeletal:  Negative for joint swelling, neck pain and neck stiffness.   Skin:  Negative for rash and wound.   Neurological:  Negative for dizziness, seizures, syncope, facial asymmetry and headaches.   Psychiatric/Behavioral:  Negative for hallucinations, self-injury and suicidal ideas.    All other systems reviewed and are negative.      Physical Exam  Physical Exam  Vitals and nursing note reviewed.   Constitutional:       General: She is not in acute distress.     Appearance: She is well-developed.   HENT:      Head: Normocephalic and atraumatic.      Right Ear: Tympanic membrane, ear canal and external ear normal.      Left Ear: Tympanic  membrane, ear canal and external ear normal.      Nose: No congestion or rhinorrhea.      Mouth/Throat:      Mouth: Mucous membranes are moist. No oral lesions.      Pharynx: Oropharynx is clear. Uvula midline. No pharyngeal swelling, oropharyngeal exudate, posterior oropharyngeal erythema or uvula swelling.      Tonsils: No tonsillar exudate or tonsillar abscesses.   Eyes:      General: No scleral icterus.        Right eye: No discharge.         Left eye: No discharge.      Extraocular Movements: Extraocular movements intact.      Conjunctiva/sclera: Conjunctivae normal.   Cardiovascular:      Rate and Rhythm: Normal rate and regular rhythm.      Heart sounds: Normal heart sounds. No murmur heard.  Pulmonary:      Effort: Pulmonary effort is normal.      Breath sounds: Normal breath sounds. No wheezing or rales.   Abdominal:      General: Bowel sounds are normal. There is no distension.      Palpations: Abdomen is soft.      Tenderness: There is no abdominal tenderness. There is no guarding or rebound.   Musculoskeletal:         General: No deformity. Normal range of motion.      Cervical back: Normal range of motion and neck supple.   Skin:     General: Skin is warm and dry.      Findings: No rash.   Neurological:      General: No focal deficit present.      Mental Status: She is alert and oriented to person, place, and time.      Cranial Nerves: No cranial nerve deficit.   Psychiatric:         Mood and Affect: Mood normal.         Behavior: Behavior normal.         Thought Content: Thought content normal.         Judgment: Judgment normal.         Vital Signs  ED Triage Vitals [01/21/24 0134]   Temperature Pulse Respirations Blood Pressure SpO2   97.7 °F (36.5 °C) 77 17 (!) 215/94 98 %      Temp Source Heart Rate Source Patient Position - Orthostatic VS BP Location FiO2 (%)   Temporal Monitor Lying Right arm --      Pain Score       --           Vitals:    01/21/24 0134 01/21/24 0230   BP: (!) 215/94    Pulse: 77  71   Patient Position - Orthostatic VS: Lying          Visual Acuity      ED Medications  Medications   LORazepam (ATIVAN) injection 1 mg (1 mg Intravenous Given 1/21/24 0159)       Diagnostic Studies  Results Reviewed       Procedure Component Value Units Date/Time    COVID[facility], [facility]/B[id number]  (Normal) Collected: 01/21/24 0155    Lab Status: Final result Specimen: Nares from Nose Updated: 01/21/24 0254     SARS-CoV-2 Negative     INFLUENZA A PCR Negative     INFLUENZA B PCR Negative     RSV PCR Negative    Narrative:      FOR PEDIATRIC PATIENTS - copy/paste COVID Guidelines URL to browser: [email or web address]    SARS-CoV-2 assay is a Nucleic Acid Amplification assay intended for the  qualitative detection of nucleic acid from SARS-CoV-2 in nasopharyngeal  swabs. Results are for the presumptive identification of SARS-CoV-2 RNA.    Positive results are indicative of infection with SARS-CoV-2, the virus  causing COVID-19, but do not rule out bacterial infection or co-infection  with other viruses. Laboratories within the [place] and its  territories are required to report all positive results to the appropriate  public health authorities. Negative results do not preclude SARS-CoV-2  infection and should not be used as the sole basis for treatment or other  patient management decisions. Negative results must be combined with  clinical observations, patient history, and epidemiological information.  This test has not been FDA cleared or approved.    This test has been authorized by FDA under an Emergency Use Authorization  (EUA). This test is only authorized for the duration of time the  declaration that circumstances exist justifying the authorization of the  emergency use of an in vitro diagnostic tests for detection of SARS-CoV-2  virus and/or diagnosis of COVID-19 infection under section 564(b)(1) of  the Act, 21 U.S.C.  360bbb-3(b)(1), unless the authorization is terminated  or revoked sooner. The test has been validated but independent review by FDA  and CLIA is pending.    Test performed using [employer] GeneXpert: This RT-PCR assay targets N2,  a region unique to SARS-CoV-2. A conserved region in the E-gene was chosen  for pan-Sarbecovirus detection which includes SARS-CoV-2.    According to CMS-2020-01-R, this platform meets the definition of high-throughput technology.    Strep A PCR [[id number]]  (Normal) Collected: 01/21/24 0155    Lab Status: Final result Specimen: Throat Updated: 01/21/24 0243     STREP A PCR Not Detected    Comprehensive metabolic panel [[id number]] Collected: 01/21/24 0155    Lab Status: Final result Specimen: Blood from Arm, Right Updated: 01/21/24 0228     Sodium 139 mmol/L      Potassium 3.7 mmol/L      Chloride 103 mmol/L      CO2 29 mmol/L      ANION GAP 7 mmol/L      BUN 25 mg/dL      Creatinine 0.96 mg/dL      Glucose 122 mg/dL      Calcium 9.6 mg/dL      AST 20 U/L      ALT 15 U/L      Alkaline Phosphatase 59 U/L      Total Protein 7.0 g/dL      Albumin 4.4 g/dL      Total Bilirubin 0.51 mg/dL      eGFR 52 ml/min/1.73sq m     Narrative:      National Kidney Disease Foundation guidelines for Chronic Kidney Disease (CKD):     Stage 1 with normal or high GFR (GFR > 90 mL/min/1.73 square meters)    Stage 2 Mild CKD (GFR = 60-89 mL/min/1.73 square meters)    Stage 3A Moderate CKD (GFR = 45-59 mL/min/1.73 square meters)    Stage 3B Moderate CKD (GFR = 30-44 mL/min/1.73 square meters)    Stage 4 Severe CKD (GFR = 15-29 mL/min/1.73 square meters)    Stage 5 End Stage CKD (GFR <15 mL/min/1.73 square meters)  Note: GFR calculation is accurate only with a steady state creatinine    CBC and differential [[id number]]  (Abnormal) Collected: 01/21/24 0155    Lab Status: Final result Specimen: Blood from Arm, Right Updated: 01/21/24 0204     WBC 10.18 Thousand/uL      RBC 5.10 Million/uL      Hemoglobin 14.9 g/dL       Hematocrit 46.2 %      MCV 91 fL      MCH 29.2 pg      MCHC 32.3 g/dL      RDW 13.6 %      MPV 9.6 fL      Platelets 423 Thousands/uL      nRBC 0 /100 WBCs      Neutrophils Relative 65 %      Immat GRANS % 1 %      Lymphocytes Relative 25 %      Monocytes Relative 8 %      Eosinophils Relative 0 %      Basophils Relative 1 %      Neutrophils Absolute 6.63 Thousands/µL      Immature Grans Absolute 0.08 Thousand/uL      Lymphocytes Absolute 2.55 Thousands/µL      Monocytes Absolute 0.83 Thousand/µL      Eosinophils Absolute 0.04 Thousand/µL      Basophils Absolute 0.05 Thousands/µL                    No orders to display              Procedures  Procedures         ED Course                               SBIRT 20yo+      Flowsheet Row Most Recent Value   Initial Alcohol Screen: US AUDIT-C     1. How often do you have a drink containing alcohol? 0 Filed at: 01/21/2024 0135   2. How many drinks containing alcohol do you have on a typical day you are drinking?  0 Filed at: 01/21/2024 0135   3a. Male UNDER 65: How often do you have five or more drinks on one occasion? 0 Filed at: 01/21/2024 0135   3b. FEMALE Any Age, or MALE 65+: How often do you have 4 or more drinks on one occassion? 0 Filed at: 01/21/2024 0135   Audit-C Score 0 Filed at: 01/21/2024 0135   GAURAV: How many times in the past year have you...    Used an illegal drug or used a prescription medication for non-medical reasons? Never Filed at: 01/21/2024 0135                      Medical Decision Making  Based on the history and medical screening exam performed the diagnostic considerations include but are not limited to viral pharyngitis, strep pharyngitis, URI, COVID, influenza, RSV, other viral respiratory illness..    Based on the work-up performed in the emergency room which includes physical examination, and which may include laboratory studies and imaging as warranted including advanced imaging such as CT scan or ultrasound, the diagnostic considerations  are narrowed to exclude limb or life-threatening process.    The patient is stable for discharge.  Workup in the emergency room is benign including lab work and strep and COVID swabs.  Patient stable for discharge.  Outpatient follow-up information for ENT provided and ambulatory referral order placed for same.    Amount and/or Complexity of Data Reviewed  Labs: ordered. Decision-making details documented in ED Course.     Details: Benign    Risk  Prescription drug management.             Disposition  Final diagnoses:   Sore throat     Time reflects when diagnosis was documented in both MDM as applicable and the Disposition within this note       Time User Action Codes Description Comment    1/21/2024  2:59 AM Denis Campo Add [J02.9] Sore throat           ED Disposition       ED Disposition   Discharge    Condition   Stable    Date/Time   Sun Jan 21, 2024 0259    Comment   Beth Goldman discharge to home/self care.                   Follow-up Information       Follow up With Specialties Details Why Contact Info    Macario Marrero MD Family Medicine   [address] [facility] [address]  [phone]      Hudson Archer MD Otolaryngology   [address] [place] [address] [address]  [facility] [address]  [phone]      Isha Hannon MD Otolaryngology   [address] [place] [id number]  [phone]              Patient's Medications   Discharge Prescriptions    No medications on file           PDMP Review         Value Time User    PDMP Reviewed  Yes 11/20/2022 11:14 AM Duran Brody DO            ED Provider  Electronically Signed by             Denis Campo MD  01/21/24 0306

## 2024-01-21 NOTE — TELEPHONE ENCOUNTER
Patient is calling regarding cancelling an appointment.    Date/Time: 1/22/24 @10 am    Patient was rescheduled: YES [] NO [x]    Patient requesting call back to reschedule: YES [x] NO []

## 2024-01-21 NOTE — ED NOTES
Pt unable to find a ride at this time. Will try in the morning.      Juli Hernandez RN  01/21/24 8339

## 2024-01-21 NOTE — DISCHARGE INSTRUCTIONS
The workup performed today in the emergency department was normal.  Please schedule follow-up appointment with one of the ear nose throat doctors listed below or the ear nose throat doctor of your choice.    A referral order has been placed in the system for Dr. Archer's office to contact you for an appointment as well

## 2024-01-30 ENCOUNTER — APPOINTMENT (OUTPATIENT)
Dept: LAB | Facility: HOSPITAL | Age: 89
End: 2024-01-30
Payer: MEDICARE

## 2024-01-30 DIAGNOSIS — H90.3 BILATERAL SENSORINEURAL HEARING LOSS: ICD-10-CM

## 2024-01-30 LAB
CREAT SERPL-MCNC: 1.16 MG/DL (ref 0.6–1.3)
GFR SERPL CREATININE-BSD FRML MDRD: 42 ML/MIN/1.73SQ M

## 2024-01-30 PROCEDURE — 82565 ASSAY OF CREATININE: CPT

## 2024-01-30 PROCEDURE — 36415 COLL VENOUS BLD VENIPUNCTURE: CPT

## 2024-02-06 ENCOUNTER — HOSPITAL ENCOUNTER (OUTPATIENT)
Dept: CT IMAGING | Facility: HOSPITAL | Age: 89
Discharge: HOME | End: 2024-02-06
Attending: OTOLARYNGOLOGY
Payer: MEDICARE

## 2024-02-06 DIAGNOSIS — H90.3 SENSORINEURAL HEARING LOSS, BILATERAL: ICD-10-CM

## 2024-02-06 PROCEDURE — 70470 CT HEAD/BRAIN W/O & W/DYE: CPT

## 2024-02-06 RX ADMIN — IOHEXOL 85 ML: 350 INJECTION, SOLUTION INTRAVENOUS at 13:13

## 2024-02-19 ENCOUNTER — IN-CLINIC DEVICE VISIT (OUTPATIENT)
Dept: CARDIOLOGY CLINIC | Facility: CLINIC | Age: 89
End: 2024-02-19
Payer: MEDICARE

## 2024-02-19 DIAGNOSIS — Z95.0 PRESENCE OF CARDIAC PACEMAKER: Primary | ICD-10-CM

## 2024-02-19 PROCEDURE — 93280 PM DEVICE PROGR EVAL DUAL: CPT | Performed by: INTERNAL MEDICINE

## 2024-02-19 NOTE — PROGRESS NOTES
Results for orders placed or performed in visit on 02/19/24   Cardiac EP device report    Narrative    MDT DUAL PM/ACTIVE SYSTEM IS MRI CONDITIONAL  DEVICE INTERROGATED IN THE [place] OFFICE: BATTERY VOLTAGE ADEQUATE (12.1 YRS). AP: 69.8%. : 9.9% (MVP-ON). ALL LEAD PARAMETERS WITHIN NORMAL LIMITS. 1 VT EPISODE W/ EGM SHOWING NSVT 13 BEATS @ 175 BPM. 1 FAST A& V EPISODE W/ EGM SHOWING SVT-ST @ 131 BPM, DURATION 8 SECS. 29 AT/AF EPISODES W/ AVAIL EGMS SHOWING AF, MAX DURATION >23 HRS. 123 TREATED AT/AF EPISODES W/rATP (16: 123~13%) AF BURDEN: 16.8%. PT TAKES XARELTO, METOPROLOL SUCC, ASA 81MG. EF: 55-60% (ECHO 4/2/21). NO PROGRAMMING CHANGES MADE TO DEVICE PARAMETERS. NORMAL DEVICE FUNCTION. CH

## 2024-03-01 RX ORDER — FAMOTIDINE 20 MG/1
TABLET, FILM COATED ORAL
COMMUNITY
Start: 2024-01-09

## 2024-03-01 RX ORDER — MONTELUKAST SODIUM 10 MG/1
TABLET ORAL
COMMUNITY
Start: 2024-01-26

## 2024-03-01 RX ORDER — IPRATROPIUM BROMIDE 42 UG/1
SPRAY, METERED NASAL
COMMUNITY
Start: 2024-01-26

## 2024-03-07 ENCOUNTER — OFFICE VISIT (OUTPATIENT)
Dept: CARDIOLOGY CLINIC | Facility: CLINIC | Age: 89
End: 2024-03-07
Payer: MEDICARE

## 2024-03-07 VITALS
WEIGHT: 133.6 LBS | OXYGEN SATURATION: 100 % | HEIGHT: 62 IN | BODY MASS INDEX: 24.59 KG/M2 | SYSTOLIC BLOOD PRESSURE: 140 MMHG | HEART RATE: 60 BPM | DIASTOLIC BLOOD PRESSURE: 78 MMHG | TEMPERATURE: 97.2 F

## 2024-03-07 DIAGNOSIS — E78.2 MIXED HYPERLIPIDEMIA: ICD-10-CM

## 2024-03-07 DIAGNOSIS — N18.30 STAGE 3 CHRONIC KIDNEY DISEASE, UNSPECIFIED WHETHER STAGE 3A OR 3B CKD (HCC): ICD-10-CM

## 2024-03-07 DIAGNOSIS — K55.1 MESENTERIC ARTERY STENOSIS (HCC): ICD-10-CM

## 2024-03-07 DIAGNOSIS — Z95.0 PACEMAKER: ICD-10-CM

## 2024-03-07 DIAGNOSIS — I10 PRIMARY HYPERTENSION: ICD-10-CM

## 2024-03-07 DIAGNOSIS — R55 SYNCOPE, UNSPECIFIED SYNCOPE TYPE: ICD-10-CM

## 2024-03-07 DIAGNOSIS — I65.23 CAROTID ATHEROSCLEROSIS, BILATERAL: ICD-10-CM

## 2024-03-07 DIAGNOSIS — I48.0 PAROXYSMAL ATRIAL FIBRILLATION (HCC): Primary | ICD-10-CM

## 2024-03-07 PROCEDURE — 99214 OFFICE O/P EST MOD 30 MIN: CPT | Performed by: NURSE PRACTITIONER

## 2024-03-07 RX ORDER — COLESEVELAM 180 1/1
1250 TABLET ORAL 2 TIMES DAILY WITH MEALS
Qty: 120 TABLET | Refills: 11 | Status: SHIPPED | OUTPATIENT
Start: 2024-03-07

## 2024-03-07 RX ORDER — METOPROLOL SUCCINATE 25 MG/1
50 TABLET, EXTENDED RELEASE ORAL 2 TIMES DAILY
Qty: 270 TABLET | Refills: 3 | Status: SHIPPED | OUTPATIENT
Start: 2024-03-07

## 2024-03-07 NOTE — PATIENT INSTRUCTIONS
Trial Welchol (colesevelam) for cholesterol - you can start with 1 pill daily and slowly increase until you are taking 2 pills twice daily as tolerated.  Recheck cholesterol in June.  17% a fib on device check. Increase metoprolol succinate to 50 mg twice daily (this will be 2 of your 25 mg pills taken together). When you get down to 2 weeks left, contact the office and I will order 50 mg pills.

## 2024-03-07 NOTE — PROGRESS NOTES
"Cardiology Follow Up    Beth Goldman  1935  [id number]  [facility] [place] [address]  [address]  [facility] [address] [phone]    Beth presents for follow up of atrial fibrillation, syncope, s/p PPM, HTN, HLD.     1. Paroxysmal atrial fibrillation (HCC)  Assessment & Plan:  History of paroxysmal atrial fibrillation with previous use of Tikosyn for > 10 years.  She presented with complaint of increasing \"a fib episodes\", feeling fluttering/palpitations in her chest.  ZIO monitoring from 6/10-6/17/2021 revealed no a fib, one 6-beat run of WCT/VT, 14 runs of SVT, longest 20 beats at 115 BPM, occasional PAC's (3.9%).  Echo 4/2021 with EF 55-60%  Metoprolol succinate increased in August 2021 to 25mg in am, 12.5mg in pm. Repeat 24 hour holter showed avg HR of 56bpm with 7.9% PAC burden.  She sustained recurrent syncopal events.  Lexiscan nuclear stress test 11/2/2021 without evidence of ischemia.  She was transitioned to amiodarone, which she did not tolerate.  Referred to EP for possible AV node ablation and PPM insertion.  Dr. Isaac performed pacemaker insertion on 2/22/2022 and up-titrated beta blocker.   Consider AV node ablation if a fib remains difficult to control.  16.8% AF burden with 2 fast rate episodes on 2/19/24 device check.  Increase metoprolol succinate to 50 mg BID.  Continue Magnesium supplementation at 250-500mg daily.  Continue Xarelto for anticoagulation.  Will monitor rates with upcoming device interrogations.     Orders:  -     metoprolol succinate (TOPROL-XL) 25 mg 24 hr tablet; Take 2 tablets (50 mg total) by mouth 2 (two) times a day TAKE 2 TABLETS BY MOUTH IN  THE MORNING AND 1 TAB IN  THE EVENING    2. Syncope, unspecified syncope type  Assessment & Plan:  Sustained a fall in 2/2019 when she was brushing her teeth in the bathroom and fell, hitting her head against the wall. EKG at that time with QTc of 470msec.  She " reports today another syncopal event while standing in her kitchen. She had received a steroid injection in her knee and her flu vaccine earlier that same day.  We have reviewed the risk of Torsades in Tikosyn with advancing age.   24 hour holter monitor results above (see atrial fibrillation)  Labs have remained stable  Lexiscan 11/2/21 negative for ischemia.  S/P PPM insertion 2/22/22 for sick sinus syndrome. Off amiodarone since that time.  No recurrent syncopal events.      3. Pacemaker  Assessment & Plan:  S/P insertion of Medtronic dual chamber PPM on 2/22/2022 for indication of sick sinus syndrome.  Device managed by [facility].      4. Primary hypertension  Assessment & Plan:  Blood pressure is acceptable.  Currently on amlodipine 10 mg daily, losartan 25 mg daily.  Increase metoprolol succinate to 50 mg BID.  We reviewed benefits of 2 g sodium diet.      5. Mixed hyperlipidemia  Assessment & Plan:  Not on statin therapy at present due to poor tolerance of several statins in the past.  She also has stopped Zetia due to diffuse muscle pain.  Lipid panel 6/9/2023: C 266. T 73. H 99. L 152.  Recommend LDL < 70 given carotid and mesenteric artery stenosis  We discussed Repatha again today, however she is reluctant.  She wishes to trial Welchol. Will titrate up as tolerated    Orders:  -     colesevelam (WELCHOL) 625 mg tablet; Take 2 tablets (1,250 mg total) by mouth 2 (two) times a day with meals  -     Lipid Panel with Direct LDL reflex; Future; Expected date: 06/07/2024    6. Carotid atherosclerosis, bilateral  Assessment & Plan:  Carotid ultrasound 5/1/2017 showed moderate bilateral carotid atherosclerosis with 20-49% carotid stenosis bilaterally. Normal vertebral artery flow.  Updated duplex 9/11/2023 shows < 50% bilateral ICA stenosis.  Ideally LDL < 70. She has not tolerated statins or Zetia in the past.   Taking daily aspirin 81 mg daily.  Consider Repatha. Wants to try Welchol      7. 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  She is now following with [facility] vascular surgery.  Duplex imaging 9/15/2023 is unchanged from prior.  Taking aspirin 81 mg daily, EC, always with food. Reviewed urgent s/s to report. Monitoring CBC given use with Xarelto.  She has reported statin intolerance and also stopped Zetia given diffuse muscle pain.  Consider Repatha, which we discussed today.  She wishes to trial Welchol first      8. Stage 3 chronic kidney disease, unspecified whether stage 3a or 3b CKD ([facility])  Assessment & Plan:  Lab Results   Component Value Date    EGFR 42 01/30/2024    EGFR 52 01/21/2024    EGFR 52 08/22/2023    CREATININE 1.16 01/30/2024    CREATININE 0.96 01/21/2024    CREATININE 0.96 08/22/2023     Stable on recent labs. Will continue to monitor.           HPI  Beth has a past medical history of paroxysmal atrial fibrillation, HTN, HLD, statin intolerance, CKD, lumbar spinal stenosis, and osteoporosis.     She was previously following with [facility], Dr. Salmeron, and presented to Dr. Aguila to establish care on 6/10/2021.     Beth has been treated for paroxysmal atrial fibrillation with Tikosyn for > 10 years. Her last symptomatic episode from a fib had been in 2019. She sustained a fall  from a potential syncopal event where her legs gave out and her head hit the wall while she was in the bathroom brushing her teeth in 2/2019. Her EKG at the time showed QTc of 470msec.     She had met with [facility] EP clinic in 9/2020 and they had discussed switching to amiodarone due to increased risk of Tikosyn induced torsades with advanced age, however, she decided against this.     7 day ZIO monitor showed 1 run of VT (6 beats) and 14 runs of SVT and occasional PAC's. Her metoprolol succinate was increased to 25 mg in am, 12.5 mg in pm. Repeat Holter monitor  following up-titration of beta blocker revealed 7.9% PAC burden with an avg HR 56 bpm.     She reported another syncopal event on 9/13/2021 when she was standing in the kitchen and suddenly woke on the floor. When she woke she was tired and sweaty but denies chest pain, shortness of breath, or palpitations. A nuclear stress test was ordered for ischemic work up due to VT on her monitor and syncopal events. She was transitioned off Tikosyn and onto amiodarone.     Lexiscan nuclear stress test was negative for ischemia. She did not tolerate amiodarone due to dizziness and balance issues and this was eventually discontinued.     She was recommended to undergo PPM insertion with possible AV node ablation and was referred to EP. She underwent dual chamber Medtronic PPM insertion at [facility] on 2/22/2022 by Dr. Isaac. The procedure was uneventful. Metoprolol succinate was increased to 50 mg in am, 25 mg in pm. With discussion for possible AV node ablation if rates continued to be difficult to control despite titration of beta blocker.     She was referred to vascular surgery for >70% mesenteric stenosis on doppler imaging. She met with vascular surgery on 3/15/2023. They reviewed her mesenteric stenosis with plan for serial monitoring. She was instructed to start an 81 mg aspirin and to discuss cholesterol treatment with our office. She has follow up imaging scheduled for September 2023. She was started on Zetia as she had not tolerated statins, however this was ultimately discontinued by her PCP in the summer of 2023 due to muscle pain and feeling generally unwell.     She followed up most recently on 11/28/2023. She had been evaluated by rheumatology due to diffuse muscle pains and left sided swelling. No definitive diagnosis was made. Symptoms were steadily improving (she noted onset of symptoms after a Covid vaccine). Prolia was resumed. We discussed trial of Repatha but she declined.     3/7/2024: Beth presents for  routine follow up. She is tearful. She buried her spouse yesterday. She states she is stable from a cardiac standpoint. No chest pains, shortness of breath, lightheadedness. No recent muscle pain. She continues with trivial left sided swelling for the past year, this has significantly improved since onset. She states she discussed Repatha with her pharmacist and he wrote down a suggesting of trying Welchol. She had a device interrogation on 2/19/24 which showed normal device function with 16.8% AF burden, 1 VT episodes at 13 beats and 1 SVT/ST lasting 8 seconds.     Medical Problems       Problem List       Constipation    Leukocytosis    Hypokalemia    Spinal stenosis of lumbar region with neurogenic claudication    Age-related osteoporosis without current pathological fracture    Primary osteoarthritis of both hips    Paroxysmal atrial fibrillation (HCC)    Syncope    Pacemaker    Hypertension    Hyperlipidemia    Carotid atherosclerosis, bilateral    Stage 3 chronic kidney disease, unspecified whether stage 3a or 3b CKD ([facility])    Mesenteric artery stenosis ([facility])    Fracture of multiple ribs of right side    Acute pain of right shoulder    Closed displaced fracture of lateral end of right clavicle    Lumbar spondylosis    Localized edema    Left arm pain    Trochanteric bursitis of right hip        Past Medical History:   Diagnosis Date    A-fib ([facility])     Anxiety     Arthritis     R knee    Carotid atherosclerosis, bilateral     GERD (gastroesophageal reflux disease)     Hyperlipidemia     Hypertension     Muscle spasms of neck     Osteoporosis     Spinal stenosis      Social History     Socioeconomic History    Marital status: /Civil Union     Spouse name: Not on file    Number of children: Not on file    Years of education: Not on file    Highest education level: Not on file   Occupational History    Not on file   Tobacco Use    Smoking status: Never    Smokeless tobacco: Never   Vaping Use    Vaping  status: Never Used   Substance and Sexual Activity    Alcohol use: Not Currently    Drug use: Never    Sexual activity: Not Currently   Other Topics Concern    Not on file   Social History Narrative    Not on file     Social Determinants of Health     Financial Resource Strain: Not on file   Food Insecurity: Not on file   Transportation Needs: Not on file   Physical Activity: Not on file   Stress: Not on file   Social Connections: Not on file   Intimate Partner Violence: Not on file   Housing Stability: Not on file      Family History   Problem Relation Age of Onset    Cancer Father     Cancer Brother     Heart disease Mother      Past Surgical History:   Procedure Laterality Date    BACK SURGERY      CARDIAC ELECTROPHYSIOLOGY PROCEDURE N/A 2/22/2022    Procedure: Cardiac pacer implant;  Surgeon: Raimundo Isaca MD;  Location: [facility];  Service: Cardiology    EYE SURGERY      cataracts    FL GUIDED NEEDLE PLAC BX/ASP/INJ  8/2/2022    FL GUIDED NEEDLE PLAC BX/ASP/INJ  9/1/2022    IR SACROPLASTY  3/15/2019    NERVE BLOCK Bilateral 8/2/2022    Procedure: BLOCK MEDIAL BRANCH L3, L4, L5 MBB #1;  Surgeon: Vlad Palmer MD;  Location: [facility];  Service: Pain Management     NERVE BLOCK Bilateral 9/1/2022    Procedure: BLOCK MEDIAL BRANCH L3, L4, L5;  Surgeon: Vlad Palmer MD;  Location: [facility];  Service: Pain Management     AK ARTHROCENTESIS ASPIR&/INJ MAJOR JT/BURSA W/O US Right 12/22/2022    Procedure: INJECTION JOINT HIP;  Surgeon: Vlad Palmer MD;  Location: [facility];  Service: Pain Management     AK ARTHROCENTESIS ASPIR&/INJ MAJOR JT/BURSA W/O US Right 6/6/2023    Procedure: INJECTION JOINT RIGHT GTB INJECTION;  Surgeon: Vlad Palmer MD;  Location: [facility];  Service: Pain Management     RHIZOTOMY Bilateral 11/17/2022    Procedure: RHIZOTOMY LUMBAR L3, L4, L5 medial branch nerves;  Surgeon: Vlad Palmer MD;  Location: [facility];  Service: Pain Management        Current Outpatient  Medications:     acetaminophen (TYLENOL) 500 mg tablet, Take 2 tablets (1,000 mg total) by mouth every 6 (six) hours as needed for mild pain or moderate pain, Disp: 30 tablet, Rfl: 0    amLODIPine (NORVASC) 10 mg tablet, Take 1 tablet (10 mg total) by mouth daily, Disp: 90 tablet, Rfl: 3    aspirin (ECOTRIN LOW STRENGTH) 81 mg EC tablet, Take 1 tablet (81 mg total) by mouth daily, Disp: 30 tablet, Rfl: 11    calcium citrate-vitamin D (CITRACAL+D) 315-200 MG-UNIT per tablet, Take by mouth, Disp: , Rfl:     colesevelam (WELCHOL) 625 mg tablet, Take 2 tablets (1,250 mg total) by mouth 2 (two) times a day with meals, Disp: 120 tablet, Rfl: 11    docusate sodium (COLACE) 100 mg capsule, Take 100 mg by mouth daily, Disp: , Rfl:     hydrocortisone (ANUSOL-HC) 25 mg suppository, , Disp: , Rfl:     ipratropium (ATROVENT) 0.06 % nasal spray, , Disp: , Rfl:     LORazepam (ATIVAN) 2 mg tablet, 3 (three) times a day prn, Disp: , Rfl:     losartan (COZAAR) 25 mg tablet, Take 1 tablet (25 mg total) by mouth daily, Disp: 90 tablet, Rfl: 3    magnesium oxide (MAG-OX) 400 mg, Take 400 mg by mouth once, Disp: , Rfl:     metoprolol succinate (TOPROL-XL) 25 mg 24 hr tablet, Take 2 tablets (50 mg total) by mouth 2 (two) times a day TAKE 2 TABLETS BY MOUTH IN  THE MORNING AND 1 TAB IN  THE EVENING, Disp: 270 tablet, Rfl: 3    Multiple Vitamins-Minerals (ICAPS AREDS 2 PO), Take 1 tablet by mouth daily , Disp: , Rfl:     polyethylene glycol (MIRALAX) 17 g packet, Take 17 g by mouth daily, Disp: , Rfl:     Xarelto 15 MG tablet, TAKE 1 TABLET BY MOUTH  DAILY WITH DINNER, Disp: 90 tablet, Rfl: 3    famotidine (PEPCID) 20 mg tablet, , Disp: , Rfl:     montelukast (SINGULAIR) 10 mg tablet, , Disp: , Rfl:   Allergies   Allergen Reactions    Ciprofloxacin     Covid-19 (Mrna) Vaccine Edema     Arm and hand swelling     Epinephrine      Irregular heart ryhthm (afib) per pt.    Statins Myalgia     Patient has tried 3-4 different statins and developed  muscle pain with all    Bactrim [Sulfamethoxazole-Trimethoprim] Rash    Medical Tape Rash       Labs:     Chemistry        Component Value Date/Time    K 3.7 01/21/2024 0155    K 4.5 04/03/2018 1021     01/21/2024 0155     04/03/2018 1021    CO2 29 01/21/2024 0155    CO2 29 04/03/2018 1021    BUN 25 01/21/2024 0155    BUN 15 04/03/2018 1021    CREATININE 1.16 01/30/2024 1307    CREATININE 0.80 05/29/2019 0948        Component Value Date/Time    CALCIUM 9.6 01/21/2024 0155    CALCIUM 9.1 05/27/2020 1333    ALKPHOS 59 01/21/2024 0155    ALKPHOS 62 04/03/2018 1021    AST 20 01/21/2024 0155    AST 21 04/03/2018 1021    ALT 15 01/21/2024 0155    ALT 18 04/03/2018 1021        Lipid panel 6/9/2023: C 266. T 73. H 99.L 152.     Imaging:   Cardiac EP device report 2/19/2024  MDT DUAL PM/ACTIVE SYSTEM IS MRI CONDITIONAL DEVICE INTERROGATED IN THE [facility] OFFICE: BATTERY VOLTAGE ADEQUATE (12.1 YRS). AP: 69.8%. : 9.9% (MVP-ON). ALL LEAD PARAMETERS WITHIN NORMAL LIMITS. 1 VT EPISODE W/ EGM SHOWING NSVT 13 BEATS @ 175 BPM. 1 FAST A& V EPISODE W/ EGM SHOWING SVT-ST @ 131 BPM, DURATION 8 SECS. 29 AT/AF EPISODES W/ AVAIL EGMS SHOWING AF, MAX DURATION >23 HRS. 123 TREATED AT/AF EPISODES W/rATP (16: 123~13%) AF BURDEN: 16.8%. PT TAKES XARELTO, METOPROLOL SUCC, ASA 81MG. EF: 55-60% (ECHO 4/2/21). NO PROGRAMMING CHANGES MADE TO DEVICE PARAMETERS. NORMAL DEVICE FUNCTION.     Mesentic duplex 9/15/2023:  The abdominal aorta is patent and normal in caliber.  There is a >70% stenosis in the celiac and superior mesenteric artery.  The splenic and common hepatic arteries are patent and normal in caliber.  The inferior mesenteric arteries is patent and normal in fasting state.     The left renal artery is patent.  There is a >60% stenosis in the right renal artery.     In comparison to the study on 3/21/2022, there is no significant change.     Carotid duplex 9/11/2023:  < 50% bilateral ICA stenosis     ECG 3/1/2023:  Atrial-paced rhythm with prolonged AV conduction and PAC's.     VAS celiac and/or mesenteric duplex 3/21/2022:  The aorta is patent and normal in caliber. >70% stenosis in the celiac artery. 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      ECG 3/1/2022: Atrial-paced rhythm with prolonged AV conduction. Left ventricular hypertrophy. Rate 73 bpm.     Lexiscan nuclear stress test 11/2/2021:  Normal stress perfusion pattern  Hyperdynamic LV function gated analysis  Low risk perfusion pattern  Stress ECG: No ECG changes meeting criteria for ischemia.  Nondiagnostic ECG portion given vasodilator protocol.     ECG 10/8/2021: Sinus bradycardia, rate 53, cannot rule out septal infarct, no significant change from previous ECG on 8/10/2021     24 hour holter monitor 9/7/2021:  Predominantly sinus rhythm, HR varied from 39 bpm to 103 bpm.  The patient’s average heart rate was 56 bpm.    17 ventricular ectopic activity. 6195 (7.9%)  supraventricular ectopy.  Longest R/R interval was 1.8 seconds.     ZIO 6/10-6/17/2021:   Min HR of 35, max HR of 193, and avg HR of 59 bpm.  Predominant underlying rhythm was Sinus Rhythm.   1 run of Ventricular Tachycardia occurred lasting 6 beats with a max rate of 193 bpm (avg 177 bpm).   14 Supraventricular Tachycardia runs occurred, the run with the longest lasting 20 beats with an avg rate of 115 bpm.   Occasional PAC's (3.9%, 45369). Rare PVC's (<1.0%).     Echocardiogram 4/2/2021 ([facility]): EF 55-60%. Grade 1 diastolic dysfunction. Mild-moderate AI. Mild-moderate MR. Mild TR.     ECG 3/22/2021 ([facility]): sinus rhythm with one PAC, rate 68    Review of Systems   Constitutional: Negative.   HENT: Negative.     Cardiovascular:  Positive for leg swelling (left, mild). Negative for chest pain, dyspnea on exertion, irregular heartbeat, near-syncope, orthopnea and palpitations.   Respiratory:  Negative for cough and snoring.    Endocrine: Negative.    Skin: Negative.    Musculoskeletal: Negative.   "  Gastrointestinal: Negative.    Genitourinary: Negative.    Neurological: Negative.    Psychiatric/Behavioral: Negative.         Vitals:    03/07/24 1337   BP: 140/78   Pulse:    Temp:    SpO2:      Vitals:    03/07/24 1254   Weight: 60.6 kg (133 lb 9.6 oz)     Height: 5' 2\" (157.5 cm)   Body mass index is 24.44 kg/m².    Physical Exam  Vitals and nursing note reviewed.   Constitutional:       General: She is not in acute distress.     Appearance: She is well-developed. She is not diaphoretic.   HENT:      Head: Normocephalic and atraumatic.   Neck:      Thyroid: No thyromegaly.      Vascular: No carotid bruit or JVD.   Cardiovascular:      Rate and Rhythm: Normal rate and regular rhythm.      Pulses: Intact distal pulses.           Radial pulses are 2+ on the right side and 2+ on the left side.      Heart sounds: Normal heart sounds, S1 normal and S2 normal. No murmur heard.     Comments: Trivial left ankle edema  Pulmonary:      Effort: Pulmonary effort is normal.      Breath sounds: Normal breath sounds.   Abdominal:      General: There is no distension.      Palpations: Abdomen is soft.      Tenderness: There is no abdominal tenderness.   Musculoskeletal:         General: Normal range of motion.      Cervical back: Normal range of motion and neck supple.   Lymphadenopathy:      Cervical: No cervical adenopathy.   Skin:     General: Skin is warm and dry.   Neurological:      Mental Status: She is alert and oriented to person, place, and time.      Cranial Nerves: No cranial nerve deficit.   Psychiatric:         Behavior: Behavior normal.                "

## 2024-03-08 ENCOUNTER — TELEPHONE (OUTPATIENT)
Dept: CARDIOLOGY CLINIC | Facility: CLINIC | Age: 89
End: 2024-03-08

## 2024-03-08 NOTE — TELEPHONE ENCOUNTER
Pt states that the welchol is saying that she needs to take it four hours after taking her medications. But she is supposed to take with her meals. States that she is concerned that she will not be able to because then she would have to wake up at 4 am to take her normal med's.    Can she take them all together.

## 2024-03-08 NOTE — TELEPHONE ENCOUNTER
If she is willing, she could try taking it at noon (this should be at least 3 hours apart from morning and evening meals). I am ok with once daily dosing for now.

## 2024-03-10 NOTE — ASSESSMENT & PLAN NOTE
Lab Results   Component Value Date    EGFR 42 01/30/2024    EGFR 52 01/21/2024    EGFR 52 08/22/2023    CREATININE 1.16 01/30/2024    CREATININE 0.96 01/21/2024    CREATININE 0.96 08/22/2023     Stable on recent labs. Will continue to monitor.

## 2024-03-10 NOTE — ASSESSMENT & PLAN NOTE
Blood pressure is acceptable.  Currently on amlodipine 10 mg daily, losartan 25 mg daily.  Increase metoprolol succinate to 50 mg BID.  We reviewed benefits of 2 g sodium diet.

## 2024-03-10 NOTE — ASSESSMENT & PLAN NOTE
S/P insertion of Medtronic dual chamber PPM on 2/22/2022 for indication of sick sinus syndrome.  Device managed by [facility]

## 2024-03-10 NOTE — ASSESSMENT & PLAN NOTE
Not on statin therapy at present due to poor tolerance of several statins in the past.  She also has stopped Zetia due to diffuse muscle pain.  Lipid panel 6/9/2023: C 266. T 73. H 99. L 152.  Recommend LDL < 70 given carotid and mesenteric artery stenosis  We discussed Repatha again today, however she is reluctant.  She wishes to trial Welchol. Will titrate up as tolerated

## 2024-03-10 NOTE — ASSESSMENT & PLAN NOTE
"History of paroxysmal atrial fibrillation with previous use of Tikosyn for > 10 years.  She presented with complaint of increasing \"a fib episodes\", feeling fluttering/palpitations in her chest.  ZIO monitoring from 6/10-6/17/2021 revealed no a fib, one 6-beat run of WCT/VT, 14 runs of SVT, longest 20 beats at 115 BPM, occasional PAC's (3.9%).  Echo 4/2021 with EF 55-60%  Metoprolol succinate increased in August 2021 to 25mg in am, 12.5mg in pm. Repeat 24 hour holter showed avg HR of 56bpm with 7.9% PAC burden.  She sustained recurrent syncopal events.  Lexiscan nuclear stress test 11/2/2021 without evidence of ischemia.  She was transitioned to amiodarone, which she did not tolerate.  Referred to EP for possible AV node ablation and PPM insertion.  Dr. Isaac performed pacemaker insertion on 2/22/2022 and up-titrated beta blocker.   Consider AV node ablation if a fib remains difficult to control.  16.8% AF burden with 2 fast rate episodes on 2/19/24 device check.  Increase metoprolol succinate to 50 mg BID.  Continue Magnesium supplementation at 250-500mg daily.  Continue Xarelto for anticoagulation.  Will monitor rates with upcoming device interrogations.   "

## 2024-03-10 NOTE — ASSESSMENT & PLAN NOTE
Carotid ultrasound 5/1/2017 showed moderate bilateral carotid atherosclerosis with 20-49% carotid stenosis bilaterally. Normal vertebral artery flow.  Updated duplex 9/11/2023 shows < 50% bilateral ICA stenosis.  Ideally LDL < 70. She has not tolerated statins or Zetia in the past.   Taking daily aspirin 81 mg daily.  Consider Repatha. Wants to try Welchol

## 2024-04-12 DIAGNOSIS — I48.0 PAROXYSMAL ATRIAL FIBRILLATION (HCC): ICD-10-CM

## 2024-04-12 RX ORDER — METOPROLOL SUCCINATE 25 MG/1
TABLET, EXTENDED RELEASE ORAL
Qty: 270 TABLET | Refills: 1 | Status: SHIPPED | OUTPATIENT
Start: 2024-04-12

## 2024-04-22 ENCOUNTER — TELEPHONE (OUTPATIENT)
Age: 89
End: 2024-04-22

## 2024-04-22 DIAGNOSIS — I48.0 PAROXYSMAL ATRIAL FIBRILLATION (HCC): ICD-10-CM

## 2024-04-22 RX ORDER — METOPROLOL SUCCINATE 50 MG/1
50 TABLET, EXTENDED RELEASE ORAL 2 TIMES DAILY
Qty: 180 TABLET | Refills: 3 | Status: SHIPPED | OUTPATIENT
Start: 2024-04-22

## 2024-04-22 NOTE — TELEPHONE ENCOUNTER
Patient called the RX Refill Line. Message is being forwarded to the office.     Patient called and stated that she was told to call when she is running low on her metoprolol succinate (TOPROL-XL) 25 mg 24 hr tablet.  The dr was going to change the dose/directions.  Please send new script to [facility].     Please contact patient at  [phone]

## 2024-04-30 ENCOUNTER — APPOINTMENT (OUTPATIENT)
Dept: LAB | Facility: HOSPITAL | Age: 89
End: 2024-04-30
Payer: MEDICARE

## 2024-04-30 DIAGNOSIS — M81.0 SENILE OSTEOPOROSIS: ICD-10-CM

## 2024-04-30 DIAGNOSIS — Z79.899 ENCOUNTER FOR LONG-TERM (CURRENT) USE OF OTHER MEDICATIONS: ICD-10-CM

## 2024-04-30 LAB
CALCIUM SERPL-MCNC: 9.3 MG/DL (ref 8.4–10.2)
MAGNESIUM SERPL-MCNC: 2.2 MG/DL (ref 1.9–2.7)
PHOSPHATE SERPL-MCNC: 3.4 MG/DL (ref 2.3–4.1)

## 2024-04-30 PROCEDURE — 84100 ASSAY OF PHOSPHORUS: CPT

## 2024-04-30 PROCEDURE — 83735 ASSAY OF MAGNESIUM: CPT

## 2024-04-30 PROCEDURE — 36415 COLL VENOUS BLD VENIPUNCTURE: CPT

## 2024-05-10 NOTE — TELEPHONE ENCOUNTER
Pt is asking about her metoprolol. States that she will run out Sunday. States that [facility] never sent it. I did call in a two week supply to [facility]. Pt is aware.

## 2024-05-21 ENCOUNTER — REMOTE DEVICE CLINIC VISIT (OUTPATIENT)
Dept: CARDIOLOGY CLINIC | Facility: CLINIC | Age: 89
End: 2024-05-21
Payer: MEDICARE

## 2024-05-21 DIAGNOSIS — Z95.0 CARDIAC PACEMAKER IN SITU: Primary | ICD-10-CM

## 2024-05-21 PROCEDURE — 93296 REM INTERROG EVL PM/IDS: CPT | Performed by: INTERNAL MEDICINE

## 2024-05-21 PROCEDURE — 93294 REM INTERROG EVL PM/LDLS PM: CPT | Performed by: INTERNAL MEDICINE

## 2024-05-21 NOTE — PROGRESS NOTES
Results for orders placed or performed in visit on 05/21/24   Cardiac EP device report    Narrative    MDT DUAL PM/ACTIVE SYSTEM IS MRI CONDITIONAL  CARELINK TRANSMISSION: BATTERY VOLTAGE ADEQUATE (11.8 YRS). AP-69%, -10%. ALL AVAILABLE LEAD PARAMETERS WITHIN NORMAL LIMITS. 5 AF EPISODES TREATED W/ rATP (0% PACE TERMINATED) & 5 MONITORED AF EPISODES. ATRIAL UNDERSENSING AF NOTED @ TIMES. AF BURDEN-11.6%. PT ON XARELTO, ASA 81MG & METOPROLOL. NORMAL DEVICE FUNCTION. GV

## 2024-06-08 ENCOUNTER — APPOINTMENT (OUTPATIENT)
Dept: LAB | Facility: HOSPITAL | Age: 89
End: 2024-06-08
Payer: MEDICARE

## 2024-06-08 DIAGNOSIS — E78.2 MIXED HYPERLIPIDEMIA: ICD-10-CM

## 2024-06-08 LAB
CHOLEST SERPL-MCNC: 246 MG/DL
HDLC SERPL-MCNC: 59 MG/DL
LDLC SERPL CALC-MCNC: 152 MG/DL (ref 0–100)
TRIGL SERPL-MCNC: 177 MG/DL

## 2024-06-08 PROCEDURE — 80061 LIPID PANEL: CPT

## 2024-06-08 PROCEDURE — 36415 COLL VENOUS BLD VENIPUNCTURE: CPT

## 2024-07-11 ENCOUNTER — OFFICE VISIT (OUTPATIENT)
Dept: CARDIOLOGY CLINIC | Facility: CLINIC | Age: 89
End: 2024-07-11
Payer: MEDICARE

## 2024-07-11 VITALS
DIASTOLIC BLOOD PRESSURE: 78 MMHG | BODY MASS INDEX: 25.51 KG/M2 | SYSTOLIC BLOOD PRESSURE: 140 MMHG | OXYGEN SATURATION: 97 % | HEIGHT: 62 IN | RESPIRATION RATE: 20 BRPM | WEIGHT: 138.6 LBS | TEMPERATURE: 97.8 F | HEART RATE: 73 BPM

## 2024-07-11 DIAGNOSIS — I65.23 CAROTID ATHEROSCLEROSIS, BILATERAL: ICD-10-CM

## 2024-07-11 DIAGNOSIS — E78.2 MIXED HYPERLIPIDEMIA: ICD-10-CM

## 2024-07-11 DIAGNOSIS — I10 PRIMARY HYPERTENSION: ICD-10-CM

## 2024-07-11 DIAGNOSIS — K55.1 MESENTERIC ARTERY STENOSIS (HCC): ICD-10-CM

## 2024-07-11 DIAGNOSIS — R55 SYNCOPE, UNSPECIFIED SYNCOPE TYPE: ICD-10-CM

## 2024-07-11 DIAGNOSIS — I48.0 PAROXYSMAL ATRIAL FIBRILLATION (HCC): Primary | ICD-10-CM

## 2024-07-11 DIAGNOSIS — R60.0 LOCALIZED EDEMA: ICD-10-CM

## 2024-07-11 DIAGNOSIS — Z95.0 PACEMAKER: ICD-10-CM

## 2024-07-11 DIAGNOSIS — N18.30 STAGE 3 CHRONIC KIDNEY DISEASE, UNSPECIFIED WHETHER STAGE 3A OR 3B CKD (HCC): ICD-10-CM

## 2024-07-11 PROCEDURE — 99214 OFFICE O/P EST MOD 30 MIN: CPT | Performed by: NURSE PRACTITIONER

## 2024-07-11 NOTE — ASSESSMENT & PLAN NOTE
Carotid ultrasound 5/1/2017 showed moderate bilateral carotid atherosclerosis with 20-49% carotid stenosis bilaterally. Normal vertebral artery flow.  Updated duplex 9/11/2023 shows < 50% bilateral ICA stenosis.  Ideally LDL < 70. She has not tolerated statins or Zetia in the past.   Taking daily aspirin 81 mg daily.  She declines trying any additional lipid lowering agents at this time.

## 2024-07-11 NOTE — ASSESSMENT & PLAN NOTE
Blood pressure is acceptable.  Currently on amlodipine 10 mg daily, losartan 25 mg daily, and metoprolol succinate to 50 mg BID.  We reviewed benefits of 2 g sodium diet.

## 2024-07-11 NOTE — ASSESSMENT & PLAN NOTE
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  She is now following with [facility] vascular surgery.  Duplex imaging 9/15/2023 is unchanged from prior.  Taking aspirin 81 mg daily, EC, always with food. Reviewed urgent s/s to report. Monitoring CBC given use with Xarelto.  She has reported statin intolerance and also stopped Zetia given diffuse muscle pain.  Consider Repatha, which we discussed today but she adamantly declines.

## 2024-07-11 NOTE — ASSESSMENT & PLAN NOTE
"History of paroxysmal atrial fibrillation with previous use of Tikosyn for > 10 years.  She presented with complaint of increasing \"a fib episodes\", feeling fluttering/palpitations in her chest.  ZIO monitoring from 6/10-6/17/2021 revealed no a fib, one 6-beat run of WCT/VT, 14 runs of SVT, longest 20 beats at 115 BPM, occasional PAC's (3.9%).  Echo 4/2021 with EF 55-60%  Metoprolol succinate increased in August 2021 to 25mg in am, 12.5mg in pm. Repeat 24 hour holter showed avg HR of 56bpm with 7.9% PAC burden.  She sustained recurrent syncopal events.  Lexiscan nuclear stress test 11/2/2021 without evidence of ischemia.  She was transitioned to amiodarone, which she did not tolerate.  Referred to EP for possible AV node ablation and PPM insertion.  Dr. Isaac performed pacemaker insertion on 2/22/2022 and up-titrated beta blocker.   Consider AV node ablation if a fib remains difficult to control.  11% AF burden with no high rate episodes on 5/2024 interrogation.  Continue metoprolol succinate to 50 mg BID.  Continue Magnesium supplementation at 250-500mg daily.  Continue Xarelto for anticoagulation.  Will monitor rates with upcoming device interrogations.   "

## 2024-07-11 NOTE — PROGRESS NOTES
"Cardiology Follow Up    Beth Goldman  1935  [id number]  [facility] [place] [address]  [address]  [facility] [address] [phone]    Beth presents for follow up of atrial fibrillation, syncope, s/p PPM, HTN, HLD.      1. Paroxysmal atrial fibrillation (HCC)  Assessment & Plan:  History of paroxysmal atrial fibrillation with previous use of Tikosyn for > 10 years.  She presented with complaint of increasing \"a fib episodes\", feeling fluttering/palpitations in her chest.  ZIO monitoring from 6/10-6/17/2021 revealed no a fib, one 6-beat run of WCT/VT, 14 runs of SVT, longest 20 beats at 115 BPM, occasional PAC's (3.9%).  Echo 4/2021 with EF 55-60%  Metoprolol succinate increased in August 2021 to 25mg in am, 12.5mg in pm. Repeat 24 hour holter showed avg HR of 56bpm with 7.9% PAC burden.  She sustained recurrent syncopal events.  Lexiscan nuclear stress test 11/2/2021 without evidence of ischemia.  She was transitioned to amiodarone, which she did not tolerate.  Referred to EP for possible AV node ablation and PPM insertion.  Dr. Isaac performed pacemaker insertion on 2/22/2022 and up-titrated beta blocker.   Consider AV node ablation if a fib remains difficult to control.  11% AF burden with no high rate episodes on 5/2024 interrogation.  Continue metoprolol succinate to 50 mg BID.  Continue Magnesium supplementation at 250-500mg daily.  Continue Xarelto for anticoagulation.  Will monitor rates with upcoming device interrogations.   Orders:  -     CBC and Platelet; Future  -     Comprehensive metabolic panel; Future  -     Echo complete w/ contrast if indicated; Future; Expected date: 07/11/2024  2. Syncope, unspecified syncope type  Assessment & Plan:  Sustained a fall in 2/2019 when she was brushing her teeth in the bathroom and fell, hitting her head against the wall. EKG at that time with QTc of 470msec.  She reports today another syncopal " event while standing in her kitchen. She had received a steroid injection in her knee and her flu vaccine earlier that same day.  We have reviewed the risk of Torsades in Tikosyn with advancing age.   24 hour holter monitor results above (see atrial fibrillation)  Labs have remained stable  Lexiscan 11/2/21 negative for ischemia.  S/P PPM insertion 2/22/22 for sick sinus syndrome. Off amiodarone since that time.  No recurrent syncopal events.  3. Pacemaker  Assessment & Plan:  S/P insertion of Medtronic dual chamber PPM on 2/22/2022 for indication of sick sinus syndrome.  Device managed by [facility].  4. Primary hypertension  Assessment & Plan:  Blood pressure is acceptable.  Currently on amlodipine 10 mg daily, losartan 25 mg daily, and metoprolol succinate to 50 mg BID.  We reviewed benefits of 2 g sodium diet.  Orders:  -     CBC and Platelet; Future  -     Comprehensive metabolic panel; Future  -     Echo complete w/ contrast if indicated; Future; Expected date: 07/11/2024  5. Mixed hyperlipidemia  Assessment & Plan:  Not on statin therapy at present due to poor tolerance of several statins in the past.  She also has stopped Zetia due to diffuse muscle pain.  Lipid panel 6/9/2023: C 266. T 73. H 99. L 152.  Lipid panel 6/8/2024: C 246. T 177. H 59.L 152  Recommend LDL < 70 given carotid and mesenteric artery stenosis  We discussed Repatha again today, however she is adamant that she will not try any additional medications at this time.  Orders:  -     Comprehensive metabolic panel; Future  6. Carotid atherosclerosis, bilateral  Assessment & Plan:  Carotid ultrasound 5/1/2017 showed moderate bilateral carotid atherosclerosis with 20-49% carotid stenosis bilaterally. Normal vertebral artery flow.  Updated duplex 9/11/2023 shows < 50% bilateral ICA stenosis.  Ideally LDL < 70. She has not tolerated statins or Zetia in the past.   Taking daily aspirin 81 mg daily.  She declines trying any additional lipid lowering  agents at this time.  7. 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  She is now following with [facility] vascular surgery.  Duplex imaging 9/15/2023 is unchanged from prior.  Taking aspirin 81 mg daily, EC, always with food. Reviewed urgent s/s to report. Monitoring CBC given use with Xarelto.  She has reported statin intolerance and also stopped Zetia given diffuse muscle pain.  Consider Repatha, which we discussed today but she adamantly declines.  8. Localized edema  Assessment & Plan:  Bilateral lower leg edema, left arm edema.  Update echo and check BNP  Will monitor closely.  Orders:  -     TSH, 3rd generation with Free T4 reflex; Future  -     B-Type Natriuretic Peptide(BNP); Future  -     Echo complete w/ contrast if indicated; Future; Expected date: 07/11/2024  9. Stage 3 chronic kidney disease, unspecified whether stage 3a or 3b CKD (HCC)  Assessment & Plan:  Lab Results   Component Value Date    EGFR 42 01/30/2024    EGFR 52 01/21/2024    EGFR 52 08/22/2023    CREATININE 1.16 01/30/2024    CREATININE 0.96 01/21/2024    CREATININE 0.96 08/22/2023     Stable on recent labs. Will continue to monitor.     HPI  Beth has a past medical history of paroxysmal atrial fibrillation, HTN, HLD, statin intolerance, CKD, lumbar spinal stenosis, and osteoporosis.     She was previously following with [facility], Dr. Salmeron, and presented to Dr. Aguila to establish care on 6/10/2021.     Beth has been treated for paroxysmal atrial fibrillation with Tikosyn for > 10 years. Her last symptomatic episode from a fib had been in 2019. She sustained a fall  from a potential syncopal event where her legs gave out and her head hit the wall while she was in the bathroom brushing her teeth in 2/2019. Her EKG at the time showed QTc of 470msec.     She had met with [facility] EP  clinic in 9/2020 and they had discussed switching to amiodarone due to increased risk of Tikosyn induced torsades with advanced age, however, she decided against this.     7 day ZIO monitor showed 1 run of VT (6 beats) and 14 runs of SVT and occasional PAC's. Her metoprolol succinate was increased to 25 mg in am, 12.5 mg in pm. Repeat Holter monitor following up-titration of beta blocker revealed 7.9% PAC burden with an avg HR 56 bpm.     She reported another syncopal event on 9/13/2021 when she was standing in the kitchen and suddenly woke on the floor. When she woke she was tired and sweaty but denies chest pain, shortness of breath, or palpitations. A nuclear stress test was ordered for ischemic work up due to VT on her monitor and syncopal events. She was transitioned off Tikosyn and onto amiodarone.     Lexiscan nuclear stress test was negative for ischemia. She did not tolerate amiodarone due to dizziness and balance issues and this was eventually discontinued.     She was recommended to undergo PPM insertion with possible AV node ablation and was referred to EP. She underwent dual chamber Medtronic PPM insertion at [facility] on 2/22/2022 by Dr. Isaac. The procedure was uneventful. Metoprolol succinate was increased to 50 mg in am, 25 mg in pm. With discussion for possible AV node ablation if rates continued to be difficult to control despite titration of beta blocker.     She was referred to vascular surgery for >70% mesenteric stenosis on doppler imaging. She met with vascular surgery on 3/15/2023. They reviewed her mesenteric stenosis with plan for serial monitoring. She was instructed to start an 81 mg aspirin and to discuss cholesterol treatment with our office. She has follow up imaging scheduled for September 2023. She was started on Zetia as she had not tolerated statins, however this was ultimately discontinued by her PCP in the summer of 2023 due to muscle pain and feeling generally unwell.     She  "underwent evaluation by rheumatology due to diffuse muscle pains and left sided swelling. No definitive diagnosis was made. Symptoms were steadily improving (she noted onset of symptoms after a Covid vaccine). Prolia was resumed. She followed up most recently on 3/7/2024. We discussed addition of Repatha as LDL was 150. She declined and brought a paper from her pharmacist requesting to trial Welchol. Her device check from 2/2024 showed 16% AF burden with normal device function.     7/11/2024: Beth presents for routine follow up. She states \"I feel lousy\". She notes fatigue, abdominal bloating, weight gain. No overt chest pain or shortness of breath. She has generalized swelling. She ended up not trying Welchol because her son said it was not going to make a difference. She had a repeat lipid panel in June which showed . She is absolutely not interested in trying another pill or Repatha. Device check in May showed 11% AF burden with normal device function. She has follow up vascular imaging in September. She denies abdominal pain or nausea.     Medical Problems       Problem List       Constipation    Leukocytosis    Hypokalemia    Spinal stenosis of lumbar region with neurogenic claudication    Age-related osteoporosis without current pathological fracture    Primary osteoarthritis of both hips    Fracture of multiple ribs of right side    Acute pain of right shoulder    Closed displaced fracture of lateral end of right clavicle    Lumbar spondylosis    Trochanteric bursitis of right hip    Paroxysmal atrial fibrillation (HCC)    Syncope    Pacemaker    Hypertension    Hyperlipidemia    Carotid atherosclerosis, bilateral    Stage 3 chronic kidney disease, unspecified whether stage 3a or 3b CKD (HCC)    Mesenteric artery stenosis (HCC)    Localized edema    Left arm pain        Past Medical History:   Diagnosis Date    A-fib ([facility])     Anxiety     Arthritis     R knee    Carotid atherosclerosis, bilateral     " GERD (gastroesophageal reflux disease)     Hyperlipidemia     Hypertension     Muscle spasms of neck     Osteoporosis     Spinal stenosis      Social History     Socioeconomic History    Marital status:      Spouse name: Not on file    Number of children: Not on file    Years of education: Not on file    Highest education level: Not on file   Occupational History    Not on file   Tobacco Use    Smoking status: Never    Smokeless tobacco: Never   Vaping Use    Vaping status: Never Used   Substance and Sexual Activity    Alcohol use: Not Currently    Drug use: Never    Sexual activity: Not Currently   Other Topics Concern    Not on file   Social History Narrative    Not on file     Social Determinants of Health     Financial Resource Strain: Not on file   Food Insecurity: Not on file   Transportation Needs: Not on file   Physical Activity: Not on file   Stress: Not on file   Social Connections: Not on file   Intimate Partner Violence: Not on file   Housing Stability: Not on file      Family History   Problem Relation Age of Onset    Cancer Father     Cancer Brother     Heart disease Mother      Past Surgical History:   Procedure Laterality Date    BACK SURGERY      CARDIAC ELECTROPHYSIOLOGY PROCEDURE N/A 2/22/2022    Procedure: Cardiac pacer implant;  Surgeon: Raimundo Isaac MD;  Location: [facility];  Service: Cardiology    EYE SURGERY      cataracts    FL GUIDED NEEDLE PLAC BX/ASP/INJ  8/2/2022    FL GUIDED NEEDLE PLAC BX/ASP/INJ  9/1/2022    IR SACROPLASTY  3/15/2019    NERVE BLOCK Bilateral 8/2/2022    Procedure: BLOCK MEDIAL BRANCH L3, L4, L5 MBB #1;  Surgeon: Vlad Palmer MD;  Location: [facility];  Service: Pain Management     NERVE BLOCK Bilateral 9/1/2022    Procedure: BLOCK MEDIAL BRANCH L3, L4, L5;  Surgeon: Vlad Palmer MD;  Location: [facility];  Service: Pain Management     AR ARTHROCENTESIS ASPIR&/INJ MAJOR JT/BURSA W/O US Right 12/22/2022    Procedure: INJECTION JOINT HIP;   Surgeon: Vlad Palmer MD;  Location: [facility];  Service: Pain Management     ND ARTHROCENTESIS ASPIR&/INJ MAJOR JT/BURSA W/O US Right 6/6/2023    Procedure: INJECTION JOINT RIGHT GTB INJECTION;  Surgeon: Vlad Palmer MD;  Location: [facility];  Service: Pain Management     RHIZOTOMY Bilateral 11/17/2022    Procedure: RHIZOTOMY LUMBAR L3, L4, L5 medial branch nerves;  Surgeon: Vlad Palmer MD;  Location: [facility];  Service: Pain Management        Current Outpatient Medications:     acetaminophen (TYLENOL) 500 mg tablet, Take 2 tablets (1,000 mg total) by mouth every 6 (six) hours as needed for mild pain or moderate pain, Disp: 30 tablet, Rfl: 0    amLODIPine (NORVASC) 10 mg tablet, Take 1 tablet (10 mg total) by mouth daily, Disp: 90 tablet, Rfl: 3    aspirin (ECOTRIN LOW STRENGTH) 81 mg EC tablet, Take 1 tablet (81 mg total) by mouth daily, Disp: 30 tablet, Rfl: 11    calcium citrate-vitamin D (CITRACAL+D) 315-200 MG-UNIT per tablet, Take by mouth, Disp: , Rfl:     docusate sodium (COLACE) 100 mg capsule, Take 100 mg by mouth daily, Disp: , Rfl:     famotidine (PEPCID) 20 mg tablet, , Disp: , Rfl:     hydrocortisone (ANUSOL-HC) 25 mg suppository, , Disp: , Rfl:     LORazepam (ATIVAN) 2 mg tablet, 3 (three) times a day prn, Disp: , Rfl:     losartan (COZAAR) 25 mg tablet, Take 1 tablet (25 mg total) by mouth daily, Disp: 90 tablet, Rfl: 3    magnesium oxide (MAG-OX) 400 mg, Take 400 mg by mouth once, Disp: , Rfl:     metoprolol succinate (TOPROL-XL) 50 mg 24 hr tablet, Take 1 tablet (50 mg total) by mouth 2 (two) times a day, Disp: 180 tablet, Rfl: 3    Multiple Vitamins-Minerals (ICAPS AREDS 2 PO), Take 1 tablet by mouth daily , Disp: , Rfl:     polyethylene glycol (MIRALAX) 17 g packet, Take 17 g by mouth daily, Disp: , Rfl:     Xarelto 15 MG tablet, TAKE 1 TABLET BY MOUTH  DAILY WITH DINNER, Disp: 90 tablet, Rfl: 3    ipratropium (ATROVENT) 0.06 % nasal spray, , Disp: , Rfl:   Allergies   Allergen  Reactions    Ciprofloxacin     Covid-19 (Mrna) Vaccine Edema     Arm and hand swelling     Epinephrine      Irregular heart ryhthm (afib) per pt.    Statins Myalgia     Patient has tried 3-4 different statins and developed muscle pain with all    Bactrim [Sulfamethoxazole-Trimethoprim] Rash    Medical Tape Rash       Labs:     Chemistry        Component Value Date/Time    K 3.7 01/21/2024 0155    K 4.5 04/03/2018 1021     01/21/2024 0155     04/03/2018 1021    CO2 29 01/21/2024 0155    CO2 29 04/03/2018 1021    BUN 25 01/21/2024 0155    BUN 15 04/03/2018 1021    CREATININE 1.16 01/30/2024 1307    CREATININE 0.80 05/29/2019 0948        Component Value Date/Time    CALCIUM 9.3 04/30/2024 1317    CALCIUM 9.1 05/27/2020 1333    ALKPHOS 59 01/21/2024 0155    ALKPHOS 62 04/03/2018 1021    AST 20 01/21/2024 0155    AST 21 04/03/2018 1021    ALT 15 01/21/2024 0155    ALT 18 04/03/2018 1021        Lipid panel 6/8/2024: C 246. T 177. H 59. L 152.    Imaging:   Device transmission 5/21/2024  BATTERY VOLTAGE ADEQUATE (11.8 YRS). AP-69%, -10%. ALL AVAILABLE LEAD PARAMETERS WITHIN NORMAL LIMITS. 5 AF EPISODES TREATED W/ rATP (0% PACE TERMINATED) & 5 MONITORED AF EPISODES. ATRIAL UNDERSENSING AF NOTED @ TIMES. AF BURDEN-11.6%. PT ON XARELTO, ASA 81MG & METOPROLOL. NORMAL DEVICE FUNCTION.     Mesentic duplex 9/15/2023:  The abdominal aorta is patent and normal in caliber.  There is a >70% stenosis in the celiac and superior mesenteric artery.  The splenic and common hepatic arteries are patent and normal in caliber.  The inferior mesenteric arteries is patent and normal in fasting state.     The left renal artery is patent.  There is a >60% stenosis in the right renal artery.     In comparison to the study on 3/21/2022, there is no significant change.     Carotid duplex 9/11/2023:  < 50% bilateral ICA stenosis     ECG 3/1/2023: Atrial-paced rhythm with prolonged AV conduction and PAC's.     VAS celiac and/or  mesenteric duplex 3/21/2022:  The aorta is patent and normal in caliber. >70% stenosis in the celiac artery. 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      ECG 3/1/2022: Atrial-paced rhythm with prolonged AV conduction. Left ventricular hypertrophy. Rate 73 bpm.     Lexiscan nuclear stress test 11/2/2021:  Normal stress perfusion pattern  Hyperdynamic LV function gated analysis  Low risk perfusion pattern  Stress ECG: No ECG changes meeting criteria for ischemia.  Nondiagnostic ECG portion given vasodilator protocol.     ECG 10/8/2021: Sinus bradycardia, rate 53, cannot rule out septal infarct, no significant change from previous ECG on 8/10/2021     24 hour holter monitor 9/7/2021:  Predominantly sinus rhythm, HR varied from 39 bpm to 103 bpm.  The patient’s average heart rate was 56 bpm.    17 ventricular ectopic activity. 6195 (7.9%)  supraventricular ectopy.  Longest R/R interval was 1.8 seconds.     ZIO 6/10-6/17/2021:   Min HR of 35, max HR of 193, and avg HR of 59 bpm.  Predominant underlying rhythm was Sinus Rhythm.   1 run of Ventricular Tachycardia occurred lasting 6 beats with a max rate of 193 bpm (avg 177 bpm).   14 Supraventricular Tachycardia runs occurred, the run with the longest lasting 20 beats with an avg rate of 115 bpm.   Occasional PAC's (3.9%, 92063). Rare PVC's (<1.0%).     Echocardiogram 4/2/2021 ([facility]): EF 55-60%. Grade 1 diastolic dysfunction. Mild-moderate AI. Mild-moderate MR. Mild TR.     ECG 3/22/2021 ([facility]): sinus rhythm with one PAC, rate 68    Review of Systems   Constitutional: Positive for malaise/fatigue.   HENT: Negative.     Cardiovascular:  Positive for leg swelling. Negative for chest pain, dyspnea on exertion, irregular heartbeat, near-syncope, orthopnea and palpitations.   Respiratory:  Negative for cough and snoring.    Endocrine: Negative.    Skin: Negative.    Musculoskeletal: Negative.    Gastrointestinal: Negative.    Genitourinary: Negative.    Neurological:  "Negative.    Psychiatric/Behavioral: Negative.         Vitals:    07/11/24 1449   BP: 140/78   Pulse:    Resp:    Temp:    SpO2:      Vitals:    07/11/24 1355   Weight: 62.9 kg (138 lb 9.6 oz)     Height: 5' 2\" (157.5 cm)   Body mass index is 25.35 kg/m².    Physical Exam  Vitals and nursing note reviewed.   Constitutional:       General: She is not in acute distress.     Appearance: She is well-developed. She is not diaphoretic.   HENT:      Head: Normocephalic and atraumatic.   Neck:      Thyroid: No thyromegaly.      Vascular: No carotid bruit or JVD.   Cardiovascular:      Rate and Rhythm: Normal rate and regular rhythm. Occasional Extrasystoles are present.     Pulses: Intact distal pulses.           Radial pulses are 2+ on the right side and 2+ on the left side.      Heart sounds: Normal heart sounds, S1 normal and S2 normal. No murmur heard.     Comments: Mild generalized swelling  Pulmonary:      Effort: Pulmonary effort is normal.      Breath sounds: Normal breath sounds.   Abdominal:      General: There is no distension.      Palpations: Abdomen is soft.      Tenderness: There is no abdominal tenderness.   Musculoskeletal:         General: Normal range of motion.      Cervical back: Normal range of motion and neck supple.   Lymphadenopathy:      Cervical: No cervical adenopathy.   Skin:     General: Skin is warm and dry.   Neurological:      Mental Status: She is alert and oriented to person, place, and time.      Cranial Nerves: No cranial nerve deficit.   Psychiatric:         Mood and Affect: Mood and affect normal.         Behavior: Behavior normal.                "

## 2024-07-11 NOTE — PATIENT INSTRUCTIONS
Echo to assess heart structure and function.  Lab work (nonfasting).  I'll be in touch with results.   Try taking Pepcid before bed. Get a wedge pillow to prop your torso up with sleep

## 2024-07-11 NOTE — ASSESSMENT & PLAN NOTE
S/P insertion of Medtronic dual chamber PPM on 2/22/2022 for indication of sick sinus syndrome.  Device managed by [facility]

## 2024-07-11 NOTE — ASSESSMENT & PLAN NOTE
Not on statin therapy at present due to poor tolerance of several statins in the past.  She also has stopped Zetia due to diffuse muscle pain.  Lipid panel 6/9/2023: C 266. T 73. H 99. L 152.  Lipid panel 6/8/2024: C 246. T 177. H 59.L 152  Recommend LDL < 70 given carotid and mesenteric artery stenosis  We discussed Repatha again today, however she is adamant that she will not try any additional medications at this time.

## 2024-07-12 ENCOUNTER — APPOINTMENT (OUTPATIENT)
Dept: LAB | Facility: HOSPITAL | Age: 89
End: 2024-07-12
Payer: MEDICARE

## 2024-07-12 ENCOUNTER — TELEPHONE (OUTPATIENT)
Dept: CARDIOLOGY CLINIC | Facility: CLINIC | Age: 89
End: 2024-07-12

## 2024-07-12 DIAGNOSIS — I10 PRIMARY HYPERTENSION: ICD-10-CM

## 2024-07-12 DIAGNOSIS — E78.2 MIXED HYPERLIPIDEMIA: ICD-10-CM

## 2024-07-12 DIAGNOSIS — R60.0 LOCALIZED EDEMA: ICD-10-CM

## 2024-07-12 DIAGNOSIS — I48.0 PAROXYSMAL ATRIAL FIBRILLATION (HCC): ICD-10-CM

## 2024-07-12 LAB
ALBUMIN SERPL BCG-MCNC: 4 G/DL (ref 3.5–5)
ALP SERPL-CCNC: 58 U/L (ref 34–104)
ALT SERPL W P-5'-P-CCNC: 16 U/L (ref 7–52)
ANION GAP SERPL CALCULATED.3IONS-SCNC: 7 MMOL/L (ref 4–13)
AST SERPL W P-5'-P-CCNC: 24 U/L (ref 13–39)
BILIRUB SERPL-MCNC: 0.53 MG/DL (ref 0.2–1)
BNP SERPL-MCNC: 218 PG/ML (ref 0–100)
BUN SERPL-MCNC: 30 MG/DL (ref 5–25)
CALCIUM SERPL-MCNC: 9.4 MG/DL (ref 8.4–10.2)
CHLORIDE SERPL-SCNC: 104 MMOL/L (ref 96–108)
CO2 SERPL-SCNC: 30 MMOL/L (ref 21–32)
CREAT SERPL-MCNC: 1.17 MG/DL (ref 0.6–1.3)
ERYTHROCYTE [DISTWIDTH] IN BLOOD BY AUTOMATED COUNT: 14 % (ref 11.6–15.1)
GFR SERPL CREATININE-BSD FRML MDRD: 41 ML/MIN/1.73SQ M
GLUCOSE P FAST SERPL-MCNC: 67 MG/DL (ref 65–99)
HCT VFR BLD AUTO: 43.2 % (ref 34.8–46.1)
HGB BLD-MCNC: 13.7 G/DL (ref 11.5–15.4)
MCH RBC QN AUTO: 29.3 PG (ref 26.8–34.3)
MCHC RBC AUTO-ENTMCNC: 31.7 G/DL (ref 31.4–37.4)
MCV RBC AUTO: 92 FL (ref 82–98)
PLATELET # BLD AUTO: 298 THOUSANDS/UL (ref 149–390)
PMV BLD AUTO: 9.8 FL (ref 8.9–12.7)
POTASSIUM SERPL-SCNC: 4.2 MMOL/L (ref 3.5–5.3)
PROT SERPL-MCNC: 6.9 G/DL (ref 6.4–8.4)
RBC # BLD AUTO: 4.68 MILLION/UL (ref 3.81–5.12)
SODIUM SERPL-SCNC: 141 MMOL/L (ref 135–147)
TSH SERPL DL<=0.05 MIU/L-ACNC: 2.4 UIU/ML (ref 0.45–4.5)
WBC # BLD AUTO: 6.26 THOUSAND/UL (ref 4.31–10.16)

## 2024-07-12 PROCEDURE — 85027 COMPLETE CBC AUTOMATED: CPT

## 2024-07-12 PROCEDURE — 80053 COMPREHEN METABOLIC PANEL: CPT

## 2024-07-12 PROCEDURE — 83880 ASSAY OF NATRIURETIC PEPTIDE: CPT

## 2024-07-12 PROCEDURE — 36415 COLL VENOUS BLD VENIPUNCTURE: CPT

## 2024-07-12 PROCEDURE — 84443 ASSAY THYROID STIM HORMONE: CPT

## 2024-07-12 NOTE — TELEPHONE ENCOUNTER
----- Message from PAUL Wells sent at 7/12/2024 12:34 PM EDT -----  Please let Beth know her labs show overall no concerning findings. I do not think she should start a water pill right now. I will be in touch when I get her echo results.  Thank you!

## 2024-07-17 ENCOUNTER — HOSPITAL ENCOUNTER (OUTPATIENT)
Dept: NON INVASIVE DIAGNOSTICS | Facility: HOSPITAL | Age: 89
Discharge: HOME | End: 2024-07-17
Payer: MEDICARE

## 2024-07-17 VITALS
WEIGHT: 138 LBS | BODY MASS INDEX: 25.4 KG/M2 | HEIGHT: 62 IN | HEART RATE: 80 BPM | DIASTOLIC BLOOD PRESSURE: 78 MMHG | SYSTOLIC BLOOD PRESSURE: 140 MMHG

## 2024-07-17 DIAGNOSIS — I10 PRIMARY HYPERTENSION: ICD-10-CM

## 2024-07-17 DIAGNOSIS — R60.0 LOCALIZED EDEMA: ICD-10-CM

## 2024-07-17 DIAGNOSIS — I48.0 PAROXYSMAL ATRIAL FIBRILLATION (HCC): ICD-10-CM

## 2024-07-17 LAB
AORTIC ROOT: 2.6 CM
APICAL FOUR CHAMBER EJECTION FRACTION: 55 %
BSA FOR ECHO PROCEDURE: 1.63 M2
FRACTIONAL SHORTENING: 28 (ref 28–44)
INTERVENTRICULAR SEPTUM IN DIASTOLE (PARASTERNAL SHORT AXIS VIEW): 1.1 CM
INTERVENTRICULAR SEPTUM: 1.1 CM (ref 0.6–1.1)
LAAS-AP2: 11.9 CM2
LAAS-AP4: 14.4 CM2
LEFT ATRIUM SIZE: 3.6 CM
LEFT ATRIUM VOLUME (MOD BIPLANE): 36 ML
LEFT ATRIUM VOLUME INDEX (MOD BIPLANE): 22 ML/M2
LEFT INTERNAL DIMENSION IN SYSTOLE: 2.3 CM (ref 2.1–4)
LEFT VENTRICULAR INTERNAL DIMENSION IN DIASTOLE: 3.2 CM (ref 3.5–6)
LEFT VENTRICULAR POSTERIOR WALL IN END DIASTOLE: 1.1 CM
LEFT VENTRICULAR STROKE VOLUME: 23 ML
LVSV (TEICH): 23 ML
RIGHT ATRIUM AREA SYSTOLE A4C: 13.9 CM2
RIGHT VENTRICLE ID DIMENSION: 2.3 CM
SL CV LEFT ATRIUM LENGTH A2C: 3.9 CM
SL CV PED ECHO LEFT VENTRICLE DIASTOLIC VOLUME (MOD BIPLANE) 2D: 41 ML
SL CV PED ECHO LEFT VENTRICLE SYSTOLIC VOLUME (MOD BIPLANE) 2D: 18 ML
TR MAX PG: 27 MMHG
TR PEAK VELOCITY: 2.6 M/S
TRICUSPID ANNULAR PLANE SYSTOLIC EXCURSION: 1.9 CM
TRICUSPID VALVE PEAK REGURGITATION VELOCITY: 2.62 M/S

## 2024-07-17 PROCEDURE — 93306 TTE W/DOPPLER COMPLETE: CPT | Performed by: INTERNAL MEDICINE

## 2024-07-17 PROCEDURE — 93306 TTE W/DOPPLER COMPLETE: CPT

## 2024-07-18 ENCOUNTER — TELEPHONE (OUTPATIENT)
Dept: NON INVASIVE DIAGNOSTICS | Facility: HOSPITAL | Age: 89
End: 2024-07-18

## 2024-07-18 DIAGNOSIS — R60.0 LOCALIZED EDEMA: Primary | ICD-10-CM

## 2024-07-18 NOTE — TELEPHONE ENCOUNTER
I called and discussed with Beth the radiologist's recommendation to proceed with updated CT C/A/P with oral and IV contrast to evaluate for possible source of her unilateral edema.  She is in agreement but asked if you could please call her to schedule the test.  Thank you!

## 2024-07-18 NOTE — TELEPHONE ENCOUNTER
Please let Beth know her echo shows no concerning findings. I have reached out to our radiologist for recommendation on additional testing to evaluate her edema.  Thank you!

## 2024-07-26 ENCOUNTER — HOSPITAL ENCOUNTER (OUTPATIENT)
Dept: CT IMAGING | Facility: HOSPITAL | Age: 89
End: 2024-07-26
Payer: MEDICARE

## 2024-07-26 DIAGNOSIS — R60.0 LOCALIZED EDEMA: ICD-10-CM

## 2024-07-26 PROCEDURE — 74177 CT ABD & PELVIS W/CONTRAST: CPT

## 2024-07-26 PROCEDURE — 71260 CT THORAX DX C+: CPT

## 2024-07-26 RX ADMIN — IOHEXOL 85 ML: 350 INJECTION, SOLUTION INTRAVENOUS at 15:45

## 2024-07-31 ENCOUNTER — TELEPHONE (OUTPATIENT)
Age: 89
End: 2024-07-31

## 2024-07-31 DIAGNOSIS — I10 PRIMARY HYPERTENSION: ICD-10-CM

## 2024-07-31 NOTE — TELEPHONE ENCOUNTER
Received call from pt stating she had a CT scan done on 7/26/2024 and is asking for the results. Please review.

## 2024-08-01 NOTE — TELEPHONE ENCOUNTER
Pt returned call.  She received your voicemail.  She would like to know if Amlodipine could be the cause of her swelling.  She states she used to take 5 mg, but now takes 10 mg.    Please advise.

## 2024-08-02 RX ORDER — AMLODIPINE BESYLATE 5 MG/1
5 TABLET ORAL DAILY
Qty: 90 TABLET | Refills: 3 | Status: SHIPPED | OUTPATIENT
Start: 2024-08-02

## 2024-08-02 NOTE — TELEPHONE ENCOUNTER
It could be making the swelling worse. If she would like we can try reducing back to 5 mg daily and monitor BP and swelling on the lower dose. If she needs a new script for the 5 mg pill please let me know (pill can only be split if there is a line in the center). If BP increases to > 150/90 then we can increase her losartan.  Thank you

## 2024-08-02 NOTE — TELEPHONE ENCOUNTER
Spoke with Pt, She is aware and understands. She checked her pills while I was on the phone. They do not have a line on them. She does need a new script for the 5 mg.

## 2024-08-09 DIAGNOSIS — I48.0 PAROXYSMAL ATRIAL FIBRILLATION (HCC): ICD-10-CM

## 2024-08-09 RX ORDER — RIVAROXABAN 15 MG/1
TABLET, FILM COATED ORAL
Qty: 90 TABLET | Refills: 1 | Status: SHIPPED | OUTPATIENT
Start: 2024-08-09

## 2024-08-20 ENCOUNTER — REMOTE DEVICE CLINIC VISIT (OUTPATIENT)
Dept: CARDIOLOGY CLINIC | Facility: CLINIC | Age: 89
End: 2024-08-20
Payer: MEDICARE

## 2024-08-20 DIAGNOSIS — Z95.0 PRESENCE OF PERMANENT CARDIAC PACEMAKER: Primary | ICD-10-CM

## 2024-08-20 PROCEDURE — 93296 REM INTERROG EVL PM/IDS: CPT | Performed by: INTERNAL MEDICINE

## 2024-08-20 PROCEDURE — 93294 REM INTERROG EVL PM/LDLS PM: CPT | Performed by: INTERNAL MEDICINE

## 2024-08-20 NOTE — TELEPHONE ENCOUNTER
I attempted to reach Beth and left her a voicemail.  My message:  I apologized for the delay. I was out of town due to a death in the family. I reviewed her CT today which did not show any concerning findings. No obvious source of the swelling. For now, she should monitor but if worsening swelling to let us know.  Thank you   Pt moved from bed 6 to exam room 4 for vaginal exam - pt placed in stirrups and set up for exam - dr chris aware

## 2024-08-20 NOTE — PROGRESS NOTES
MDT DC PM/ACTIVE SYSTEM IS MRI CONDITIONAL   CARELINK TRANSMISSION:  BATTERY VOLTAGE ADEQUATE (11.6 YR.).  AP 70.1%  9.7%.  ALL LEAD PARAMETERS WITHIN NORMAL LIMITS.  1 EPISODE OF NSVT (16 @ 179 BPM). 8 AT/AF EPISODES WITH LONGEST 87 H 40 M, AND AT/AF BURDEN OF 10% SINCE 5/21/24.  EF 65% (ECHO 07/2024).  PATIENT TAKES ASA, XARELTO, AND METOPROLOL.  NORMAL DEVICE FUNCTION.  RG

## 2024-09-06 ENCOUNTER — TELEPHONE (OUTPATIENT)
Dept: UROLOGY | Facility: CLINIC | Age: 89
End: 2024-09-06

## 2024-09-06 NOTE — TELEPHONE ENCOUNTER
Left message for patient to call back and reschedule 11/11/24 appointment with PAUL Welsl. Please office an appointment with Dr. Rivero

## 2024-09-18 ENCOUNTER — HOSPITAL ENCOUNTER (OUTPATIENT)
Dept: NON INVASIVE DIAGNOSTICS | Facility: HOSPITAL | Age: 89
Discharge: HOME | End: 2024-09-18
Payer: MEDICARE

## 2024-09-18 DIAGNOSIS — K55.1 MESENTERIC ARTERY STENOSIS (HCC): ICD-10-CM

## 2024-09-18 PROCEDURE — 93975 VASCULAR STUDY: CPT

## 2024-09-20 PROCEDURE — 93975 VASCULAR STUDY: CPT | Performed by: SURGERY

## 2024-09-30 NOTE — Clinical Note
I have signed for the following orders AND/OR meds.  Please call the patient and ask the patient to schedule the testing AND/OR inform about any medications that were sent. Medications have been sent to pharmacy listed below      No orders of the defined types were placed in this encounter.      Medications Ordered This Encounter   Medications    tirzepatide (MOUNJARO) 2.5 mg/0.5 mL PnIj     Sig: Inject 2.5 mg into the skin every 7 days.     Dispense:  4 Pen     Refill:  1         [id number] - [address] - [facility] [address] AT [facility] [address]  [facility] [place] [address]  Phone: [phone] Fax: [phone]    [facility] [address] - [facility] AT [facility]  [facility]  [place] [address]  Phone: [phone] Fax: [phone]     Left arm sling applied

## 2024-11-07 ENCOUNTER — OFFICE VISIT (OUTPATIENT)
Dept: PODIATRY | Age: 89
End: 2024-11-07
Payer: MEDICARE

## 2024-11-07 VITALS
SYSTOLIC BLOOD PRESSURE: 130 MMHG | TEMPERATURE: 97.6 F | HEIGHT: 62 IN | HEART RATE: 77 BPM | DIASTOLIC BLOOD PRESSURE: 78 MMHG | OXYGEN SATURATION: 99 % | BODY MASS INDEX: 25.65 KG/M2 | WEIGHT: 139.4 LBS

## 2024-11-07 DIAGNOSIS — L60.3 ONYCHODYSTROPHY: Primary | ICD-10-CM

## 2024-11-07 DIAGNOSIS — L84 CALLUS OF FOOT: ICD-10-CM

## 2024-11-07 PROCEDURE — 99203 OFFICE O/P NEW LOW 30 MIN: CPT | Performed by: STUDENT IN AN ORGANIZED HEALTH CARE EDUCATION/TRAINING PROGRAM

## 2024-11-07 NOTE — PROGRESS NOTES
"Ambulatory Visit  Name: Beth Goldman      : 1935      MRN: [id number]  Encounter Provider: Veronica Benson DPM  Encounter Date: 2024   Encounter department: [facility] [place]    Assessment & Plan  Onychodystrophy         Callus of foot             PLAN:  I reviewed clinical exam with patient in detail today. I have discussed with the patient the pathophysiology of this diagnosis and reviewed how the examination correlates with this diagnosis.  Toenails are not mycotic. They are elongated and patient cannot reach to cut them. I did trim the nails in length with a nail nipper (not billable service)  Patient has thin HPK to plantar right foot which I burred to smooth (not billable service). Daily aquaphor.   F/u Q9-10wks for foot care (office visit) however I did recommend going to a nail salon as they would be able to get her seen sooner than 9wks    History of Present Illness     Beth Goldman is a 89 y.o. female who presents to clinic for nail care. Cannot reach her feet to cut her nails. Notes thin callus to right foot. Used to go to prior podiatrist every 5wks to have her nails cut.       Review of Systems   Constitutional:  Negative for activity change, chills and fever.   HENT: Negative.     Respiratory:  Negative for cough, chest tightness and shortness of breath.    Cardiovascular:  Negative for chest pain and leg swelling.   Endocrine: Negative.    Genitourinary: Negative.    Neurological: Negative.  Negative for numbness.   Psychiatric/Behavioral: Negative.  Negative for agitation and behavioral problems.            Objective     /78 (BP Location: Left arm, Patient Position: Sitting, Cuff Size: Standard)   Pulse 77   Temp 97.6 °F (36.4 °C) (Temporal)   Ht 5' 2\" (1.575 m)   Wt 63.2 kg (139 lb 6.4 oz)   SpO2 99%   BMI 25.50 kg/m²     Physical Exam  Vitals and nursing note reviewed.   Constitutional:       General: She is not in acute distress.     Appearance: " She is well-developed.   Cardiovascular:      Comments: DP/PT pulses 1/4. Diminished pedal hair. No edema.   Pulmonary:      Effort: Pulmonary effort is normal. No respiratory distress.   Musculoskeletal:         General: No swelling.   Skin:     General: Skin is warm and dry.      Capillary Refill: Capillary refill takes less than 2 seconds.      Comments: Elongate toenails x10. Thin HPK right plantar 1st met head. Thin HPK lateral 5th toe right foot.    Neurological:      General: No focal deficit present.      Mental Status: She is alert.   Psychiatric:         Mood and Affect: Mood normal.

## 2024-11-19 ENCOUNTER — RESULTS FOLLOW-UP (OUTPATIENT)
Dept: CARDIOLOGY CLINIC | Facility: CLINIC | Age: 89
End: 2024-11-19

## 2024-11-19 ENCOUNTER — REMOTE DEVICE CLINIC VISIT (OUTPATIENT)
Dept: CARDIOLOGY CLINIC | Facility: CLINIC | Age: 89
End: 2024-11-19
Payer: MEDICARE

## 2024-11-19 DIAGNOSIS — Z95.0 PRESENCE OF PERMANENT CARDIAC PACEMAKER: Primary | ICD-10-CM

## 2024-11-19 PROCEDURE — 93294 REM INTERROG EVL PM/LDLS PM: CPT | Performed by: INTERNAL MEDICINE

## 2024-11-19 PROCEDURE — 93296 REM INTERROG EVL PM/IDS: CPT | Performed by: INTERNAL MEDICINE

## 2024-11-19 NOTE — PROGRESS NOTES
Results for orders placed or performed in visit on 11/19/24   Cardiac EP device report    Narrative    MDT DC PM/ACTIVE SYSTEM IS MRI CONDITIONAL  CARELINK TRANSMISSION:  BATTERY VOLTAGE ADEQUATE (11.3 YR.).  AP 71%  8.5% (AAIR-DDDR 70PPM).  ALL AVAILABLE LEAD PARAMETERS WITHIN NORMAL LIMITS. NO SIGNIFICANT HIGH RATE EPISODES.  11 AT/AF EPISODES WITH MAX EPISODE DURAATION @ 46 HRS, 30 MINS. SINCE 8/29/24: AT/AF BURDEN 9.3%. HX:PAF, NSVT & PATIENT TAKES ASA 81 MG, XARELTO, METOPROLOL SUCC.  NORMAL DEVICE FUNCTION.  ES

## 2024-12-20 DIAGNOSIS — I10 PRIMARY HYPERTENSION: ICD-10-CM

## 2024-12-20 RX ORDER — LOSARTAN POTASSIUM 25 MG/1
25 TABLET ORAL DAILY
Qty: 90 TABLET | Refills: 1 | Status: SHIPPED | OUTPATIENT
Start: 2024-12-20

## 2025-01-10 ENCOUNTER — OFFICE VISIT (OUTPATIENT)
Dept: PODIATRY | Age: OVER 89
End: 2025-01-10
Payer: MEDICARE

## 2025-01-10 VITALS — BODY MASS INDEX: 25.21 KG/M2 | WEIGHT: 137 LBS | HEIGHT: 62 IN

## 2025-01-10 DIAGNOSIS — L84 CALLUS OF FOOT: ICD-10-CM

## 2025-01-10 DIAGNOSIS — M20.5X1 ACQUIRED ADDUCTOVARUS ROTATION OF TOE OF RIGHT FOOT: ICD-10-CM

## 2025-01-10 DIAGNOSIS — L60.0 IGTN (INGROWING TOE NAIL): ICD-10-CM

## 2025-01-10 DIAGNOSIS — L60.3 ONYCHODYSTROPHY: Primary | ICD-10-CM

## 2025-01-10 PROCEDURE — 99213 OFFICE O/P EST LOW 20 MIN: CPT | Performed by: STUDENT IN AN ORGANIZED HEALTH CARE EDUCATION/TRAINING PROGRAM

## 2025-01-10 NOTE — PROGRESS NOTES
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 They are elongated and patient cannot reach to cut them. I did trim the nails in length with a nail nipper (not billable service)  Left 1st toe medial-distal border tenderness and incurvation. Slant back performed. No open wound or SOI. Aquaphor daily  Patient has thin HPK to plantar right foot which I burred to smooth (not billable service). Daily aquaphor.   Right 5th toe adductovarus with thin callus present. Donut pad applied. Soft-wide shoes rec  F/u Q9-10wks for foot care (office visit) however I did recommend going to a nail salon as they would be able to get her seen sooner than 9wks    History of Present Illness     Beth Goldman is a 89 y.o. female who presents to clinic for nail care. Notes tenderness to left 1st toe. Also small callus right 5th toe.       Review of Systems   Constitutional:  Negative for activity change, chills and fever.   HENT: Negative.     Respiratory:  Negative for cough, chest tightness and shortness of breath.    Cardiovascular:  Negative for chest pain and leg swelling.   Endocrine: Negative.    Genitourinary: Negative.    Neurological: Negative.  Negative for numbness.   Psychiatric/Behavioral: Negative.  Negative for agitation and behavioral problems.            Objective     Ht 5' 2\" (1.575 m)   Wt 62.1 kg (137 lb)   BMI 25.06 kg/m²     Physical Exam  Vitals and nursing note reviewed.   Constitutional:       General: She is not in acute distress.     Appearance: She is well-developed.   Cardiovascular:      Comments: DP/PT pulses 1/4. Diminished pedal hair. No edema. "   Pulmonary:      Effort: Pulmonary effort is normal. No respiratory distress.   Musculoskeletal:         General: No swelling.   Skin:     General: Skin is warm and dry.      Capillary Refill: Capillary refill takes less than 2 seconds.      Comments: Elongate toenails x10. Left 1st toenail with slight distal-medial incurvation. Thin HPK right plantar 1st met head. Thin HPK lateral 5th toe right foot.    Neurological:      General: No focal deficit present.      Mental Status: She is alert.   Psychiatric:         Mood and Affect: Mood normal.

## 2025-01-13 ENCOUNTER — TELEPHONE (OUTPATIENT)
Dept: CARDIOLOGY CLINIC | Facility: CLINIC | Age: OVER 89
End: 2025-01-13

## 2025-01-13 ENCOUNTER — OFFICE VISIT (OUTPATIENT)
Age: OVER 89
End: 2025-01-13
Payer: MEDICARE

## 2025-01-13 VITALS
HEART RATE: 92 BPM | WEIGHT: 139.2 LBS | BODY MASS INDEX: 25.62 KG/M2 | SYSTOLIC BLOOD PRESSURE: 148 MMHG | HEIGHT: 62 IN | DIASTOLIC BLOOD PRESSURE: 80 MMHG | OXYGEN SATURATION: 98 % | TEMPERATURE: 98.4 F

## 2025-01-13 DIAGNOSIS — I70.1 RENAL ARTERY STENOSIS (HCC): ICD-10-CM

## 2025-01-13 DIAGNOSIS — K55.1 MESENTERIC ARTERY STENOSIS (HCC): Primary | ICD-10-CM

## 2025-01-13 DIAGNOSIS — I65.23 CAROTID ATHEROSCLEROSIS, BILATERAL: ICD-10-CM

## 2025-01-13 DIAGNOSIS — N18.30 STAGE 3 CHRONIC KIDNEY DISEASE, UNSPECIFIED WHETHER STAGE 3A OR 3B CKD (HCC): ICD-10-CM

## 2025-01-13 DIAGNOSIS — I10 PRIMARY HYPERTENSION: ICD-10-CM

## 2025-01-13 PROCEDURE — 99214 OFFICE O/P EST MOD 30 MIN: CPT | Performed by: NURSE PRACTITIONER

## 2025-01-13 NOTE — ASSESSMENT & PLAN NOTE
Reviewed Carotid ultrasound from 9/11/23 which demonstrates b/l ICA <50% stenosis.    -Denies symptoms of numbness/ tingling/ weakness on one side of the body, facial droop, slurred speech or blindness in one eye.   -Reviewed most recent carotid ultrasound results in detail with pt and discussed pathophysiology of arterial disease and indication for vascular intervention. No vascular intervention planned at this time. Discussed that we will continue with medical management and non-invasive imaging at this time.     Plan  -Complete carotid ultrasound due this year is September, pt being followed by her PCP.  -Continue Aspirin 81mg daily.  -Statin intolerant. Refusing Repatha.  -Call 911/ Go to the ER with any S/S of TIA/ CVA.  -Continue with BP control

## 2025-01-13 NOTE — PROGRESS NOTES
Name: Beth Goldman      : 1935      MRN: [id number]  Encounter Provider: PAUL Del Cid  Encounter Date: 2025   Encounter department: [facility]    89-year-old female with hx of A-fib on Xarelto w/ PPM, HTN, osteoporosis, lumbar spondylosis, CKD stage 3, leukocytosis, spinal stenosis, b/l carotid stenosis returns to the office for yearly office visit and review of mesenteric duplex.   Assessment & Plan  Mesenteric artery stenosis (HCC)  Mesenteric ultrasound 24 demonstrates  >70% stenosis noted in the celiac and superior  mesenteric arteries.      -Pt denies any mesenteric ischemic symptoms such as post prandial abdominal pain, food fear or unintentional weight loss.       Plan:  -Check mesenteric duplex in 1 year with office visit. Reviewed the symptoms of mesenteric ischemia for which she should follow up sooner.   -Patient has no symptoms of mesenteric ischemia. No further intervention is required at this time other than routine duplex surveillance and clinical monitoring.  -Continue with aspirin 81mg daily  -Reports she is statin intolerant. Pt is refusing Repatha.   -Maintain good blood pressure, cholesterol and glucose control  -continue with heart healthy lifestyle changes, including diabetic diet and regular walking/ exercise        Orders:    VAS CELIAC AND/OR MESENTERIC DUPLEX; Future    Renal artery stenosis (HCC)  Reviewed mesenteric ultrasound from  which demonstrates  >60% stenosis noted in the bilateral renal  arteries.    BP medications:  Amlodipine, metoprolol and losartan    Labs  24 Cr 1.17/ GFR 41    Pt has adequate BP control with 3 BP medications. Continue with yearly non-invasive surveillance with duplex. Call the office for any new or worsening symptoms.  Orders:    VAS CELIAC AND/OR MESENTERIC DUPLEX; Future    Carotid atherosclerosis, bilateral  Reviewed Carotid ultrasound from 23 which demonstrates b/l ICA  <50% stenosis.    -Denies symptoms of numbness/ tingling/ weakness on one side of the body, facial droop, slurred speech or blindness in one eye.   -Reviewed most recent carotid ultrasound results in detail with pt and discussed pathophysiology of arterial disease and indication for vascular intervention. No vascular intervention planned at this time. Discussed that we will continue with medical management and non-invasive imaging at this time.     Plan  -Complete carotid ultrasound due this year is September, pt being followed by her PCP.  -Continue Aspirin 81mg daily.  -Statin intolerant. Refusing Repatha.  -Call 911/ Go to the ER with any S/S of TIA/ CVA.  -Continue with BP control       Primary hypertension  -BP well controlled  -continue current medical regimen  -management per PCP             History of Present Illness   HPI  Beth Goldman is a 89 y.o. female who presents A-fib on Xarelto w/ PPM, HTN, osteoporosis, lumbar spondylosis, CKD stage 3, leukocytosis, spinal stenosis, b/l carotid stenosis returns to the office for yearly office visit and review of mesenteric duplex.   History obtained from: patient  Recently had COVID, started to feel better on Blanca. Last week. Presents to the office alone with walker.  -Pt denies any mesenteric ischemic symptoms such as post prandial abdominal pain, food fear or unintentional weight loss.   -Denies symptoms of numbness/ tingling/ weakness on one side of the body, facial droop, slurred speech or blindness in one eye.   -Pt reports compliance with ASA and Xarelto.     Review of Systems   Respiratory:  Negative for shortness of breath.    Cardiovascular:  Negative for chest pain and leg swelling.   Gastrointestinal:  Negative for abdominal pain.     Current Outpatient Medications on File Prior to Visit   Medication Sig Dispense Refill    acetaminophen (TYLENOL) 500 mg tablet Take 2 tablets (1,000 mg total) by mouth every 6 (six) hours as needed for mild pain or moderate  "pain 30 tablet 0    amLODIPine (NORVASC) 5 mg tablet Take 1 tablet (5 mg total) by mouth daily 90 tablet 3    aspirin (ECOTRIN LOW STRENGTH) 81 mg EC tablet Take 1 tablet (81 mg total) by mouth daily 30 tablet 11    calcium citrate-vitamin D (CITRACAL+D) 315-200 MG-UNIT per tablet Take by mouth      docusate sodium (COLACE) 100 mg capsule Take 100 mg by mouth daily      famotidine (PEPCID) 20 mg tablet       hydrocortisone (ANUSOL-HC) 25 mg suppository       LORazepam (ATIVAN) 2 mg tablet 3 (three) times a day prn      losartan (COZAAR) 25 mg tablet TAKE 1 TABLET BY MOUTH DAILY 90 tablet 1    magnesium oxide (MAG-OX) 400 mg Take 400 mg by mouth once      metoprolol succinate (TOPROL-XL) 50 mg 24 hr tablet Take 1 tablet (50 mg total) by mouth 2 (two) times a day 180 tablet 3    Multiple Vitamins-Minerals (ICAPS AREDS 2 PO) Take 1 tablet by mouth daily       polyethylene glycol (MIRALAX) 17 g packet Take 17 g by mouth daily      rivaroxaban (Xarelto) 15 mg tablet TAKE 1 TABLET BY MOUTH DAILY  WITH DINNER 90 tablet 1    ipratropium (ATROVENT) 0.06 % nasal spray  (Patient not taking: Reported on 1/13/2025)       No current facility-administered medications on file prior to visit.         Objective   /80   Pulse 92   Temp 98.4 °F (36.9 °C)   Ht 5' 2\" (1.575 m)   Wt 63.1 kg (139 lb 3.2 oz)   SpO2 98%   BMI 25.46 kg/m²      Physical Exam  Vitals (Reports that her diastolic BP has been elevated, controlled today) and nursing note reviewed.   Constitutional:       General: She is not in acute distress.     Appearance: Normal appearance. She is obese. She is not ill-appearing.   HENT:      Head: Normocephalic and atraumatic.   Cardiovascular:      Rate and Rhythm: Normal rate. Rhythm irregular.      Pulses:           Radial pulses are 2+ on the right side and 2+ on the left side.   Pulmonary:      Effort: Pulmonary effort is normal. No respiratory distress.      Breath sounds: Normal breath sounds. "   Musculoskeletal:      Right lower leg: No edema.      Left lower leg: No edema.   Skin:     General: Skin is warm and dry.   Neurological:      General: No focal deficit present.      Mental Status: She is alert and oriented to person, place, and time.      Gait: Gait abnormal (walker).      Comments: Equal arm strength bilaterally  Symmetrical smile with midline tongue      Psychiatric:         Mood and Affect: Mood normal.         Behavior: Behavior normal.         Administrative Statements   I have spent a total time of 30 minutes in caring for this patient on the day of the visit/encounter including Diagnostic results, Risks and benefits of tx options, Instructions for management, Patient and family education, Importance of tx compliance, Risk factor reductions, Documenting in the medical record, Reviewing / ordering tests, medicine, procedures  , and Obtaining or reviewing history  .

## 2025-01-13 NOTE — ASSESSMENT & PLAN NOTE
Lab Results   Component Value Date    EGFR 41 07/12/2024    EGFR 42 01/30/2024    EGFR 52 01/21/2024    CREATININE 1.17 07/12/2024    CREATININE 1.16 01/30/2024    CREATININE 0.96 01/21/2024

## 2025-01-13 NOTE — ASSESSMENT & PLAN NOTE
Mesenteric ultrasound 9/18/24 demonstrates  >70% stenosis noted in the celiac and superior  mesenteric arteries.      -Pt denies any mesenteric ischemic symptoms such as post prandial abdominal pain, food fear or unintentional weight loss.       Plan:  -Check mesenteric duplex in 1 year with office visit. Reviewed the symptoms of mesenteric ischemia for which she should follow up sooner.   -Patient has no symptoms of mesenteric ischemia. No further intervention is required at this time other than routine duplex surveillance and clinical monitoring.  -Continue with aspirin 81mg daily  -Reports she is statin intolerant. Pt is refusing Repatha.   -Maintain good blood pressure, cholesterol and glucose control  -continue with heart healthy lifestyle changes, including diabetic diet and regular walking/ exercise        Orders:    VAS CELIAC AND/OR MESENTERIC DUPLEX; Future

## 2025-01-23 NOTE — TELEPHONE ENCOUNTER
Put in 1yr [employer] RR recall, STACEY/MES duplex was not scheduled at checkout. Tried to call pt to schedule but call could not be completed

## 2025-01-30 ENCOUNTER — OFFICE VISIT (OUTPATIENT)
Dept: CARDIOLOGY CLINIC | Facility: CLINIC | Age: OVER 89
End: 2025-01-30
Payer: MEDICARE

## 2025-01-30 VITALS
HEART RATE: 77 BPM | HEIGHT: 62 IN | BODY MASS INDEX: 25.58 KG/M2 | SYSTOLIC BLOOD PRESSURE: 126 MMHG | OXYGEN SATURATION: 98 % | WEIGHT: 139 LBS | TEMPERATURE: 97.6 F | DIASTOLIC BLOOD PRESSURE: 82 MMHG

## 2025-01-30 DIAGNOSIS — I48.0 PAROXYSMAL ATRIAL FIBRILLATION (HCC): Primary | ICD-10-CM

## 2025-01-30 DIAGNOSIS — R60.0 LOCALIZED EDEMA: ICD-10-CM

## 2025-01-30 DIAGNOSIS — Z95.0 PACEMAKER: ICD-10-CM

## 2025-01-30 DIAGNOSIS — I65.23 CAROTID ATHEROSCLEROSIS, BILATERAL: ICD-10-CM

## 2025-01-30 DIAGNOSIS — E78.2 MIXED HYPERLIPIDEMIA: ICD-10-CM

## 2025-01-30 DIAGNOSIS — N18.30 STAGE 3 CHRONIC KIDNEY DISEASE, UNSPECIFIED WHETHER STAGE 3A OR 3B CKD (HCC): ICD-10-CM

## 2025-01-30 DIAGNOSIS — K55.1 MESENTERIC ARTERY STENOSIS (HCC): ICD-10-CM

## 2025-01-30 DIAGNOSIS — I10 PRIMARY HYPERTENSION: ICD-10-CM

## 2025-01-30 PROCEDURE — 99214 OFFICE O/P EST MOD 30 MIN: CPT | Performed by: NURSE PRACTITIONER

## 2025-01-30 RX ORDER — PANTOPRAZOLE SODIUM 40 MG/1
40 TABLET, DELAYED RELEASE ORAL DAILY
COMMUNITY
Start: 2025-01-29

## 2025-01-30 NOTE — PATIENT INSTRUCTIONS
Continue the 5 mg amlodipine  Notify me if your BP is staying > 140/90.  I will get the labs from Dr. Marrero  I agree with the GI appointment

## 2025-01-30 NOTE — ASSESSMENT & PLAN NOTE
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  She is now following with [facility] vascular surgery.  Taking aspirin 81 mg daily, EC, always with food. Reviewed urgent s/s to report. Monitoring CBC given use with Xarelto.  She has reported statin intolerance and also stopped Zetia given diffuse muscle pain.  Consider Repatha, which we discussed today but she adamantly declines.

## 2025-01-30 NOTE — ASSESSMENT & PLAN NOTE
Lab Results   Component Value Date    EGFR 41 07/12/2024    EGFR 42 01/30/2024    EGFR 52 01/21/2024    CREATININE 1.17 07/12/2024    CREATININE 1.16 01/30/2024    CREATININE 0.96 01/21/2024     Stable on recent labs. Will continue to monitor.

## 2025-01-30 NOTE — PROGRESS NOTES
"Cardiology Follow Up    Beth Goldman  1935  [id number]  [facility] CARDIOVASC PHYS  [address] [facility] [address] [phone]  [phone]    Beth presents for follow up of atrial fibrillation, syncope, s/p PPM, HTN, HLD.      1. Paroxysmal atrial fibrillation (HCC)  Assessment & Plan:  History of paroxysmal atrial fibrillation with previous use of Tikosyn for > 10 years.  She presented with complaint of increasing \"a fib episodes\", feeling fluttering/palpitations in her chest.  ZIO monitoring from 6/10-6/17/2021 revealed no a fib, one 6-beat run of WCT/VT, 14 runs of SVT, longest 20 beats at 115 BPM, occasional PAC's (3.9%).  Echo 4/2021 with EF 55-60%  Metoprolol succinate increased in August 2021 to 25mg in am, 12.5mg in pm. Repeat 24 hour holter showed avg HR of 56bpm with 7.9% PAC burden.  She sustained recurrent syncopal events.  Lexiscan nuclear stress test 11/2/2021 without evidence of ischemia.  She was transitioned to amiodarone, which she did not tolerate.  Referred to EP for possible AV node ablation and PPM insertion.  Dr. Isaac performed pacemaker insertion on 2/22/2022 and up-titrated beta blocker.   Consider AV node ablation if a fib remains difficult to control.  9% AF burden with no high rate episodes on 11/2024 interrogation.  Continue metoprolol succinate to 50 mg BID.  Continue Magnesium supplementation at 250-500mg daily.  Continue Xarelto for anticoagulation.  Will monitor rates with upcoming device interrogations.   2. Pacemaker  Assessment & Plan:  S/P insertion of Medtronic dual chamber PPM on 2/22/2022 for indication of sick sinus syndrome.  Device managed by [facility].  3. Primary hypertension  Assessment & Plan:  Blood pressure is acceptable.  Currently on amlodipine 5 mg daily (reduced due to swelling), losartan 25 mg daily, and metoprolol succinate to 50 mg BID.  We reviewed benefits of 2 g sodium diet.  4. Mixed hyperlipidemia  Assessment & Plan:  Not on statin " therapy at present due to poor tolerance of several statins in the past.  She also has stopped Zetia due to diffuse muscle pain.  Lipid panel 6/9/2023: C 266. T 73. H 99. L 152.  Lipid panel 6/8/2024: C 246. T 177. H 59.L 152  Recommend LDL < 70 given carotid and mesenteric artery stenosis  We discussed Repatha again today, however she is adamant that she will not try any additional medications at this time.  5. Stage 3 chronic kidney disease, unspecified whether stage 3a or 3b CKD (HCC)  Assessment & Plan:  Lab Results   Component Value Date    EGFR 41 07/12/2024    EGFR 42 01/30/2024    EGFR 52 01/21/2024    CREATININE 1.17 07/12/2024    CREATININE 1.16 01/30/2024    CREATININE 0.96 01/21/2024     Stable on recent labs. Will continue to monitor.  6. Localized edema  Assessment & Plan:  Bilateral lower leg edema, left arm edema.  Echo shows preserved LVEF with no significant change from 2021  BNP minimal elevated but stable.  Developed JOY with furosemide in the past.  CT imaging unremarkable.  Reduced amlodipine to 5 mg and symptoms are somewhat improved.  Will monitor  7. 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  She is now following with [facility] vascular surgery.  Taking aspirin 81 mg daily, EC, always with food. Reviewed urgent s/s to report. Monitoring CBC given use with Xarelto.  She has reported statin intolerance and also stopped Zetia given diffuse muscle pain.  Consider Repatha, which we discussed today but she adamantly declines.  8. Carotid atherosclerosis, bilateral  Assessment & Plan:  Carotid ultrasound 5/1/2017 showed moderate bilateral carotid atherosclerosis with 20-49% carotid stenosis bilaterally. Normal vertebral artery flow.  Updated duplex 9/11/2023 shows < 50% bilateral ICA stenosis.  Ideally LDL < 70. She has not tolerated statins or Zetia in  the past.   Taking daily aspirin 81 mg daily.  She declines trying any additional lipid lowering agents at this time.     SHAE Campos has a past medical history of paroxysmal atrial fibrillation, HTN, HLD, statin intolerance, CKD, lumbar spinal stenosis, and osteoporosis.     She was previously following with [facility], Dr. Salmeron, and presented to Dr. Aguila to establish care on 6/10/2021.     Beth has been treated for paroxysmal atrial fibrillation with Tikosyn for > 10 years. Her last symptomatic episode from a fib had been in 2019. She sustained a fall  from a potential syncopal event where her legs gave out and her head hit the wall while she was in the bathroom brushing her teeth in 2/2019. Her EKG at the time showed QTc of 470msec.     She had met with [facility] EP clinic in 9/2020 and they had discussed switching to amiodarone due to increased risk of Tikosyn induced torsades with advanced age, however, she decided against this.     7 day ZIO monitor showed 1 run of VT (6 beats) and 14 runs of SVT and occasional PAC's. Her metoprolol succinate was increased to 25 mg in am, 12.5 mg in pm. Repeat Holter monitor following up-titration of beta blocker revealed 7.9% PAC burden with an avg HR 56 bpm.     She reported another syncopal event on 9/13/2021 when she was standing in the kitchen and suddenly woke on the floor. When she woke she was tired and sweaty but denies chest pain, shortness of breath, or palpitations. A nuclear stress test was ordered for ischemic work up due to VT on her monitor and syncopal events. She was transitioned off Tikosyn and onto amiodarone.     Lexiscan nuclear stress test was negative for ischemia. She did not tolerate amiodarone due to dizziness and balance issues and this was eventually discontinued.     She was recommended to undergo PPM insertion with possible AV node ablation and was referred to EP. She underwent dual chamber Medtronic PPM insertion at [facility] on 2/22/2022  by Dr. Isaac. The procedure was uneventful. Metoprolol succinate was increased to 50 mg in am, 25 mg in pm. With discussion for possible AV node ablation if rates continued to be difficult to control despite titration of beta blocker.     She was referred to vascular surgery for >70% mesenteric stenosis on doppler imaging. She met with vascular surgery on 3/15/2023. They reviewed her mesenteric stenosis with plan for serial monitoring. She was instructed to start an 81 mg aspirin and to discuss cholesterol treatment with our office. She has follow up imaging scheduled for September 2023. She was started on Zetia as she had not tolerated statins, however this was ultimately discontinued by her PCP in the summer of 2023 due to muscle pain and feeling generally unwell.      She underwent evaluation by rheumatology due to diffuse muscle pains and left sided swelling. No definitive diagnosis was made. Symptoms were steadily improving (she noted onset of symptoms after a Covid vaccine). Prolia was resumed. We discussed addition of Repatha as LDL was 150. She declined.    She followed up with me 7/11/2024 at which time she felt fatigued with weight gain and abdominal bloating as well as generalized swelling. Updated echo as well as CT imaging was ordered which was unrevealing. We opted to try a dose reduction of amlodipine from 10 to 5 mg daily.    1/30/2025: Beth presents for routine follow up. She notices some improvement in her swelling with the reduced dose of amlodipine. She continues with indigestion and LLQ abdominal distention and discomfort. She is scheduled to see GI 2/14/2025. She denies chest pain, lightheadedness, palpitations. Device check in 11/2024 showed 9% AF burden with no significant high rate episodes. We reviewed her echo results. She met with her PCP yesterday and had labs collected. I will request the results. She met with vascular surgery 1/13/2025 and reviewed recent mesenteric duplex imaging.  Plan is for 1 year surveillance.    Medical Problems       Problem List       Constipation    Leukocytosis    Hypokalemia    Spinal stenosis of lumbar region with neurogenic claudication    Age-related osteoporosis without current pathological fracture    Primary osteoarthritis of both hips    Fracture of multiple ribs of right side    Acute pain of right shoulder    Closed displaced fracture of lateral end of right clavicle    Lumbar spondylosis    Trochanteric bursitis of right hip    Paroxysmal atrial fibrillation (HCC)    Syncope    Pacemaker    Hypertension    Hyperlipidemia    Carotid atherosclerosis, bilateral    Stage 3 chronic kidney disease, unspecified whether stage 3a or 3b CKD ([facility])    Mesenteric artery stenosis ([facility])    Localized edema    Left arm pain        Past Medical History:   Diagnosis Date    A-fib ([facility])     Anxiety     Arthritis     R knee    Carotid atherosclerosis, bilateral     GERD (gastroesophageal reflux disease)     Hyperlipidemia     Hypertension     Muscle spasms of neck     Osteoporosis     Spinal stenosis      Social History     Socioeconomic History    Marital status:      Spouse name: Not on file    Number of children: Not on file    Years of education: Not on file    Highest education level: Not on file   Occupational History    Not on file   Tobacco Use    Smoking status: Never    Smokeless tobacco: Never   Vaping Use    Vaping status: Never Used   Substance and Sexual Activity    Alcohol use: Not Currently    Drug use: Never    Sexual activity: Not Currently   Other Topics Concern    Not on file   Social History Narrative    Not on file     Social Drivers of Health     Financial Resource Strain: Not on file   Food Insecurity: Not on file   Transportation Needs: Not on file   Physical Activity: Not on file   Stress: Not on file   Social Connections: Not on file   Intimate Partner Violence: Not on file   Housing Stability: Not on file      Family History   Problem Relation  Age of Onset    Cancer Father     Cancer Brother     Heart disease Mother      Past Surgical History:   Procedure Laterality Date    BACK SURGERY      CARDIAC ELECTROPHYSIOLOGY PROCEDURE N/A 2/22/2022    Procedure: Cardiac pacer implant;  Surgeon: Raimundo Isaac MD;  Location: [facility];  Service: Cardiology    EYE SURGERY      cataracts    FL GUIDED NEEDLE PLAC BX/ASP/INJ  8/2/2022    FL GUIDED NEEDLE PLAC BX/ASP/INJ  9/1/2022    IR SACROPLASTY  3/15/2019    NERVE BLOCK Bilateral 8/2/2022    Procedure: BLOCK MEDIAL BRANCH L3, L4, L5 MBB #1;  Surgeon: Vlad Palmer MD;  Location: [facility];  Service: Pain Management     NERVE BLOCK Bilateral 9/1/2022    Procedure: BLOCK MEDIAL BRANCH L3, L4, L5;  Surgeon: Vlad Palmer MD;  Location: [facility];  Service: Pain Management     [place] ARTHROCENTESIS ASPIR&/INJ MAJOR JT/BURSA W/O US Right 12/22/2022    Procedure: INJECTION JOINT HIP;  Surgeon: Vlad Palmer MD;  Location: [facility];  Service: Pain Management     [place] ARTHROCENTESIS ASPIR&/INJ MAJOR JT/BURSA W/O US Right 6/6/2023    Procedure: INJECTION JOINT RIGHT GTB INJECTION;  Surgeon: Vlad Palmer MD;  Location: [facility];  Service: Pain Management     RHIZOTOMY Bilateral 11/17/2022    Procedure: RHIZOTOMY LUMBAR L3, L4, L5 medial branch nerves;  Surgeon: Vlad Palmer MD;  Location: [facility];  Service: Pain Management        Current Outpatient Medications:     acetaminophen (TYLENOL) 500 mg tablet, Take 2 tablets (1,000 mg total) by mouth every 6 (six) hours as needed for mild pain or moderate pain, Disp: 30 tablet, Rfl: 0    amLODIPine (NORVASC) 5 mg tablet, Take 1 tablet (5 mg total) by mouth daily, Disp: 90 tablet, Rfl: 3    aspirin (ECOTRIN LOW STRENGTH) 81 mg EC tablet, Take 1 tablet (81 mg total) by mouth daily, Disp: 30 tablet, Rfl: 11    calcium citrate-vitamin D (CITRACAL+D) 315-200 MG-UNIT per tablet, Take by mouth, Disp: , Rfl:     docusate sodium (COLACE) 100 mg capsule, Take 100 mg by  mouth daily, Disp: , Rfl:     hydrocortisone (ANUSOL-HC) 25 mg suppository, , Disp: , Rfl:     LORazepam (ATIVAN) 2 mg tablet, 3 (three) times a day prn, Disp: , Rfl:     losartan (COZAAR) 25 mg tablet, TAKE 1 TABLET BY MOUTH DAILY, Disp: 90 tablet, Rfl: 1    magnesium oxide (MAG-OX) 400 mg, Take 400 mg by mouth once, Disp: , Rfl:     metoprolol succinate (TOPROL-XL) 50 mg 24 hr tablet, Take 1 tablet (50 mg total) by mouth 2 (two) times a day, Disp: 180 tablet, Rfl: 3    Multiple Vitamins-Minerals (ICAPS AREDS 2 PO), Take 1 tablet by mouth daily , Disp: , Rfl:     pantoprazole (PROTONIX) 40 mg tablet, Take 40 mg by mouth daily, Disp: , Rfl:     polyethylene glycol (MIRALAX) 17 g packet, Take 17 g by mouth daily, Disp: , Rfl:     rivaroxaban (Xarelto) 15 mg tablet, TAKE 1 TABLET BY MOUTH DAILY  WITH DINNER, Disp: 90 tablet, Rfl: 1  Allergies   Allergen Reactions    Ciprofloxacin     Covid-19 (Mrna) Vaccine Edema     Arm and hand swelling     Epinephrine      Irregular heart ryhthm (afib) per pt.    Statins Myalgia     Patient has tried 3-4 different statins and developed muscle pain with all    Bactrim [Sulfamethoxazole-Trimethoprim] Rash    Medical Tape Rash       Labs:     Chemistry        Component Value Date/Time    K 4.2 07/12/2024 0928    K 4.5 04/03/2018 1021     07/12/2024 0928     04/03/2018 1021    CO2 30 07/12/2024 0928    CO2 29 04/03/2018 1021    BUN 30 (H) 07/12/2024 0928    BUN 15 04/03/2018 1021    CREATININE 1.17 07/12/2024 0928    CREATININE 0.80 05/29/2019 0948        Component Value Date/Time    CALCIUM 9.4 07/12/2024 0928    CALCIUM 9.1 05/27/2020 1333    ALKPHOS 58 07/12/2024 0928    ALKPHOS 62 04/03/2018 1021    AST 24 07/12/2024 0928    AST 21 04/03/2018 1021    ALT 16 07/12/2024 0928    ALT 18 04/03/2018 1021        Lipid panel 6/8/2024: C 246. T 177. H 59. L 152.     Imaging:   Echo 7/17/2024  LVEF 65%. Mild LVH.   Mild AI. Mild-mod MR.    Mesentic duplex 9/15/2023:  The  abdominal aorta is patent and normal in caliber.  There is a >70% stenosis in the celiac and superior mesenteric artery.  The splenic and common hepatic arteries are patent and normal in caliber.  The inferior mesenteric arteries is patent and normal in fasting state.     The left renal artery is patent.  There is a >60% stenosis in the right renal artery.     In comparison to the study on 3/21/2022, there is no significant change.     Carotid duplex 9/11/2023:  < 50% bilateral ICA stenosis     ECG 3/1/2023: Atrial-paced rhythm with prolonged AV conduction and PAC's.     VAS celiac and/or mesenteric duplex 3/21/2022:  The aorta is patent and normal in caliber. >70% stenosis in the celiac artery. 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      ECG 3/1/2022: Atrial-paced rhythm with prolonged AV conduction. Left ventricular hypertrophy. Rate 73 bpm.     Lexiscan nuclear stress test 11/2/2021:  Normal stress perfusion pattern  Hyperdynamic LV function gated analysis  Low risk perfusion pattern  Stress ECG: No ECG changes meeting criteria for ischemia.  Nondiagnostic ECG portion given vasodilator protocol.     ECG 10/8/2021: Sinus bradycardia, rate 53, cannot rule out septal infarct, no significant change from previous ECG on 8/10/2021     24 hour holter monitor 9/7/2021:  Predominantly sinus rhythm, HR varied from 39 bpm to 103 bpm.  The patient’s average heart rate was 56 bpm.    17 ventricular ectopic activity. 6195 (7.9%)  supraventricular ectopy.  Longest R/R interval was 1.8 seconds.     ZIO 6/10-6/17/2021:   Min HR of 35, max HR of 193, and avg HR of 59 bpm.  Predominant underlying rhythm was Sinus Rhythm.   1 run of Ventricular Tachycardia occurred lasting 6 beats with a max rate of 193 bpm (avg 177 bpm).   14 Supraventricular Tachycardia runs occurred, the run with the longest lasting 20 beats with an avg rate of 115 bpm.   Occasional PAC's (3.9%, 38045). Rare PVC's (<1.0%).     Echocardiogram 4/2/2021  "([facility]): EF 55-60%. Grade 1 diastolic dysfunction. Mild-moderate AI. Mild-moderate MR. Mild TR.     ECG 3/22/2021 ([facility]): sinus rhythm with one PAC, rate 68    Review of Systems   Constitutional: Positive for malaise/fatigue.   HENT: Negative.     Cardiovascular:  Negative for chest pain, dyspnea on exertion, irregular heartbeat, leg swelling, near-syncope, orthopnea and palpitations.   Respiratory:  Negative for cough and snoring.    Endocrine: Negative.    Skin: Negative.    Musculoskeletal: Negative.    Gastrointestinal:  Positive for bloating.        Gastric reflux   Genitourinary: Negative.    Neurological: Negative.    Psychiatric/Behavioral: Negative.         Vitals:    01/30/25 1514   BP: 126/82   Pulse: 77   Temp: 97.6 °F (36.4 °C)   SpO2: 98%     Vitals:    01/30/25 1514   Weight: 63 kg (139 lb)     Height: 5' 2\" (157.5 cm)   Body mass index is 25.42 kg/m².    Physical Exam  Vitals and nursing note reviewed.   Constitutional:       General: She is not in acute distress.     Appearance: She is well-developed. She is not diaphoretic.   HENT:      Head: Normocephalic and atraumatic.   Neck:      Vascular: No JVD.   Cardiovascular:      Rate and Rhythm: Normal rate and regular rhythm. Occasional Extrasystoles are present.     Pulses: Intact distal pulses.           Radial pulses are 2+ on the right side and 2+ on the left side.      Heart sounds: Normal heart sounds, S1 normal and S2 normal. No murmur heard.     Comments: Trivial ankle edema, left > right  Pulmonary:      Effort: Pulmonary effort is normal.      Breath sounds: Normal breath sounds.   Abdominal:      General: There is no distension.      Palpations: Abdomen is soft.      Tenderness: There is no abdominal tenderness.   Musculoskeletal:         General: Normal range of motion.      Cervical back: Normal range of motion and neck supple.      Comments: Utilizing walker for ambulation   Skin:     General: Skin is warm and dry.   Neurological:      " Mental Status: She is alert and oriented to person, place, and time.      Cranial Nerves: No cranial nerve deficit.   Psychiatric:         Behavior: Behavior normal.

## 2025-01-30 NOTE — ASSESSMENT & PLAN NOTE
Blood pressure is acceptable.  Currently on amlodipine 5 mg daily (reduced due to swelling), losartan 25 mg daily, and metoprolol succinate to 50 mg BID.  We reviewed benefits of 2 g sodium diet.

## 2025-01-30 NOTE — ASSESSMENT & PLAN NOTE
"History of paroxysmal atrial fibrillation with previous use of Tikosyn for > 10 years.  She presented with complaint of increasing \"a fib episodes\", feeling fluttering/palpitations in her chest.  ZIO monitoring from 6/10-6/17/2021 revealed no a fib, one 6-beat run of WCT/VT, 14 runs of SVT, longest 20 beats at 115 BPM, occasional PAC's (3.9%).  Echo 4/2021 with EF 55-60%  Metoprolol succinate increased in August 2021 to 25mg in am, 12.5mg in pm. Repeat 24 hour holter showed avg HR of 56bpm with 7.9% PAC burden.  She sustained recurrent syncopal events.  Lexiscan nuclear stress test 11/2/2021 without evidence of ischemia.  She was transitioned to amiodarone, which she did not tolerate.  Referred to EP for possible AV node ablation and PPM insertion.  Dr. Isaac performed pacemaker insertion on 2/22/2022 and up-titrated beta blocker.   Consider AV node ablation if a fib remains difficult to control.  9% AF burden with no high rate episodes on 11/2024 interrogation.  Continue metoprolol succinate to 50 mg BID.  Continue Magnesium supplementation at 250-500mg daily.  Continue Xarelto for anticoagulation.  Will monitor rates with upcoming device interrogations.   "

## 2025-01-30 NOTE — ASSESSMENT & PLAN NOTE
S/P insertion of Medtronic dual chamber PPM on 2/22/2022 for indication of sick sinus syndrome.  Device managed by [facility]

## 2025-01-30 NOTE — ASSESSMENT & PLAN NOTE
Bilateral lower leg edema, left arm edema.  Echo shows preserved LVEF with no significant change from 2021  BNP minimal elevated but stable.  Developed JOY with furosemide in the past.  CT imaging unremarkable.  Reduced amlodipine to 5 mg and symptoms are somewhat improved.  Will monitor

## 2025-02-14 DIAGNOSIS — K21.9 GASTRO-ESOPHAGEAL REFLUX DISEASE WITHOUT ESOPHAGITIS: ICD-10-CM

## 2025-02-14 DIAGNOSIS — R13.10 DYSPHAGIA, UNSPECIFIED: ICD-10-CM

## 2025-03-04 DIAGNOSIS — I48.0 PAROXYSMAL ATRIAL FIBRILLATION (HCC): ICD-10-CM

## 2025-03-05 RX ORDER — RIVAROXABAN 15 MG/1
15 TABLET, FILM COATED ORAL
Qty: 90 TABLET | Refills: 0 | Status: SHIPPED | OUTPATIENT
Start: 2025-03-05

## 2025-04-04 DIAGNOSIS — I48.0 PAROXYSMAL ATRIAL FIBRILLATION (HCC): ICD-10-CM

## 2025-04-04 RX ORDER — METOPROLOL SUCCINATE 50 MG/1
50 TABLET, EXTENDED RELEASE ORAL 2 TIMES DAILY
Qty: 180 TABLET | Refills: 1 | Status: SHIPPED | OUTPATIENT
Start: 2025-04-04

## (undated) DEVICE — NEEDLE 25G X 1 1/2

## (undated) DEVICE — SYRINGE 5ML LL

## (undated) DEVICE — SKIN MARKER DUAL TIP WITH RULER CAP, FLEXIBLE RULER AND LABELS: Brand: DEVON

## (undated) DEVICE — DRAPE TOWEL: Brand: CONVERTORS

## (undated) DEVICE — DRAPE SHEET THREE QUARTER

## (undated) DEVICE — GAUZE SPONGES,16 PLY: Brand: CURITY

## (undated) DEVICE — NEEDLE BLUNT 18 G X 1 1/2IN

## (undated) DEVICE — SYRINGE 10ML LL

## (undated) DEVICE — GAUZE SPONGES,USP TYPE VII GAUZE, 12 PLY: Brand: CURITY

## (undated) DEVICE — PAD GROUNDING IONIC RF DISP

## (undated) DEVICE — GLOVE SRG LF STRL BGL SKNSNS 7.5 PF

## (undated) DEVICE — NEEDLE SPINAL 22G X 3.5IN  QUINCKE

## (undated) DEVICE — CHLORAPREP HI-LITE 10.5ML ORANGE

## (undated) DEVICE — GLOVE INDICATOR PI UNDERGLOVE SZ 7.5 BLUE

## (undated) DEVICE — PLASTIC ADHESIVE BANDAGE: Brand: CURITY

## (undated) DEVICE — IV EXTENSION TUBING SMALL BORE

## (undated) DEVICE — RADIFOCUS GLIDEWIRE: Brand: GLIDEWIRE

## (undated) DEVICE — INTRO SHEATH PEEL AWAY 9 FR

## (undated) DEVICE — ELECTRODE RF 10CM

## (undated) DEVICE — UTILITY MARKER,BLACK WITH LABELS: Brand: DEVON

## (undated) DEVICE — SLITTER ADJUSTABLE

## (undated) DEVICE — NEEDLE 18 G X 1 1/2 SAFETY

## (undated) DEVICE — INTRO SHEATH PEEL AWAY 7FR

## (undated) DEVICE — TELFA ADHESIVE ISLAND DRESSING: Brand: TELFA

## (undated) DEVICE — CATH GUIDING FIXED SHAPE 43CM -NC

## (undated) DEVICE — FLEXIBLE ADHESIVE BANDAGE,X-LARGE: Brand: CURITY

## (undated) DEVICE — Device